# Patient Record
Sex: FEMALE | Race: WHITE | NOT HISPANIC OR LATINO | Employment: OTHER | URBAN - METROPOLITAN AREA
[De-identification: names, ages, dates, MRNs, and addresses within clinical notes are randomized per-mention and may not be internally consistent; named-entity substitution may affect disease eponyms.]

---

## 2016-11-21 LAB
ALT SERPL-CCNC: 134 IU/L (ref 0–32)
AST SERPL-CCNC: 96 IU/L (ref 0–40)
CHOLEST SERPL-MCNC: 234 MG/DL (ref 100–199)
CREAT SERPL-MCNC: 0.73 MG/DL (ref 0.57–1)
GFR SERPL CREATININE-BSD FRML MDRD: 85 ML/MIN/1.73M2
HBA1C MFR BLD: 6.3 % (ref 4.8–5.6)
HDLC SERPL-MCNC: 100 MG/DL
LDLC SERPL CALC-MCNC: 117 MG/DL (ref 0–99)
POTASSIUM SERPL-SCNC: 3.8 MMOL/L (ref 3.5–5.2)
TRIGL SERPL-MCNC: 86 MG/DL (ref 0–149)
TSH SERPL-ACNC: 2.77 UIU/ML (ref 0.45–4.5)

## 2017-01-16 LAB
ALT SERPL-CCNC: 145 IU/L (ref 0–32)
AST SERPL-CCNC: 116 IU/L (ref 0–40)
CREAT SERPL-MCNC: 0.91 MG/DL (ref 0.57–1)
GFR SERPL CREATININE-BSD FRML MDRD: 65 ML/MIN/1.73M2
GLUCOSE SERPL-MCNC: 111 MG/DL (ref 65–99)
HBA1C MFR BLD: 6.2 % (ref 4.8–5.6)
POTASSIUM SERPL-SCNC: 3.9 MMOL/L (ref 3.5–5.2)

## 2017-01-18 ENCOUNTER — TRANSFERRED RECORDS (OUTPATIENT)
Dept: HEALTH INFORMATION MANAGEMENT | Facility: CLINIC | Age: 67
End: 2017-01-18

## 2017-01-18 LAB — EJECTION FRACTION: 3

## 2018-01-09 ENCOUNTER — TRANSFERRED RECORDS (OUTPATIENT)
Dept: HEALTH INFORMATION MANAGEMENT | Facility: CLINIC | Age: 68
End: 2018-01-09

## 2018-01-15 ENCOUNTER — TRANSFERRED RECORDS (OUTPATIENT)
Dept: HEALTH INFORMATION MANAGEMENT | Facility: CLINIC | Age: 68
End: 2018-01-15

## 2018-01-15 LAB
CHOLEST SERPL-MCNC: 274 MG/DL (ref 100–199)
CREAT SERPL-MCNC: 0.82 MG/DL (ref 0.57–1)
GFR SERPL CREATININE-BSD FRML MDRD: 74 ML/MIN/1.73M2
HBA1C MFR BLD: 6.1 % (ref 4.8–5.6)
HDLC SERPL-MCNC: 98 MG/DL
LDLC SERPL CALC-MCNC: 164 MG/DL (ref 0–99)
POTASSIUM SERPL-SCNC: 4.3 MMOL/L (ref 3.5–5.2)
TRIGL SERPL-MCNC: 60 MG/DL (ref 0–149)
TSH SERPL-ACNC: 3.56 UIU/ML (ref 0.45–4.5)

## 2018-04-05 ENCOUNTER — OFFICE VISIT (OUTPATIENT)
Dept: ALLERGY | Facility: CLINIC | Age: 68
End: 2018-04-05
Payer: COMMERCIAL

## 2018-04-05 VITALS
HEART RATE: 72 BPM | DIASTOLIC BLOOD PRESSURE: 78 MMHG | WEIGHT: 219.2 LBS | RESPIRATION RATE: 16 BRPM | HEIGHT: 63 IN | OXYGEN SATURATION: 95 % | SYSTOLIC BLOOD PRESSURE: 162 MMHG | TEMPERATURE: 97 F | BODY MASS INDEX: 38.84 KG/M2

## 2018-04-05 DIAGNOSIS — J31.0 CHRONIC RHINITIS, UNSPECIFIED TYPE: Primary | ICD-10-CM

## 2018-04-05 PROCEDURE — 99204 OFFICE O/P NEW MOD 45 MIN: CPT | Performed by: ALLERGY & IMMUNOLOGY

## 2018-04-05 RX ORDER — AMLODIPINE BESYLATE 5 MG/1
TABLET ORAL
Refills: 4 | COMMUNITY
Start: 2018-02-28 | End: 2018-07-27

## 2018-04-05 RX ORDER — AZELASTINE 1 MG/ML
1-2 SPRAY, METERED NASAL 2 TIMES DAILY
Qty: 1 BOTTLE | Refills: 11 | Status: SHIPPED | OUTPATIENT
Start: 2018-04-05 | End: 2020-07-08

## 2018-04-05 RX ORDER — ATENOLOL 25 MG/1
TABLET ORAL
Refills: 3 | COMMUNITY
Start: 2018-01-15 | End: 2018-11-29

## 2018-04-05 RX ORDER — LOSARTAN POTASSIUM AND HYDROCHLOROTHIAZIDE 12.5; 5 MG/1; MG/1
TABLET ORAL
Refills: 2 | COMMUNITY
Start: 2018-03-18 | End: 2018-08-14

## 2018-04-05 RX ORDER — SIMVASTATIN 10 MG
TABLET ORAL
Refills: 4 | COMMUNITY
Start: 2018-01-15 | End: 2019-03-04

## 2018-04-05 RX ORDER — FLUTICASONE PROPIONATE 50 MCG
1 SPRAY, SUSPENSION (ML) NASAL DAILY PRN
COMMUNITY

## 2018-04-05 RX ORDER — CETIRIZINE HYDROCHLORIDE 10 MG/1
10 TABLET ORAL DAILY
COMMUNITY
End: 2024-03-21

## 2018-04-05 RX ORDER — LEVOTHYROXINE SODIUM 112 UG/1
TABLET ORAL
Refills: 4 | COMMUNITY
Start: 2018-01-31 | End: 2019-03-04

## 2018-04-05 NOTE — MR AVS SNAPSHOT
After Visit Summary   4/5/2018    Glenda Bales    MRN: [id number]           Patient Information     Date Of Birth          1950        Visit Information        Provider Department      4/5/2018 1:00 PM Oc West MD [facility]        Today's Diagnoses     Chronic rhinitis, unspecified type    -  1      Care Instructions    If you have any questions regarding your allergies, asthma, or what we discussed during your visit today please call the allergy clinic or contact us via [facility].    [facility]: [phone]    Do not take any more zyrtec until you come back for allergy testing      Take the flonase (fluticasone) nasal spray - 1 spray in each nostril twice daily    Start the astelin (azelastine) nasal spray - 1 or 2 sprays in each nostril twice daily      You can also try the sinus rinses once a day - use this at least 15 or 20 min before using the nasal sprays          Follow-ups after your visit        Who to contact     If you have questions or need follow up information about today's clinic visit or your schedule please contact [facility] directly at [phone].  Normal or non-critical lab and imaging results will be communicated to you by MyChart, letter or phone within 4 business days after the clinic has received the results. If you do not hear from us within 7 days, please contact the clinic through [employer] or phone. If you have a critical or abnormal lab result, we will notify you by phone as soon as possible.  Submit refill requests through [facility] or call your pharmacy and they will forward the refill request to us. Please allow 3 business days for your refill to be completed.          Additional Information About Your Visit        MyChart Information     [facility] lets you send messages to your doctor, view your test results, renew your prescriptions, schedule appointments and more. To sign up, go to [email or web address] .  "Click on \"Log in\" on the left side of the screen, which will take you to the Welcome page. Then click on \"Sign up Now\" on the right side of the page.     You will be asked to enter the access code listed below, as well as some personal information. Please follow the directions to create your username and password.     Your access code is: [email or web address]  Expires: 2018  1:40 PM     Your access code will  in 90 days. If you need help or a new code, please call your [facility] or [phone].        Care EveryWhere ID     This is your Care EveryWhere ID. This could be used by other organizations to access your [place] medical records  [id number]        Your Vitals Were     Pulse Temperature Respirations Height Pulse Oximetry BMI (Body Mass Index)    72 97  F (36.1  C) (Oral) 16 1.595 m (5' 2.8\") 95% 39.08 kg/m2       Blood Pressure from Last 3 Encounters:   18 162/78    Weight from Last 3 Encounters:   18 99.4 kg (219 lb 3.2 oz)              Today, you had the following     No orders found for display         Today's Medication Changes          These changes are accurate as of 18  1:40 PM.  If you have any questions, ask your nurse or doctor.               Start taking these medicines.        Dose/Directions    azelastine 0.1 % spray   Commonly known as:  ASTELIN   Used for:  Chronic rhinitis, unspecified type   Started by:  Oc West MD        Dose:  1-2 spray   Spray 1-2 sprays into both nostrils 2 times daily   Quantity:  1 Bottle   Refills:  11            Where to get your medicines      [facility] [place] [address] [address]  [address] [address]     Phone:  [phone]     azelastine 0.1 % spray                Primary Care Provider Office Phone # Fax #    Gladis Hernandez [phone]       [facility] [address] [place] [address]  [place] " [place] [address]        Equal Access to Services     [facility]: Hadii aad ku hadronda Sorosamaria, waaxda luqadaha, qaybta kaalmada robinson, margaret garrett vianeythaddeus valenzuelayolanda ibarra panchito steiner. So [facility] [phone].    ATENCIÓN: Si habla español, tiene a whalen disposición servicios gratuitos de asistencia lingüística. Larry al [phone].    We comply with applicable federal civil rights laws and [place] laws. We do not discriminate on the basis of race, color, national origin, age, disability, sex, sexual orientation, or gender identity.            Thank you!     Thank you for choosing [facility]  for your care. Our goal is always to provide you with excellent care. Hearing back from our patients is one way we can continue to improve our services. Please take a few minutes to complete the written survey that you may receive in the mail after your visit with us. Thank you!             Your Updated Medication List - Protect others around you: Learn how to safely use, store and throw away your medicines at www.disposemymeds.org.          This list is accurate as of 4/5/18  1:40 PM.  Always use your most recent med list.                   Brand Name Dispense Instructions for use Diagnosis    ALAWAY 0.025 % Soln ophthalmic solution   Generic drug:  ketotifen      1 drop 2 times daily        amLODIPine 5 MG tablet    NORVASC     TK 1 T PO  QD        atenolol 25 MG tablet    TENORMIN     TK 1 T PO QD        azelastine 0.1 % spray    ASTELIN    1 Bottle    Spray 1-2 sprays into both nostrils 2 times daily    Chronic rhinitis, unspecified type       cetirizine 10 MG tablet    zyrTEC     Take 10 mg by mouth daily        fluticasone 50 MCG/ACT spray    FLONASE     Spray 1 spray into both nostrils daily        levothyroxine 112 MCG tablet    SYNTHROID/LEVOTHROID     TK 1 T PO  QD        losartan-hydrochlorothiazide 50-12.5 MG per tablet    HYZAAR     TK 1 T PO D        omeprazole 20 MG CR capsule    priLOSEC     TK 1 C PO D         simvastatin 10 MG tablet    ZOCOR     TK 1 T PO D HS

## 2018-04-05 NOTE — PATIENT INSTRUCTIONS
If you have any questions regarding your allergies, asthma, or what we discussed during your visit today please call the allergy clinic or contact us via [employer].    [facility]: [phone]    Do not take any more zyrtec until you come back for allergy testing      Take the flonase (fluticasone) nasal spray - 1 spray in each nostril twice daily    Start the astelin (azelastine) nasal spray - 1 or 2 sprays in each nostril twice daily      You can also try the sinus rinses once a day - use this at least 15 or 20 min before using the nasal sprays

## 2018-04-05 NOTE — LETTER
4/5/2018         RE: Glenda Bales  [address]  [place] [facility] [address]        Dear Colleague,    Thank you for referring your patient, Glenda Bales, to the [facility]. Please see a copy of my visit note below.    Glenda Bales was seen in the Allergy Clinic at [facility]. The following are my recommendations regarding her Chronic Rhinitis    1. Return for skin testing when off of antihistamines x 7 days  2. Continue fluticasone nasal spray, 1 spray in each nostril twice daily - appropriate nasal spray technique reviewed  3. Begin azelastine nasal spray, 1 to 2 sprays in each nostril twice daily  4. Recommend sinus irrigation rinse once daily  5. Patient to follow up with Primary Care provider regarding elevated blood pressure      Glenda Bales is a 67 year old female being seen today in consultation for allergies. She states she has had allergy symptoms for at least a year and needs to have this evaluated. Glenda's symptoms consist of sinus pressure and congestion, post-nasal drainage, rhinorrhea in the mornings, and she often has to cough up mucus and clear her throat. In the morning her mucus is pink/red in color otherwise it is usually greenish in color. Glenda states that her symptoms are present throughout the year without seasonal variation. She does feel that strong scents or perfumes do bother her. She does take zyrtec daily but stopped it on Sunday and hasn't noticed much difference since stopping the medication. Glenda does use alaway eye drops every morning for eye itching and feels that this is helpful. She has also been using flonase daily for a couple of years and states she can tell that her symptoms are worse if she does not use the nasal spray.      PAST MEDICAL HISTORY:  Hypertension  Hyperlipidemia  Hypothyroidism  GERD    FAMILY HISTORY:  Niece - asthma    History reviewed. No pertinent surgical history.    ENVIRONMENTAL HISTORY: [place] The home is heated with a forced air. They does not have central air conditioning. The patient's bedroom is furnished with feather/wool bedding or pillows and carpeting in bedroom.  Pets inside the house include None. There is  history of cockroach or mice infestation. There is/are 0 smokers in the house.  The house does not have a damp basement.     SOCIAL HISTORY:   Glenda is employed as . She has missed 1 days of school/work due to illness. She lives alone.    REVIEW OF SYSTEMS:  General: negative for weight gain. negative for weight loss. negative for changes in sleep.   Eyes: positive  for itching. negative for redness. negative for tearing/watering.  Ears: negative for fullness. negative for hearing loss. negative for dizziness.   Nose: negative for snoring.negative for changes in smell. positive  for drainage.   Throat: positive  for hoarseness. negative for sore throat. negative for trouble swallowing.   Lungs: negative for shortness of breath.negative for wheezing. positive  for sputum production.   Cardiovascular: negative for chest pain. positive  for swelling of ankles. negative for fast or irregular heartbeat.   Gastrointestinal: negative for nausea. negative for heartburn. negative for acid reflux.   Musculoskeletal: negative for joint pain. negative for joint stiffness. negative for joint swelling.   Neurologic: negative for seizures. negative for fainting. negative for weakness.   Psychiatric: negative for changes in mood. negative for anxiety.   Endocrine: negative for cold intolerance. negative for heat intolerance. negative for tremors.   Hematologic: negative for easy bruising. negative for easy bleeding.  Integumentary: negative for rash. negative for scaling. negative for nail changes.       Current Outpatient Prescriptions:      atenolol (TENORMIN) 25 MG tablet, TK 1 T PO QD, Disp: , Rfl: 3     levothyroxine (SYNTHROID/LEVOTHROID) 112 MCG tablet,  "TK 1 T PO  QD, Disp: , Rfl: 4     losartan-hydrochlorothiazide (HYZAAR) 50-12.5 MG per tablet, TK 1 T PO D, Disp: , Rfl: 2     omeprazole (PRILOSEC) 20 MG CR capsule, TK 1 C PO D, Disp: , Rfl: 4     amLODIPine (NORVASC) 5 MG tablet, TK 1 T PO  QD, Disp: , Rfl: 4     simvastatin (ZOCOR) 10 MG tablet, TK 1 T PO D HS, Disp: , Rfl: 4     cetirizine (ZYRTEC) 10 MG tablet, Take 10 mg by mouth daily, Disp: , Rfl:      fluticasone (FLONASE) 50 MCG/ACT spray, Spray 1 spray into both nostrils daily, Disp: , Rfl:      ketotifen (ALAWAY) 0.025 % SOLN ophthalmic solution, 1 drop 2 times daily, Disp: , Rfl:     There is no immunization history on file for this patient.  No Known Allergies      EXAM:   /82  Pulse 72  Temp 97  F (36.1  C) (Oral)  Resp 16  Ht 1.595 m (5' 2.8\")  Wt 99.4 kg (219 lb 3.2 oz)  SpO2 95%  BMI 39.08 kg/m2  GENERAL APPEARANCE: alert, cooperative and not in distress  SKIN: no rashes, no lesions  HEAD: atraumatic, normocephalic  EYES: lids and lashes normal, conjunctivae and sclerae clear, pupils equal, round, reactive to light, EOM full and intact  ENT: no scars or lesions, nasal exam showed no discharge, swelling or lesions noted, otoscopy showed external auditory canals clear, tympanic membranes normal, tongue midline and normal, soft palate, uvula, and tonsils normal  NECK: no asymmetry, masses, or scars, supple without significant adenopathy  LUNGS: unlabored respirations, no intercostal retractions or accessory muscle use, clear to auscultation without rales or wheezes  HEART: regular rate and rhythm without murmurs and normal S1 and S2  MUSCULOSKELETAL: no musculoskeletal defects are noted  NEURO: no focal deficits noted  PSYCH: does not appear depressed or anxious    WORKUP: None    ASSESSMENT/PLAN:  Glenda Bales is a 67 year old female here for evaluation of possible allergies. Skin prick testing could not be performed today due to recent antihistamine use. Her symptoms have been present " for the last year, do not vary with seasons, and have been triggered by exposure to irritants such as perfumes. Glenda was counseled that her symptoms are likely due to nonallergic rhinitis and potential causes as well as management were discussed. She is interested in trying alternative medications but requests allergy testing to definitively rule out allergic causes.    1. Return for skin testing when off of antihistamines x 7 days  2. Continue fluticasone nasal spray, 1 spray in each nostril twice daily - appropriate nasal spray technique reviewed  3. Begin azelastine nasal spray, 1 to 2 sprays in each nostril twice daily  4. Recommend sinus irrigation rinse once daily  5. Patient to follow up with Primary Care provider regarding elevated blood pressure      Oc West MD  Allergy/Immunology  [place], [place]      Chart documentation done in part with Dragon Voice Recognition Software. Although reviewed after completion, some word and grammatical errors may remain.      Again, thank you for allowing me to participate in the care of your patient.        Sincerely,        Oc West MD

## 2018-04-05 NOTE — PROGRESS NOTES
Glenda Bales was seen in the Allergy Clinic at [facility]. The following are my recommendations regarding her Chronic Rhinitis    1. Return for skin testing when off of antihistamines x 7 days  2. Continue fluticasone nasal spray, 1 spray in each nostril twice daily - appropriate nasal spray technique reviewed  3. Begin azelastine nasal spray, 1 to 2 sprays in each nostril twice daily  4. Recommend sinus irrigation rinse once daily  5. Patient to follow up with Primary Care provider regarding elevated blood pressure      Glenda Bales is a 67 year old female being seen today in consultation for allergies. She states she has had allergy symptoms for at least a year and needs to have this evaluated. Glenda's symptoms consist of sinus pressure and congestion, post-nasal drainage, rhinorrhea in the mornings, and she often has to cough up mucus and clear her throat. In the morning her mucus is pink/red in color otherwise it is usually greenish in color. Glenda states that her symptoms are present throughout the year without seasonal variation. She does feel that strong scents or perfumes do bother her. She does take zyrtec daily but stopped it on Sunday and hasn't noticed much difference since stopping the medication. Glenda does use alaway eye drops every morning for eye itching and feels that this is helpful. She has also been using flonase daily for a couple of years and states she can tell that her symptoms are worse if she does not use the nasal spray.      PAST MEDICAL HISTORY:  Hypertension  Hyperlipidemia  Hypothyroidism  GERD    FAMILY HISTORY:  Niece - asthma    History reviewed. No pertinent surgical history.    ENVIRONMENTAL HISTORY: [place] The home is heated with a forced air. They does not have central air conditioning. The patient's bedroom is furnished with feather/wool bedding or pillows and carpeting in bedroom.  Pets inside the house  include None. There is  history of cockroach or mice infestation. There is/are 0 smokers in the house.  The house does not have a damp basement.     SOCIAL HISTORY:   Glenda is employed as . She has missed 1 days of school/work due to illness. She lives alone.    REVIEW OF SYSTEMS:  General: negative for weight gain. negative for weight loss. negative for changes in sleep.   Eyes: positive  for itching. negative for redness. negative for tearing/watering.  Ears: negative for fullness. negative for hearing loss. negative for dizziness.   Nose: negative for snoring.negative for changes in smell. positive  for drainage.   Throat: positive  for hoarseness. negative for sore throat. negative for trouble swallowing.   Lungs: negative for shortness of breath.negative for wheezing. positive  for sputum production.   Cardiovascular: negative for chest pain. positive  for swelling of ankles. negative for fast or irregular heartbeat.   Gastrointestinal: negative for nausea. negative for heartburn. negative for acid reflux.   Musculoskeletal: negative for joint pain. negative for joint stiffness. negative for joint swelling.   Neurologic: negative for seizures. negative for fainting. negative for weakness.   Psychiatric: negative for changes in mood. negative for anxiety.   Endocrine: negative for cold intolerance. negative for heat intolerance. negative for tremors.   Hematologic: negative for easy bruising. negative for easy bleeding.  Integumentary: negative for rash. negative for scaling. negative for nail changes.       Current Outpatient Prescriptions:      atenolol (TENORMIN) 25 MG tablet, TK 1 T PO QD, Disp: , Rfl: 3     levothyroxine (SYNTHROID/LEVOTHROID) 112 MCG tablet, TK 1 T PO  QD, Disp: , Rfl: 4     losartan-hydrochlorothiazide (HYZAAR) 50-12.5 MG per tablet, TK 1 T PO D, Disp: , Rfl: 2     omeprazole (PRILOSEC) 20 MG CR capsule, TK 1 C PO D, Disp: , Rfl: 4     amLODIPine (NORVASC) 5 MG tablet, TK 1 T  "PO  QD, Disp: , Rfl: 4     simvastatin (ZOCOR) 10 MG tablet, TK 1 T PO D HS, Disp: , Rfl: 4     cetirizine (ZYRTEC) 10 MG tablet, Take 10 mg by mouth daily, Disp: , Rfl:      fluticasone (FLONASE) 50 MCG/ACT spray, Spray 1 spray into both nostrils daily, Disp: , Rfl:      ketotifen (ALAWAY) 0.025 % SOLN ophthalmic solution, 1 drop 2 times daily, Disp: , Rfl:     There is no immunization history on file for this patient.  No Known Allergies      EXAM:   /82  Pulse 72  Temp 97  F (36.1  C) (Oral)  Resp 16  Ht 1.595 m (5' 2.8\")  Wt 99.4 kg (219 lb 3.2 oz)  SpO2 95%  BMI 39.08 kg/m2  GENERAL APPEARANCE: alert, cooperative and not in distress  SKIN: no rashes, no lesions  HEAD: atraumatic, normocephalic  EYES: lids and lashes normal, conjunctivae and sclerae clear, pupils equal, round, reactive to light, EOM full and intact  ENT: no scars or lesions, nasal exam showed no discharge, swelling or lesions noted, otoscopy showed external auditory canals clear, tympanic membranes normal, tongue midline and normal, soft palate, uvula, and tonsils normal  NECK: no asymmetry, masses, or scars, supple without significant adenopathy  LUNGS: unlabored respirations, no intercostal retractions or accessory muscle use, clear to auscultation without rales or wheezes  HEART: regular rate and rhythm without murmurs and normal S1 and S2  MUSCULOSKELETAL: no musculoskeletal defects are noted  NEURO: no focal deficits noted  PSYCH: does not appear depressed or anxious    WORKUP: None    ASSESSMENT/PLAN:  Glenda Bales is a 67 year old female here for evaluation of possible allergies. Skin prick testing could not be performed today due to recent antihistamine use. Her symptoms have been present for the last year, do not vary with seasons, and have been triggered by exposure to irritants such as perfumes. Glenda was counseled that her symptoms are likely due to nonallergic rhinitis and potential causes as well as management were " discussed. She is interested in trying alternative medications but requests allergy testing to definitively rule out allergic causes.    1. Return for skin testing when off of antihistamines x 7 days  2. Continue fluticasone nasal spray, 1 spray in each nostril twice daily - appropriate nasal spray technique reviewed  3. Begin azelastine nasal spray, 1 to 2 sprays in each nostril twice daily  4. Recommend sinus irrigation rinse once daily  5. Patient to follow up with Primary Care provider regarding elevated blood pressure      Oc West MD  Allergy/Immunology  [facility] and [place], [place]      Chart documentation done in part with Dragon Voice Recognition Software. Although reviewed after completion, some word and grammatical errors may remain.

## 2018-04-09 ENCOUNTER — OFFICE VISIT (OUTPATIENT)
Dept: ALLERGY | Facility: CLINIC | Age: 68
End: 2018-04-09
Payer: COMMERCIAL

## 2018-04-09 VITALS
BODY MASS INDEX: 39.65 KG/M2 | DIASTOLIC BLOOD PRESSURE: 80 MMHG | TEMPERATURE: 97.6 F | HEART RATE: 71 BPM | SYSTOLIC BLOOD PRESSURE: 162 MMHG | WEIGHT: 222.4 LBS | OXYGEN SATURATION: 95 %

## 2018-04-09 DIAGNOSIS — J31.0 NONALLERGIC RHINITIS: Primary | ICD-10-CM

## 2018-04-09 PROCEDURE — 95004 PERQ TESTS W/ALRGNC XTRCS: CPT | Performed by: ALLERGY & IMMUNOLOGY

## 2018-04-09 PROCEDURE — 99207 ZZC DROP WITH A PROCEDURE: CPT | Mod: 25 | Performed by: ALLERGY & IMMUNOLOGY

## 2018-04-09 NOTE — LETTER
4/9/2018         RE: Glenda Bales  [address]  [facility] [address]        Dear Colleague,    Thank you for referring your patient, Glenda Bales, to the [facility]. Please see a copy of my visit note below.    Glenda Bales was seen in the Allergy Clinic at [facility]. The following are my recommendations regarding her Nonallergic Rhinitis    1. Continue fluticasone nasal spray, 1 spray in each nostril twice daily  2. Continue azelastine nasal spray, 1 to 2 sprays in each nostril twice daily  3. Recommend sinus irrigation rinse once daily  4. Patient to follow up with Primary Care provider regarding elevated blood pressure.  5. Follow-up in the allergy clinic as needed      Glenda Bales is a 67 year old American female who is seen today for skin testing. She has been feeling well and remained off of antihistamines x 7 days.      REVIEW OF SYSTEMS:  General: negative for weight gain. negative for weight loss. negative for changes in sleep.   Eyes: positive  for itching. negative for redness. negative for tearing/watering.  Ears: negative for fullness. negative for hearing loss. negative for dizziness.   Nose: negative for snoring.negative for changes in smell. positive for drainage.   Throat: negative for hoarseness. negative for sore throat. negative for trouble swallowing.   Lungs: negative for shortness of breath.negative for wheezing. positive  for sputum production.   Cardiovascular: negative for chest pain. positive  for swelling of ankles. negative for fast or irregular heartbeat.   Gastrointestinal: negative for nausea. negative for heartburn. negative for acid reflux.   Musculoskeletal: negative for joint pain. negative for joint stiffness. negative for joint swelling.   Neurologic: negative for seizures. negative for fainting. negative for weakness.   Psychiatric: negative for changes in mood. negative for anxiety.   Endocrine: negative for cold intolerance.  negative for heat intolerance. negative for tremors.   Hematologic: negative for easy bruising. negative for easy bleeding.  Integumentary: negative for rash. negative for scaling. negative for nail changes.       Current Outpatient Prescriptions:      atenolol (TENORMIN) 25 MG tablet, TK 1 T PO QD, Disp: , Rfl: 3     levothyroxine (SYNTHROID/LEVOTHROID) 112 MCG tablet, TK 1 T PO  QD, Disp: , Rfl: 4     losartan-hydrochlorothiazide (HYZAAR) 50-12.5 MG per tablet, TK 1 T PO D, Disp: , Rfl: 2     omeprazole (PRILOSEC) 20 MG CR capsule, TK 1 C PO D, Disp: , Rfl: 4     amLODIPine (NORVASC) 5 MG tablet, TK 1 T PO  QD, Disp: , Rfl: 4     simvastatin (ZOCOR) 10 MG tablet, TK 1 T PO D HS, Disp: , Rfl: 4     fluticasone (FLONASE) 50 MCG/ACT spray, Spray 1 spray into both nostrils daily, Disp: , Rfl:      ketotifen (ALAWAY) 0.025 % SOLN ophthalmic solution, 1 drop 2 times daily, Disp: , Rfl:      azelastine (ASTELIN) 0.1 % spray, Spray 1-2 sprays into both nostrils 2 times daily, Disp: 1 Bottle, Rfl: 11     cetirizine (ZYRTEC) 10 MG tablet, Take 10 mg by mouth daily, Disp: , Rfl:     EXAM:   /80  Pulse 71  Temp 97.6  F (36.4  C) (Oral)  Wt 100.9 kg (222 lb 6.4 oz)  SpO2 95%  BMI 39.65 kg/m2  GENERAL APPEARANCE: alert, cooperative and not in distress  SKIN: no rashes, no lesions  MUSCULOSKELETAL: no musculoskeletal defects are noted  NEURO: no focal deficits noted  PSYCH: does not appear depressed or anxious      WORKUP:  Skin testing    Skin prick testing was performed to our adult aeroallergen panel. She had negative reaction to all antigens tested with appropriate responses to controls.    ASSESSMENT/PLAN:  Glenda Bales is a 67 year old female here for skin testing. Allergy testing was negative for evidence of sensitization to seasonal or perennial aeroallergens. She reports that she feels her symptoms have improved since stopping zyrtec and adding astelin nasal spray. She has not yet tried the sinus irrigation  rinses.    1. Continue fluticasone nasal spray, 1 spray in each nostril twice daily  2. Continue azelastine nasal spray, 1 to 2 sprays in each nostril twice daily  3. Recommend sinus irrigation rinse once daily  4. Patient to follow up with Primary Care provider regarding elevated blood pressure.  5. Follow-up in the allergy clinic as needed      Oc West MD  Allergy/Immunology  [place], [place]      Chart documentation done in part with Dragon Voice Recognition Software. Although reviewed after completion, some word and grammatical errors may remain.      Again, thank you for allowing me to participate in the care of your patient.        Sincerely,        Oc West MD

## 2018-04-09 NOTE — NURSING NOTE
Per provider verbal order, placed Adult Environmental Panel scratch test.  Consent was obtained prior to procedure.  Once panels were placed, patient was monitored for 15 minutes in clinic.  Provider read test after 15 minutes..  Pt tolerated procedure well.  All questions and concerns were addressed at office visit.     Penny Rees RN

## 2018-04-09 NOTE — MR AVS SNAPSHOT
"              After Visit Summary   4/9/2018    Glenda Bales    MRN: [id number]           Patient Information     Date Of Birth          1950        Visit Information        Provider Department      4/9/2018 3:00 PM Oc West MD [facility]        Today's Diagnoses     Nonallergic rhinitis    -  1      Care Instructions    If you have any questions regarding your allergies, asthma, or what we discussed during your visit today please call the allergy clinic or contact us via [employer].    [facility] ([place], [place]): [phone]  [facility]: [phone]            Follow-ups after your visit        Who to contact     If you have questions or need follow up information about today's clinic visit or your schedule please contact [facility] directly at [phone].  Normal or non-critical lab and imaging results will be communicated to you by MyChart, letter or phone within 4 business days after the clinic has received the results. If you do not hear from us within 7 days, please contact the clinic through [employer] or phone. If you have a critical or abnormal lab result, we will notify you by phone as soon as possible.  Submit refill requests through [employer] or call your pharmacy and they will forward the refill request to us. Please allow 3 business days for your refill to be completed.          Additional Information About Your Visit        MyChart Information     [employer] lets you send messages to your doctor, view your test results, renew your prescriptions, schedule appointments and more. To sign up, go to [email or web address] . Click on \"Log in\" on the left side of the screen, which will take you to the Welcome page. Then click on \"Sign up Now\" on the right side of the page.     You will be asked to enter the access code listed below, as well as some personal information. Please follow the directions to create your username and password.   "   Your access code is: [email or web address]  Expires: 2018  1:40 PM     Your access code will  in 90 days. If you need help or a new code, please call your [place] [facility] clinic or [phone].        Care EveryWhere ID     This is your Care EveryWhere ID. This could be used by other organizations to access your [facility] medical records  [id number]        Your Vitals Were     Pulse Temperature Pulse Oximetry BMI (Body Mass Index)          71 97.6  F (36.4  C) (Oral) 95% 39.65 kg/m2         Blood Pressure from Last 3 Encounters:   18 162/80   18 162/78    Weight from Last 3 Encounters:   18 100.9 kg (222 lb 6.4 oz)   18 99.4 kg (219 lb 3.2 oz)              We Performed the Following     ALLERGY SKIN TESTS,ALLERGENS        Primary Care Provider Office Phone # Fax #    Gladis Hernandez [phone]       [facility] [address] [facility] [address]        Equal Access to Services     [facility]: Hadii aad ku hadasho Soomaali, waaxda luqadaha, qaybta kaalmada adeegyamarshall, margaret flanagan . So [facility] [phone].    ATENCIÓN: Si habla español, tiene a whalen disposición servicios gratuitos de asistencia lingüística. Larry al [phone].    We comply with applicable federal civil rights laws and [place] laws. We do not discriminate on the basis of race, color, national origin, age, disability, sex, sexual orientation, or gender identity.            Thank you!     Thank you for choosing [facility]  for your care. Our goal is always to provide you with excellent care. Hearing back from our patients is one way we can continue to improve our services. Please take a few minutes to complete the written survey that you may receive in the mail after your visit with us. Thank you!             Your Updated Medication List - Protect others around you: Learn how to safely use, store and throw away your medicines at  www.disposemymeds.org.          This list is accurate as of 4/9/18  3:39 PM.  Always use your most recent med list.                   Brand Name Dispense Instructions for use Diagnosis    ALAWAY 0.025 % Soln ophthalmic solution   Generic drug:  ketotifen      1 drop 2 times daily        amLODIPine 5 MG tablet    NORVASC     TK 1 T PO  QD        atenolol 25 MG tablet    TENORMIN     TK 1 T PO QD        azelastine 0.1 % spray    ASTELIN    1 Bottle    Spray 1-2 sprays into both nostrils 2 times daily    Chronic rhinitis, unspecified type       cetirizine 10 MG tablet    zyrTEC     Take 10 mg by mouth daily        fluticasone 50 MCG/ACT spray    FLONASE     Spray 1 spray into both nostrils daily        levothyroxine 112 MCG tablet    SYNTHROID/LEVOTHROID     TK 1 T PO  QD        losartan-hydrochlorothiazide 50-12.5 MG per tablet    HYZAAR     TK 1 T PO D        omeprazole 20 MG CR capsule    priLOSEC     TK 1 C PO D        simvastatin 10 MG tablet    ZOCOR     TK 1 T PO D HS

## 2018-04-09 NOTE — PATIENT INSTRUCTIONS
If you have any questions regarding your allergies, asthma, or what we discussed during your visit today please call the allergy clinic or contact us via [employer].    [facility] ([place], [place]): [phone]  [facility]: [phone]

## 2018-04-09 NOTE — PROGRESS NOTES
Glenda Bales was seen in the Allergy Clinic at [facility]. The following are my recommendations regarding her Nonallergic Rhinitis    1. Continue fluticasone nasal spray, 1 spray in each nostril twice daily  2. Continue azelastine nasal spray, 1 to 2 sprays in each nostril twice daily  3. Recommend sinus irrigation rinse once daily  4. Patient to follow up with Primary Care provider regarding elevated blood pressure.  5. Follow-up in the allergy clinic as needed      Glenda Bales is a 67 year old American female who is seen today for skin testing. She has been feeling well and remained off of antihistamines x 7 days.      REVIEW OF SYSTEMS:  General: negative for weight gain. negative for weight loss. negative for changes in sleep.   Eyes: positive  for itching. negative for redness. negative for tearing/watering.  Ears: negative for fullness. negative for hearing loss. negative for dizziness.   Nose: negative for snoring.negative for changes in smell. positive for drainage.   Throat: negative for hoarseness. negative for sore throat. negative for trouble swallowing.   Lungs: negative for shortness of breath.negative for wheezing. positive  for sputum production.   Cardiovascular: negative for chest pain. positive  for swelling of ankles. negative for fast or irregular heartbeat.   Gastrointestinal: negative for nausea. negative for heartburn. negative for acid reflux.   Musculoskeletal: negative for joint pain. negative for joint stiffness. negative for joint swelling.   Neurologic: negative for seizures. negative for fainting. negative for weakness.   Psychiatric: negative for changes in mood. negative for anxiety.   Endocrine: negative for cold intolerance. negative for heat intolerance. negative for tremors.   Hematologic: negative for easy bruising. negative for easy bleeding.  Integumentary: negative for rash. negative for scaling. negative for nail changes.       Current Outpatient  Prescriptions:      atenolol (TENORMIN) 25 MG tablet, TK 1 T PO QD, Disp: , Rfl: 3     levothyroxine (SYNTHROID/LEVOTHROID) 112 MCG tablet, TK 1 T PO  QD, Disp: , Rfl: 4     losartan-hydrochlorothiazide (HYZAAR) 50-12.5 MG per tablet, TK 1 T PO D, Disp: , Rfl: 2     omeprazole (PRILOSEC) 20 MG CR capsule, TK 1 C PO D, Disp: , Rfl: 4     amLODIPine (NORVASC) 5 MG tablet, TK 1 T PO  QD, Disp: , Rfl: 4     simvastatin (ZOCOR) 10 MG tablet, TK 1 T PO D HS, Disp: , Rfl: 4     fluticasone (FLONASE) 50 MCG/ACT spray, Spray 1 spray into both nostrils daily, Disp: , Rfl:      ketotifen (ALAWAY) 0.025 % SOLN ophthalmic solution, 1 drop 2 times daily, Disp: , Rfl:      azelastine (ASTELIN) 0.1 % spray, Spray 1-2 sprays into both nostrils 2 times daily, Disp: 1 Bottle, Rfl: 11     cetirizine (ZYRTEC) 10 MG tablet, Take 10 mg by mouth daily, Disp: , Rfl:     EXAM:   /80  Pulse 71  Temp 97.6  F (36.4  C) (Oral)  Wt 100.9 kg (222 lb 6.4 oz)  SpO2 95%  BMI 39.65 kg/m2  GENERAL APPEARANCE: alert, cooperative and not in distress  SKIN: no rashes, no lesions  MUSCULOSKELETAL: no musculoskeletal defects are noted  NEURO: no focal deficits noted  PSYCH: does not appear depressed or anxious      WORKUP:  Skin testing    Skin prick testing was performed to our adult aeroallergen panel. She had negative reaction to all antigens tested with appropriate responses to controls.    ASSESSMENT/PLAN:  Glenda Bales is a 67 year old female here for skin testing. Allergy testing was negative for evidence of sensitization to seasonal or perennial aeroallergens. She reports that she feels her symptoms have improved since stopping zyrtec and adding astelin nasal spray. She has not yet tried the sinus irrigation rinses.    1. Continue fluticasone nasal spray, 1 spray in each nostril twice daily  2. Continue azelastine nasal spray, 1 to 2 sprays in each nostril twice daily  3. Recommend sinus irrigation rinse once daily  4. Patient to follow  up with Primary Care provider regarding elevated blood pressure.  5. Follow-up in the allergy clinic as needed      Oc West MD  Allergy/Immunology  [facility] and [place], [place]      Chart documentation done in part with Dragon Voice Recognition Software. Although reviewed after completion, some word and grammatical errors may remain.

## 2018-04-24 ENCOUNTER — OFFICE VISIT (OUTPATIENT)
Dept: FAMILY MEDICINE | Facility: CLINIC | Age: 68
End: 2018-04-24
Payer: COMMERCIAL

## 2018-04-24 VITALS
DIASTOLIC BLOOD PRESSURE: 80 MMHG | TEMPERATURE: 97.7 F | RESPIRATION RATE: 14 BRPM | HEART RATE: 66 BPM | OXYGEN SATURATION: 95 % | BODY MASS INDEX: 39.37 KG/M2 | WEIGHT: 222.2 LBS | SYSTOLIC BLOOD PRESSURE: 138 MMHG | HEIGHT: 63 IN

## 2018-04-24 DIAGNOSIS — Z12.31 VISIT FOR SCREENING MAMMOGRAM: ICD-10-CM

## 2018-04-24 DIAGNOSIS — Z12.11 SCREEN FOR COLON CANCER: ICD-10-CM

## 2018-04-24 DIAGNOSIS — Z11.59 NEED FOR HEPATITIS C SCREENING TEST: ICD-10-CM

## 2018-04-24 DIAGNOSIS — E78.5 HYPERLIPIDEMIA LDL GOAL <100: ICD-10-CM

## 2018-04-24 DIAGNOSIS — Z00.00 ROUTINE GENERAL MEDICAL EXAMINATION AT A HEALTH CARE FACILITY: Primary | ICD-10-CM

## 2018-04-24 DIAGNOSIS — I10 ESSENTIAL HYPERTENSION, BENIGN: ICD-10-CM

## 2018-04-24 DIAGNOSIS — Z23 ENCOUNTER FOR IMMUNIZATION: ICD-10-CM

## 2018-04-24 DIAGNOSIS — J31.0 OTHER CHRONIC RHINITIS: ICD-10-CM

## 2018-04-24 DIAGNOSIS — Z78.0 ASYMPTOMATIC POSTMENOPAUSAL STATUS: ICD-10-CM

## 2018-04-24 DIAGNOSIS — E66.01 MORBID OBESITY DUE TO EXCESS CALORIES (H): ICD-10-CM

## 2018-04-24 DIAGNOSIS — E03.9 ACQUIRED HYPOTHYROIDISM: ICD-10-CM

## 2018-04-24 PROBLEM — K21.9 GASTROESOPHAGEAL REFLUX DISEASE WITHOUT ESOPHAGITIS: Status: ACTIVE | Noted: 2018-04-24

## 2018-04-24 PROCEDURE — 90472 IMMUNIZATION ADMIN EACH ADD: CPT | Performed by: FAMILY MEDICINE

## 2018-04-24 PROCEDURE — 90715 TDAP VACCINE 7 YRS/> IM: CPT | Performed by: FAMILY MEDICINE

## 2018-04-24 PROCEDURE — 90471 IMMUNIZATION ADMIN: CPT | Performed by: FAMILY MEDICINE

## 2018-04-24 PROCEDURE — 90732 PPSV23 VACC 2 YRS+ SUBQ/IM: CPT | Performed by: FAMILY MEDICINE

## 2018-04-24 PROCEDURE — 99213 OFFICE O/P EST LOW 20 MIN: CPT | Mod: 25 | Performed by: FAMILY MEDICINE

## 2018-04-24 PROCEDURE — 99387 INIT PM E/M NEW PAT 65+ YRS: CPT | Mod: 25 | Performed by: FAMILY MEDICINE

## 2018-04-24 ASSESSMENT — PAIN SCALES - GENERAL: PAINLEVEL: NO PAIN (0)

## 2018-04-24 NOTE — PATIENT INSTRUCTIONS
Preventive Health Recommendations    Female Ages 65 +    Yearly exam:     See your health care provider every year in order to  o Review health changes.   o Discuss preventive care.    o Review your medicines if your doctor has prescribed any.      You no longer need a yearly Pap test unless you've had an abnormal Pap test in the past 10 years. If you have vaginal symptoms, such as bleeding or discharge, be sure to talk with your provider about a Pap test.      Every 1 to 2 years, have a mammogram.  If you are over 69, talk with your health care provider about whether or not you want to continue having screening mammograms.      Every 10 years, have a colonoscopy. Or, have a yearly FIT test (stool test). These exams will check for colon cancer.       Have a cholesterol test every 5 years, or more often if your doctor advises it.       Have a diabetes test (fasting glucose) every three years. If you are at risk for diabetes, you should have this test more often.       At age 65, have a bone density scan (DEXA) to check for osteoporosis (brittle bone disease).    Shots:    Get a flu shot each year.    Get a tetanus shot every 10 years.    Talk to your doctor about your pneumonia vaccines. There are now two you should receive - Pneumovax (PPSV 23) and Prevnar (PCV 13).    Talk to your doctor about the shingles vaccine.    Talk to your doctor about the hepatitis B vaccine.    Nutrition:     Eat at least 5 servings of fruits and vegetables each day.      Eat whole-grain bread, whole-wheat pasta and brown rice instead of white grains and rice.      Talk to your provider about Calcium and Vitamin D.     Lifestyle    Exercise at least 150 minutes a week (30 minutes a day, 5 days a week). This will help you control your weight and prevent disease.      Limit alcohol to one drink per day.      No smoking.       Wear sunscreen to prevent skin cancer.       See your dentist twice a year for an exam and cleaning.      See your  eye doctor every 1 to 2 years to screen for conditions such as glaucoma, macular degeneration and cataracts.  [facility]    If you have any questions regarding to your visit please contact your care team:       Team Red:   Clinic Hours Telephone Number   Dr. Carin Aparicio, NP   7am-7pm  Monday - Thursday   7am-5pm  Fridays  [phone]  (Appointment scheduling available 24/7)    Questions about your recent visit?   Team Line  [phone]   Urgent Care - [place] and [facility] - 11am-9pm Monday-Friday Saturday-Sunday- 9am-5pm   [place] - 5pm-9pm Monday-Friday Saturday-Sunday- 9am-5pm  [phone] - [place]  [phone] - [place]       What options do I have for a visit other than an office visit? We offer electronic visits (e-visits) and telephone visits, when medically appropriate.  Please check with your medical insurance to see if these types of visits are covered, as you will be responsible for any charges that are not paid by your insurance.      You can use [employer] (secure electronic communication) to access to your chart, send your primary care provider a message, or make an appointment. Ask a team member how to get started.     For a price quote for your services, please call our Consumer Price Line at [phone] or our Imaging Cost estimation line at [phone] (for imaging tests).        Allyson Beckman MA

## 2018-04-24 NOTE — MR AVS SNAPSHOT
After Visit Summary   4/24/2018    Glenda Bales    MRN: [id number]           Patient Information     Date Of Birth          1950        Visit Information        Provider Department      4/24/2018 5:00 PM Marysol Cisse MD [facility] [place]        Today's Diagnoses     Essential hypertension, benign    -  1    Screen for colon cancer        Need for prophylactic vaccination with tetanus-diphtheria (TD)        Acquired hypothyroidism        Hyperlipidemia LDL goal <100        Encounter for immunization        Other chronic rhinitis        Asymptomatic postmenopausal status        Visit for screening mammogram        Need for hepatitis C screening test          Care Instructions      Preventive Health Recommendations    Female Ages 65 +    Yearly exam:     See your health care provider every year in order to  o Review health changes.   o Discuss preventive care.    o Review your medicines if your doctor has prescribed any.      You no longer need a yearly Pap test unless you've had an abnormal Pap test in the past 10 years. If you have vaginal symptoms, such as bleeding or discharge, be sure to talk with your provider about a Pap test.      Every 1 to 2 years, have a mammogram.  If you are over 69, talk with your health care provider about whether or not you want to continue having screening mammograms.      Every 10 years, have a colonoscopy. Or, have a yearly FIT test (stool test). These exams will check for colon cancer.       Have a cholesterol test every 5 years, or more often if your doctor advises it.       Have a diabetes test (fasting glucose) every three years. If you are at risk for diabetes, you should have this test more often.       At age 65, have a bone density scan (DEXA) to check for osteoporosis (brittle bone disease).    Shots:    Get a flu shot each year.    Get a tetanus shot every 10 years.    Talk to your doctor about your pneumonia vaccines. There are now two you  should receive - Pneumovax (PPSV 23) and Prevnar (PCV 13).    Talk to your doctor about the shingles vaccine.    Talk to your doctor about the hepatitis B vaccine.    Nutrition:     Eat at least 5 servings of fruits and vegetables each day.      Eat whole-grain bread, whole-wheat pasta and brown rice instead of white grains and rice.      Talk to your provider about Calcium and Vitamin D.     Lifestyle    Exercise at least 150 minutes a week (30 minutes a day, 5 days a week). This will help you control your weight and prevent disease.      Limit alcohol to one drink per day.      No smoking.       Wear sunscreen to prevent skin cancer.       See your dentist twice a year for an exam and cleaning.      See your eye doctor every 1 to 2 years to screen for conditions such as glaucoma, macular degeneration and cataracts.  [facility]    If you have any questions regarding to your visit please contact your care team:       Team Red:   Clinic Hours Telephone Number   Dr. Carin Aparicio, NP   7am-7pm  Monday - Thursday   7am-5pm  Fridays  [phone]  (Appointment scheduling available 24/7)    Questions about your recent visit?   Team Line  [phone]   Urgent Care - [place] - 11am-9pm Monday-Friday Saturday-Sunday- 9am-5pm   [place] - 5pm-9pm Monday-Friday Saturday-Sunday- 9am-5pm  [phone] - [place]  [phone] - [place]       What options do I have for a visit other than an office visit? We offer electronic visits (e-visits) and telephone visits, when medically appropriate.  Please check with your medical insurance to see if these types of visits are covered, as you will be responsible for any charges that are not paid by your insurance.      You can use [employer] (secure electronic communication) to access to your chart, send your primary care provider a message, or make an appointment. Ask a team member how  to get started.     For a price quote for your services, please call our Consumer Price Line at [phone] or our Imaging Cost estimation line at [phone] (for imaging tests).        Allyson Beckman MA              Follow-ups after your visit        Future tests that were ordered for you today     Open Future Orders        Priority Expected Expires Ordered    DEXA HIP/PELVIS/SPINE - Future Routine  4/18/2019 4/24/2018    MA SCREENING DIGITAL BILAT - Future  (s+30) Routine  4/18/2019 4/24/2018    Hepatitis C Screen Reflex to HCV RNA Quant and Genotype Routine  5/24/2018 4/24/2018    Lipid panel reflex to direct LDL Fasting Routine  5/24/2018 4/24/2018    Fecal colorectal cancer screen (FIT) Routine 5/15/2018 7/17/2018 4/24/2018    Comprehensive metabolic panel Routine  5/24/2018 4/24/2018    TSH with free T4 reflex Routine  5/24/2018 4/24/2018    Fecal colorectal cancer screen (FIT) Routine 5/15/2018 7/17/2018 4/24/2018            Who to contact     If you have questions or need follow up information about today's clinic visit or your schedule please contact [facility] directly at [phone].  Normal or non-critical lab and imaging results will be communicated to you by kontoblickhart, letter or phone within 4 business days after the clinic has received the results. If you do not hear from us within 7 days, please contact the clinic through kontoblickhart or phone. If you have a critical or abnormal lab result, we will notify you by phone as soon as possible.  Submit refill requests through [employer] or call your pharmacy and they will forward the refill request to us. Please allow 3 business days for your refill to be completed.          Additional Information About Your Visit        kontoblickharNetechy Information     [employer] gives you secure access to your electronic health record. If you see a primary care provider, you can also send messages to your care team and make appointments. If you have questions, please call  "your primary care clinic.  If you do not have a primary care provider, please call [phone] and they will assist you.        Care EveryWhere ID     This is your Care EveryWhere ID. This could be used by other organizations to access your [place] medical records  [id number]        Your Vitals Were     Pulse Temperature Respirations Height Pulse Oximetry BMI (Body Mass Index)    66 97.7  F (36.5  C) (Oral) 14 5' 3\" (1.6 m) 95% 39.36 kg/m2       Blood Pressure from Last 3 Encounters:   04/24/18 138/80   04/09/18 162/80   04/05/18 162/78    Weight from Last 3 Encounters:   04/24/18 222 lb 3.2 oz (100.8 kg)   04/09/18 222 lb 6.4 oz (100.9 kg)   04/05/18 219 lb 3.2 oz (99.4 kg)              We Performed the Following     PNEUMOCOCCAL VACCINE,ADULT,SQ OR IM     TDAP VACCINE (ADACEL)        Primary Care Provider Office Phone # Fax #    Gladis Hernandez [phone]       [facility] [address] [address]        Equal Access to Services     [facility]: Hadii aad ku hadasho Soraffaeleali, waaxda luqadaha, qaybta kaalmada adeyolandayada, margaret steiner. So [facility] [phone].    ATENCIÓN: Si habla español, tiene a whalen disposición servicios gratuitos de asistencia lingüística. [facility] [phone].    We comply with applicable federal civil rights laws and [place] laws. We do not discriminate on the basis of race, color, national origin, age, disability, sex, sexual orientation, or gender identity.            Thank you!     Thank you for choosing [facility] [place]  for your care. Our goal is always to provide you with excellent care. Hearing back from our patients is one way we can continue to improve our services. Please take a few minutes to complete the written survey that you may receive in the mail after your visit with us. Thank you!             Your Updated Medication List - Protect others around you: Learn how to safely use, store and " throw away your medicines at www.disposemymeds.org.          This list is accurate as of 4/24/18  5:46 PM.  Always use your most recent med list.                   Brand Name Dispense Instructions for use Diagnosis    ALAWAY 0.025 % Soln ophthalmic solution   Generic drug:  ketotifen      1 drop 2 times daily        amLODIPine 5 MG tablet    NORVASC     TK 1 T PO  QD        atenolol 25 MG tablet    TENORMIN     TK 1 T PO QD        azelastine 0.1 % spray    ASTELIN    1 Bottle    Spray 1-2 sprays into both nostrils 2 times daily    Chronic rhinitis, unspecified type       cetirizine 10 MG tablet    zyrTEC     Take 10 mg by mouth daily        fluticasone 50 MCG/ACT spray    FLONASE     Spray 1 spray into both nostrils daily        levothyroxine 112 MCG tablet    SYNTHROID/LEVOTHROID     TK 1 T PO  QD        losartan-hydrochlorothiazide 50-12.5 MG per tablet    HYZAAR     TK 1 T PO D        omeprazole 20 MG CR capsule    priLOSEC     TK 1 C PO D        simvastatin 10 MG tablet    ZOCOR     TK 1 T PO D HS

## 2018-04-24 NOTE — PROGRESS NOTES
SUBJECTIVE:   Glenda Bales is a 67 year old female who presents for Preventive Visit.    She is a new patient to me.  Are you in the first 12 months of your Medicare Part B coverage?  No    Healthy Habits:    Do you get at least three servings of calcium containing foods daily (dairy, green leafy vegetables, etc.)? yes    Amount of exercise or daily activities, outside of work: none    Problems taking medications regularly No    Medication side effects: No    Have you had an eye exam in the past two years? yes    Do you see a dentist twice per year? Once a year    Do you have sleep apnea, excessive snoring or daytime drowsiness?wakes up at night      Ability to successfully perform activities of daily living: Yes, no assistance needed    Home safety:  none identified     Hearing impairment: No    Fall risk:  Fallen 2 or more times in the past year?: Yes (once)  Any fall with injury in the past year?: No    click delete button to remove this line now    COGNITIVE SCREEN  1) Repeat 3 items (Banana, Sunrise, Chair)    2) Clock draw:    3) 3 item recall: Recalls 3 objects  Results: 3 items recalled: COGNITIVE IMPAIRMENT LESS LIKELY    Mini-CogTM Copyright S Javy. Licensed by the author for use in [facility]; reprinted with permission ([email or web address]). All rights reserved.                Hyperlipidemia Follow-Up      Rate your low fat/cholesterol diet?: good    Taking statin?  Yes, no muscle aches from statin    Other lipid medications/supplements?:  none    Hypertension Follow-up      Outpatient blood pressures are not being checked.    Low Salt Diet: no added salt    Hypothyroidism Follow-up      Since last visit, patient describes the following symptoms: Weight stable, no hair loss, no skin changes, no constipation, no loose stools      Reviewed and updated as needed this visit by clinical staff         Reviewed and updated as needed this visit by Provider        Social History   Substance Use Topics      Smoking status: Former Smoker     Types: Cigarettes     Smokeless tobacco: Never Used     Alcohol use Not on file       If you drink alcohol do you typically have >3 drinks per day or >7 drinks per week? Yes - AUDIT SCORE:     No flowsheet data found.                        Today's PHQ-2 Score: No flowsheet data found.    Do you feel safe in your environment - Yes        Current providers sharing in care for this patient include:   Patient Care Team:  Gladis Hernandez MD as PCP - General (Family Practice)    The following health maintenance items are reviewed in Epic and correct as of today:  Health Maintenance   Topic Date Due     HEPATITIS C SCREENING  11/20/1967     LIPID SCREEN Q5 YR FEMALE (SYSTEM ASSIGNED)  11/20/1994     MAMMO SCREEN Q2 YR (SYSTEM ASSIGNED)  11/20/1999     COLON CANCER SCREEN (SYSTEM ASSIGNED)  11/20/1999     ADVANCE DIRECTIVE PLANNING Q5 YRS  11/20/2004     FALL RISK ASSESSMENT  11/20/2014     DEXA SCAN SCREENING (SYSTEM ASSIGNED)  11/20/2014     PNEUMOCOCCAL (1 of 2 - PCV13) 11/20/2014     TETANUS IMMUNIZATION (SYSTEM ASSIGNED)  01/30/2016     INFLUENZA VACCINE (1) 09/01/2017     Labs reviewed in EPIC  BP Readings from Last 3 Encounters:   04/24/18 138/80   04/09/18 162/80   04/05/18 162/78    Wt Readings from Last 3 Encounters:   04/24/18 222 lb 3.2 oz (100.8 kg)   04/09/18 222 lb 6.4 oz (100.9 kg)   04/05/18 219 lb 3.2 oz (99.4 kg)                  Patient Active Problem List   Diagnosis     Nonallergic rhinitis     Essential hypertension, benign     Chronic rhinitis     Acquired hypothyroidism     Hyperlipidemia LDL goal <100     Gastroesophageal reflux disease without esophagitis     Past Surgical History:   Procedure Laterality Date     NO HISTORY OF SURGERY         Social History   Substance Use Topics     Smoking status: Former Smoker     Types: Cigarettes     Smokeless tobacco: Never Used     Alcohol use Not on file     Family History   Problem Relation Age of Onset      "DIABETES Mother      Coronary Artery Disease Father      Colon Cancer No family hx of      Breast Cancer No family hx of          Current Outpatient Prescriptions   Medication Sig Dispense Refill     amLODIPine (NORVASC) 5 MG tablet TK 1 T PO  QD  4     atenolol (TENORMIN) 25 MG tablet TK 1 T PO QD  3     azelastine (ASTELIN) 0.1 % spray Spray 1-2 sprays into both nostrils 2 times daily 1 Bottle 11     cetirizine (ZYRTEC) 10 MG tablet Take 10 mg by mouth daily       fluticasone (FLONASE) 50 MCG/ACT spray Spray 1 spray into both nostrils daily       ketotifen (ALAWAY) 0.025 % SOLN ophthalmic solution 1 drop 2 times daily       levothyroxine (SYNTHROID/LEVOTHROID) 112 MCG tablet TK 1 T PO  QD  4     losartan-hydrochlorothiazide (HYZAAR) 50-12.5 MG per tablet TK 1 T PO D  2     omeprazole (PRILOSEC) 20 MG CR capsule TK 1 C PO D  4     simvastatin (ZOCOR) 10 MG tablet TK 1 T PO D HS  4     No Known Allergies  No lab results found.         ROS:  CONSTITUTIONAL: NEGATIVE for fever, chills, change in weight  INTEGUMENTARY/SKIN: NEGATIVE for worrisome rashes, moles or lesions  EYES: NEGATIVE for vision changes or irritation  ENT/MOUTH: NEGATIVE for ear, mouth and throat problems  RESP: NEGATIVE for significant cough or SOB  BREAST: NEGATIVE for masses, tenderness or discharge  CV: NEGATIVE for chest pain, palpitations or peripheral edema  GI: NEGATIVE for nausea, abdominal pain, heartburn, or change in bowel habits  : NEGATIVE for frequency, dysuria, or hematuria  MUSCULOSKELETAL: NEGATIVE for significant arthralgias or myalgia  NEURO: NEGATIVE for weakness, dizziness or paresthesias  ENDOCRINE: NEGATIVE for temperature intolerance, skin/hair changes  HEME: NEGATIVE for bleeding problems  PSYCHIATRIC: NEGATIVE for changes in mood or affect    OBJECTIVE:   There were no vitals taken for this visit. Estimated body mass index is 39.65 kg/(m^2) as calculated from the following:    Height as of 4/5/18: 5' 2.8\" (1.595 m).    " Weight as of 4/9/18: 222 lb 6.4 oz (100.9 kg).  EXAM:   GENERAL: healthy, alert and no distress  EYES: Eyes grossly normal to inspection, PERRL and conjunctivae and sclerae normal  HENT: ear canals and TM's normal, nose and mouth without ulcers or lesions  NECK: no adenopathy, no asymmetry, masses, or scars and thyroid normal to palpation  RESP: lungs clear to auscultation - no rales, rhonchi or wheezes  BREAST: normal without masses, tenderness or nipple discharge and no palpable axillary masses or adenopathy  CV: regular rate and rhythm, normal S1 S2, no S3 or S4, no murmur, click or rub, no peripheral edema and peripheral pulses strong  ABDOMEN: soft, nontender, no hepatosplenomegaly, no masses and bowel sounds normal  MS: no gross musculoskeletal defects noted, no edema  SKIN: no suspicious lesions or rashes  NEURO: Normal strength and tone, mentation intact and speech normal  PSYCH: mentation appears normal, affect normal/bright    ASSESSMENT / PLAN:   1. Routine general medical examination at a health care facility      2. Essential hypertension, benign  controlled  Labs are pending   - Comprehensive metabolic panel; Future    3. Acquired hypothyroidism  Pending   - TSH with free T4 reflex; Future    4. Hyperlipidemia LDL goal <100  Labs pending   - Lipid panel reflex to direct LDL Fasting; Future  - Comprehensive metabolic panel; Future    5. Encounter for immunization  Advised   The potential side effects of thishave been discussed with the patient.  Call if any significant problems with these are experienced.    - TDAP VACCINE (ADACEL)  - PNEUMOCOCCAL VACCINE,ADULT,SQ OR IM    6. Other chronic rhinitis  Saw Allergist    7. Asymptomatic postmenopausal status    - DEXA HIP/PELVIS/SPINE - Future; Future    8. Screen for colon cancer  Advised colonoscopy  - Fecal colorectal cancer screen (FIT); Future  - Fecal colorectal cancer screen (FIT); Future    9. Visit for screening mammogram  Advised   - MA SCREENING  "DIGITAL BILAT - Future  (s+30); Future    10. Need for hepatitis C screening test  Advised   - Hepatitis C Screen Reflex to HCV RNA Quant and Genotype; Future  COUNSELING:  Reviewed preventive health counseling, as reflected in patient instructions       Regular exercise       Healthy diet/nutrition       Vision screening       Hearing screening       Dental care       Osteoporosis Prevention/Bone Health       Colon cancer screening       The ASCVD Risk score (Stacey BUI Jr, et al., 2013) failed to calculate for the following reasons:    Cannot find a previous HDL lab    Cannot find a previous total cholesterol lab       Advanced Planning         Estimated body mass index is 39.65 kg/(m^2) as calculated from the following:    Height as of 4/5/18: 5' 2.8\" (1.595 m).    Weight as of 4/9/18: 222 lb 6.4 oz (100.9 kg).  Weight management plan: low carroll Exercise/Exercise     reports that she has quit smoking. Her smoking use included Cigarettes. She has never used smokeless tobacco.      Appropriate preventive services were discussed with this patient, including applicable screening as appropriate for cardiovascular disease, diabetes, osteopenia/osteoporosis, and glaucoma.  As appropriate for age/gender, discussed screening for colorectal cancer, prostate cancer, breast cancer, and cervical cancer. Checklist reviewing preventive services available has been given to the patient.    Reviewed patients plan of care and provided an AVS. The Intermediate Care Plan ( asthma action plan, low back pain action plan, and migraine action plan) for Glenda meets the Care Plan requirement. This Care Plan has been established and reviewed with the Patient.    Counseling Resources:  ATP IV Guidelines  Pooled Cohorts Equation Calculator  Breast Cancer Risk Calculator  FRAX Risk Assessment  ICSI Preventive Guidelines  Dietary Guidelines for Americans, 2010  USDA's MyPlate  ASA Prophylaxis  Lung CA Screening    Marysol Cisse MD  [facility] " [place]

## 2018-04-25 PROBLEM — E66.01 MORBID OBESITY DUE TO EXCESS CALORIES (H): Status: ACTIVE | Noted: 2018-04-25

## 2018-05-02 DIAGNOSIS — R73.9 HYPERGLYCEMIA: Primary | ICD-10-CM

## 2018-05-02 PROBLEM — F10.11 ALCOHOL ABUSE, IN REMISSION: Status: ACTIVE | Noted: 2018-05-02

## 2018-05-02 PROBLEM — K58.8 OTHER IRRITABLE BOWEL SYNDROME: Status: ACTIVE | Noted: 2018-05-02

## 2018-05-02 PROBLEM — F41.9 ANXIETY: Status: ACTIVE | Noted: 2018-05-02

## 2018-05-02 PROBLEM — M17.9 OSTEOARTHRITIS OF KNEE, UNSPECIFIED LATERALITY, UNSPECIFIED OSTEOARTHRITIS TYPE: Status: ACTIVE | Noted: 2018-05-02

## 2018-05-02 PROBLEM — R79.89 ELEVATED LFTS: Status: ACTIVE | Noted: 2018-05-02

## 2018-05-02 PROBLEM — I35.1 NONRHEUMATIC AORTIC VALVE INSUFFICIENCY: Status: ACTIVE | Noted: 2018-05-02

## 2018-05-14 ENCOUNTER — RADIANT APPOINTMENT (OUTPATIENT)
Dept: MAMMOGRAPHY | Facility: CLINIC | Age: 68
End: 2018-05-14
Attending: FAMILY MEDICINE
Payer: COMMERCIAL

## 2018-05-14 ENCOUNTER — RADIANT APPOINTMENT (OUTPATIENT)
Dept: BONE DENSITY | Facility: CLINIC | Age: 68
End: 2018-05-14
Attending: FAMILY MEDICINE
Payer: COMMERCIAL

## 2018-05-14 DIAGNOSIS — Z78.0 ASYMPTOMATIC POSTMENOPAUSAL STATUS: ICD-10-CM

## 2018-05-14 DIAGNOSIS — Z12.31 VISIT FOR SCREENING MAMMOGRAM: ICD-10-CM

## 2018-05-14 PROCEDURE — 77067 SCR MAMMO BI INCL CAD: CPT | Mod: TC

## 2018-05-14 PROCEDURE — 77080 DXA BONE DENSITY AXIAL: CPT | Performed by: INTERNAL MEDICINE

## 2018-05-25 DIAGNOSIS — R73.9 HYPERGLYCEMIA: ICD-10-CM

## 2018-05-25 DIAGNOSIS — Z11.59 NEED FOR HEPATITIS C SCREENING TEST: ICD-10-CM

## 2018-05-25 DIAGNOSIS — I10 ESSENTIAL HYPERTENSION, BENIGN: ICD-10-CM

## 2018-05-25 DIAGNOSIS — E03.9 ACQUIRED HYPOTHYROIDISM: ICD-10-CM

## 2018-05-25 DIAGNOSIS — E78.5 HYPERLIPIDEMIA LDL GOAL <100: ICD-10-CM

## 2018-05-25 LAB
ALBUMIN SERPL-MCNC: 3.4 G/DL (ref 3.4–5)
ALP SERPL-CCNC: 83 U/L (ref 40–150)
ALT SERPL W P-5'-P-CCNC: 86 U/L (ref 0–50)
ANION GAP SERPL CALCULATED.3IONS-SCNC: 7 MMOL/L (ref 3–14)
AST SERPL W P-5'-P-CCNC: 58 U/L (ref 0–45)
BILIRUB SERPL-MCNC: 0.4 MG/DL (ref 0.2–1.3)
BUN SERPL-MCNC: 17 MG/DL (ref 7–30)
CALCIUM SERPL-MCNC: 8.9 MG/DL (ref 8.5–10.1)
CHLORIDE SERPL-SCNC: 104 MMOL/L (ref 94–109)
CHOLEST SERPL-MCNC: 254 MG/DL
CO2 SERPL-SCNC: 28 MMOL/L (ref 20–32)
CREAT SERPL-MCNC: 0.94 MG/DL (ref 0.52–1.04)
GFR SERPL CREATININE-BSD FRML MDRD: 59 ML/MIN/1.7M2
GLUCOSE SERPL-MCNC: 96 MG/DL (ref 70–99)
HBA1C MFR BLD: 6 % (ref 0–5.6)
HCV AB SERPL QL IA: NONREACTIVE
HDLC SERPL-MCNC: 82 MG/DL
LDLC SERPL CALC-MCNC: 152 MG/DL
NONHDLC SERPL-MCNC: 172 MG/DL
POTASSIUM SERPL-SCNC: 4 MMOL/L (ref 3.4–5.3)
PROT SERPL-MCNC: 7.7 G/DL (ref 6.8–8.8)
SODIUM SERPL-SCNC: 139 MMOL/L (ref 133–144)
TRIGL SERPL-MCNC: 98 MG/DL
TSH SERPL DL<=0.005 MIU/L-ACNC: 3 MU/L (ref 0.4–4)

## 2018-05-25 PROCEDURE — 80061 LIPID PANEL: CPT | Performed by: FAMILY MEDICINE

## 2018-05-25 PROCEDURE — 86803 HEPATITIS C AB TEST: CPT | Performed by: FAMILY MEDICINE

## 2018-05-25 PROCEDURE — 80053 COMPREHEN METABOLIC PANEL: CPT | Performed by: FAMILY MEDICINE

## 2018-05-25 PROCEDURE — 36415 COLL VENOUS BLD VENIPUNCTURE: CPT | Performed by: FAMILY MEDICINE

## 2018-05-25 PROCEDURE — 84443 ASSAY THYROID STIM HORMONE: CPT | Performed by: FAMILY MEDICINE

## 2018-05-25 PROCEDURE — 83036 HEMOGLOBIN GLYCOSYLATED A1C: CPT | Performed by: FAMILY MEDICINE

## 2018-05-29 ENCOUNTER — TELEPHONE (OUTPATIENT)
Dept: FAMILY MEDICINE | Facility: CLINIC | Age: 68
End: 2018-05-29

## 2018-05-29 NOTE — TELEPHONE ENCOUNTER
Hepatitis C Screen Reflex to HCV RNA Quant and Genotype   Status:  Final result   Visible to patient:  Yes (Luket)   Dx:  Need for hepatitis C screening test Order: [id number]       Notes Recorded by Carlee Simms RN on 5/29/2018 at 4:58 PM  Message left on VM to return call to RN hotline at [phone].   Carlee Simms RN    TE started  ------    Notes Recorded by Marysol Cisse MD on 5/29/2018 at 11:17 AM  Thyroid is normal   Liver test are High  Please stop any alcohol  Repeat test in 1 month  Normal Electrolytes   The 10-year ASCVD risk score (Stacey DC Jr, et al., 2013) is: 11.6%   Please follow low cholesterol diet  I would recommend increasing your cholestero medicines when Liver test is Good  Take care  Marysol Cisse MD

## 2018-05-29 NOTE — TELEPHONE ENCOUNTER
Spoke with patient.   Results note read as written.   Patient verbalized understanding.   No further questions at this time.     Carlee Simms RN

## 2018-07-27 ENCOUNTER — OFFICE VISIT (OUTPATIENT)
Dept: FAMILY MEDICINE | Facility: CLINIC | Age: 68
End: 2018-07-27
Payer: COMMERCIAL

## 2018-07-27 VITALS
OXYGEN SATURATION: 95 % | HEIGHT: 63 IN | DIASTOLIC BLOOD PRESSURE: 82 MMHG | RESPIRATION RATE: 14 BRPM | TEMPERATURE: 99.2 F | BODY MASS INDEX: 38.8 KG/M2 | SYSTOLIC BLOOD PRESSURE: 146 MMHG | WEIGHT: 219 LBS | HEART RATE: 76 BPM

## 2018-07-27 DIAGNOSIS — E66.01 MORBID OBESITY DUE TO EXCESS CALORIES (H): ICD-10-CM

## 2018-07-27 DIAGNOSIS — J32.9 SINUSITIS, UNSPECIFIED CHRONICITY, UNSPECIFIED LOCATION: Primary | ICD-10-CM

## 2018-07-27 DIAGNOSIS — I10 ESSENTIAL HYPERTENSION, BENIGN: ICD-10-CM

## 2018-07-27 PROCEDURE — 99214 OFFICE O/P EST MOD 30 MIN: CPT | Performed by: FAMILY MEDICINE

## 2018-07-27 RX ORDER — AMLODIPINE BESYLATE 5 MG/1
TABLET ORAL
Qty: 45 TABLET | Refills: 0 | Status: SHIPPED | OUTPATIENT
Start: 2018-07-27 | End: 2018-08-14

## 2018-07-27 NOTE — MR AVS SNAPSHOT
After Visit Summary   7/27/2018    Glenda Bales    MRN: [id number]           Patient Information     Date Of Birth          1950        Visit Information        Provider Department      7/27/2018 2:20 PM Marysol Cisse MD [facility]        Today's Diagnoses     Sinusitis, unspecified chronicity, unspecified location    -  1      Care Instructions      [facility]    If you have any questions regarding to your visit please contact your care team:       Team Red:   Clinic Hours Telephone Number   Dr. Carin Aparicio, NP   7am-7pm  Monday - Thursday   7am-5pm  Fridays  [phone]  (Appointment scheduling available 24/7)    Questions about your recent visit?   Team Line  [phone]   Urgent Care - [place] and [facility] - 11am-9pm Monday-Friday Saturday-Sunday- 9am-5pm   [place] - 5pm-9pm Monday-Friday Saturday-Sunday- 9am-5pm  [phone] - [place]  [phone] - [place]       What options do I have for a visit other than an office visit? We offer electronic visits (e-visits) and telephone visits, when medically appropriate.  Please check with your medical insurance to see if these types of visits are covered, as you will be responsible for any charges that are not paid by your insurance.      You can use Placely (secure electronic communication) to access to your chart, send your primary care provider a message, or make an appointment. Ask a team member how to get started.     For a price quote for your services, please call our Consumer Price Line at       [phone] or our Imaging Cost estimation line at [phone] (for imaging tests).        Increase Norvasc to 7.5 mg daily  Please do FIT card  Follow up in 1 month Blood Pressure check  Marysol Cisse MD                Follow-ups after your visit        Who to contact     If you have questions or need follow up information about today's  "clinic visit or your schedule please contact [facility] directly at [phone].  Normal or non-critical lab and imaging results will be communicated to you by MyChart, letter or phone within 4 business days after the clinic has received the results. If you do not hear from us within 7 days, please contact the clinic through Ryonethart or phone. If you have a critical or abnormal lab result, we will notify you by phone as soon as possible.  Submit refill requests through Sherpaa or call your pharmacy and they will forward the refill request to us. Please allow 3 business days for your refill to be completed.          Additional Information About Your Visit        [employer] Information     Sherpaa gives you secure access to your electronic health record. If you see a primary care provider, you can also send messages to your care team and make appointments. If you have questions, please call your primary care clinic.  If you do not have a primary care provider, please call [phone] and they will assist you.        Care EveryWhere ID     This is your Care EveryWhere ID. This could be used by other organizations to access your [place] medical records  [id number]        Your Vitals Were     Pulse Temperature Respirations Height Pulse Oximetry BMI (Body Mass Index)    76 99.2  F (37.3  C) 14 5' 3\" (1.6 m) 95% 38.79 kg/m2       Blood Pressure from Last 3 Encounters:   07/27/18 146/82   04/24/18 138/80   04/09/18 162/80    Weight from Last 3 Encounters:   07/27/18 219 lb (99.3 kg)   04/24/18 222 lb 3.2 oz (100.8 kg)   04/09/18 222 lb 6.4 oz (100.9 kg)              Today, you had the following     No orders found for display         Today's Medication Changes          These changes are accurate as of 7/27/18  3:13 PM.  If you have any questions, ask your nurse or doctor.               Start taking these medicines.        Dose/Directions    amoxicillin-clavulanate 875-125 MG per tablet   Commonly known as:  " AUGMENTIN   Used for:  Sinusitis, unspecified chronicity, unspecified location   Started by:  Marysol Cisse MD        Dose:  1 tablet   Take 1 tablet by mouth 2 times daily for 10 days   Quantity:  20 tablet   Refills:  0         These medicines have changed or have updated prescriptions.        Dose/Directions    amLODIPine 5 MG tablet   Commonly known as:  NORVASC   This may have changed:  See the new instructions.   Used for:  Sinusitis, unspecified chronicity, unspecified location   Changed by:  Marysol Cisse MD        7.5 mg daily   Quantity:  45 tablet   Refills:  0            Where to get your medicines      These medications [id number] [id number] [facility] [address]     Phone:  [phone]     amLODIPine 5 MG tablet    amoxicillin-clavulanate 875-125 MG per tablet                Primary Care Provider Office Phone # Fax #    Gladis Hernandez [phone]       [facility] [address] [address]        Equal Access to Services     [facility]: Hadii aad ku hadasho Soomaali, waaxda luqadaha, qaybta kaalmada adeegyada, waxay garrett flanagan . So [facility] [phone].    ATENCIÓN: Si habla español, tiene a whalen disposición servicios gratuitos de asistencia lingüística. [facility] [phone].    We comply with applicable federal civil rights laws and [place] laws. We do not discriminate on the basis of race, color, national origin, age, disability, sex, sexual orientation, or gender identity.            Thank you!     Thank you for choosing [facility]  for your care. Our goal is always to provide you with excellent care. Hearing back from our patients is one way we can continue to improve our services. Please take a few minutes to complete the written survey that you may receive in the mail after your visit with us.  Thank you!             Your Updated Medication List - Protect others around you: Learn how to safely use, store and throw away your medicines at www.disposemymeds.org.          This list is accurate as of 7/27/18  3:13 PM.  Always use your most recent med list.                   Brand Name Dispense Instructions for use Diagnosis    ALAWAY 0.025 % Soln ophthalmic solution   Generic drug:  ketotifen      1 drop 2 times daily        amLODIPine 5 MG tablet    NORVASC    45 tablet    7.5 mg daily    Sinusitis, unspecified chronicity, unspecified location       amoxicillin-clavulanate 875-125 MG per tablet    AUGMENTIN    20 tablet    Take 1 tablet by mouth 2 times daily for 10 days    Sinusitis, unspecified chronicity, unspecified location       atenolol 25 MG tablet    TENORMIN     TK 1 T PO QD        azelastine 0.1 % spray    ASTELIN    1 Bottle    Spray 1-2 sprays into both nostrils 2 times daily    Chronic rhinitis, unspecified type       cetirizine 10 MG tablet    zyrTEC     Take 10 mg by mouth daily        fluticasone 50 MCG/ACT spray    FLONASE     Spray 1 spray into both nostrils daily        levothyroxine 112 MCG tablet    SYNTHROID/LEVOTHROID     TK 1 T PO  QD        losartan-hydrochlorothiazide 50-12.5 MG per tablet    HYZAAR     TK 1 T PO D        omeprazole 20 MG CR capsule    priLOSEC     TK 1 C PO D        simvastatin 10 MG tablet    ZOCOR     TK 1 T PO D HS

## 2018-07-27 NOTE — PATIENT INSTRUCTIONS
[facility]    If you have any questions regarding to your visit please contact your care team:       Team Red:   Clinic Hours Telephone Number   Dr. Carin Aparicio, NP   7am-7pm  Monday - Thursday   7am-5pm  Fridays  [phone]  (Appointment scheduling available 24/7)    Questions about your recent visit?   Team Line  [phone]   Urgent Care - [place] and [facility] - 11am-9pm Monday-Friday Saturday-Sunday- 9am-5pm   [place] - 5pm-9pm Monday-Friday Saturday-Sunday- 9am-5pm  [phone] - [place]  [phone] - [place]       What options do I have for a visit other than an office visit? We offer electronic visits (e-visits) and telephone visits, when medically appropriate.  Please check with your medical insurance to see if these types of visits are covered, as you will be responsible for any charges that are not paid by your insurance.      You can use [employer] (secure electronic communication) to access to your chart, send your primary care provider a message, or make an appointment. Ask a team member how to get started.     For a price quote for your services, please call our Consumer Price Line at       [phone] or our Imaging Cost estimation line at [phone] (for imaging tests).        Increase Norvasc to 7.5 mg daily  Please do FIT card  Follow up in 1 month Blood Pressure check  Marysol Cisse MD

## 2018-07-27 NOTE — PROGRESS NOTES
SUBJECTIVE:   Glenda Bales is a 67 year old female who presents to clinic today for the following health issues:        Acute Illness   Acute illness concerns: sinus  Onset: 3 day    Fever: no     Chills/Sweats: no     Headache (location?): YES    Sinus Pressure:YES    Conjunctivitis:  no    Ear Pain: no    Rhinorrhea: YES    Congestion: YES    Sore Throat: YES     Cough: YES    Wheeze: no    Decreased Appetite: no    Nausea: no    Vomiting: no    Diarrhea:  no    Dysuria/Freq.: no    Fatigue/Achiness: no    Sick/Strep Exposure: no     Therapies Tried and outcome: pal,flonase          Problem list and histories reviewed & adjusted, as indicated.  Additional history: as documented    Patient Active Problem List   Diagnosis     Nonallergic rhinitis     Essential hypertension, benign     Chronic rhinitis     Acquired hypothyroidism     Hyperlipidemia LDL goal <100     Gastroesophageal reflux disease without esophagitis     BMI>35     Nonrheumatic aortic valve insufficiency     Anxiety     Hyperglycemia     Elevated LFTs     Osteoarthritis of knee, unspecified laterality, unspecified osteoarthritis type     Other irritable bowel syndrome     Alcohol abuse, in remission     Past Surgical History:   Procedure Laterality Date     NO HISTORY OF SURGERY         Social History   Substance Use Topics     Smoking status: Former Smoker     Types: Cigarettes     Quit date: 4/25/2016     Smokeless tobacco: Never Used     Alcohol use Not on file     Family History   Problem Relation Age of Onset     Diabetes Mother      Coronary Artery Disease Father      Colon Cancer No family hx of      Breast Cancer No family hx of          Current Outpatient Prescriptions   Medication Sig Dispense Refill     amLODIPine (NORVASC) 5 MG tablet 7.5 mg daily 45 tablet 0     amoxicillin-clavulanate (AUGMENTIN) 875-125 MG per tablet Take 1 tablet by mouth 2 times daily for 10 days 20 tablet 0     atenolol (TENORMIN) 25 MG tablet TK 1 T PO QD  3      cetirizine (ZYRTEC) 10 MG tablet Take 10 mg by mouth daily       fluticasone (FLONASE) 50 MCG/ACT spray Spray 1 spray into both nostrils daily       ketotifen (ALAWAY) 0.025 % SOLN ophthalmic solution 1 drop 2 times daily       levothyroxine (SYNTHROID/LEVOTHROID) 112 MCG tablet TK 1 T PO  QD  4     losartan-hydrochlorothiazide (HYZAAR) 50-12.5 MG per tablet TK 1 T PO D  2     omeprazole (PRILOSEC) 20 MG CR capsule TK 1 C PO D  4     simvastatin (ZOCOR) 10 MG tablet TK 1 T PO D HS  4     azelastine (ASTELIN) 0.1 % spray Spray 1-2 sprays into both nostrils 2 times daily (Patient not taking: Reported on 7/27/2018) 1 Bottle 11     [DISCONTINUED] amLODIPine (NORVASC) 5 MG tablet TK 1 T PO  QD  4     No Known Allergies  Recent Labs   Lab Test  05/25/18   0735 01/15/18 01/16/17 11/21/16   A1C  6.0*  6.1*  6.2*  6.3*   LDL  152*  164*   --   117*   HDL  82  98   --   100   TRIG  98  60   --   86   ALT  86*   --   145*  134*   CR  0.94  0.82  0.91  0.73   GFRESTIMATED  59*  74  65  85   GFRESTBLACK  71  85  76  99   POTASSIUM  4.0  4.3  3.9  3.8   TSH  3.00  3.560   --   2.770      BP Readings from Last 3 Encounters:   07/27/18 146/82   04/24/18 138/80   04/09/18 162/80    Wt Readings from Last 3 Encounters:   07/27/18 219 lb (99.3 kg)   04/24/18 222 lb 3.2 oz (100.8 kg)   04/09/18 222 lb 6.4 oz (100.9 kg)                  Labs reviewed in EPIC    Reviewed and updated as needed this visit by clinical staff  Tobacco  Allergies  Meds       Reviewed and updated as needed this visit by Provider         ROS:  CONSTITUTIONAL: NEGATIVE for fever, chills, change in weight  INTEGUMENTARY/SKIN: NEGATIVE for worrisome rashes, moles or lesions  ENT/MOUTH: nasal congestion  RESP:as above  CV: NEGATIVE for chest pain, palpitations or peripheral edema  GI: NEGATIVE for nausea, abdominal pain, heartburn, or change in bowel habits  MUSCULOSKELETAL: NEGATIVE for significant arthralgias or myalgia    OBJECTIVE:     /82  Pulse 76  " Temp 99.2  F (37.3  C)  Resp 14  Ht 5' 3\" (1.6 m)  Wt 219 lb (99.3 kg)  SpO2 95%  BMI 38.79 kg/m2  Body mass index is 38.79 kg/(m^2).  GENERAL: healthy, alert and no distress  EYES: Eyes grossly normal to inspection, PERRL and conjunctivae and sclerae normal  HENT: ear canals and TM's normal, nose  Congestion nd mouth without ulcers or lesions  NECK: no adenopathy, no asymmetry, masses, or scars and thyroid normal to palpation  RESP: lungs clear to auscultation - no rales, rhonchi or wheezes  CV: regular rate and rhythm, normal S1 S2, no S3 or S4, no murmur, click or rub, no peripheral edema and peripheral pulses strong  ABDOMEN: soft, nontender, no hepatosplenomegaly, no masses and bowel sounds normal  MS: no gross musculoskeletal defects noted, no edema    Diagnostic Test Results:  none     ASSESSMENT/PLAN:   (J32.9) Sinusitis, unspecified chronicity, unspecified location  (primary encounter diagnosis)  Comment: SEE EPIC care orders  The potential side effects of this medication have been discussed with the patient.  Call if any significant problems with these are experienced.    Plan: amoxicillin-clavulanate (AUGMENTIN) 875-125 MG         per tablet        Follow up 1 week if not better/sooner if worse      (I10) Essential hypertension, benign  Comment: advised increase Norvasc to 7.5 mg daily  Plan: amLODIPine (NORVASC) 5 MG tablet        Follow up BP check 1 month  Repeat /88    (E66.01) BMI>35  Comment: low carroll diet  Plan:    Advised to send the FIT card  Marysol Cisse MD  [facility]  "

## 2018-08-13 NOTE — PROGRESS NOTES
SUBJECTIVE:   Glenda Bales is a 67 year old female who presents to clinic today for the following health issues:      Edema Legs since Norvasc was increased      Duration: since 07/27/2018    Description (location/character/radiation): swelling in ankles and feet    Intensity:  moderate    Accompanying signs and symptoms: tightness of skin    History (similar episodes/previous evaluation): None    Precipitating or alleviating factors: states blood pressure medication was increased    Therapies tried and outcome: elevating feet which helps       Pt still drinks 1 glass of wince daily    Problem list and histories reviewed & adjusted, as indicated.  Additional history: as documented    Patient Active Problem List   Diagnosis     Nonallergic rhinitis     Essential hypertension, benign     Chronic rhinitis     Acquired hypothyroidism     Hyperlipidemia LDL goal <100     Gastroesophageal reflux disease without esophagitis     BMI>35     Nonrheumatic aortic valve insufficiency     Anxiety     Hyperglycemia     Elevated LFTs     Osteoarthritis of knee, unspecified laterality, unspecified osteoarthritis type     Other irritable bowel syndrome     Alcohol abuse, in remission     Past Surgical History:   Procedure Laterality Date     NO HISTORY OF SURGERY         Social History   Substance Use Topics     Smoking status: Former Smoker     Types: Cigarettes     Quit date: 4/25/2016     Smokeless tobacco: Never Used     Alcohol use Not on file     Family History   Problem Relation Age of Onset     Diabetes Mother      Coronary Artery Disease Father      Colon Cancer No family hx of      Breast Cancer No family hx of          Current Outpatient Prescriptions   Medication Sig Dispense Refill     amLODIPine (NORVASC) 5 MG tablet Take 1 tablet (5 mg) by mouth daily 30 tablet 1     atenolol (TENORMIN) 25 MG tablet TK 1 T PO QD  3     cetirizine (ZYRTEC) 10 MG tablet Take 10 mg by mouth daily       fluticasone (FLONASE) 50 MCG/ACT  spray Spray 1 spray into both nostrils daily       ketotifen (ALAWAY) 0.025 % SOLN ophthalmic solution 1 drop 2 times daily       levothyroxine (SYNTHROID/LEVOTHROID) 112 MCG tablet TK 1 T PO  QD  4     losartan-hydrochlorothiazide (HYZAAR) 100-25 MG per tablet Take 1 tablet by mouth daily 30 tablet 0     omeprazole (PRILOSEC) 20 MG CR capsule TK 1 C PO D  4     simvastatin (ZOCOR) 10 MG tablet TK 1 T PO D HS  4     azelastine (ASTELIN) 0.1 % spray Spray 1-2 sprays into both nostrils 2 times daily (Patient not taking: Reported on 7/27/2018) 1 Bottle 11     [DISCONTINUED] amLODIPine (NORVASC) 5 MG tablet 7.5 mg daily 45 tablet 0     [DISCONTINUED] losartan-hydrochlorothiazide (HYZAAR) 50-12.5 MG per tablet TK 1 T PO D  2     No Known Allergies  Recent Labs   Lab Test  05/25/18   0735 01/15/18 01/16/17 11/21/16   A1C  6.0*  6.1*  6.2*  6.3*   LDL  152*  164*   --   117*   HDL  82  98   --   100   TRIG  98  60   --   86   ALT  86*   --   145*  134*   CR  0.94  0.82  0.91  0.73   GFRESTIMATED  59*  74  65  85   GFRESTBLACK  71  85  76  99   POTASSIUM  4.0  4.3  3.9  3.8   TSH  3.00  3.560   --   2.770      BP Readings from Last 3 Encounters:   08/14/18 146/78   07/27/18 146/82   04/24/18 138/80    Wt Readings from Last 3 Encounters:   08/14/18 221 lb (100.2 kg)   07/27/18 219 lb (99.3 kg)   04/24/18 222 lb 3.2 oz (100.8 kg)                  Labs reviewed in EPIC    Reviewed and updated as needed this visit by clinical staff       Reviewed and updated as needed this visit by Provider         ROS:  CONSTITUTIONAL: NEGATIVE for fever, chills, change in weight  ENT/MOUTH: NEGATIVE for ear, mouth and throat problems  RESP: NEGATIVE for significant cough or SOB  CV: NEGATIVE for chest pain, palpitations or PND,no Orthopnea  GI: NEGATIVE for nausea, abdominal pain, heartburn, or change in bowel habits  MUSCULOSKELETAL: NEGATIVE for significant arthralgias or myalgia    OBJECTIVE:     /78 (BP Location: Left arm, Patient  "Position: Chair, Cuff Size: Adult Large)  Pulse 73  Temp 98.8  F (37.1  C) (Oral)  Resp 20  Ht 5' 3\" (1.6 m)  Wt 221 lb (100.2 kg)  SpO2 95%  BMI 39.15 kg/m2  Body mass index is 39.15 kg/(m^2).  GENERAL: healthy, alert and no distress  NECK: no adenopathy, no asymmetry, masses, or scars and thyroid normal to palpation  RESP: lungs clear to auscultation - no rales, rhonchi or wheezes  CV: regular rate and rhythm, normal S1 S2, no S3 or S4, no murmur, click or rub, no peripheral edema and peripheral pulses strong  ABDOMEN: soft, nontender, no hepatosplenomegaly, no masses and bowel sounds normal  MS: 1 + pedal edema    Diagnostic Test Results:  none     ASSESSMENT/PLAN:       1. Essential hypertension, benign  Advised decrease Norvasc and increase Losarten/HCTZ  SEE EPIC care orders  The potential side effects of this medication have been discussed with the patient.  Call if any significant problems with these are experienced.    - losartan-hydrochlorothiazide (HYZAAR) 100-25 MG per tablet; Take 1 tablet by mouth daily  Dispense: 30 tablet; Refill: 0  - amLODIPine (NORVASC) 5 MG tablet; Take 1 tablet (5 mg) by mouth daily  Dispense: 30 tablet; Refill: 1  Follow up  2-3 weeks  Advised stop alcohol  2. Screen for colon cancer  Advised do FIT card  Elevate legs  Marysol Cisse MD  [facility]    "

## 2018-08-14 ENCOUNTER — OFFICE VISIT (OUTPATIENT)
Dept: FAMILY MEDICINE | Facility: CLINIC | Age: 68
End: 2018-08-14
Payer: COMMERCIAL

## 2018-08-14 VITALS
OXYGEN SATURATION: 95 % | SYSTOLIC BLOOD PRESSURE: 146 MMHG | BODY MASS INDEX: 39.16 KG/M2 | HEIGHT: 63 IN | HEART RATE: 73 BPM | RESPIRATION RATE: 20 BRPM | DIASTOLIC BLOOD PRESSURE: 78 MMHG | WEIGHT: 221 LBS | TEMPERATURE: 98.8 F

## 2018-08-14 DIAGNOSIS — R60.0 PEDAL EDEMA: ICD-10-CM

## 2018-08-14 DIAGNOSIS — Z12.11 SCREEN FOR COLON CANCER: ICD-10-CM

## 2018-08-14 DIAGNOSIS — I10 ESSENTIAL HYPERTENSION, BENIGN: Primary | ICD-10-CM

## 2018-08-14 PROCEDURE — 99213 OFFICE O/P EST LOW 20 MIN: CPT | Performed by: FAMILY MEDICINE

## 2018-08-14 RX ORDER — AMLODIPINE BESYLATE 5 MG/1
5 TABLET ORAL DAILY
Qty: 30 TABLET | Refills: 1 | Status: SHIPPED | OUTPATIENT
Start: 2018-08-14 | End: 2018-08-27

## 2018-08-14 RX ORDER — LOSARTAN POTASSIUM AND HYDROCHLOROTHIAZIDE 25; 100 MG/1; MG/1
1 TABLET ORAL DAILY
Qty: 30 TABLET | Refills: 0 | Status: SHIPPED | OUTPATIENT
Start: 2018-08-14 | End: 2018-08-28

## 2018-08-14 ASSESSMENT — PAIN SCALES - GENERAL: PAINLEVEL: NO PAIN (0)

## 2018-08-14 NOTE — MR AVS SNAPSHOT
After Visit Summary   8/14/2018    Glenda Bales    MRN: [id number]           Patient Information     Date Of Birth          1950        Visit Information        Provider Department      8/14/2018 5:40 PM Marysol Cisse MD [facility]        Today's Diagnoses     Essential hypertension, benign    -  1    Screen for colon cancer           Follow-ups after your visit        Your next 10 appointments already scheduled     Aug 28, 2018  5:20 PM CDT   Office Visit with Marysol Cisse MD   [facility] ([facility]    [address] [facility] [address] [phone]           Bring a current list of meds and any records pertaining to this visit. For Physicals, please bring immunization records and any forms needing to be filled out. Please arrive 10 minutes early to complete paperwork.              Who to contact     If you have questions or need follow up information about today's clinic visit or your schedule please contact [facility] directly at [phone].  Normal or non-critical lab and imaging results will be communicated to you by fypiohart, letter or phone within 4 business days after the clinic has received the results. If you do not hear from us within 7 days, please contact the clinic through Wavebornt or phone. If you have a critical or abnormal lab result, we will notify you by phone as soon as possible.  Submit refill requests through Lecturio or call your pharmacy and they will forward the refill request to us. Please allow 3 business days for your refill to be completed.          Additional Information About Your Visit        fypiohart Information     Lecturio gives you secure access to your electronic health record. If you see a primary care provider, you can also send messages to your care team and make appointments. If you have questions, please call your primary care clinic.  If you do not have a primary care provider, please  "call [phone] and they will assist you.        Care EveryWhere ID     This is your Care EveryWhere ID. This could be used by other organizations to access your [address] medical records  [id number]        Your Vitals Were     Pulse Temperature Respirations Height Pulse Oximetry BMI (Body Mass Index)    73 98.8  F (37.1  C) (Oral) 20 5' 3\" (1.6 m) 95% 39.15 kg/m2       Blood Pressure from Last 3 Encounters:   08/14/18 146/78   07/27/18 146/82   04/24/18 138/80    Weight from Last 3 Encounters:   08/14/18 221 lb (100.2 kg)   07/27/18 219 lb (99.3 kg)   04/24/18 222 lb 3.2 oz (100.8 kg)              Today, you had the following     No orders found for display         Today's Medication Changes          These changes are accurate as of 8/14/18  6:05 PM.  If you have any questions, ask your nurse or doctor.               Start taking these medicines.        Dose/Directions    losartan-hydrochlorothiazide 100-25 MG per tablet   Commonly known as:  HYZAAR   Used for:  Essential hypertension, benign   Replaces:  losartan-hydrochlorothiazide 50-12.5 MG per tablet   Started by:  Marysol Cisse MD        Dose:  1 tablet   Take 1 tablet by mouth daily   Quantity:  30 tablet   Refills:  0         These medicines have changed or have updated prescriptions.        Dose/Directions    * amLODIPine 5 MG tablet   Commonly known as:  NORVASC   This may have changed:  Another medication with the same name was added. Make sure you understand how and when to take each.   Used for:  Essential hypertension, benign   Changed by:  Marysol Cisse MD        7.5 mg daily   Quantity:  45 tablet   Refills:  0       * amLODIPine 5 MG tablet   Commonly known as:  NORVASC   This may have changed:  You were already taking a medication with the same name, and this prescription was added. Make sure you understand how and when to take each.   Used for:  Essential hypertension, benign   Changed by:  Marysol Cisse MD        Dose:  5 mg   Take 1 tablet " (5 mg) by mouth daily   Quantity:  30 tablet   Refills:  1       * Notice:  This list has 2 medication(s) that are the same as other medications prescribed for you. Read the directions carefully, and ask your doctor or other care provider to review them with you.      Stop taking these medicines if you haven't already. Please contact your care team if you have questions.     losartan-hydrochlorothiazide 50-12.5 MG per tablet   Commonly known as:  HYZAAR   Replaced by:  losartan-hydrochlorothiazide 100-25 MG per tablet   Stopped by:  Marysol Cisse MD                Where to get your medicines      [id number] [address] [facility] [address] [facility] [address]     Phone:  [phone]     amLODIPine 5 MG tablet    losartan-hydrochlorothiazide 100-25 MG per tablet                Primary Care Provider Office Phone # Fax #    Gladis Hernandez [phone]       [facility] [address] [facility] [address]        Equal Access to Services     [facility]: Hadii aad ku hadasho Soomaali, waaxda luqadaha, qaybta kaalmada adeegyada, waxay garrett flanagan . So [facility] [phone].    ATENCIÓN: Si habla español, tiene a whalen disposición servicios gratuitos de asistencia lingüística. [facility] [phone].    We comply with applicable federal civil rights laws and [place] laws. We do not discriminate on the basis of race, color, national origin, age, disability, sex, sexual orientation, or gender identity.            Thank you!     Thank you for choosing [facility]  for your care. Our goal is always to provide you with excellent care. Hearing back from our patients is one way we can continue to improve our services. Please take a few minutes to complete the written survey that you may receive in the mail after your visit with us. Thank you!              Your Updated Medication List - Protect others around you: Learn how to safely use, store and throw away your medicines at www.disposemymeds.org.          This list is accurate as of 8/14/18  6:05 PM.  Always use your most recent med list.                   Brand Name Dispense Instructions for use Diagnosis    ALAWAY 0.025 % Soln ophthalmic solution   Generic drug:  ketotifen      1 drop 2 times daily        * amLODIPine 5 MG tablet    NORVASC    45 tablet    7.5 mg daily    Essential hypertension, benign       * amLODIPine 5 MG tablet    NORVASC    30 tablet    Take 1 tablet (5 mg) by mouth daily    Essential hypertension, benign       atenolol 25 MG tablet    TENORMIN     TK 1 T PO QD        azelastine 0.1 % nasal spray    ASTELIN    1 Bottle    Spray 1-2 sprays into both nostrils 2 times daily    Chronic rhinitis, unspecified type       cetirizine 10 MG tablet    zyrTEC     Take 10 mg by mouth daily        fluticasone 50 MCG/ACT spray    FLONASE     Spray 1 spray into both nostrils daily        levothyroxine 112 MCG tablet    SYNTHROID/LEVOTHROID     TK 1 T PO  QD        losartan-hydrochlorothiazide 100-25 MG per tablet    HYZAAR    30 tablet    Take 1 tablet by mouth daily    Essential hypertension, benign       omeprazole 20 MG CR capsule    priLOSEC     TK 1 C PO D        simvastatin 10 MG tablet    ZOCOR     TK 1 T PO D HS        * Notice:  This list has 2 medication(s) that are the same as other medications prescribed for you. Read the directions carefully, and ask your doctor or other care provider to review them with you.

## 2018-08-27 RX ORDER — LOSARTAN POTASSIUM AND HYDROCHLOROTHIAZIDE 25; 100 MG/1; MG/1
1 TABLET ORAL DAILY
Qty: 30 TABLET | Refills: 0 | Status: CANCELLED | OUTPATIENT
Start: 2018-08-27

## 2018-08-27 NOTE — PROGRESS NOTES
SUBJECTIVE:   Glenda Bales is a 67 year old female who presents to clinic today for the following health issues:        Hypertension Follow-up      Outpatient blood pressures are being checked at home.  Results are higher.    Low Salt Diet: no added salt      Amount of exercise or physical activity: None    Problems taking medications regularly: No    Medication side effects: none    Diet: low salt and low fat/cholesterol            Problem list and histories reviewed & adjusted, as indicated.  Additional history: as documented    Patient Active Problem List   Diagnosis     Nonallergic rhinitis     Essential hypertension, benign     Chronic rhinitis     Acquired hypothyroidism     Hyperlipidemia LDL goal <100     Gastroesophageal reflux disease without esophagitis     BMI>35     Nonrheumatic aortic valve insufficiency     Anxiety     Hyperglycemia     Elevated LFTs     Osteoarthritis of knee, unspecified laterality, unspecified osteoarthritis type     Other irritable bowel syndrome     Alcohol abuse, in remission     Past Surgical History:   Procedure Laterality Date     NO HISTORY OF SURGERY         Social History   Substance Use Topics     Smoking status: Former Smoker     Types: Cigarettes     Quit date: 4/25/2016     Smokeless tobacco: Never Used     Alcohol use Not on file     Family History   Problem Relation Age of Onset     Diabetes Mother      Coronary Artery Disease Father      Colon Cancer No family hx of      Breast Cancer No family hx of          Current Outpatient Prescriptions   Medication Sig Dispense Refill     amLODIPine (NORVASC) 5 MG tablet Take 1 tablet (5 mg) by mouth daily 30 tablet 1     atenolol (TENORMIN) 25 MG tablet TK 1 T PO QD  3     cetirizine (ZYRTEC) 10 MG tablet Take 10 mg by mouth daily       chlorthalidone (HYGROTON) 25 MG tablet Take 1 tablet (25 mg) by mouth daily 30 tablet 1     fluticasone (FLONASE) 50 MCG/ACT spray Spray 1 spray into both nostrils daily       ketotifen  "(ALAWAY) 0.025 % SOLN ophthalmic solution 1 drop 2 times daily       levothyroxine (SYNTHROID/LEVOTHROID) 112 MCG tablet TK 1 T PO  QD  4     losartan (COZAAR) 100 MG tablet Take 1 tablet (100 mg) by mouth daily 90 tablet 1     omeprazole (PRILOSEC) 20 MG CR capsule TK 1 C PO D  4     simvastatin (ZOCOR) 10 MG tablet TK 1 T PO D HS  4     azelastine (ASTELIN) 0.1 % spray Spray 1-2 sprays into both nostrils 2 times daily (Patient not taking: Reported on 7/27/2018) 1 Bottle 11     No Known Allergies  Recent Labs   Lab Test  05/25/18   0735 01/15/18 01/16/17 11/21/16   A1C  6.0*  6.1*  6.2*  6.3*   LDL  152*  164*   --   117*   HDL  82  98   --   100   TRIG  98  60   --   86   ALT  86*   --   145*  134*   CR  0.94  0.82  0.91  0.73   GFRESTIMATED  59*  74  65  85   GFRESTBLACK  71  85  76  99   POTASSIUM  4.0  4.3  3.9  3.8   TSH  3.00  3.560   --   2.770      BP Readings from Last 3 Encounters:   08/28/18 142/78   08/14/18 146/78   07/27/18 146/82    Wt Readings from Last 3 Encounters:   08/28/18 223 lb (101.2 kg)   08/14/18 221 lb (100.2 kg)   07/27/18 219 lb (99.3 kg)                  Labs reviewed in EPIC    Reviewed and updated as needed this visit by clinical staff       Reviewed and updated as needed this visit by Provider         ROS:  CONSTITUTIONAL: NEGATIVE for fever, chills, change in weight  ENT/MOUTH: NEGATIVE for ear, mouth and throat problems  RESP: NEGATIVE for significant cough or SOB  CV: NEGATIVE for chest pain, palpitations or peripheral edema  GI: NEGATIVE for nausea, abdominal pain, heartburn, or change in bowel habits  MUSCULOSKELETAL: NEGATIVE for significant arthralgias or myalgia    OBJECTIVE:     /78  Pulse 70  Temp 97.1  F (36.2  C)  Resp 14  Ht 5' 3\" (1.6 m)  Wt 223 lb (101.2 kg)  SpO2 94%  BMI 39.5 kg/m2  Body mass index is 39.5 kg/(m^2).  GENERAL: healthy, alert and no distress  NECK: no adenopathy, no asymmetry, masses, or scars and thyroid normal to palpation  RESP: lungs " clear to auscultation - no rales, rhonchi or wheezes  CV: regular rate and rhythm, normal S1 S2, no S3 or S4, no murmur, click or rub, no peripheral edema and peripheral pulses strong  ABDOMEN: soft, nontender, no hepatosplenomegaly, no masses and bowel sounds normal  MS: no gross musculoskeletal defects noted, no edema    Diagnostic Test Results:  Results for orders placed or performed in visit on 05/25/18   Hepatitis C Screen Reflex to HCV RNA Quant and Genotype   Result Value Ref Range    Hepatitis C Antibody Nonreactive NR^Nonreactive   Lipid panel reflex to direct LDL Fasting   Result Value Ref Range    Cholesterol 254 (H) <200 mg/dL    Triglycerides 98 <150 mg/dL    HDL Cholesterol 82 >49 mg/dL    LDL Cholesterol Calculated 152 (H) <100 mg/dL    Non HDL Cholesterol 172 (H) <130 mg/dL   Comprehensive metabolic panel   Result Value Ref Range    Sodium 139 133 - 144 mmol/L    Potassium 4.0 3.4 - 5.3 mmol/L    Chloride 104 94 - 109 mmol/L    Carbon Dioxide 28 20 - 32 mmol/L    Anion Gap 7 3 - 14 mmol/L    Glucose 96 70 - 99 mg/dL    Urea Nitrogen 17 7 - 30 mg/dL    Creatinine 0.94 0.52 - 1.04 mg/dL    GFR Estimate 59 (L) >60 mL/min/1.7m2    GFR Estimate If Black 71 >60 mL/min/1.7m2    Calcium 8.9 8.5 - 10.1 mg/dL    Bilirubin Total 0.4 0.2 - 1.3 mg/dL    Albumin 3.4 3.4 - 5.0 g/dL    Protein Total 7.7 6.8 - 8.8 g/dL    Alkaline Phosphatase 83 40 - 150 U/L    ALT 86 (H) 0 - 50 U/L    AST 58 (H) 0 - 45 U/L   TSH with free T4 reflex   Result Value Ref Range    TSH 3.00 0.40 - 4.00 mU/L   Hemoglobin A1c   Result Value Ref Range    Hemoglobin A1C 6.0 (H) 0 - 5.6 %       ASSESSMENT/PLAN:       1. Essential hypertension, benign  Advised stop Losartan/HCTZ  seemeds below  SEE [facility] care orders  The potential side effects of this medication have been discussed with the patient.  Call if any significant problems with these are experienced.  Follow up 2 weeks BP check  - amLODIPine (NORVASC) 5 MG tablet; Take 1 tablet (5 mg)  by mouth daily  Dispense: 30 tablet; Refill: 1  - losartan (COZAAR) 100 MG tablet; Take 1 tablet (100 mg) by mouth daily  Dispense: 90 tablet; Refill: 1  - chlorthalidone (HYGROTON) 25 MG tablet; Take 1 tablet (25 mg) by mouth daily  Dispense: 30 tablet; Refill: 1    2. Screen for colon cancer  Advised   Pt interested in cologuard    Pt needs CT Lung cancer screen    Marysol Cisse MD  [facility]

## 2018-08-28 ENCOUNTER — OFFICE VISIT (OUTPATIENT)
Dept: FAMILY MEDICINE | Facility: CLINIC | Age: 68
End: 2018-08-28
Payer: COMMERCIAL

## 2018-08-28 VITALS
SYSTOLIC BLOOD PRESSURE: 142 MMHG | BODY MASS INDEX: 39.51 KG/M2 | OXYGEN SATURATION: 94 % | HEART RATE: 70 BPM | HEIGHT: 63 IN | RESPIRATION RATE: 14 BRPM | DIASTOLIC BLOOD PRESSURE: 78 MMHG | WEIGHT: 223 LBS | TEMPERATURE: 97.1 F

## 2018-08-28 DIAGNOSIS — I10 ESSENTIAL HYPERTENSION, BENIGN: Primary | ICD-10-CM

## 2018-08-28 DIAGNOSIS — Z12.11 SCREEN FOR COLON CANCER: ICD-10-CM

## 2018-08-28 DIAGNOSIS — Z23 NEED FOR PROPHYLACTIC VACCINATION AND INOCULATION AGAINST INFLUENZA: ICD-10-CM

## 2018-08-28 PROCEDURE — 90662 IIV NO PRSV INCREASED AG IM: CPT | Performed by: FAMILY MEDICINE

## 2018-08-28 PROCEDURE — 90471 IMMUNIZATION ADMIN: CPT | Performed by: FAMILY MEDICINE

## 2018-08-28 PROCEDURE — 99213 OFFICE O/P EST LOW 20 MIN: CPT | Performed by: FAMILY MEDICINE

## 2018-08-28 RX ORDER — CHLORTHALIDONE 25 MG/1
25 TABLET ORAL DAILY
Qty: 30 TABLET | Refills: 1 | Status: SHIPPED | OUTPATIENT
Start: 2018-08-28 | End: 2018-09-11

## 2018-08-28 RX ORDER — LOSARTAN POTASSIUM 100 MG/1
100 TABLET ORAL DAILY
Qty: 90 TABLET | Refills: 1 | Status: SHIPPED | OUTPATIENT
Start: 2018-08-28 | End: 2018-09-11

## 2018-08-28 RX ORDER — AMLODIPINE BESYLATE 5 MG/1
5 TABLET ORAL DAILY
Qty: 30 TABLET | Refills: 1 | Status: SHIPPED | OUTPATIENT
Start: 2018-08-28 | End: 2018-09-11

## 2018-08-28 NOTE — MR AVS SNAPSHOT
After Visit Summary   8/28/2018    Glenda Bales    MRN: [id number]           Patient Information     Date Of Birth          1950        Visit Information        Provider Department      8/28/2018 5:20 PM Marysol Cisse MD [facility]        Today's Diagnoses     Essential hypertension, benign    -  1    Screen for colon cancer          Care Instructions      [facility]    If you have any questions regarding to your visit please contact your care team:       Team Red:   Clinic Hours Telephone Number   Dr. Carin Aparicio, NP   7am-7pm  Monday - Thursday   7am-5pm  Fridays  [phone]  (Appointment scheduling available 24/7)    Questions about your recent visit?   Team Line  [phone]   Urgent Care - [place] and [facility] - 11am-9pm Monday-Friday Saturday-Sunday- 9am-5pm   [place] - 5pm-9pm Monday-Friday Saturday-Sunday- 9am-5pm  [phone] - [place]  [phone] - [place]       What options do I have for a visit other than an office visit? We offer electronic visits (e-visits) and telephone visits, when medically appropriate.  Please check with your medical insurance to see if these types of visits are covered, as you will be responsible for any charges that are not paid by your insurance.      You can use SentiOne (secure electronic communication) to access to your chart, send your primary care provider a message, or make an appointment. Ask a team member how to get started.     For a price quote for your services, please call our Consumer Price Line at [phone] or our Imaging Cost estimation line at [phone] (for imaging tests).          Please stop Losatan/HCTZ  Take Losartan 100 mg daily  Take Chlorthalidone 25 mg daily  Follow up for labs and Blood Pressure check in 2 weeks  Marysol Cisse MD                Follow-ups after your visit        Who to contact     If you have  "questions or need follow up information about today's clinic visit or your schedule please contact [facility] directly at [phone].  Normal or non-critical lab and imaging results will be communicated to you by OpenNewshart, letter or phone within 4 business days after the clinic has received the results. If you do not hear from us within 7 days, please contact the clinic through OpenNewshart or phone. If you have a critical or abnormal lab result, we will notify you by phone as soon as possible.  Submit refill requests through [email or web address] or call your pharmacy and they will forward the refill request to us. Please allow 3 business days for your refill to be completed.          Additional Information About Your Visit        OpenNewsharAwesomeHighlighter Information     [email or web address] gives you secure access to your electronic health record. If you see a primary care provider, you can also send messages to your care team and make appointments. If you have questions, please call your primary care clinic.  If you do not have a primary care provider, please call [phone] and they will assist you.        Care EveryWhere ID     This is your Care EveryWhere ID. This could be used by other organizations to access your [place] medical records  [id number]        Your Vitals Were     Pulse Temperature Respirations Height Pulse Oximetry BMI (Body Mass Index)    70 97.1  F (36.2  C) 14 5' 3\" (1.6 m) 94% 39.5 kg/m2       Blood Pressure from Last 3 Encounters:   08/28/18 142/78   08/14/18 146/78   07/27/18 146/82    Weight from Last 3 Encounters:   08/28/18 223 lb (101.2 kg)   08/14/18 221 lb (100.2 kg)   07/27/18 219 lb (99.3 kg)              Today, you had the following     No orders found for display         Today's Medication Changes          These changes are accurate as of 8/28/18  6:02 PM.  If you have any questions, ask your nurse or doctor.               Start taking these medicines.        Dose/Directions    chlorthalidone 25 MG tablet "   Commonly known as:  HYGROTON   Used for:  Essential hypertension, benign   Started by:  Marysol Cisse MD        Dose:  25 mg   Take 1 tablet (25 mg) by mouth daily   Quantity:  30 tablet   Refills:  1       losartan 100 MG tablet   Commonly known as:  COZAAR   Used for:  Essential hypertension, benign   Started by:  Marysol Cisse MD        Dose:  100 mg   Take 1 tablet (100 mg) by mouth daily   Quantity:  90 tablet   Refills:  1         Stop taking these medicines if you haven't already. Please contact your care team if you have questions.     losartan-hydrochlorothiazide 100-25 MG per tablet   Commonly known as:  HYZAAR   Stopped by:  Marysol Cisse MD                Where to get your medicines      These medications were sent to [facility] [id number] [place] [address] [address] [facility] & [address] [address]     Phone:  [phone]     amLODIPine 5 MG tablet    chlorthalidone 25 MG tablet    losartan 100 MG tablet                Primary Care Provider Office Phone # Fax #    Gladis Hernandez [phone]       [facility] [address] [facility] [address]        Equal Access to Services     [facility]: Hadii aad ku hadasho Soomaali, waaxda luqadaha, qaybta kaalmada adeegyada, waxay sonyain hayaan adeyolanda ibarra laalysia steiner. So [facility] [phone].    ATENCIÓN: Si habla español, tiene a whalen disposición servicios gratuitos de asistencia lingüística. Larry al [phone].    We comply with applicable federal civil rights laws and [place] laws. We do not discriminate on the basis of race, color, national origin, age, disability, sex, sexual orientation, or gender identity.            Thank you!     Thank you for choosing [facility]  for your care. Our goal is always to provide you with excellent care. Hearing back from our patients is one way we can continue to improve our services. Please take a few  minutes to complete the written survey that you may receive in the mail after your visit with us. Thank you!             Your Updated Medication List - Protect others around you: Learn how to safely use, store and throw away your medicines at www.disposemymeds.org.          This list is accurate as of 8/28/18  6:02 PM.  Always use your most recent med list.                   Brand Name Dispense Instructions for use Diagnosis    ALAWAY 0.025 % Soln ophthalmic solution   Generic drug:  ketotifen      1 drop 2 times daily        amLODIPine 5 MG tablet    NORVASC    30 tablet    Take 1 tablet (5 mg) by mouth daily    Essential hypertension, benign       atenolol 25 MG tablet    TENORMIN     TK 1 T PO QD        azelastine 0.1 % nasal spray    ASTELIN    1 Bottle    Spray 1-2 sprays into both nostrils 2 times daily    Chronic rhinitis, unspecified type       cetirizine 10 MG tablet    zyrTEC     Take 10 mg by mouth daily        chlorthalidone 25 MG tablet    HYGROTON    30 tablet    Take 1 tablet (25 mg) by mouth daily    Essential hypertension, benign       fluticasone 50 MCG/ACT spray    FLONASE     Spray 1 spray into both nostrils daily        levothyroxine 112 MCG tablet    SYNTHROID/LEVOTHROID     TK 1 T PO  QD        losartan 100 MG tablet    COZAAR    90 tablet    Take 1 tablet (100 mg) by mouth daily    Essential hypertension, benign       omeprazole 20 MG CR capsule    priLOSEC     TK 1 C PO D        simvastatin 10 MG tablet    ZOCOR     TK 1 T PO D HS

## 2018-08-28 NOTE — PATIENT INSTRUCTIONS
[facility]    If you have any questions regarding to your visit please contact your care team:       Team Red:   Clinic Hours Telephone Number   Dr. Carin Aparicio, NP   7am-7pm  Monday - Thursday   7am-5pm  Fridays  [phone]  (Appointment scheduling available 24/7)    Questions about your recent visit?   Team Line  [phone]   Urgent Care - [place] and [facility] - 11am-9pm Monday-Friday Saturday-Sunday- 9am-5pm   [place] - 5pm-9pm Monday-Friday Saturday-Sunday- 9am-5pm  [phone] - [place]  [phone] - [place]       What options do I have for a visit other than an office visit? We offer electronic visits (e-visits) and telephone visits, when medically appropriate.  Please check with your medical insurance to see if these types of visits are covered, as you will be responsible for any charges that are not paid by your insurance.      You can use [employer] (secure electronic communication) to access to your chart, send your primary care provider a message, or make an appointment. Ask a team member how to get started.     For a price quote for your services, please call our Consumer Price Line at [phone] or our Imaging Cost estimation line at [phone] (for imaging tests).          Please stop Losatan/HCTZ  Take Losartan 100 mg daily  Take Chlorthalidone 25 mg daily  Follow up for labs and Blood Pressure check in 2 weeks  Marysol Cisse MD

## 2018-08-29 NOTE — PROGRESS NOTES

## 2018-09-04 NOTE — PROGRESS NOTES
SUBJECTIVE:   Glenda Bales is a 67 year old female who presents to clinic today for the following health issues:      Hypertension Follow-up      Outpatient blood pressures are being checked at home.  Results are 132/82, 120/78, 109/69.    Low Salt Diet: no added salt      Amount of exercise or physical activity: None    Problems taking medications regularly: No    Medication side effects: none    Diet: regular (no restrictions)        1.) Patient states she has been having tingling in feet since starting water pill and would like to have labs drawn for potassium, if possible.    Problem list and histories reviewed & adjusted, as indicated.  Additional history: as documented    Patient Active Problem List   Diagnosis     Nonallergic rhinitis     Essential hypertension, benign     Chronic rhinitis     Acquired hypothyroidism     Hyperlipidemia LDL goal <100     Gastroesophageal reflux disease without esophagitis     BMI>35     Nonrheumatic aortic valve insufficiency     Anxiety     Hyperglycemia     Elevated LFTs     Osteoarthritis of knee, unspecified laterality, unspecified osteoarthritis type     Other irritable bowel syndrome     Alcohol abuse, in remission     Past Surgical History:   Procedure Laterality Date     NO HISTORY OF SURGERY         Social History   Substance Use Topics     Smoking status: Former Smoker     Types: Cigarettes     Quit date: 4/25/2016     Smokeless tobacco: Never Used     Alcohol use Not on file     Family History   Problem Relation Age of Onset     Diabetes Mother      Coronary Artery Disease Father      Colon Cancer No family hx of      Breast Cancer No family hx of          Current Outpatient Prescriptions   Medication Sig Dispense Refill     amLODIPine (NORVASC) 5 MG tablet Take 1 tablet (5 mg) by mouth daily 90 tablet 3     atenolol (TENORMIN) 25 MG tablet TK 1 T PO QD  3     cetirizine (ZYRTEC) 10 MG tablet Take 10 mg by mouth daily       chlorthalidone (HYGROTON) 25 MG tablet  Take 1 tablet (25 mg) by mouth daily 90 tablet 3     fluticasone (FLONASE) 50 MCG/ACT spray Spray 1 spray into both nostrils daily       ketotifen (ALAWAY) 0.025 % SOLN ophthalmic solution 1 drop 2 times daily       levothyroxine (SYNTHROID/LEVOTHROID) 112 MCG tablet TK 1 T PO  QD  4     losartan (COZAAR) 100 MG tablet Take 1 tablet (100 mg) by mouth daily 90 tablet 3     omeprazole (PRILOSEC) 20 MG CR capsule TK 1 C PO D  4     azelastine (ASTELIN) 0.1 % spray Spray 1-2 sprays into both nostrils 2 times daily (Patient not taking: Reported on 7/27/2018) 1 Bottle 11     simvastatin (ZOCOR) 10 MG tablet TK 1 T PO D HS  4     [DISCONTINUED] amLODIPine (NORVASC) 5 MG tablet Take 1 tablet (5 mg) by mouth daily 30 tablet 1     [DISCONTINUED] chlorthalidone (HYGROTON) 25 MG tablet Take 1 tablet (25 mg) by mouth daily 30 tablet 1     [DISCONTINUED] losartan (COZAAR) 100 MG tablet Take 1 tablet (100 mg) by mouth daily 90 tablet 1     No Known Allergies  Recent Labs   Lab Test  05/25/18   0735 01/15/18 01/16/17 11/21/16   A1C  6.0*  6.1*  6.2*  6.3*   LDL  152*  164*   --   117*   HDL  82  98   --   100   TRIG  98  60   --   86   ALT  86*   --   145*  134*   CR  0.94  0.82  0.91  0.73   GFRESTIMATED  59*  74  65  85   GFRESTBLACK  71  85  76  99   POTASSIUM  4.0  4.3  3.9  3.8   TSH  3.00  3.560   --   2.770      BP Readings from Last 3 Encounters:   09/11/18 132/78   08/28/18 142/78   08/14/18 146/78    Wt Readings from Last 3 Encounters:   09/11/18 221 lb 9.6 oz (100.5 kg)   08/28/18 223 lb (101.2 kg)   08/14/18 221 lb (100.2 kg)                  Labs reviewed in EPIC    Reviewed and updated as needed this visit by clinical staff       Reviewed and updated as needed this visit by Provider         ROS:  CONSTITUTIONAL: NEGATIVE for fever, chills, change in weight  ENT/MOUTH: NEGATIVE for ear, mouth and throat problems  RESP: NEGATIVE for significant cough or SOB  CV: NEGATIVE for chest pain, palpitations or peripheral  "edema  GI: NEGATIVE for nausea, abdominal pain, heartburn, or change in bowel habits  ROS otherwise negative    OBJECTIVE:     /78  Pulse 70  Temp 98.2  F (36.8  C) (Oral)  Resp 18  Ht 5' 3\" (1.6 m)  Wt 221 lb 9.6 oz (100.5 kg)  SpO2 96%  Breastfeeding? No  BMI 39.25 kg/m2  Body mass index is 39.25 kg/(m^2).  GENERAL: healthy, alert and no distress  NECK: no adenopathy, no asymmetry, masses, or scars and thyroid normal to palpation  RESP: lungs clear to auscultation - no rales, rhonchi or wheezes  CV: regular rate and rhythm, normal S1 S2, no S3 or S4, no murmur, click or rub, no peripheral edema and peripheral pulses strong  ABDOMEN: soft, nontender, no hepatosplenomegaly, no masses and bowel sounds normal  MS: no gross musculoskeletal defects noted, no edema    Diagnostic Test Results:  Pending     ASSESSMENT/PLAN:     1. Essential hypertension, benign  Controlled  Will check potassium  List of Potassium foods given to pt  - Potassium  - chlorthalidone (HYGROTON) 25 MG tablet; Take 1 tablet (25 mg) by mouth daily  Dispense: 90 tablet; Refill: 3  - losartan (COZAAR) 100 MG tablet; Take 1 tablet (100 mg) by mouth daily  Dispense: 90 tablet; Refill: 3  - amLODIPine (NORVASC) 5 MG tablet; Take 1 tablet (5 mg) by mouth daily  Dispense: 90 tablet; Refill: 3  Follow up 6 months  Advised again to do Emelyn Cisse MD  [facility]    "

## 2018-09-11 ENCOUNTER — OFFICE VISIT (OUTPATIENT)
Dept: FAMILY MEDICINE | Facility: CLINIC | Age: 68
End: 2018-09-11
Payer: COMMERCIAL

## 2018-09-11 VITALS
TEMPERATURE: 98.2 F | RESPIRATION RATE: 18 BRPM | BODY MASS INDEX: 39.27 KG/M2 | HEART RATE: 70 BPM | OXYGEN SATURATION: 96 % | HEIGHT: 63 IN | DIASTOLIC BLOOD PRESSURE: 78 MMHG | SYSTOLIC BLOOD PRESSURE: 132 MMHG | WEIGHT: 221.6 LBS

## 2018-09-11 DIAGNOSIS — I10 ESSENTIAL HYPERTENSION, BENIGN: Primary | ICD-10-CM

## 2018-09-11 PROCEDURE — 36415 COLL VENOUS BLD VENIPUNCTURE: CPT | Performed by: FAMILY MEDICINE

## 2018-09-11 PROCEDURE — 84132 ASSAY OF SERUM POTASSIUM: CPT | Performed by: FAMILY MEDICINE

## 2018-09-11 PROCEDURE — 99213 OFFICE O/P EST LOW 20 MIN: CPT | Performed by: FAMILY MEDICINE

## 2018-09-11 RX ORDER — CHLORTHALIDONE 25 MG/1
25 TABLET ORAL DAILY
Qty: 90 TABLET | Refills: 3 | Status: SHIPPED | OUTPATIENT
Start: 2018-09-11 | End: 2019-03-04

## 2018-09-11 RX ORDER — LOSARTAN POTASSIUM 100 MG/1
100 TABLET ORAL DAILY
Qty: 90 TABLET | Refills: 3 | Status: SHIPPED | OUTPATIENT
Start: 2018-09-11 | End: 2018-11-20 | Stop reason: SINTOL

## 2018-09-11 RX ORDER — AMLODIPINE BESYLATE 5 MG/1
5 TABLET ORAL DAILY
Qty: 90 TABLET | Refills: 3 | Status: SHIPPED | OUTPATIENT
Start: 2018-09-11 | End: 2018-11-20

## 2018-09-11 NOTE — PATIENT INSTRUCTIONS
Discharge Instructions: Eating a High-Potassium Diet  Your healthcare provider has told you to eat a high-potassium diet. This may be because you have low levels of potassium in your blood. Or it may be because you have high blood pressure. Potassium is found in many foods. These include dairy products, nuts, seeds, and beans. It s also found in many fruits and vegetables in high amounts.  Guidelines for a high-potassium diet    Eat fruits and vegetables in their fresh or raw form most often.    Check labels for ingredients that have potassium. This includes potassium chloride. Add these items to your diet.    Try salt substitutes. Many of these have potassium.    Avoid licorice. This includes licorice root and teas that have licorice. These can reduce potassium levels in your body.  Eat plenty of the following high-potassium foods:    Fruits. Good choices are apricots (canned and fresh), bananas, cantaloupe, honeydew melon, kiwi, nectarines, oranges, orange juice, and pears. Dried fruits include apricots, dates, figs, and prunes. Prune juice also has potassium.    Vegetables. Good choices are asparagus, avocado, artichoke, broccoli, bamboo shoots, beets, [place] sprouts, cabbage, celery, chard, okra, potatoes (white and sweet), pumpkin, rutabaga, spinach (cooked), squash, and tomatoes. Tomato sauce, tomato juice, and vegetable juice cocktail are also good choices.    Chicken, fish, clams, and crab    Milk, chocolate milk, buttermilk, and soy milk    Legumes. These include black-eyed peas, chickpeas, lentils, lima beans, navy beans, red kidney beans, soybeans, and split peas.    Nuts and seeds. Try almonds, Brazil nuts, cashews, peanuts, peanut butter, pecans, pumpkin seeds, sunflower seeds, and walnuts.    Breads and cereals. These include bran and whole-grain products.    Others foods include chocolate, cocoa, coconut milk, and molasses  Follow-up  Make a follow-up appointment for a repeat test.  When to call  your healthcare provider  Call your healthcare provider right away or go to the ER if you have any of the following:    Vomiting    Extreme tiredness (fatigue)    Diarrhea    Rapid, irregular heartbeat    Shortness of breath    Chest pain    Muscle cramps, spasms, or twitching    Weakness    Paralysis   Date Last Reviewed: 11/1/2017 2000-2017 [employer]. [address] [place] [address]. All rights reserved. This information is not intended as a substitute for professional medical care. Always follow your healthcare professional's instructions.      [facility]    If you have any questions regarding to your visit please contact your care team:       Team Red:   Clinic Hours Telephone Number   Dr. Carin Aparicio, NP 7am-7pm  Monday - Thursday   7am-5pm  Fridays  [phone]  (Appointment scheduling available 24/7)   Urgent Care - [place] [facility] [place] and [place] [facility] - 11am-9pm Monday-Friday Saturday-Sunday- 9am-5pm   [place] - 5pm-9pm Monday-Friday Saturday-Sunday- 9am-5pm  [phone] - [facility]  [phone] - [place]       What options do I have for a visit other than an office visit? We offer electronic visits (e-visits) and telephone visits, when medically appropriate.  Please check with your medical insurance to see if these types of visits are covered, as you will be responsible for any charges that are not paid by your insurance.      You can use [employer] (secure electronic communication) to access to your chart, send your primary care provider a message, or make an appointment. Ask a team member how to get started.     For a price quote for your services, please call our Consumer Price Line at [phone] or our Imaging Cost estimation line at [phone] (for imaging tests).    YUNIOR Vu MA

## 2018-09-11 NOTE — MR AVS SNAPSHOT
After Visit Summary   9/11/2018    Glenda Bales    MRN: [id number]           Patient Information     Date Of Birth          1950        Visit Information        Provider Department      9/11/2018 5:40 PM Marysol Cisse MD [facility]        Today's Diagnoses     Essential hypertension, benign    -  1      Care Instructions      Discharge Instructions: Eating a High-Potassium Diet  Your healthcare provider has told you to eat a high-potassium diet. This may be because you have low levels of potassium in your blood. Or it may be because you have high blood pressure. Potassium is found in many foods. These include dairy products, nuts, seeds, and beans. It s also found in many fruits and vegetables in high amounts.  Guidelines for a high-potassium diet    Eat fruits and vegetables in their fresh or raw form most often.    Check labels for ingredients that have potassium. This includes potassium chloride. Add these items to your diet.    Try salt substitutes. Many of these have potassium.    Avoid licorice. This includes licorice root and teas that have licorice. These can reduce potassium levels in your body.  Eat plenty of the following high-potassium foods:    Fruits. Good choices are apricots (canned and fresh), bananas, cantaloupe, honeydew melon, kiwi, nectarines, oranges, orange juice, and pears. Dried fruits include apricots, dates, figs, and prunes. Prune juice also has potassium.    Vegetables. Good choices are asparagus, avocado, artichoke, broccoli, bamboo shoots, beets, [place] sprouts, cabbage, celery, chard, okra, potatoes (white and sweet), pumpkin, rutabaga, spinach (cooked), squash, and tomatoes. Tomato sauce, tomato juice, and vegetable juice cocktail are also good choices.    Chicken, fish, clams, and crab    Milk, chocolate milk, buttermilk, and soy milk    Legumes. These include black-eyed peas, chickpeas, lentils, lima beans, navy beans, red kidney beans,  soybeans, and split peas.    Nuts and seeds. Try almonds, Brazil nuts, cashews, peanuts, peanut butter, pecans, pumpkin seeds, sunflower seeds, and walnuts.    Breads and cereals. These include bran and whole-grain products.    Others foods include chocolate, cocoa, coconut milk, and molasses  Follow-up  Make a follow-up appointment for a repeat test.  When to call your healthcare provider  Call your healthcare provider right away or go to the ER if you have any of the following:    Vomiting    Extreme tiredness (fatigue)    Diarrhea    Rapid, irregular heartbeat    Shortness of breath    Chest pain    Muscle cramps, spasms, or twitching    Weakness    Paralysis   Date Last Reviewed: 11/1/2017 2000-2017 [employer]. [address] [place] [address]. All rights reserved. This information is not intended as a substitute for professional medical care. Always follow your healthcare professional's instructions.      [facility]    If you have any questions regarding to your visit please contact your care team:       Team Red:   Clinic Hours Telephone Number   Dr. Carin Aparicio, NP 7am-7pm  Monday - Thursday   7am-5pm  Fridays  [phone]  (Appointment scheduling available 24/7)   Urgent Care - [facility] - 11am-9pm Monday-Friday Saturday-Sunday- 9am-5pm   [place] - 5pm-9pm Monday-Friday Saturday-Sunday- 9am-5pm  [phone] - [place]  [phone] - [place]       What options do I have for a visit other than an office visit? We offer electronic visits (e-visits) and telephone visits, when medically appropriate.  Please check with your medical insurance to see if these types of visits are covered, as you will be responsible for any charges that are not paid by your insurance.      You can use [employer] (secure electronic communication) to access to your chart, send your primary care provider a  "message, or make an appointment. Ask a team member how to get started.     For a price quote for your services, please call our Consumer Price Line at [phone] or our Imaging Cost estimation line at [phone] (for imaging tests).    YUNIOR Vu MA            Follow-ups after your visit        Who to contact     If you have questions or need follow up information about today's clinic visit or your schedule please contact [facility] [place] directly at [phone].  Normal or non-critical lab and imaging results will be communicated to you by [employer], letter or phone within 4 business days after the clinic has received the results. If you do not hear from us within 7 days, please contact the clinic through [employer] or phone. If you have a critical or abnormal lab result, we will notify you by phone as soon as possible.  Submit refill requests through [employer] or call your pharmacy and they will forward the refill request to us. Please allow 3 business days for your refill to be completed.          Additional Information About Your Visit        [employer] Information     [employer] gives you secure access to your electronic health record. If you see a primary care provider, you can also send messages to your care team and make appointments. If you have questions, please call your primary care clinic.  If you do not have a primary care provider, please call [phone] and they will assist you.        Care EveryWhere ID     This is your Care EveryWhere ID. This could be used by other organizations to access your [place] medical records  [id number]        Your Vitals Were     Pulse Temperature Respirations Height Pulse Oximetry Breastfeeding?    70 98.2  F (36.8  C) (Oral) 18 5' 3\" (1.6 m) 96% No    BMI (Body Mass Index)                   39.25 kg/m2            Blood Pressure from Last 3 Encounters:   09/11/18 132/78   08/28/18 142/78   08/14/18 146/78    Weight from Last 3 Encounters:   09/11/18 221 lb 9.6 oz " (100.5 kg)   08/28/18 223 lb (101.2 kg)   08/14/18 221 lb (100.2 kg)              We Performed the Following     Potassium          Where to get your medicines      [id number] [place] - [address] AT [facility] [address]  [address], [place] [address]     Phone:  [phone]     amLODIPine 5 MG tablet    chlorthalidone 25 MG tablet    losartan 100 MG tablet          Primary Care Provider Office Phone # Fax #    Marysol Cisse -[phone] [address]  [place] [address]        Equal Access to Services     [facility] : Hadii basil Main, wasen baker, qaybta kaalmada adejuan c, margaret flanagan . So [facility] [phone].    ATENCIÓN: Si habla español, tiene a whalen disposición servicios gratuitos de asistencia lingüística. [facility] [phone].    We comply with applicable federal civil rights laws and [place] laws. We do not discriminate on the basis of race, color, national origin, age, disability, sex, sexual orientation, or gender identity.            Thank you!     Thank you for choosing [facility]  for your care. Our goal is always to provide you with excellent care. Hearing back from our patients is one way we can continue to improve our services. Please take a few minutes to complete the written survey that you may receive in the mail after your visit with us. Thank you!             Your Updated Medication List - Protect others around you: Learn how to safely use, store and throw away your medicines at www.disposemymeds.org.          This list is accurate as of 9/11/18  6:05 PM.  Always use your most recent med list.                   Brand Name Dispense Instructions for use Diagnosis    ALAWAY 0.025 % Soln ophthalmic solution   Generic drug:  ketotifen      1 drop 2 times daily        amLODIPine 5 MG tablet    NORVASC    90 tablet    Take 1  tablet (5 mg) by mouth daily    Essential hypertension, benign       atenolol 25 MG tablet    TENORMIN     TK 1 T PO QD        azelastine 0.1 % nasal spray    ASTELIN    1 Bottle    Spray 1-2 sprays into both nostrils 2 times daily    Chronic rhinitis, unspecified type       cetirizine 10 MG tablet    zyrTEC     Take 10 mg by mouth daily        chlorthalidone 25 MG tablet    HYGROTON    90 tablet    Take 1 tablet (25 mg) by mouth daily    Essential hypertension, benign       fluticasone 50 MCG/ACT spray    FLONASE     Spray 1 spray into both nostrils daily        levothyroxine 112 MCG tablet    SYNTHROID/LEVOTHROID     TK 1 T PO  QD        losartan 100 MG tablet    COZAAR    90 tablet    Take 1 tablet (100 mg) by mouth daily    Essential hypertension, benign       omeprazole 20 MG CR capsule    priLOSEC     TK 1 C PO D        simvastatin 10 MG tablet    ZOCOR     TK 1 T PO D HS

## 2018-09-12 ENCOUNTER — MEDICAL CORRESPONDENCE (OUTPATIENT)
Dept: HEALTH INFORMATION MANAGEMENT | Facility: CLINIC | Age: 68
End: 2018-09-12

## 2018-09-12 LAB — POTASSIUM SERPL-SCNC: 4.1 MMOL/L (ref 3.4–5.3)

## 2018-11-06 ENCOUNTER — TELEPHONE (OUTPATIENT)
Dept: FAMILY MEDICINE | Facility: CLINIC | Age: 68
End: 2018-11-06

## 2018-11-06 NOTE — TELEPHONE ENCOUNTER
Received a fax from [facility]   Your patient has not completed the Cologuard test you ordered for them.    [facility] has made several attempts to encourage your patient to return their sample.We encourage you to contact your patient directly to ensure your patient completes their screening.     Order number: [id number]    Phone number [phone]  Fax number [phone]    Per provider has requested that the clinic reach out to the patient to confirm this is what they still plan to do.    Anna Ray,

## 2018-11-06 NOTE — TELEPHONE ENCOUNTER
Called Glenda to let her know of the message. She is aware that she needs to complete this. She is just busy at this time. She said she would call them to let them know that she just hasn't had time yet. Anna Ray,

## 2018-11-12 ENCOUNTER — TELEPHONE (OUTPATIENT)
Dept: FAMILY MEDICINE | Facility: CLINIC | Age: 68
End: 2018-11-12

## 2018-11-12 NOTE — TELEPHONE ENCOUNTER
Panel Management Review      Patient has the following on her problem list:     Hypertension   Last three blood pressure readings:  BP Readings from Last 3 Encounters:   09/11/18 132/78   08/28/18 142/78   08/14/18 146/78     Blood pressure: Passed    HTN Guidelines:  Age 18-59 BP range:  Less than 140/90  Age 60-85 with Diabetes:  Less than 140/90  Age 60-85 without Diabetes:  less than 150/90      Composite cancer screening  Chart review shows that this patient is due/due soon for the following Fecal Colorectal (FIT)  Summary:    Patient is due/failing the following:   FIT    Action needed:   NONE, order done on 9/13/2018    Type of outreach:    NONE    Questions for provider review:    None                                                                                                                                    Pancho Alanis CMA on 11/12/2018 at 12:08 PM       Chart routed to NONE .

## 2018-11-15 ENCOUNTER — OFFICE VISIT (OUTPATIENT)
Dept: FAMILY MEDICINE | Facility: CLINIC | Age: 68
End: 2018-11-15
Payer: COMMERCIAL

## 2018-11-15 VITALS
WEIGHT: 213 LBS | SYSTOLIC BLOOD PRESSURE: 110 MMHG | DIASTOLIC BLOOD PRESSURE: 68 MMHG | TEMPERATURE: 98.6 F | BODY MASS INDEX: 37.74 KG/M2 | HEART RATE: 81 BPM | HEIGHT: 63 IN | OXYGEN SATURATION: 96 %

## 2018-11-15 DIAGNOSIS — J06.9 VIRAL UPPER RESPIRATORY TRACT INFECTION: Primary | ICD-10-CM

## 2018-11-15 DIAGNOSIS — Z12.11 SCREEN FOR COLON CANCER: ICD-10-CM

## 2018-11-15 DIAGNOSIS — R05.9 COUGH: ICD-10-CM

## 2018-11-15 PROCEDURE — 99213 OFFICE O/P EST LOW 20 MIN: CPT | Performed by: NURSE PRACTITIONER

## 2018-11-15 RX ORDER — BENZONATATE 200 MG/1
200 CAPSULE ORAL 3 TIMES DAILY PRN
Qty: 21 CAPSULE | Refills: 0 | Status: SHIPPED | OUTPATIENT
Start: 2018-11-15 | End: 2018-12-13

## 2018-11-15 ASSESSMENT — PAIN SCALES - GENERAL: PAINLEVEL: MODERATE PAIN (5)

## 2018-11-15 NOTE — PATIENT INSTRUCTIONS
Before Your Surgery      Call your surgeon if there is any change in your health. This includes signs of a cold or flu (such as a sore throat, runny nose, cough, rash or fever).    Do not smoke, drink alcohol or take over the counter medicine (unless your surgeon or primary care doctor tells you to) for the 24 hours before and after surgery.    If you take prescribed drugs: Follow your doctor s orders about which medicines to take and which to stop until after surgery.    Eating and drinking prior to surgery: follow the instructions from your surgeon    Take a shower or bath the night before surgery. Use the soap your surgeon gave you to gently clean your skin. If you do not have soap from your surgeon, use your regular soap. Do not shave or scrub the surgery site.  Wear clean pajamas and have clean sheets on your bed.     [facility]    If you have any questions regarding to your visit please contact your care team:       Team Red:   Clinic Hours Telephone Number   Dr. Carin Aparicio, NP 7am-7pm  Monday - Thursday   7am-5pm  Fridays  [phone]  (Appointment scheduling available 24/7)   Urgent Care - [place] - 11am-9pm Monday-Friday Saturday-Sunday- 9am-5pm   [place] - 5pm-9pm Monday-Friday Saturday-Sunday- 9am-5pm  [phone] - [place]  [phone] - [place]       What options do I have for a visit other than an office visit? We offer electronic visits (e-visits) and telephone visits, when medically appropriate.  Please check with your medical insurance to see if these types of visits are covered, as you will be responsible for any charges that are not paid by your insurance.      You can use [employer] (secure electronic communication) to access to your chart, send your primary care provider a message, or make an appointment. Ask a team member how to get started.     For a price quote for your services,  please call our Consumer Price Line at [phone] or our Imaging Cost estimation line at [phone] (for imaging tests).    Discharged by Migdalia Turner MA.

## 2018-11-15 NOTE — MR AVS SNAPSHOT
After Visit Summary   11/15/2018    Glenda Bales    MRN: [id number]           Patient Information     Date Of Birth          1950        Visit Information        Provider Department      11/15/2018 8:40 AM Makayla Aparicio APRN CNP [facility]        Today's Diagnoses     Viral upper respiratory tract infection    -  1    Cough        Screen for colon cancer          Care Instructions    [facility]    If you have any questions regarding to your visit please contact your care team:       Team Red:   Clinic Hours Telephone Number   Dr. Carin Aparicio, NP 7am-7pm  Monday - Thursday   7am-5pm  Fridays  [phone]  (Appointment scheduling available 24/7)   Urgent Care - [place] and [facility] - 11am-9pm Monday-Friday Saturday-Sunday- 9am-5pm   [place] - 5pm-9pm Monday-Friday Saturday-Sunday- 9am-5pm  [phone] - [place]  [phone] - [place]       What options do I have for a visit other than an office visit? We offer electronic visits (e-visits) and telephone visits, when medically appropriate.  Please check with your medical insurance to see if these types of visits are covered, as you will be responsible for any charges that are not paid by your insurance.      You can use Moneythink (secure electronic communication) to access to your chart, send your primary care provider a message, or make an appointment. Ask a team member how to get started.     For a price quote for your services, please call our Consumer Price Line at [phone] or our Imaging Cost estimation line at [phone] (for imaging tests).    Discharged by Migdalia Turner MA.              Follow-ups after your visit        Your next 10 appointments already scheduled     Nov 19, 2018  7:20 AM CST   Pre-Op physical with EDILBERTO Hu CNP   [facility] ([address]  "[place]  [place] [id number] [phone]              Who to contact     If you have questions or need follow up information about today's clinic visit or your schedule please contact [facility] [place] directly at [phone].  Normal or non-critical lab and imaging results will be communicated to you by MyChart, letter or phone within 4 business days after the clinic has received the results. If you do not hear from us within 7 days, please contact the clinic through Rijuvenhart or phone. If you have a critical or abnormal lab result, we will notify you by phone as soon as possible.  Submit refill requests through Pick[employer] Services or call your pharmacy and they will forward the refill request to us. Please allow 3 business days for your refill to be completed.          Additional Information About Your Visit        Rijuvenhart Information     [employer] Services gives you secure access to your electronic health record. If you see a primary care provider, you can also send messages to your care team and make appointments. If you have questions, please call your primary care clinic.  If you do not have a primary care provider, please call [phone] and they will assist you.        Care EveryWhere ID     This is your Care EveryWhere ID. This could be used by other organizations to access your [place] medical records  [id number]        Your Vitals Were     Pulse Temperature Height Pulse Oximetry BMI (Body Mass Index)       81 98.6  F (37  C) (Tympanic) 5' 3\" (1.6 m) 96% 37.73 kg/m2        Blood Pressure from Last 3 Encounters:   11/15/18 110/68   09/11/18 132/78   08/28/18 142/78    Weight from Last 3 Encounters:   11/15/18 213 lb (96.6 kg)   09/11/18 221 lb 9.6 oz (100.5 kg)   08/28/18 223 lb (101.2 kg)              Today, you had the following     No orders found for display         Today's Medication Changes          These changes are accurate as of 11/15/18  9:18 AM.  If you have any questions, ask your nurse or doctor.       "         Start taking these medicines.        Dose/Directions    benzonatate 200 MG capsule   Commonly known as:  TESSALON   Used for:  Viral upper respiratory tract infection, Cough   Started by:  Makayla Aparicio APRN CNP        Dose:  200 mg   Take 1 capsule (200 mg) by mouth 3 times daily as needed for cough   Quantity:  21 capsule   Refills:  0            Where to get your medicines      These [id number] [address] AT [facility] [address] [place] [id number] [facility], [place] [address]     Phone:  [phone]     benzonatate 200 MG capsule                Primary Care Provider Office Phone # Fax #    Marysol Cisse [phone] [address] [facility]  [place] [address]        Equal Access to Services     [facility]: Hadii aad ku hadasho Sorosamaria, waaxda luqadaha, qaybta kaalmada adejuan c, margaret flanagan . So [facility] [phone].    ATENCIÓN: Si habla español, tiene a whalen disposición servicios gratuitos de asistencia lingüística. [facility] [phone].    We comply with applicable federal civil rights laws and [place] laws. We do not discriminate on the basis of race, color, national origin, age, disability, sex, sexual orientation, or gender identity.            Thank you!     Thank you for choosing [facility]  for your care. Our goal is always to provide you with excellent care. Hearing back from our patients is one way we can continue to improve our services. Please take a few minutes to complete the written survey that you may receive in the mail after your visit with us. Thank you!             Your Updated Medication List - Protect others around you: Learn how to safely use, store and throw away your medicines at www.disposemymeds.org.          This list is accurate as of 11/15/18  9:18 AM.  Always use your most recent med list.                   Brand Name Dispense  Instructions for use Diagnosis    ALAWAY 0.025 % Soln ophthalmic solution   Generic drug:  ketotifen      1 drop 2 times daily        amLODIPine 5 MG tablet    NORVASC    90 tablet    Take 1 tablet (5 mg) by mouth daily    Essential hypertension, benign       atenolol 25 MG tablet    TENORMIN     TK 1 T PO QD        azelastine 0.1 % nasal spray    ASTELIN    1 Bottle    Spray 1-2 sprays into both nostrils 2 times daily    Chronic rhinitis, unspecified type       benzonatate 200 MG capsule    TESSALON    21 capsule    Take 1 capsule (200 mg) by mouth 3 times daily as needed for cough    Viral upper respiratory tract infection, Cough       cetirizine 10 MG tablet    zyrTEC     Take 10 mg by mouth daily        chlorthalidone 25 MG tablet    HYGROTON    90 tablet    Take 1 tablet (25 mg) by mouth daily    Essential hypertension, benign       fluticasone 50 MCG/ACT spray    FLONASE     Spray 1 spray into both nostrils daily        levothyroxine 112 MCG tablet    SYNTHROID/LEVOTHROID     TK 1 T PO  QD        losartan 100 MG tablet    COZAAR    90 tablet    Take 1 tablet (100 mg) by mouth daily    Essential hypertension, benign       omeprazole 20 MG CR capsule    priLOSEC     TK 1 C PO D        simvastatin 10 MG tablet    ZOCOR     TK 1 T PO D HS

## 2018-11-15 NOTE — PATIENT INSTRUCTIONS
[facility]    If you have any questions regarding to your visit please contact your care team:       Team Red:   Clinic Hours Telephone Number   Dr. Carin Aparicio NP 7am-7pm  Monday - Thursday   7am-5pm  Fridays  [phone]  (Appointment scheduling available 24/7)   Urgent Care - [place] and [facility] - 11am-9pm Monday-Friday Saturday-Sunday- 9am-5pm   [place] - 5pm-9pm Monday-Friday Saturday-Sunday- 9am-5pm  [phone] - [place]  [phone] - [place]       What options do I have for a visit other than an office visit? We offer electronic visits (e-visits) and telephone visits, when medically appropriate.  Please check with your medical insurance to see if these types of visits are covered, as you will be responsible for any charges that are not paid by your insurance.      You can use Graceful Tables (secure electronic communication) to access to your chart, send your primary care provider a message, or make an appointment. Ask a team member how to get started.     For a price quote for your services, please call our Consumer Price Line at [phone] or our Imaging Cost estimation line at [phone] (for imaging tests).    Discharged by Migdalia Turner MA.

## 2018-11-15 NOTE — PROGRESS NOTES
[facility]  [address] [place] [address]  [place] [address]  [phone]  Dept: [phone]    PRE-OP EVALUATION:  Today's date: 2018    Glenda Bales (: 1950) presents for pre-operative evaluation assessment as requested by Dr. Akers.  She requires evaluation and anesthesia risk assessment prior to undergoing surgery/procedure for treatment of Bilateral Cataracts.    Proposed Surgery/ Procedure: Cataract Removal  Date of Surgery/ Procedure: 2018 and 2018  Time of Surgery/ Procedure: [facility]  Hospital/Surgical Facility: [facility] [place]  Fax number for surgical facility: [phone]  Primary Physician: Marysol Cisse  Type of Anesthesia Anticipated: to be determined    Patient has a Health Care Directive or Living Will:  NO    1. NO - Do you have a history of heart attack, stroke, stent, bypass or surgery on an artery in the head, neck, heart or legs?  2. NO - Do you ever have any pain or discomfort in your chest?  3. NO - Do you have a history of  Heart Failure?  4. NO - Are you troubled by shortness of breath when: walking on the level, up a slight hill or at night?  5. YES - DO YOU CURRENTLY HAVE A COLD, BRONCHITIS OR OTHER RESPIRATORY INFECTION? Cough ongoing for 9 days with slight improvement since onset. Has related muscle cramps and urinary frequency.  6. YES - DO YOU HAVE A COUGH, SHORTNESS OF BREATH OR WHEEZING? As above  7. YES - DO YOU SOMETIMES GET PAINS IN THE CALVES OF YOUR LEGS WHEN YOU WALK? Has bilateral calf cramps since sick  8. NO - Do you or anyone in your family have previous history of blood clots?  9. NO - Do you or does anyone in your family have a serious bleeding problem such as prolonged bleeding following surgeries or cuts?  10. NO - Have you ever had problems with anemia or been told to take iron pills?  11. NO - Have you had any abnormal blood loss such as black, tarry or bloody stools, or abnormal vaginal bleeding?  12. NO - Have you  ever had a blood transfusion?  13. NO - Have you or any of your relatives ever had problems with anesthesia?  14. NO - Do you have sleep apnea, excessive snoring or daytime drowsiness?  15. NO - Do you have any prosthetic heart valves?  16. NO - Do you have prosthetic joints?  17. NO - Is there any chance that you may be pregnant?      HPI:     HPI related to upcoming procedure: Patient has bilateral senile cataracts with reduction in visual acuity and repair is planned.       See problem list for active medical problems.  Problems all longstanding and stable, except as noted/documented.  See ROS for pertinent symptoms related to these conditions.                                                                                                                                                          .    MEDICAL HISTORY:     Patient Active Problem List    Diagnosis Date Noted     Nonrheumatic aortic valve insufficiency 05/02/2018     Priority: Medium     Anxiety 05/02/2018     Priority: Medium     Hyperglycemia 05/02/2018     Priority: Medium     Elevated LFTs 05/02/2018     Priority: Medium     Transaminase level       Osteoarthritis of knee, unspecified laterality, unspecified osteoarthritis type 05/02/2018     Priority: Medium     Other irritable bowel syndrome 05/02/2018     Priority: Medium     Alcohol abuse, in remission 05/02/2018     Priority: Medium     BMI>35 04/25/2018     Priority: Medium     Essential hypertension, benign 04/24/2018     Priority: Medium     Chronic rhinitis 04/24/2018     Priority: Medium     Acquired hypothyroidism 04/24/2018     Priority: Medium     Hyperlipidemia LDL goal <100 04/24/2018     Priority: Medium     Gastroesophageal reflux disease without esophagitis 04/24/2018     Priority: Medium     Nonallergic rhinitis 04/09/2018     Priority: Medium     Negative skin testing 4/9/18        No past medical history on file.  Past Surgical History:   Procedure Laterality Date     NO  "HISTORY OF SURGERY       Current Outpatient Prescriptions   Medication Sig Dispense Refill     amLODIPine (NORVASC) 5 MG tablet Take 1 tablet (5 mg) by mouth daily 90 tablet 3     atenolol (TENORMIN) 25 MG tablet TK 1 T PO QD  3     azelastine (ASTELIN) 0.1 % spray Spray 1-2 sprays into both nostrils 2 times daily 1 Bottle 11     benzonatate (TESSALON) 200 MG capsule Take 1 capsule (200 mg) by mouth 3 times daily as needed for cough 21 capsule 0     cetirizine (ZYRTEC) 10 MG tablet Take 10 mg by mouth daily       chlorthalidone (HYGROTON) 25 MG tablet Take 1 tablet (25 mg) by mouth daily 90 tablet 3     fluticasone (FLONASE) 50 MCG/ACT spray Spray 1 spray into both nostrils daily       ketotifen (ALAWAY) 0.025 % SOLN ophthalmic solution 1 drop 2 times daily       levothyroxine (SYNTHROID/LEVOTHROID) 112 MCG tablet TK 1 T PO  QD  4     losartan (COZAAR) 100 MG tablet Take 1 tablet (100 mg) by mouth daily 90 tablet 3     omeprazole (PRILOSEC) 20 MG CR capsule TK 1 C PO D  4     simvastatin (ZOCOR) 10 MG tablet TK 1 T PO D HS  4     OTC products: None, except as noted above    No Known Allergies   Latex Allergy: NO    Social History   Substance Use Topics     Smoking status: Former Smoker     Years: 3.00     Types: Cigarettes     Quit date: 4/25/2016     Smokeless tobacco: Never Used     Alcohol use Not on file     History   Drug Use Not on file       REVIEW OF SYSTEMS:   Constitutional, neuro, ENT, endocrine, pulmonary, cardiac, gastrointestinal, genitourinary, musculoskeletal, integument and psychiatric systems are negative, except as otherwise noted.    EXAM:   /68 (BP Location: Left arm, Patient Position: Chair, Cuff Size: Adult Large)  Pulse 88  Temp 96.3  F (35.7  C) (Tympanic)  Ht 5' 3\" (1.6 m)  Wt 214 lb (97.1 kg)  SpO2 95%  BMI 37.91 kg/m2    GENERAL APPEARANCE: healthy, alert and no distress     EYES: EOMI, PERRL     HENT: ear canals and TM's normal and nose and mouth without ulcers or lesions     " NECK: no adenopathy, no asymmetry, masses, or scars and thyroid normal to palpation     RESP: no coughing, lungs clear to auscultation - no rales, rhonchi or wheezes     CV: regular rates and rhythm, normal S1 S2, no S3 or S4 and no murmur, click or rub     ABDOMEN:  soft, nontender, no HSM or masses and bowel sounds normal     MS: extremities normal- no gross deformities noted, no evidence of inflammation in joints, FROM in all extremities.     SKIN: no suspicious lesions or rashes     NEURO: Normal strength and tone, sensory exam grossly normal, mentation intact and speech normal     PSYCH: mentation appears normal. and affect normal/bright     LYMPHATICS: No cervical adenopathy    DIAGNOSTICS:     Labs Resulted Today:   Results for orders placed or performed in visit on 09/11/18   Potassium   Result Value Ref Range    Potassium 4.1 3.4 - 5.3 mmol/L     Labs Drawn and in Process:   Unresulted Labs Ordered in the Past 30 Days of this Admission     Date and Time Order Name Status Description    11/19/2018 0749 CBC WITH PLATELETS DIFFERENTIAL In process     11/19/2018 0749 UA MACROSCOPIC WITH REFLEX TO MICROSCOPIC AND CULTURE In process     11/19/2018 0749 BASIC METABOLIC PANEL In process           Recent Labs   Lab Test  09/11/18   1816  05/25/18   0735 01/15/18   NA   --   139   --    POTASSIUM  4.1  4.0  4.3   CR   --   0.94  0.82   A1C   --   6.0*  6.1*        IMPRESSION:   Reason for surgery/procedure: Senile cataracts  Diagnosis/reason for consult: Evaluate perioperative risk    The proposed surgical procedure is considered LOW risk.    REVISED CARDIAC RISK INDEX  The patient has the following serious cardiovascular risks for perioperative complications such as (MI, PE, VFib and 3  AV Block):  No serious cardiac risks  INTERPRETATION: 0 risks: Class I (very low risk - 0.4% complication rate)    The patient has the following additional risks for perioperative complications:  Morbid obesity      ICD-10-CM    1.  Preop general physical exam Z01.818    2. Age-related cataract of both eyes, unspecified age-related cataract type H25.9    3. Acute bronchitis, unspecified organism J20.9 CBC with platelets differential     azithromycin (ZITHROMAX) 250 MG tablet   4. Urinary frequency R35.0 UA reflex to Microscopic and Culture   5. Muscle cramps R25.2 Basic metabolic panel   6. Candidiasis of vagina B37.3 fluconazole (DIFLUCAN) 150 MG tablet       RECOMMENDATIONS:     --For Bronchitis- continue to observe symptoms over the next 24-48 hours. If improving, continue supportive measures only. If no improvement or symptoms worsen, may start Azithromycin. Diflucan given prophylactically due to history of vaginal candidiasis with antibiotics.    --Patient is to take all scheduled medications on the day of surgery EXCEPT for modifications listed below.  - no changes to meds    APPROVAL GIVEN to proceed with proposed procedure, without further diagnostic evaluation       Signed Electronically by: EDILBERTO Hu CNP    Copy of this evaluation report is provided to requesting physician.    [facility] Preop Guidelines    Revised Cardiac Risk Index

## 2018-11-15 NOTE — PROGRESS NOTES
SUBJECTIVE:   Glenda Bales is a 67 year old female who presents to clinic today for the following health issues:      RESPIRATORY SYMPTOMS      Duration: 6 days    Description  nasal congestion, lots of drainage, post nasal drip, dry cough, tightness in chest and hoarse voice    Severity: moderate    Accompanying signs and symptoms: None    History (predisposing factors):  History of sinus infections    Precipitating or alleviating factors: None    Therapies tried and outcome:  Lots of water unsure if this has helped    Has some SOB when walking for extended period of time, but otherwise breathing well. Did feel achy last night.    Problem list and histories reviewed & adjusted, as indicated.  Additional history: as documented    Patient Active Problem List   Diagnosis     Nonallergic rhinitis     Essential hypertension, benign     Chronic rhinitis     Acquired hypothyroidism     Hyperlipidemia LDL goal <100     Gastroesophageal reflux disease without esophagitis     BMI>35     Nonrheumatic aortic valve insufficiency     Anxiety     Hyperglycemia     Elevated LFTs     Osteoarthritis of knee, unspecified laterality, unspecified osteoarthritis type     Other irritable bowel syndrome     Alcohol abuse, in remission     Past Surgical History:   Procedure Laterality Date     NO HISTORY OF SURGERY         Social History   Substance Use Topics     Smoking status: Former Smoker     Years: 3.00     Types: Cigarettes     Quit date: 4/25/2016     Smokeless tobacco: Never Used     Alcohol use Not on file     Family History   Problem Relation Age of Onset     Diabetes Mother      Coronary Artery Disease Father      Colon Cancer No family hx of      Breast Cancer No family hx of            Reviewed and updated as needed this visit by clinical staff       Reviewed and updated as needed this visit by Provider         ROS:  Constitutional, HEENT, cardiovascular, pulmonary, GI, , musculoskeletal, neuro, skin, endocrine and psych  "systems are negative, except as otherwise noted.    OBJECTIVE:     /68 (BP Location: Left arm, Patient Position: Chair, Cuff Size: Adult Large)  Pulse 81  Temp 98.6  F (37  C) (Tympanic)  Ht 5' 3\" (1.6 m)  Wt 213 lb (96.6 kg)  SpO2 96%  BMI 37.73 kg/m2  Body mass index is 37.73 kg/(m^2).  GENERAL: healthy, alert and no distress  EYES: Eyes grossly normal to inspection, PERRL and conjunctivae and sclerae normal  HENT: normal cephalic/atraumatic, ear canals and TM's normal, nose and mouth without ulcers or lesions, oropharynx clear, oral mucous membranes moist and sinuses: maxillary tenderness on bilateral  NECK: no adenopathy, no asymmetry, masses, or scars and thyroid normal to palpation  RESP: lungs clear to auscultation - no rales, rhonchi or wheezes  CV: regular rate and rhythm, normal S1 S2, no S3 or S4, no murmur, click or rub, no peripheral edema and peripheral pulses strong    Diagnostic Test Results:  none     ASSESSMENT/PLAN:     1. Viral upper respiratory tract infection  Symptoms consistent with viral sinusitis- reviewed general care for URIs including hydration, rest, and warning signs indicating needs for follow up (I.e. Symptoms persisting greater than 10-14 days, new onset fevers, worsening cough). May use Tessalon PRN cough, continue Flonase, consider using saline sinus rinse.  - benzonatate (TESSALON) 200 MG capsule; Take 1 capsule (200 mg) by mouth 3 times daily as needed for cough  Dispense: 21 capsule; Refill: 0    2. Cough  As above  - benzonatate (TESSALON) 200 MG capsule; Take 1 capsule (200 mg) by mouth 3 times daily as needed for cough  Dispense: 21 capsule; Refill: 0    3. Screen for colon cancer  Advised to complete Cologuard screening as ordered by PCP      See Patient Instructions    EDILBERTO Hu [facility]    "

## 2018-11-19 ENCOUNTER — OFFICE VISIT (OUTPATIENT)
Dept: FAMILY MEDICINE | Facility: CLINIC | Age: 68
End: 2018-11-19
Payer: COMMERCIAL

## 2018-11-19 VITALS
DIASTOLIC BLOOD PRESSURE: 68 MMHG | HEIGHT: 63 IN | HEART RATE: 88 BPM | TEMPERATURE: 96.3 F | OXYGEN SATURATION: 95 % | SYSTOLIC BLOOD PRESSURE: 112 MMHG | BODY MASS INDEX: 37.92 KG/M2 | WEIGHT: 214 LBS

## 2018-11-19 DIAGNOSIS — Z01.818 PREOP GENERAL PHYSICAL EXAM: Primary | ICD-10-CM

## 2018-11-19 DIAGNOSIS — H25.9 AGE-RELATED CATARACT OF BOTH EYES, UNSPECIFIED AGE-RELATED CATARACT TYPE: ICD-10-CM

## 2018-11-19 DIAGNOSIS — J20.9 ACUTE BRONCHITIS, UNSPECIFIED ORGANISM: ICD-10-CM

## 2018-11-19 DIAGNOSIS — I10 ESSENTIAL HYPERTENSION, BENIGN: ICD-10-CM

## 2018-11-19 DIAGNOSIS — R25.2 MUSCLE CRAMPS: ICD-10-CM

## 2018-11-19 DIAGNOSIS — B37.31 CANDIDIASIS OF VAGINA: ICD-10-CM

## 2018-11-19 DIAGNOSIS — N17.9 ACUTE KIDNEY INJURY (H): ICD-10-CM

## 2018-11-19 DIAGNOSIS — R35.0 URINARY FREQUENCY: ICD-10-CM

## 2018-11-19 LAB
ALBUMIN UR-MCNC: NEGATIVE MG/DL
ANION GAP SERPL CALCULATED.3IONS-SCNC: 5 MMOL/L (ref 3–14)
APPEARANCE UR: CLEAR
BASOPHILS # BLD AUTO: 0 10E9/L (ref 0–0.2)
BASOPHILS NFR BLD AUTO: 0.3 %
BILIRUB UR QL STRIP: NEGATIVE
BUN SERPL-MCNC: 27 MG/DL (ref 7–30)
CALCIUM SERPL-MCNC: 9 MG/DL (ref 8.5–10.1)
CHLORIDE SERPL-SCNC: 97 MMOL/L (ref 94–109)
CO2 SERPL-SCNC: 31 MMOL/L (ref 20–32)
COLOR UR AUTO: YELLOW
CREAT SERPL-MCNC: 1.44 MG/DL (ref 0.52–1.04)
DIFFERENTIAL METHOD BLD: NORMAL
EOSINOPHIL # BLD AUTO: 0 10E9/L (ref 0–0.7)
EOSINOPHIL NFR BLD AUTO: 0.6 %
ERYTHROCYTE [DISTWIDTH] IN BLOOD BY AUTOMATED COUNT: 12.8 % (ref 10–15)
GFR SERPL CREATININE-BSD FRML MDRD: 36 ML/MIN/1.7M2
GLUCOSE SERPL-MCNC: 152 MG/DL (ref 70–99)
GLUCOSE UR STRIP-MCNC: NEGATIVE MG/DL
HCT VFR BLD AUTO: 44.5 % (ref 35–47)
HGB BLD-MCNC: 14.9 G/DL (ref 11.7–15.7)
HGB UR QL STRIP: NEGATIVE
KETONES UR STRIP-MCNC: NEGATIVE MG/DL
LEUKOCYTE ESTERASE UR QL STRIP: NEGATIVE
LYMPHOCYTES # BLD AUTO: 2.2 10E9/L (ref 0.8–5.3)
LYMPHOCYTES NFR BLD AUTO: 33 %
MCH RBC QN AUTO: 32 PG (ref 26.5–33)
MCHC RBC AUTO-ENTMCNC: 33.5 G/DL (ref 31.5–36.5)
MCV RBC AUTO: 96 FL (ref 78–100)
MONOCYTES # BLD AUTO: 0.6 10E9/L (ref 0–1.3)
MONOCYTES NFR BLD AUTO: 9.5 %
NEUTROPHILS # BLD AUTO: 3.7 10E9/L (ref 1.6–8.3)
NEUTROPHILS NFR BLD AUTO: 56.6 %
NITRATE UR QL: NEGATIVE
PH UR STRIP: 6 PH (ref 5–7)
PLATELET # BLD AUTO: 168 10E9/L (ref 150–450)
POTASSIUM SERPL-SCNC: 3.9 MMOL/L (ref 3.4–5.3)
RBC # BLD AUTO: 4.65 10E12/L (ref 3.8–5.2)
SODIUM SERPL-SCNC: 133 MMOL/L (ref 133–144)
SOURCE: NORMAL
SP GR UR STRIP: 1.02 (ref 1–1.03)
UROBILINOGEN UR STRIP-ACNC: 0.2 EU/DL (ref 0.2–1)
WBC # BLD AUTO: 6.5 10E9/L (ref 4–11)

## 2018-11-19 PROCEDURE — 81003 URINALYSIS AUTO W/O SCOPE: CPT | Performed by: NURSE PRACTITIONER

## 2018-11-19 PROCEDURE — 99214 OFFICE O/P EST MOD 30 MIN: CPT | Performed by: NURSE PRACTITIONER

## 2018-11-19 PROCEDURE — 85025 COMPLETE CBC W/AUTO DIFF WBC: CPT | Performed by: NURSE PRACTITIONER

## 2018-11-19 PROCEDURE — 80048 BASIC METABOLIC PNL TOTAL CA: CPT | Performed by: NURSE PRACTITIONER

## 2018-11-19 PROCEDURE — 36415 COLL VENOUS BLD VENIPUNCTURE: CPT | Performed by: NURSE PRACTITIONER

## 2018-11-19 RX ORDER — FLUCONAZOLE 150 MG/1
150 TABLET ORAL ONCE
Qty: 1 TABLET | Refills: 0 | Status: SHIPPED | OUTPATIENT
Start: 2018-11-19 | End: 2018-11-19

## 2018-11-19 RX ORDER — AZITHROMYCIN 250 MG/1
TABLET, FILM COATED ORAL
Qty: 6 TABLET | Refills: 0 | Status: SHIPPED | OUTPATIENT
Start: 2018-11-19 | End: 2018-12-13

## 2018-11-19 NOTE — Clinical Note
Please fax pre-op notes, labs, and EKG from 11/19/18 to surgical facility listed in pre-op note.  Makayla Aparicio, CNP

## 2018-11-19 NOTE — MR AVS SNAPSHOT
After Visit Summary   11/19/2018    Glenda Bales    MRN: [id number]           Patient Information     Date Of Birth          1950        Visit Information        Provider Department      11/19/2018 7:20 AM Makayla Aparicio APRN [facility]        Today's Diagnoses     Preop general physical exam    -  1    Age-related cataract of both eyes, unspecified age-related cataract type        Acute bronchitis, unspecified organism        Urinary frequency        Muscle cramps        Candidiasis of vagina          Care Instructions      Before Your Surgery      Call your surgeon if there is any change in your health. This includes signs of a cold or flu (such as a sore throat, runny nose, cough, rash or fever).    Do not smoke, drink alcohol or take over the counter medicine (unless your surgeon or primary care doctor tells you to) for the 24 hours before and after surgery.    If you take prescribed drugs: Follow your doctor s orders about which medicines to take and which to stop until after surgery.    Eating and drinking prior to surgery: follow the instructions from your surgeon    Take a shower or bath the night before surgery. Use the soap your surgeon gave you to gently clean your skin. If you do not have soap from your surgeon, use your regular soap. Do not shave or scrub the surgery site.  Wear clean pajamas and have clean sheets on your bed.     [facility]    If you have any questions regarding to your visit please contact your care team:       Team Red:   Clinic Hours Telephone Number   Dr. Carin Aparicio, NP 7am-7pm  Monday - Thursday   7am-5pm  Fridays  [phone]  (Appointment scheduling available 24/7)   Urgent [facility] - [place] [facility] - 11am-9pm Monday-Friday Saturday-Sunday- 9am-5pm   [place] - 5pm-9pm Monday-Friday Saturday-Sunday- 9am-5pm  [phone]  [place]  Danielle  [phone] - [place]       What options do I have for a visit other than an office visit? We offer electronic visits (e-visits) and telephone visits, when medically appropriate.  Please check with your medical insurance to see if these types of visits are covered, as you will be responsible for any charges that are not paid by your insurance.      You can use [employer] (secure electronic communication) to access to your chart, send your primary care provider a message, or make an appointment. Ask a team member how to get started.     For a price quote for your services, please call our Consumer Price Line at [phone] or our Imaging Cost estimation line at [phone] (for imaging tests).    Discharged by Migdalia Turner MA.            Follow-ups after your visit        Who to contact     If you have questions or need follow up information about today's clinic visit or your schedule please contact [facility] directly at [phone].  Normal or non-critical lab and imaging results will be communicated to you by [employer], letter or phone within 4 business days after the clinic has received the results. If you do not hear from us within 7 days, please contact the clinic through [employer] or phone. If you have a critical or abnormal lab result, we will notify you by phone as soon as possible.  Submit refill requests through [employer] or call your pharmacy and they will forward the refill request to us. Please allow 3 business days for your refill to be completed.          Additional Information About Your Visit        [employer] Information     [employer] gives you secure access to your electronic health record. If you see a primary care provider, you can also send messages to your care team and make appointments. If you have questions, please call your primary care clinic.  If you do not have a primary care provider, please call [phone] and they will assist you.        Care EveryWhere ID     This is  "your Care EveryWhere ID. This could be used by other organizations to access your [place] medical records  [id number]        Your Vitals Were     Pulse Temperature Height Pulse Oximetry BMI (Body Mass Index)       88 96.3  F (35.7  C) (Tympanic) 5' 3\" (1.6 m) 95% 37.91 kg/m2        Blood Pressure from Last 3 Encounters:   11/19/18 112/68   11/15/18 110/68   09/11/18 132/78    Weight from Last 3 Encounters:   11/19/18 214 lb (97.1 kg)   11/15/18 213 lb (96.6 kg)   09/11/18 221 lb 9.6 oz (100.5 kg)              We Performed the Following     Basic metabolic panel     CBC with platelets differential     UA reflex to Microscopic and Culture          Today's Medication Changes          These changes are accurate as of 11/19/18  8:46 AM.  If you have any questions, ask your nurse or doctor.               Start taking these medicines.        Dose/Directions    azithromycin 250 MG tablet   Commonly known as:  ZITHROMAX   Used for:  Acute bronchitis, unspecified organism   Started by:  Makayla Aparicio APRN CNP        Two tablets first day, then one tablet daily for four days.   Quantity:  6 tablet   Refills:  0       fluconazole 150 MG tablet   Commonly known as:  DIFLUCAN   Used for:  Candidiasis of vagina   Started by:  Makayla Aparicio APRN CNP        Dose:  150 mg   Take 1 tablet (150 mg) by mouth once for 1 dose   Quantity:  1 tablet   Refills:  0            Where to get your medicines      Some of these will need a paper prescription and others can be bought over the counter.  Ask your nurse if you have questions.     Bring a paper prescription for each of these medications     azithromycin 250 MG tablet    fluconazole 150 MG tablet                Primary Care Provider Office Phone # Fax #    Marysol Cisse [phone] [address] [facility] [place] [id number]        Equal Access to Services     ELLIE GRIFFITHS AH: Tony Main, fareed baker, paco betancourt " margaret bowersyolanda bearaan ah. [facility] [phone].    ATENCIÓN: Si benniela pablo, tiene a whalen disposición servicios gratuitos de asistencia lingüística. Larry al [phone].    We comply with applicable federal civil rights laws and [place] laws. We do not discriminate on the basis of race, color, national origin, age, disability, sex, sexual orientation, or gender identity.            Thank you!     Thank you for choosing [facility]  for your care. Our goal is always to provide you with excellent care. Hearing back from our patients is one way we can continue to improve our services. Please take a few minutes to complete the written survey that you may receive in the mail after your visit with us. Thank you!             Your Updated Medication List - Protect others around you: Learn how to safely use, store and throw away your medicines at www.disposemymeds.org.          This list is accurate as of 11/19/18  8:46 AM.  Always use your most recent med list.                   Brand Name Dispense Instructions for use Diagnosis    ALAWAY 0.025 % Soln ophthalmic solution   Generic drug:  ketotifen      1 drop 2 times daily        amLODIPine 5 MG tablet    NORVASC    90 tablet    Take 1 tablet (5 mg) by mouth daily    Essential hypertension, benign       atenolol 25 MG tablet    TENORMIN     TK 1 T PO QD        azelastine 0.1 % nasal spray    ASTELIN    1 Bottle    Spray 1-2 sprays into both nostrils 2 times daily    Chronic rhinitis, unspecified type       azithromycin 250 MG tablet    ZITHROMAX    6 tablet    Two tablets first day, then one tablet daily for four days.    Acute bronchitis, unspecified organism       benzonatate 200 MG capsule    TESSALON    21 capsule    Take 1 capsule (200 mg) by mouth 3 times daily as needed for cough    Viral upper respiratory tract infection, Cough       cetirizine 10 MG tablet    zyrTEC     Take 10 mg by mouth daily        chlorthalidone 25  MG tablet    HYGROTON    90 tablet    Take 1 tablet (25 mg) by mouth daily    Essential hypertension, benign       fluconazole 150 MG tablet    DIFLUCAN    1 tablet    Take 1 tablet (150 mg) by mouth once for 1 dose    Candidiasis of vagina       fluticasone 50 MCG/ACT spray    FLONASE     Spray 1 spray into both nostrils daily        levothyroxine 112 MCG tablet    SYNTHROID/LEVOTHROID     TK 1 T PO  QD        losartan 100 MG tablet    COZAAR    90 tablet    Take 1 tablet (100 mg) by mouth daily    Essential hypertension, benign       omeprazole 20 MG CR capsule    priLOSEC     TK 1 C PO D        simvastatin 10 MG tablet    ZOCOR     TK 1 T PO D HS

## 2018-11-20 ENCOUNTER — TELEPHONE (OUTPATIENT)
Dept: FAMILY MEDICINE | Facility: CLINIC | Age: 68
End: 2018-11-20

## 2018-11-20 RX ORDER — AMLODIPINE BESYLATE 10 MG/1
10 TABLET ORAL DAILY
Qty: 90 TABLET | Refills: 0 | Status: SHIPPED | OUTPATIENT
Start: 2018-11-20 | End: 2018-11-29

## 2018-11-20 NOTE — TELEPHONE ENCOUNTER
"Called and spoke with patient and gave results.   Patient concerned about switching medications because she states when she was on a higher dose of amlodipine, it caused her legs/feet to swell.   Also states Losartan seems to be the only medication that has reduced her blood pressure.   Advised her Losartan can negatively affect the kidneys along with other medications and should be avoided as her GFR is decreased.  Pt still continued to disagree and states she wants more information on why she should stop Losartan or if she should really increase amlodipine.  Gave info & # for Ultrasound but states \"I don't have time off for that\" and \"I'll wait to see what Dr. Cisse has to say\"   Scheduled pt for 2 week f/u with Dr. Cisse.    Please advise if any further info can be given.     Rosario Montanez RN  "

## 2018-11-20 NOTE — TELEPHONE ENCOUNTER
Patient called back and added that she is concerned that her BP will increase and she will have leg swelling.  Patient asked if she should wait to change medications?  Advised patient if she decides to keep medications the same she is risking her kidney function worsening and causing problems.  Patient will start the new dose recommended but is still concerned about leg/feet swelling.  See note below also.   Maribel Tee RN

## 2018-11-20 NOTE — PROGRESS NOTES
Please call the patient with these results:    Glenda, your kidney function is significantly decreased since your last lab check. Please stop your Losartan, avoid NSAIDs and doses of Aspirin higher than 81 mg daily. Schedule a kidney ultrasound. You may increase the Amlodipine to 10 mg daily. Follow-up in 2 weeks with Dr. Cisse or me.  Ok to proceed with surgery.    Makayla Aparicio, CNP

## 2018-11-20 NOTE — TELEPHONE ENCOUNTER
Notes Recorded by Rosario Montanez RN on 11/20/2018 at 12:23 PM  Left message for patient to call RN hotline [phone].   See TE.    Rosario Montanez RN  ------    Notes Recorded by Makayla Aparicio APRN CNP on 11/20/2018 at 11:43 AM  Please call the patient with these results:    Glenda, your kidney function is significantly decreased since your last lab check. Please stop your Losartan, avoid NSAIDs and doses of Aspirin higher than 81 mg daily. Schedule a kidney ultrasound. You may increase the Amlodipine to 10 mg daily. Follow-up in 2 weeks with Dr. Cisse or me.  Ok to proceed with surgery.    Makayla Aparicio, DANILO Montanez RN

## 2018-11-23 ENCOUNTER — MYC MEDICAL ADVICE (OUTPATIENT)
Dept: FAMILY MEDICINE | Facility: CLINIC | Age: 68
End: 2018-11-23

## 2018-11-23 NOTE — TELEPHONE ENCOUNTER
I agree with Makayla's recommendations.  Her kidney function has decreased significantly since last drawn.  Losartan can contribute to decline in kidney function.  Please have her stop losartan.  She can increase amlodipine to 10 mg and monitor closely for edema.  Please encourage her to have ultrasound done to evaluate for structural changes to the kidneys that may cause a decrease in kidney function.    Thanks,  Fannie Vail, CNP

## 2018-11-27 ENCOUNTER — RADIANT APPOINTMENT (OUTPATIENT)
Dept: ULTRASOUND IMAGING | Facility: CLINIC | Age: 68
End: 2018-11-27
Attending: NURSE PRACTITIONER
Payer: COMMERCIAL

## 2018-11-27 DIAGNOSIS — N17.9 ACUTE KIDNEY INJURY (H): ICD-10-CM

## 2018-11-27 PROBLEM — K76.0 FATTY LIVER: Status: ACTIVE | Noted: 2018-11-27

## 2018-11-27 PROCEDURE — 76770 US EXAM ABDO BACK WALL COMP: CPT | Mod: 59

## 2018-11-27 PROCEDURE — 93975 VASCULAR STUDY: CPT

## 2018-11-27 NOTE — PROGRESS NOTES
Please call patient with results:    Good news- your kidneys are normal.   The ultrasound did show you have fatty liver, which can be caused by a diet high in fat, being overweight, or alcohol consumption. Regular exercise, a diet low in simple carbs and low in saturated fats, weight loss, and avoiding alcohol can improve this.  Please follow up with Dr. Cisse as planned.      Call or send me a message with any questions!    Makayla Aparicio, CNP

## 2018-11-27 NOTE — PROGRESS NOTES
SUBJECTIVE:   Glenda Bales is a 68 year old female who presents to clinic today for the following health issues:      New Patient/Transfer of Care  Hypertension Follow-up      Outpatient blood pressures are being checked at home.  Results are 128 78.    Low Salt Diet: low salt      Amount of exercise or physical activity: None    Problems taking medications regularly: No    Medication side effects: none    Diet: low salt            Problem list and histories reviewed & adjusted, as indicated.  Additional history: as documented    Patient Active Problem List   Diagnosis     Nonallergic rhinitis     Essential hypertension, benign     Chronic rhinitis     Acquired hypothyroidism     Hyperlipidemia LDL goal <100     Gastroesophageal reflux disease without esophagitis     BMI>35     Nonrheumatic aortic valve insufficiency     Anxiety     Hyperglycemia     Elevated LFTs     Osteoarthritis of knee, unspecified laterality, unspecified osteoarthritis type     Other irritable bowel syndrome     Alcohol abuse, in remission     Fatty liver     Past Surgical History:   Procedure Laterality Date     NO HISTORY OF SURGERY         Social History   Substance Use Topics     Smoking status: Former Smoker     Years: 3.00     Types: Cigarettes     Quit date: 4/25/2016     Smokeless tobacco: Never Used     Alcohol use Not on file     Family History   Problem Relation Age of Onset     Diabetes Mother      Coronary Artery Disease Father      Colon Cancer No family hx of      Breast Cancer No family hx of          Current Outpatient Prescriptions   Medication Sig Dispense Refill     amLODIPine (NORVASC) 10 MG tablet Take 1 tablet (10 mg) by mouth daily       atenolol (TENORMIN) 25 MG tablet Take 1 tablet (25 mg) by mouth daily       azelastine (ASTELIN) 0.1 % spray Spray 1-2 sprays into both nostrils 2 times daily 1 Bottle 11     azithromycin (ZITHROMAX) 250 MG tablet Two tablets first day, then one tablet daily for four days. 6 tablet 0      benzonatate (TESSALON) 200 MG capsule Take 1 capsule (200 mg) by mouth 3 times daily as needed for cough 21 capsule 0     cetirizine (ZYRTEC) 10 MG tablet Take 10 mg by mouth daily       chlorthalidone (HYGROTON) 25 MG tablet Take 1 tablet (25 mg) by mouth daily 90 tablet 3     fluticasone (FLONASE) 50 MCG/ACT spray Spray 1 spray into both nostrils daily       ketotifen (ALAWAY) 0.025 % SOLN ophthalmic solution 1 drop 2 times daily       levothyroxine (SYNTHROID/LEVOTHROID) 112 MCG tablet TK 1 T PO  QD  4     omeprazole (PRILOSEC) 20 MG CR capsule TK 1 C PO D  4     simvastatin (ZOCOR) 10 MG tablet TK 1 T PO D HS  4     [DISCONTINUED] amLODIPine (NORVASC) 5 MG tablet Take 1 tablet (5 mg) by mouth daily (Patient taking differently: Take 10 mg by mouth daily ) 90 tablet 0     Allergies   Allergen Reactions     Losartan Other (See Comments)     Acute Kidney Injury     Seasonal      Recent Labs   Lab Test  12/03/18   1643  11/19/18   0810   05/25/18   0735 01/15/18 01/16/17 11/21/16   A1C   --    --    --   6.0*  6.1*  6.2*  6.3*   LDL   --    --    --   152*  164*   --   117*   HDL   --    --    --   82  98   --   100   TRIG   --    --    --   98  60   --   86   ALT   --    --    --   86*   --   145*  134*   CR  1.31*  1.44*   --   0.94  0.82  0.91  0.73   GFRESTIMATED  40*  36*   --   59*  74  65  85   GFRESTBLACK  49*  44*   --   71  85  76  99   POTASSIUM  3.5  3.9   < >  4.0  4.3  3.9  3.8   TSH   --    --    --   3.00  3.560   --   2.770    < > = values in this interval not displayed.      BP Readings from Last 3 Encounters:   12/03/18 124/68   11/19/18 112/68   11/15/18 110/68    Wt Readings from Last 3 Encounters:   12/03/18 217 lb (98.4 kg)   11/19/18 214 lb (97.1 kg)   11/15/18 213 lb (96.6 kg)                  Labs reviewed in EPIC    Reviewed and updated as needed this visit by clinical staff       Reviewed and updated as needed this visit by Provider         ROS:  CONSTITUTIONAL: NEGATIVE for fever,  "chills, change in weight  ENT/MOUTH: NEGATIVE for ear, mouth and throat problems  RESP: NEGATIVE for significant cough or SOB  CV: NEGATIVE for chest pain, palpitations or peripheral edema  GI: NEGATIVE for nausea, abdominal pain, heartburn, or change in bowel habits  MUSCULOSKELETAL: NEGATIVE for significant arthralgias or myalgia    OBJECTIVE:     /68  Pulse 79  Temp 97.8  F (36.6  C) (Oral)  Resp 18  Ht 5' 3\" (1.6 m)  Wt 217 lb (98.4 kg)  SpO2 98%  BMI 38.44 kg/m2  Body mass index is 38.44 kg/(m^2).  GENERAL: healthy, alert and no distress  NECK: no adenopathy, no asymmetry, masses, or scars and thyroid normal to palpation  RESP: lungs clear to auscultation - no rales, rhonchi or wheezes  CV: regular rate and rhythm, normal S1 S2, no S3 or S4, no murmur, click or rub, no peripheral edema and peripheral pulses strong  ABDOMEN: soft, nontender, no hepatosplenomegaly, no masses and bowel sounds normal  MS: no gross musculoskeletal defects noted, no edema    Diagnostic Test Results:      ASSESSMENT/PLAN:         BMI:   Estimated body mass index is 38.44 kg/(m^2) as calculated from the following:    Height as of this encounter: 5' 3\" (1.6 m).    Weight as of this encounter: 217 lb (98.4 kg).   Weight management plan: Discussed healthy diet and exercise guidelines      1. Essential hypertension, benign  controllled  - atenolol (TENORMIN) 25 MG tablet; Take 1 tablet (25 mg) by mouth daily  - amLODIPine (NORVASC) 10 MG tablet; Take 1 tablet (10 mg) by mouth daily  - Basic metabolic panel    2. Prediabetes  Referral done  - DIABETES EDUCATOR REFERRAL  Exercise/low carroll diet  3. Screen for colon cancer  Advised  Colonoscopy  Pt wants   - Fecal colorectal cancer screen FIT - Future (S+30); Future    4. BMI>35  As above        Marysol Cisse MD  [facility]    "

## 2018-11-28 ENCOUNTER — MYC MEDICAL ADVICE (OUTPATIENT)
Dept: FAMILY MEDICINE | Facility: CLINIC | Age: 68
End: 2018-11-28

## 2018-11-28 DIAGNOSIS — I10 ESSENTIAL HYPERTENSION, BENIGN: ICD-10-CM

## 2018-11-29 RX ORDER — ATENOLOL 25 MG/1
25 TABLET ORAL 2 TIMES DAILY
Qty: 60 TABLET | Refills: 3 | Status: SHIPPED | OUTPATIENT
Start: 2018-11-29 | End: 2018-12-03

## 2018-11-29 RX ORDER — AMLODIPINE BESYLATE 5 MG/1
5 TABLET ORAL DAILY
Qty: 90 TABLET | Refills: 0 | Status: SHIPPED | OUTPATIENT
Start: 2018-11-29 | End: 2018-12-04

## 2018-11-29 NOTE — TELEPHONE ENCOUNTER
Makayla,  Please see [employer] message below and advise. Thanks.    Gladis Baker RN  [facility]

## 2018-12-03 ENCOUNTER — OFFICE VISIT (OUTPATIENT)
Dept: FAMILY MEDICINE | Facility: CLINIC | Age: 68
End: 2018-12-03
Payer: COMMERCIAL

## 2018-12-03 VITALS
BODY MASS INDEX: 38.45 KG/M2 | TEMPERATURE: 97.8 F | RESPIRATION RATE: 18 BRPM | WEIGHT: 217 LBS | HEIGHT: 63 IN | DIASTOLIC BLOOD PRESSURE: 68 MMHG | SYSTOLIC BLOOD PRESSURE: 124 MMHG | OXYGEN SATURATION: 98 % | HEART RATE: 79 BPM

## 2018-12-03 DIAGNOSIS — E66.01 MORBID OBESITY DUE TO EXCESS CALORIES (H): ICD-10-CM

## 2018-12-03 DIAGNOSIS — R73.03 PREDIABETES: ICD-10-CM

## 2018-12-03 DIAGNOSIS — I10 ESSENTIAL HYPERTENSION, BENIGN: Primary | ICD-10-CM

## 2018-12-03 DIAGNOSIS — Z12.11 SCREEN FOR COLON CANCER: ICD-10-CM

## 2018-12-03 PROCEDURE — 36415 COLL VENOUS BLD VENIPUNCTURE: CPT | Performed by: FAMILY MEDICINE

## 2018-12-03 PROCEDURE — 99213 OFFICE O/P EST LOW 20 MIN: CPT | Performed by: FAMILY MEDICINE

## 2018-12-03 PROCEDURE — 80048 BASIC METABOLIC PNL TOTAL CA: CPT | Performed by: FAMILY MEDICINE

## 2018-12-03 RX ORDER — AMLODIPINE BESYLATE 10 MG/1
5 TABLET ORAL DAILY
COMMUNITY
Start: 2018-12-03 | End: 2019-02-18

## 2018-12-03 RX ORDER — ATENOLOL 25 MG/1
25 TABLET ORAL 2 TIMES DAILY
COMMUNITY
Start: 2018-12-03 | End: 2019-03-04

## 2018-12-03 ASSESSMENT — PAIN SCALES - GENERAL: PAINLEVEL: NO PAIN (0)

## 2018-12-03 NOTE — MR AVS SNAPSHOT
After Visit Summary   12/3/2018    Glenda Bales    MRN: [id number]           Patient Information     Date Of Birth          1950        Visit Information        Provider Department      12/3/2018 4:00 PM Marysol Cisse MD [facility]        Today's Diagnoses     Screen for colon cancer    -  1    Essential hypertension, benign          Care Instructions    Please do FIT test for colon cancer screen  Marysol Cisse MD    [place]    If you have any questions regarding to your visit please contact your care team:       Team Red:   Clinic Hours Telephone Number   Dr. Carin Aparicio, NP 7am-7pm  Monday - Thursday   7am-5pm  Fridays  [phone]  (Appointment scheduling available 24/7)   Urgent Care - [place] and [facility] - 11am-9pm Monday-Friday Saturday-Sunday- 9am-5pm   [place] - 5pm-9pm Monday-Friday Saturday-Sunday- 9am-5pm  [phone] - [place]  [phone] - [place]       What options do I have for a visit other than an office visit? We offer electronic visits (e-visits) and telephone visits, when medically appropriate.  Please check with your medical insurance to see if these types of visits are covered, as you will be responsible for any charges that are not paid by your insurance.      You can use [employer] (secure electronic communication) to access to your chart, send your primary care provider a message, or make an appointment. Ask a team member how to get started.     For a price quote for your services, please call our Consumer Price Line at [phone] or our Imaging Cost estimation line at [phone] (for imaging tests).              Follow-ups after your visit        Follow-up notes from your care team     Return in about 3 months (around 3/3/2019) for Physical Exam, BP Recheck.      Future tests that were ordered for you today     Open Future Orders        Priority Expected  "Expires Ordered    Fecal colorectal cancer screen FIT - Future (S+30) Routine 12/18/2018 12/27/2018 12/3/2018            Who to contact     If you have questions or need follow up information about today's clinic visit or your schedule please contact [facility] directly at [phone].  Normal or non-critical lab and imaging results will be communicated to you by MyChart, letter or phone within 4 business days after the clinic has received the results. If you do not hear from us within 7 days, please contact the clinic through [employer] or phone. If you have a critical or abnormal lab result, we will notify you by phone as soon as possible.  Submit refill requests through [employer] or call your pharmacy and they will forward the refill request to us. Please allow 3 business days for your refill to be completed.          Additional Information About Your Visit        [employer] Information     [employer] gives you secure access to your electronic health record. If you see a primary care provider, you can also send messages to your care team and make appointments. If you have questions, please call your primary care clinic.  If you do not have a primary care provider, please call [phone] and they will assist you.        Care EveryWhere ID     This is your Care EveryWhere ID. This could be used by other organizations to access your [place] medical records  [id number]        Your Vitals Were     Pulse Temperature Respirations Height Pulse Oximetry BMI (Body Mass Index)    79 97.8  F (36.6  C) (Oral) 18 5' 3\" (1.6 m) 98% 38.44 kg/m2       Blood Pressure from Last 3 Encounters:   12/03/18 124/68   11/19/18 112/68   11/15/18 110/68    Weight from Last 3 Encounters:   12/03/18 217 lb (98.4 kg)   11/19/18 214 lb (97.1 kg)   11/15/18 213 lb (96.6 kg)              We Performed the Following     Basic metabolic panel          Today's Medication Changes          These changes are accurate as of 12/3/18  4:28 PM.  If " you have any questions, ask your nurse or doctor.               These medicines have changed or have updated prescriptions.        Dose/Directions    * amLODIPine 5 MG tablet   Commonly known as:  NORVASC   This may have changed:  how much to take   Used for:  Essential hypertension, benign        Dose:  5 mg   Take 1 tablet (5 mg) by mouth daily   Quantity:  90 tablet   Refills:  0       * amLODIPine 10 MG tablet   Commonly known as:  NORVASC   This may have changed:  Another medication with the same name was changed. Make sure you understand how and when to take each.   Used for:  Essential hypertension, benign        Dose:  10 mg   Take 1 tablet (10 mg) by mouth daily   Refills:  0       atenolol 25 MG tablet   Commonly known as:  TENORMIN   This may have changed:  Another medication with the same name was removed. Continue taking this medication, and follow the directions you see here.   Used for:  Essential hypertension, benign   Changed by:  Marysol Cisse MD        Dose:  25 mg   Take 1 tablet (25 mg) by mouth daily   Refills:  0       * Notice:  This list has 2 medication(s) that are the same as other medications prescribed for you. Read the directions carefully, and ask your doctor or other care provider to review them with you.             Primary Care Provider Office Phone # Fax #    Marysol Cisse [phone] [address] [facility] [address]        Equal Access to Services     TED GRIFFITHS : Tony anno Soraffaeleali, waaxda luqadaha, qaybta kaalmada adeegyada, margaret steiner. So [facility] [place] [phone].    ATENCIÓN: Si habla español, tiene a whalen disposición servicios gratuitos de asistencia lingüística. Llame al [phone].    We comply with applicable federal civil rights laws and [place] laws. We do not discriminate on the basis of race, color, national origin, age, disability, sex, sexual orientation, or gender identity.            Thank you!      Thank you for choosing [facility]  for your care. Our goal is always to provide you with excellent care. Hearing back from our patients is one way we can continue to improve our services. Please take a few minutes to complete the written survey that you may receive in the mail after your visit with us. Thank you!             Your Updated Medication List - Protect others around you: Learn how to safely use, store and throw away your medicines at www.disposemymeds.org.          This list is accurate as of 12/3/18  4:28 PM.  Always use your most recent med list.                   Brand Name Dispense Instructions for use Diagnosis    ALAWAY 0.025 % ophthalmic solution   Generic drug:  ketotifen      1 drop 2 times daily        * amLODIPine 5 MG tablet    NORVASC    90 tablet    Take 1 tablet (5 mg) by mouth daily    Essential hypertension, benign       * amLODIPine 10 MG tablet    NORVASC     Take 1 tablet (10 mg) by mouth daily    Essential hypertension, benign       atenolol 25 MG tablet    TENORMIN     Take 1 tablet (25 mg) by mouth daily    Essential hypertension, benign       azelastine 0.1 % nasal spray    ASTELIN    1 Bottle    Spray 1-2 sprays into both nostrils 2 times daily    Chronic rhinitis, unspecified type       azithromycin 250 MG tablet    ZITHROMAX    6 tablet    Two tablets first day, then one tablet daily for four days.    Acute bronchitis, unspecified organism       benzonatate 200 MG capsule    TESSALON    21 capsule    Take 1 capsule (200 mg) by mouth 3 times daily as needed for cough    Viral upper respiratory tract infection, Cough       cetirizine 10 MG tablet    zyrTEC     Take 10 mg by mouth daily        chlorthalidone 25 MG tablet    HYGROTON    90 tablet    Take 1 tablet (25 mg) by mouth daily    Essential hypertension, benign       fluticasone 50 MCG/ACT nasal spray    FLONASE     Spray 1 spray into both nostrils daily        levothyroxine 112 MCG tablet     SYNTHROID/LEVOTHROID     TK 1 T PO  QD        omeprazole 20 MG DR capsule    priLOSEC     TK 1 C PO D        simvastatin 10 MG tablet    ZOCOR     TK 1 T PO D HS        * Notice:  This list has 2 medication(s) that are the same as other medications prescribed for you. Read the directions carefully, and ask your doctor or other care provider to review them with you.

## 2018-12-03 NOTE — PATIENT INSTRUCTIONS
Please do FIT test for colon cancer screen  Marysol Cisse MD    [facility]    If you have any questions regarding to your visit please contact your care team:       Team Red:   Clinic Hours Telephone Number   Dr. Carin Aparicio, NP 7am-7pm  Monday - Thursday   7am-5pm  Fridays  [phone]  (Appointment scheduling available 24/7)   Urgent Care - [place] and [facility] - 11am-9pm Monday-Friday Saturday-Sunday- 9am-5pm   [place] - 5pm-9pm Monday-Friday Saturday-Sunday- 9am-5pm  [phone] - [place]  [phone] - [place]       What options do I have for a visit other than an office visit? We offer electronic visits (e-visits) and telephone visits, when medically appropriate.  Please check with your medical insurance to see if these types of visits are covered, as you will be responsible for any charges that are not paid by your insurance.      You can use [employer] (secure electronic communication) to access to your chart, send your primary care provider a message, or make an appointment. Ask a team member how to get started.     For a price quote for your services, please call our Consumer Price Line at [phone] or our Imaging Cost estimation line at [phone] (for imaging tests).

## 2018-12-04 LAB
ANION GAP SERPL CALCULATED.3IONS-SCNC: 6 MMOL/L (ref 3–14)
BUN SERPL-MCNC: 26 MG/DL (ref 7–30)
CALCIUM SERPL-MCNC: 9.2 MG/DL (ref 8.5–10.1)
CHLORIDE SERPL-SCNC: 99 MMOL/L (ref 94–109)
CO2 SERPL-SCNC: 31 MMOL/L (ref 20–32)
CREAT SERPL-MCNC: 1.31 MG/DL (ref 0.52–1.04)
GFR SERPL CREATININE-BSD FRML MDRD: 40 ML/MIN/1.7M2
GLUCOSE SERPL-MCNC: 127 MG/DL (ref 70–99)
POTASSIUM SERPL-SCNC: 3.5 MMOL/L (ref 3.4–5.3)
SODIUM SERPL-SCNC: 136 MMOL/L (ref 133–144)

## 2018-12-12 PROBLEM — J31.0 NONALLERGIC RHINITIS: Status: RESOLVED | Noted: 2018-04-09 | Resolved: 2018-12-12

## 2018-12-12 NOTE — PROGRESS NOTES
SUBJECTIVE:   Glenda Bales is a 68 year old female who presents to clinic today for the following health issues:      Hypertension Follow-up      Outpatient blood pressures at home this mornin/78    Low Salt Diet: no added salt      Amount of exercise or physical activity: None    Problems taking medications regularly: No    Medication side effects: possibly, swelling in feet and hands     Diet: regular (no restrictions)    Patient presents for follow up of blood pressure. Due to acute decline in renal function, her losartan was discontinued & Amlodipine increased. She has had ankle swelling since then, but BP has been nicely controlled. She discussed with Primary Care Provider and opted to live with the swelling, but yesterday found the swelling was significantly worse. Better today and with leg elevation.      Problem list and histories reviewed & adjusted, as indicated.  Additional history: as documented    Patient Active Problem List   Diagnosis     Essential hypertension, benign     Chronic rhinitis     Acquired hypothyroidism     Hyperlipidemia LDL goal <100     Gastroesophageal reflux disease without esophagitis     BMI>35     Nonrheumatic aortic valve insufficiency     Anxiety     Hyperglycemia     Elevated LFTs     Osteoarthritis of knee, unspecified laterality, unspecified osteoarthritis type     Other irritable bowel syndrome     Alcohol abuse, in remission     Fatty liver     Past Surgical History:   Procedure Laterality Date     NO HISTORY OF SURGERY         Social History     Tobacco Use     Smoking status: Former Smoker     Years: 3.00     Types: Cigarettes     Last attempt to quit: 2016     Years since quittin.6     Smokeless tobacco: Never Used   Substance Use Topics     Alcohol use: Not on file     Family History   Problem Relation Age of Onset     Diabetes Mother      Coronary Artery Disease Father      Colon Cancer No family hx of      Breast Cancer No family hx of       "      Reviewed and updated as needed this visit by clinical staff       Reviewed and updated as needed this visit by Provider         ROS:  Constitutional, HEENT, cardiovascular, pulmonary, gi and gu systems are negative, except as otherwise noted.    OBJECTIVE:     /70 (BP Location: Left arm, Patient Position: Chair, Cuff Size: Adult Large)   Pulse 78   Temp 98.5  F (36.9  C) (Oral)   Ht 1.6 m (5' 3\")   Wt 95.7 kg (211 lb)   SpO2 95%   BMI 37.38 kg/m    Body mass index is 37.38 kg/m .  GENERAL: healthy, alert and no distress  RESP: lungs clear to auscultation - no rales, rhonchi or wheezes  CV: regular rates and rhythm, normal S1 S2, no S3 or S4, no murmur, click or rub, peripheral pulses strong and 1+ bilateral edema of ankles      Diagnostic Test Results:  none     ASSESSMENT/PLAN:     1. Essential hypertension, benign  Due to side effect of swelling, patient will decrease Amlodipine to 5 mg daily and increase Atenolol to 25 mg two times daily. She prefers to wait until after her eye surgery next week to make this change. Follow up 2 weeks after med change for ancillary BP check.      See Patient Instructions    EDILBERTO Hu [facility]    "

## 2018-12-12 NOTE — PATIENT INSTRUCTIONS
After your eye surgery, decrease the Amlodipine to 5 mg daily and increase the Atenolol to 25 mg two times daily.     Please return your FIT card.

## 2018-12-13 ENCOUNTER — OFFICE VISIT (OUTPATIENT)
Dept: FAMILY MEDICINE | Facility: CLINIC | Age: 68
End: 2018-12-13
Payer: COMMERCIAL

## 2018-12-13 VITALS
SYSTOLIC BLOOD PRESSURE: 110 MMHG | BODY MASS INDEX: 37.39 KG/M2 | TEMPERATURE: 98.5 F | OXYGEN SATURATION: 95 % | WEIGHT: 211 LBS | HEIGHT: 63 IN | HEART RATE: 78 BPM | DIASTOLIC BLOOD PRESSURE: 70 MMHG

## 2018-12-13 DIAGNOSIS — I10 ESSENTIAL HYPERTENSION, BENIGN: Primary | ICD-10-CM

## 2018-12-13 PROCEDURE — 99213 OFFICE O/P EST LOW 20 MIN: CPT | Performed by: NURSE PRACTITIONER

## 2018-12-13 ASSESSMENT — PAIN SCALES - GENERAL: PAINLEVEL: MODERATE PAIN (4)

## 2018-12-13 ASSESSMENT — MIFFLIN-ST. JEOR: SCORE: 1451.22

## 2018-12-19 ENCOUNTER — TRANSFERRED RECORDS (OUTPATIENT)
Dept: HEALTH INFORMATION MANAGEMENT | Facility: CLINIC | Age: 68
End: 2018-12-19

## 2018-12-20 ENCOUNTER — TRANSFERRED RECORDS (OUTPATIENT)
Dept: HEALTH INFORMATION MANAGEMENT | Facility: CLINIC | Age: 68
End: 2018-12-20

## 2019-01-14 ENCOUNTER — ALLIED HEALTH/NURSE VISIT (OUTPATIENT)
Dept: NURSING | Facility: CLINIC | Age: 69
End: 2019-01-14
Payer: COMMERCIAL

## 2019-01-14 VITALS — HEART RATE: 59 BPM | DIASTOLIC BLOOD PRESSURE: 82 MMHG | OXYGEN SATURATION: 98 % | SYSTOLIC BLOOD PRESSURE: 124 MMHG

## 2019-01-14 DIAGNOSIS — Z01.30 BP CHECK: Primary | ICD-10-CM

## 2019-01-14 NOTE — NURSING NOTE
Glenda Bales is a 68 year old patient who comes in today for a Blood Pressure check.  Initial BP:  /82   Pulse 59   SpO2 98%      59  Disposition: results routed to provider  Gladis LOPEZ CMA ([facility])

## 2019-02-18 ENCOUNTER — MYC REFILL (OUTPATIENT)
Dept: FAMILY MEDICINE | Facility: CLINIC | Age: 69
End: 2019-02-18

## 2019-02-18 DIAGNOSIS — I10 ESSENTIAL HYPERTENSION, BENIGN: ICD-10-CM

## 2019-02-18 RX ORDER — AMLODIPINE BESYLATE 10 MG/1
5 TABLET ORAL DAILY
Qty: 90 TABLET | Refills: 1 | Status: SHIPPED | OUTPATIENT
Start: 2019-02-18 | End: 2019-03-04

## 2019-02-18 NOTE — TELEPHONE ENCOUNTER
"Routing refill request to provider for review/approval because:  Labs out of range:  Cr    Requested Prescriptions   Pending Prescriptions Disp Refills     amLODIPine (NORVASC) 10 MG tablet       Sig: Take 0.5 tablets (5 mg) by mouth daily    Calcium Channel Blockers Protocol  Failed - 2/18/2019  7:25 AM       Failed - Normal serum creatinine on file in past 12 months    Recent Labs   Lab Test 12/03/18  1643   CR 1.31*            Passed - Blood pressure under 140/90 in past 12 months    BP Readings from Last 3 Encounters:   01/14/19 124/82   12/13/18 110/70   12/03/18 124/68                Passed - Recent (12 mo) or future (30 days) visit within the authorizing provider's specialty    Patient had office visit in the last 12 months or has a visit in the next 30 days with authorizing provider or within the authorizing provider's specialty.  See \"Patient Info\" tab in inbasket, or \"Choose Columns\" in Meds & Orders section of the refill encounter.             Passed - Medication is active on med list       Passed - Patient is age 18 or older       Passed - No active pregnancy on record       Passed - No positive pregnancy test in past 12 months        Rosario Montanez RN  "

## 2019-02-27 RX ORDER — SIMVASTATIN 10 MG
TABLET ORAL
Qty: 30 TABLET | Refills: 4 | Status: CANCELLED | OUTPATIENT
Start: 2019-02-27

## 2019-02-27 RX ORDER — ATENOLOL 25 MG/1
25 TABLET ORAL 2 TIMES DAILY
Qty: 30 TABLET | Status: CANCELLED | OUTPATIENT
Start: 2019-02-27

## 2019-03-04 ENCOUNTER — OFFICE VISIT (OUTPATIENT)
Dept: FAMILY MEDICINE | Facility: CLINIC | Age: 69
End: 2019-03-04
Payer: COMMERCIAL

## 2019-03-04 VITALS
OXYGEN SATURATION: 96 % | SYSTOLIC BLOOD PRESSURE: 152 MMHG | HEIGHT: 63 IN | BODY MASS INDEX: 37.74 KG/M2 | WEIGHT: 213 LBS | TEMPERATURE: 97.4 F | RESPIRATION RATE: 16 BRPM | HEART RATE: 76 BPM | DIASTOLIC BLOOD PRESSURE: 82 MMHG

## 2019-03-04 DIAGNOSIS — E66.01 MORBID OBESITY DUE TO EXCESS CALORIES (H): ICD-10-CM

## 2019-03-04 DIAGNOSIS — I10 ESSENTIAL HYPERTENSION, BENIGN: ICD-10-CM

## 2019-03-04 DIAGNOSIS — R73.9 HYPERGLYCEMIA: ICD-10-CM

## 2019-03-04 DIAGNOSIS — E78.5 HYPERLIPIDEMIA LDL GOAL <100: ICD-10-CM

## 2019-03-04 DIAGNOSIS — K21.9 GASTROESOPHAGEAL REFLUX DISEASE, ESOPHAGITIS PRESENCE NOT SPECIFIED: ICD-10-CM

## 2019-03-04 DIAGNOSIS — K76.0 FATTY LIVER: ICD-10-CM

## 2019-03-04 DIAGNOSIS — E03.9 ACQUIRED HYPOTHYROIDISM: ICD-10-CM

## 2019-03-04 DIAGNOSIS — Z12.11 SCREEN FOR COLON CANCER: ICD-10-CM

## 2019-03-04 DIAGNOSIS — F10.11 ALCOHOL ABUSE, IN REMISSION: ICD-10-CM

## 2019-03-04 LAB
ALBUMIN SERPL-MCNC: 3.5 G/DL (ref 3.4–5)
ALP SERPL-CCNC: 91 U/L (ref 40–150)
ALT SERPL W P-5'-P-CCNC: 70 U/L (ref 0–50)
ANION GAP SERPL CALCULATED.3IONS-SCNC: 7 MMOL/L (ref 3–14)
AST SERPL W P-5'-P-CCNC: 44 U/L (ref 0–45)
BILIRUB SERPL-MCNC: 0.5 MG/DL (ref 0.2–1.3)
BUN SERPL-MCNC: 12 MG/DL (ref 7–30)
CALCIUM SERPL-MCNC: 8.8 MG/DL (ref 8.5–10.1)
CHLORIDE SERPL-SCNC: 100 MMOL/L (ref 94–109)
CHOLEST SERPL-MCNC: 277 MG/DL
CO2 SERPL-SCNC: 32 MMOL/L (ref 20–32)
CREAT SERPL-MCNC: 0.97 MG/DL (ref 0.52–1.04)
GFR SERPL CREATININE-BSD FRML MDRD: 59 ML/MIN/{1.73_M2}
GLUCOSE SERPL-MCNC: 162 MG/DL (ref 70–99)
HDLC SERPL-MCNC: 77 MG/DL
LDLC SERPL CALC-MCNC: 175 MG/DL
NONHDLC SERPL-MCNC: 200 MG/DL
POTASSIUM SERPL-SCNC: 3.6 MMOL/L (ref 3.4–5.3)
PROT SERPL-MCNC: 7.8 G/DL (ref 6.8–8.8)
SODIUM SERPL-SCNC: 139 MMOL/L (ref 133–144)
T4 FREE SERPL-MCNC: 1.52 NG/DL (ref 0.76–1.46)
TRIGL SERPL-MCNC: 124 MG/DL
TSH SERPL DL<=0.005 MIU/L-ACNC: 4.93 MU/L (ref 0.4–4)

## 2019-03-04 PROCEDURE — 99214 OFFICE O/P EST MOD 30 MIN: CPT | Performed by: FAMILY MEDICINE

## 2019-03-04 PROCEDURE — 36415 COLL VENOUS BLD VENIPUNCTURE: CPT | Performed by: FAMILY MEDICINE

## 2019-03-04 PROCEDURE — 84443 ASSAY THYROID STIM HORMONE: CPT | Performed by: FAMILY MEDICINE

## 2019-03-04 PROCEDURE — 84439 ASSAY OF FREE THYROXINE: CPT | Performed by: FAMILY MEDICINE

## 2019-03-04 PROCEDURE — 80053 COMPREHEN METABOLIC PANEL: CPT | Performed by: FAMILY MEDICINE

## 2019-03-04 PROCEDURE — 80061 LIPID PANEL: CPT | Performed by: FAMILY MEDICINE

## 2019-03-04 RX ORDER — ATORVASTATIN CALCIUM 10 MG/1
10 TABLET, FILM COATED ORAL DAILY
Qty: 30 TABLET | Refills: 1 | Status: SHIPPED | OUTPATIENT
Start: 2019-03-04 | End: 2019-04-17

## 2019-03-04 RX ORDER — CHLORTHALIDONE 25 MG/1
25 TABLET ORAL DAILY
Qty: 90 TABLET | Refills: 3 | Status: SHIPPED | OUTPATIENT
Start: 2019-03-04 | End: 2019-09-13

## 2019-03-04 RX ORDER — ATENOLOL 50 MG/1
50 TABLET ORAL 2 TIMES DAILY
Qty: 180 TABLET | Refills: 0 | Status: SHIPPED | OUTPATIENT
Start: 2019-03-04 | End: 2019-04-17

## 2019-03-04 RX ORDER — LEVOTHYROXINE SODIUM 112 UG/1
TABLET ORAL
Qty: 90 TABLET | Refills: 4 | Status: SHIPPED | OUTPATIENT
Start: 2019-03-04 | End: 2020-03-18

## 2019-03-04 RX ORDER — AMLODIPINE BESYLATE 5 MG/1
5 TABLET ORAL DAILY
Qty: 90 TABLET | Refills: 1 | Status: SHIPPED | OUTPATIENT
Start: 2019-03-04 | End: 2019-04-17

## 2019-03-04 ASSESSMENT — MIFFLIN-ST. JEOR: SCORE: 1460.29

## 2019-03-04 NOTE — PROGRESS NOTES
SUBJECTIVE:   Glenda Bales is a 68 year old female who presents to clinic today for the following health issues:      Hyperlipidemia Follow-Up      Rate your low fat/cholesterol diet?: fair    Taking statin?  No, told to hold off because labs were high    Other lipid medications/supplements?:  none    Hypertension Follow-up      Outpatient blood pressures are not being checked.    Low Salt Diet: no added salt      Amount of exercise or physical activity: None    Problems taking medications regularly: No    Medication side effects: none    Diet: regular (no restrictions)    Pt says she Drinks 1-2 alcohol drinks a week.  Hypothyroidism Follow-up      Since last visit, patient describes the following symptoms: Weight stable, no hair loss, no skin changes, no constipation, no loose stools      Problem list and histories reviewed & adjusted, as indicated.  Additional history: as documented    Patient Active Problem List   Diagnosis     Essential hypertension, benign     Chronic rhinitis     Acquired hypothyroidism     Hyperlipidemia LDL goal <100     Gastroesophageal reflux disease without esophagitis     BMI>35     Nonrheumatic aortic valve insufficiency     Anxiety     Hyperglycemia     Elevated LFTs     Osteoarthritis of knee, unspecified laterality, unspecified osteoarthritis type     Other irritable bowel syndrome     Alcohol abuse, in remission     Fatty liver     Past Surgical History:   Procedure Laterality Date     NO HISTORY OF SURGERY         Social History     Tobacco Use     Smoking status: Former Smoker     Years: 3.00     Types: Cigarettes     Last attempt to quit: 2016     Years since quittin.8     Smokeless tobacco: Never Used     Tobacco comment: quit in    Substance Use Topics     Alcohol use: Yes     Family History   Problem Relation Age of Onset     Diabetes Mother      Coronary Artery Disease Father      Colon Cancer No family hx of      Breast Cancer No family hx of          Current  Outpatient Medications   Medication Sig Dispense Refill     amLODIPine (NORVASC) 5 MG tablet Take 1 tablet (5 mg) by mouth daily 90 tablet 1     atenolol (TENORMIN) 50 MG tablet Take 1 tablet (50 mg) by mouth 2 times daily 180 tablet 0     atorvastatin (LIPITOR) 10 MG tablet Take 1 tablet (10 mg) by mouth daily 30 tablet 1     cetirizine (ZYRTEC) 10 MG tablet Take 10 mg by mouth daily       chlorthalidone (HYGROTON) 25 MG tablet Take 1 tablet (25 mg) by mouth daily 90 tablet 3     fluticasone (FLONASE) 50 MCG/ACT spray Spray 1 spray into both nostrils daily       levothyroxine (SYNTHROID/LEVOTHROID) 112 MCG tablet TK 1 T PO  QD 90 tablet 4     omeprazole (PRILOSEC) 20 MG DR capsule TK 1 C PO D 90 capsule 4     azelastine (ASTELIN) 0.1 % spray Spray 1-2 sprays into both nostrils 2 times daily (Patient not taking: Reported on 3/4/2019) 1 Bottle 11     ketotifen (ALAWAY) 0.025 % SOLN ophthalmic solution 1 drop 2 times daily       Allergies   Allergen Reactions     Losartan Other (See Comments)     Acute Kidney Injury     Seasonal      Recent Labs   Lab Test 12/03/18  1643 11/19/18  0810  05/25/18  0735 01/15/18 01/16/17 11/21/16   A1C  --   --   --  6.0* 6.1* 6.2* 6.3*   LDL  --   --   --  152* 164*  --  117*   HDL  --   --   --  82 98  --  100   TRIG  --   --   --  98 60  --  86   ALT  --   --   --  86*  --  145* 134*   CR 1.31* 1.44*  --  0.94 0.82 0.91 0.73   GFRESTIMATED 40* 36*  --  59* 74 65 85   GFRESTBLACK 49* 44*  --  71 85 76 99   POTASSIUM 3.5 3.9   < > 4.0 4.3 3.9 3.8   TSH  --   --   --  3.00 3.560  --  2.770    < > = values in this interval not displayed.      BP Readings from Last 3 Encounters:   03/04/19 152/82   01/14/19 124/82   12/13/18 110/70    Wt Readings from Last 3 Encounters:   03/04/19 96.6 kg (213 lb)   12/13/18 95.7 kg (211 lb)   12/03/18 98.4 kg (217 lb)                  Labs reviewed in EPIC    Reviewed and updated as needed this visit by clinical staff  Tobacco  Allergies  Meds  Med Hx  " Surg Hx  Fam Hx  Soc Hx      Reviewed and updated as needed this visit by Provider         ROS:  CONSTITUTIONAL: NEGATIVE for fever, chills, change in weight  ENT/MOUTH: NEGATIVE for ear, mouth and throat problems  RESP: NEGATIVE for significant cough or SOB  CV: NEGATIVE for chest pain, palpitations or peripheral edema  GI: NEGATIVE for nausea, abdominal pain, heartburn, or change in bowel habits  MUSCULOSKELETAL: NEGATIVE for significant arthralgias or myalgia  ROS otherwise negative    OBJECTIVE:     /82   Pulse 76   Temp 97.4  F (36.3  C) (Oral)   Resp 16   Ht 1.6 m (5' 3\")   Wt 96.6 kg (213 lb)   SpO2 96%   Breastfeeding? No   BMI 37.73 kg/m    Body mass index is 37.73 kg/m .  GENERAL: healthy, alert and no distress  NECK: no adenopathy, no asymmetry, masses, or scars and thyroid normal to palpation  RESP: lungs clear to auscultation - no rales, rhonchi or wheezes  CV: regular rate and rhythm, normal S1 S2, no S3 or S4, no murmur, click or rub, no peripheral edema and peripheral pulses strong  ABDOMEN: soft, nontender, no hepatosplenomegaly, no masses and bowel sounds normal  MS: no gross musculoskeletal defects noted, no edema    Diagnostic Test Results:  Pending     ASSESSMENT/PLAN:     1. Essential hypertension, benign  High  Advised increase atenolol to 50 mg BID  SEE EPIC care orders  The potential side effects of this medication have been discussed with the patient.  Call if any significant problems with these are experienced.    - Comprehensive metabolic panel  - chlorthalidone (HYGROTON) 25 MG tablet; Take 1 tablet (25 mg) by mouth daily  Dispense: 90 tablet; Refill: 3  - atenolol (TENORMIN) 50 MG tablet; Take 1 tablet (50 mg) by mouth 2 times daily  Dispense: 180 tablet; Refill: 0  - amLODIPine (NORVASC) 5 MG tablet; Take 1 tablet (5 mg) by mouth daily  Dispense: 90 tablet; Refill: 1    2. Hyperlipidemia LDL goal <100  Advised start Lipitor  SEE [facility] orders  The potential side " effects of this medication have been discussed with the patient.  Call if any significant problems with these are experienced.    - atorvastatin (LIPITOR) 10 MG tablet; Take 1 tablet (10 mg) by mouth daily  Dispense: 30 tablet; Refill: 1  - Lipid panel reflex to direct LDL Fasting  - Comprehensive metabolic panel    3. Hyperglycemia  Pending   - Hemoglobin A1c; Future    4. Fatty liver  Advised stop alcohol    5. BMI>35  Low carroll diet    6. Alcohol abuse, in remission      7. Acquired hypothyroidism    - levothyroxine (SYNTHROID/LEVOTHROID) 112 MCG tablet; TK 1 T PO  QD  Dispense: 90 tablet; Refill: 4  - TSH with free T4 reflex    8. Gastroesophageal reflux disease, esophagitis presence not specified  Stable   - omeprazole (PRILOSEC) 20 MG DR capsule; TK 1 C PO D  Dispense: 90 capsule; Refill: 4    9. Screen for colon cancer  Advised do FIT card    Follow up 6 weeks Physical and lab checkSalex Cisse MD  [facility]

## 2019-03-11 ENCOUNTER — OFFICE VISIT (OUTPATIENT)
Dept: FAMILY MEDICINE | Facility: CLINIC | Age: 69
End: 2019-03-11
Payer: COMMERCIAL

## 2019-03-11 VITALS
HEART RATE: 69 BPM | DIASTOLIC BLOOD PRESSURE: 85 MMHG | SYSTOLIC BLOOD PRESSURE: 162 MMHG | WEIGHT: 212 LBS | OXYGEN SATURATION: 95 % | TEMPERATURE: 97 F | HEIGHT: 63 IN | BODY MASS INDEX: 37.56 KG/M2 | RESPIRATION RATE: 18 BRPM

## 2019-03-11 DIAGNOSIS — J20.9 ACUTE BRONCHITIS WITH COEXISTING CONDITION REQUIRING PROPHYLACTIC TREATMENT: Primary | ICD-10-CM

## 2019-03-11 PROCEDURE — 99214 OFFICE O/P EST MOD 30 MIN: CPT | Mod: 25 | Performed by: PHYSICIAN ASSISTANT

## 2019-03-11 PROCEDURE — 94640 AIRWAY INHALATION TREATMENT: CPT | Performed by: PHYSICIAN ASSISTANT

## 2019-03-11 RX ORDER — CODEINE PHOSPHATE AND GUAIFENESIN 10; 100 MG/5ML; MG/5ML
1-2 SOLUTION ORAL EVERY 4 HOURS PRN
Qty: 118 ML | Refills: 0 | Status: SHIPPED | OUTPATIENT
Start: 2019-03-11 | End: 2019-04-17

## 2019-03-11 RX ORDER — ALBUTEROL SULFATE 90 UG/1
2 AEROSOL, METERED RESPIRATORY (INHALATION) EVERY 6 HOURS
Qty: 1 INHALER | Refills: 0 | Status: SHIPPED | OUTPATIENT
Start: 2019-03-11 | End: 2019-06-06

## 2019-03-11 RX ORDER — AZITHROMYCIN 500 MG/1
500 TABLET, FILM COATED ORAL DAILY
Qty: 3 TABLET | Refills: 0 | Status: SHIPPED | OUTPATIENT
Start: 2019-03-11 | End: 2019-06-06

## 2019-03-11 RX ORDER — IPRATROPIUM BROMIDE AND ALBUTEROL SULFATE 2.5; .5 MG/3ML; MG/3ML
3 SOLUTION RESPIRATORY (INHALATION) ONCE
Status: DISCONTINUED | OUTPATIENT
Start: 2019-03-11 | End: 2019-06-06

## 2019-03-11 ASSESSMENT — MIFFLIN-ST. JEOR: SCORE: 1455.76

## 2019-03-11 ASSESSMENT — PAIN SCALES - GENERAL: PAINLEVEL: NO PAIN (0)

## 2019-03-11 NOTE — NURSING NOTE
The following nebulizer treatment was given:     MEDICATION: Albuterol Sulfate 2.5 mg  : [employer]  LOT #: [id number]  EXPIRATION DATE:  2/20  NDC # 0262-1857-70     Nebulizer Start Time:  11:55am  Nebulizer Stop Time:  12:05am  See Vital Signs Flowsheet      ROSIE Villanueva

## 2019-03-11 NOTE — PROGRESS NOTES
"  SUBJECTIVE:   Glenda Bales is a 68 year old female who presents to clinic today for the following health issues:    RESPIRATORY SYMPTOMS    Duration: 4 days     Description  cough and fever, drainage     Severity: moderate    Accompanying signs and symptoms: can't sleep at night, and chest discomfort     History (predisposing factors):  none    Precipitating or alleviating factors: None    Therapies tried and outcome:  none      Problem list and histories reviewed & adjusted, as indicated.  Additional history: as documented      ROS:  Constitutional, HEENT, cardiovascular, pulmonary, gi and gu systems are negative, except as otherwise noted.    OBJECTIVE:     /85   Pulse 68   Temp 100  F (37.8  C) (Oral)   Resp 18   Ht 1.6 m (5' 3\")   Wt 96.2 kg (212 lb)   SpO2 95%   BMI 37.55 kg/m    Body mass index is 37.55 kg/m .  GENERAL: healthy, alert and no distress  HENT: normal cephalic/atraumatic, ear canals and TM's normal, nasal mucosa edematous , rhinorrhea clear, oropharynx clear and oral mucous membranes moist  NECK: no adenopathy, no asymmetry, masses, or scars and thyroid normal to palpation  RESP: prolonged expiratory phase, improved sats to 97% after DuoNeb clinic administered.   SKIN: no suspicious lesions or rashes    Diagnostic Test Results:  none     ASSESSMENT/PLAN:   1. Acute bronchitis with coexisting condition requiring prophylactic treatment  - ipratropium - albuterol 0.5 mg/2.5 mg/3 mL (DUONEB) neb solution 3 mL  - azithromycin (ZITHROMAX) 500 MG tablet; Take 1 tablet (500 mg) by mouth daily for 3 days  Dispense: 3 tablet; Refill: 0  - guaiFENesin-codeine (ROBITUSSIN AC) 100-10 MG/5ML solution; Take 5-10 mLs by mouth every 4 hours as needed for cough  Dispense: 118 mL; Refill: 0  - albuterol (PROAIR HFA/PROVENTIL HFA/VENTOLIN HFA) 108 (90 Base) MCG/ACT inhaler; Inhale 2 puffs into the lungs every 6 hours  Dispense: 1 Inhaler; Refill: 0    Use medication as directed.  Follow up if symptoms " should persist, change or worsen.  Patient amenable to this follow up plan.     Raffi Mauro PA-C  [facility]

## 2019-03-13 ENCOUNTER — MYC MEDICAL ADVICE (OUTPATIENT)
Dept: FAMILY MEDICINE | Facility: CLINIC | Age: 69
End: 2019-03-13

## 2019-03-13 NOTE — PROGRESS NOTES
SUBJECTIVE:   Glenda Bales is a 68 year old female who presents to clinic today for the following health issues:      ED/UC Followup:    Facility:  [facility]  Date of visit: 19  Reason for visit: Acute bronchitis  Current Status: Got a Inhaler  And zithromax     She is better  Still Mild cough  Occasional wheezing  Problem list and histories reviewed & adjusted, as indicated.  Additional history: as documented    Patient Active Problem List   Diagnosis     Essential hypertension, benign     Chronic rhinitis     Acquired hypothyroidism     Hyperlipidemia LDL goal <100     Gastroesophageal reflux disease without esophagitis     BMI>35     Nonrheumatic aortic valve insufficiency     Anxiety     Hyperglycemia     Elevated LFTs     Osteoarthritis of knee, unspecified laterality, unspecified osteoarthritis type     Other irritable bowel syndrome     Alcohol abuse, in remission     Fatty liver     Past Surgical History:   Procedure Laterality Date     NO HISTORY OF SURGERY         Social History     Tobacco Use     Smoking status: Former Smoker     Years: 3.00     Types: Cigarettes     Last attempt to quit: 2016     Years since quittin.8     Smokeless tobacco: Never Used     Tobacco comment: quit in    Substance Use Topics     Alcohol use: Yes     Family History   Problem Relation Age of Onset     Diabetes Mother      Coronary Artery Disease Father      Colon Cancer No family hx of      Breast Cancer No family hx of          Current Outpatient Medications   Medication Sig Dispense Refill     albuterol (PROAIR HFA/PROVENTIL HFA/VENTOLIN HFA) 108 (90 Base) MCG/ACT inhaler Inhale 2 puffs into the lungs every 6 hours 1 Inhaler 0     amLODIPine (NORVASC) 5 MG tablet Take 1 tablet (5 mg) by mouth daily 90 tablet 1     atenolol (TENORMIN) 50 MG tablet Take 1 tablet (50 mg) by mouth 2 times daily 180 tablet 0     atorvastatin (LIPITOR) 10 MG tablet Take 1 tablet (10 mg) by mouth daily 30  tablet 1     azithromycin (ZITHROMAX) 500 MG tablet Take 1 tablet (500 mg) by mouth daily for 3 days 3 tablet 0     chlorthalidone (HYGROTON) 25 MG tablet Take 1 tablet (25 mg) by mouth daily 90 tablet 3     fluticasone (FLONASE) 50 MCG/ACT spray Spray 1 spray into both nostrils daily       ketotifen (ALAWAY) 0.025 % SOLN ophthalmic solution 1 drop 2 times daily       levothyroxine (SYNTHROID/LEVOTHROID) 112 MCG tablet TK 1 T PO  QD 90 tablet 4     omeprazole (PRILOSEC) 20 MG DR capsule TK 1 C PO D 90 capsule 4     azelastine (ASTELIN) 0.1 % spray Spray 1-2 sprays into both nostrils 2 times daily (Patient not taking: Reported on 3/4/2019) 1 Bottle 11     cetirizine (ZYRTEC) 10 MG tablet Take 10 mg by mouth daily       guaiFENesin-codeine (ROBITUSSIN AC) 100-10 MG/5ML solution Take 5-10 mLs by mouth every 4 hours as needed for cough (Patient not taking: Reported on 3/14/2019) 118 mL 0     Allergies   Allergen Reactions     Losartan Other (See Comments)     Acute Kidney Injury     Seasonal      Recent Labs   Lab Test 03/04/19  0807 12/03/18  1643  05/25/18  0735 01/15/18 01/16/17   A1C  --   --   --  6.0* 6.1* 6.2*   *  --   --  152* 164*  --    HDL 77  --   --  82 98  --    TRIG 124  --   --  98 60  --    ALT 70*  --   --  86*  --  145*   CR 0.97 1.31*   < > 0.94 0.82 0.91   GFRESTIMATED 59* 40*   < > 59* 74 65   GFRESTBLACK 69 49*   < > 71 85 76   POTASSIUM 3.6 3.5   < > 4.0 4.3 3.9   TSH 4.93*  --   --  3.00 3.560  --     < > = values in this interval not displayed.      BP Readings from Last 3 Encounters:   03/14/19 132/82   03/11/19 162/85   03/04/19 152/82    Wt Readings from Last 3 Encounters:   03/14/19 93.4 kg (206 lb)   03/11/19 96.2 kg (212 lb)   03/04/19 96.6 kg (213 lb)                  Labs reviewed in EPIC    Reviewed and updated as needed this visit by clinical staff       Reviewed and updated as needed this visit by Provider         ROS:  CONSTITUTIONAL: NEGATIVE for fever, chills, change in  "weight  INTEGUMENTARY/SKIN: NEGATIVE for worrisome rashes, moles or lesions  ENT/MOUTH: NEGATIVE for ear, mouth and throat problems  RESP:  As above  CV: NEGATIVE for chest pain, palpitations or peripheral edema  GI: NEGATIVE for nausea, abdominal pain, heartburn, or change in bowel habits  MUSCULOSKELETAL: NEGATIVE for significant arthralgias or myalgia  PSYCHIATRIC: NEGATIVE for changes in mood or affect    OBJECTIVE:     /82   Pulse 80   Temp 97.8  F (36.6  C) (Oral)   Resp 16   Ht 1.6 m (5' 3\")   Wt 93.4 kg (206 lb)   SpO2 96%   Breastfeeding? No   BMI 36.49 kg/m    Body mass index is 36.49 kg/m .  GENERAL: healthy, alert and no distress  EYES: Eyes grossly normal to inspection, PERRL and conjunctivae and sclerae normal  HENT: ear canals and TM's normal, nose and mouth without ulcers or lesions  NECK: no adenopathy, no asymmetry, masses, or scars and thyroid normal to palpation  RESP: lungs clear to auscultation - no rales, rhonchi or wheezes  CV: regular rate and rhythm, normal S1 S2, no S3 or S4, no murmur, click or rub, no peripheral edema and peripheral pulses strong  ABDOMEN: soft, nontender, no hepatosplenomegaly, no masses and bowel sounds normal  MS: no gross musculoskeletal defects noted, no edema    Diagnostic Test Results:  none     ASSESSMENT/PLAN:       Bronchitis  Advised use Inhaler as recommended   Zithromax just done  Advised follow up if not better 1 week-sooner if worse  Advised to do FIT card      Marysol Cisse MD  [facility]    "

## 2019-03-14 ENCOUNTER — OFFICE VISIT (OUTPATIENT)
Dept: FAMILY MEDICINE | Facility: CLINIC | Age: 69
End: 2019-03-14
Payer: COMMERCIAL

## 2019-03-14 VITALS
HEIGHT: 63 IN | WEIGHT: 206 LBS | TEMPERATURE: 97.8 F | SYSTOLIC BLOOD PRESSURE: 132 MMHG | RESPIRATION RATE: 16 BRPM | DIASTOLIC BLOOD PRESSURE: 82 MMHG | HEART RATE: 80 BPM | BODY MASS INDEX: 36.5 KG/M2 | OXYGEN SATURATION: 96 %

## 2019-03-14 DIAGNOSIS — Z12.11 SCREEN FOR COLON CANCER: Primary | ICD-10-CM

## 2019-03-14 DIAGNOSIS — J40 BRONCHITIS: ICD-10-CM

## 2019-03-14 PROCEDURE — 99213 OFFICE O/P EST LOW 20 MIN: CPT | Performed by: FAMILY MEDICINE

## 2019-03-14 ASSESSMENT — MIFFLIN-ST. JEOR: SCORE: 1428.54

## 2019-03-14 NOTE — NURSING NOTE
Inhaler teaching done for albuterol inhaler per request of Dr. Cisse. Pt was also given handouts on proair inhaler.     Gladis Baker RN  [facility]

## 2019-04-17 ENCOUNTER — OFFICE VISIT (OUTPATIENT)
Dept: FAMILY MEDICINE | Facility: CLINIC | Age: 69
End: 2019-04-17
Payer: COMMERCIAL

## 2019-04-17 VITALS
TEMPERATURE: 97.3 F | OXYGEN SATURATION: 95 % | HEART RATE: 70 BPM | BODY MASS INDEX: 37.07 KG/M2 | RESPIRATION RATE: 16 BRPM | WEIGHT: 209.2 LBS | DIASTOLIC BLOOD PRESSURE: 82 MMHG | SYSTOLIC BLOOD PRESSURE: 132 MMHG | HEIGHT: 63 IN

## 2019-04-17 DIAGNOSIS — I10 ESSENTIAL HYPERTENSION, BENIGN: ICD-10-CM

## 2019-04-17 DIAGNOSIS — E78.5 HYPERLIPIDEMIA LDL GOAL <100: ICD-10-CM

## 2019-04-17 DIAGNOSIS — Z12.11 SCREEN FOR COLON CANCER: ICD-10-CM

## 2019-04-17 PROCEDURE — 99213 OFFICE O/P EST LOW 20 MIN: CPT | Performed by: FAMILY MEDICINE

## 2019-04-17 RX ORDER — ATENOLOL 50 MG/1
50 TABLET ORAL 2 TIMES DAILY
Qty: 180 TABLET | Refills: 3 | Status: SHIPPED | OUTPATIENT
Start: 2019-04-17 | End: 2019-06-06

## 2019-04-17 RX ORDER — ATORVASTATIN CALCIUM 10 MG/1
10 TABLET, FILM COATED ORAL DAILY
Qty: 90 TABLET | Refills: 0 | Status: SHIPPED | OUTPATIENT
Start: 2019-04-17 | End: 2019-04-30

## 2019-04-17 RX ORDER — AMLODIPINE BESYLATE 5 MG/1
5 TABLET ORAL DAILY
Qty: 90 TABLET | Refills: 3 | Status: SHIPPED | OUTPATIENT
Start: 2019-04-17 | End: 2019-06-06

## 2019-04-17 ASSESSMENT — PAIN SCALES - GENERAL: PAINLEVEL: NO PAIN (0)

## 2019-04-17 ASSESSMENT — MIFFLIN-ST. JEOR: SCORE: 1443.05

## 2019-04-17 NOTE — PROGRESS NOTES
SUBJECTIVE:   Glenda Bales is a 68 year old female who presents to clinic today for the following   health issues:    Hyperlipidemia Follow-Up    Rate your low fat/cholesterol diet?: poor    Taking statin?  Yes, no muscle aches from statin    Other lipid medications/supplements?:  none    Hypertension Follow-up    Outpatient blood pressures are being checked at home.  Results are 129/70.  Low Salt Diet: low salt    Amount of exercise or physical activity: None    Problems taking medications regularly: No    Medication side effects: none    Diet: none            Additional history: as documented    Reviewed  and updated as needed this visit by clinical staff         Reviewed and updated as needed this visit by Provider         Patient Active Problem List   Diagnosis     Essential hypertension, benign     Chronic rhinitis     Acquired hypothyroidism     Hyperlipidemia LDL goal <100     Gastroesophageal reflux disease without esophagitis     BMI>35     Nonrheumatic aortic valve insufficiency     Anxiety     Hyperglycemia     Elevated LFTs     Osteoarthritis of knee, unspecified laterality, unspecified osteoarthritis type     Other irritable bowel syndrome     Alcohol abuse, in remission     Fatty liver     Past Surgical History:   Procedure Laterality Date     NO HISTORY OF SURGERY         Social History     Tobacco Use     Smoking status: Former Smoker     Years: 3.00     Types: Cigarettes     Last attempt to quit: 2016     Years since quittin.9     Smokeless tobacco: Never Used     Tobacco comment: quit in    Substance Use Topics     Alcohol use: Yes     Family History   Problem Relation Age of Onset     Diabetes Mother      Coronary Artery Disease Father      Colon Cancer No family hx of      Breast Cancer No family hx of          Current Outpatient Medications   Medication Sig Dispense Refill     albuterol (PROAIR HFA/PROVENTIL HFA/VENTOLIN HFA) 108 (90 Base) MCG/ACT inhaler Inhale 2 puffs into the  "lungs every 6 hours 1 Inhaler 0     amLODIPine (NORVASC) 5 MG tablet Take 1 tablet (5 mg) by mouth daily 90 tablet 3     atenolol (TENORMIN) 50 MG tablet Take 1 tablet (50 mg) by mouth 2 times daily 180 tablet 3     atorvastatin (LIPITOR) 10 MG tablet Take 1 tablet (10 mg) by mouth daily 90 tablet 0     azelastine (ASTELIN) 0.1 % spray Spray 1-2 sprays into both nostrils 2 times daily 1 Bottle 11     cetirizine (ZYRTEC) 10 MG tablet Take 10 mg by mouth daily       chlorthalidone (HYGROTON) 25 MG tablet Take 1 tablet (25 mg) by mouth daily 90 tablet 3     fluticasone (FLONASE) 50 MCG/ACT spray Spray 1 spray into both nostrils daily       ketotifen (ALAWAY) 0.025 % SOLN ophthalmic solution 1 drop 2 times daily       levothyroxine (SYNTHROID/LEVOTHROID) 112 MCG tablet TK 1 T PO  QD 90 tablet 4     omeprazole (PRILOSEC) 20 MG DR capsule TK 1 C PO D 90 capsule 4     Allergies   Allergen Reactions     Losartan Other (See Comments)     Acute Kidney Injury     Seasonal      Recent Labs   Lab Test 03/04/19  0807 12/03/18  1643  05/25/18  0735 01/15/18 01/16/17   A1C  --   --   --  6.0* 6.1* 6.2*   *  --   --  152* 164*  --    HDL 77  --   --  82 98  --    TRIG 124  --   --  98 60  --    ALT 70*  --   --  86*  --  145*   CR 0.97 1.31*   < > 0.94 0.82 0.91   GFRESTIMATED 59* 40*   < > 59* 74 65   GFRESTBLACK 69 49*   < > 71 85 76   POTASSIUM 3.6 3.5   < > 4.0 4.3 3.9   TSH 4.93*  --   --  3.00 3.560  --     < > = values in this interval not displayed.      BP Readings from Last 3 Encounters:   04/17/19 132/82   03/14/19 132/82   03/11/19 162/85    Wt Readings from Last 3 Encounters:   04/17/19 94.9 kg (209 lb 3.2 oz)   03/14/19 93.4 kg (206 lb)   03/11/19 96.2 kg (212 lb)                  Labs reviewed in EPIC    ROS:  Rest of the ROS is Negative except see above and Problem list [stable]      OBJECTIVE:     /82   Pulse 70   Temp 97.3  F (36.3  C) (Oral)   Resp 16   Ht 1.6 m (5' 3\")   Wt 94.9 kg (209 lb 3.2 " "oz)   SpO2 95%   BMI 37.06 kg/m    Body mass index is 37.06 kg/m .  GENERAL: healthy, alert and no distress  NECK: no adenopathy, no asymmetry, masses, or scars and thyroid normal to palpation  RESP: lungs clear to auscultation - no rales, rhonchi or wheezes  CV: regular rate and rhythm, normal S1 S2, no S3 or S4, no murmur, click or rub, no peripheral edema and peripheral pulses strong  ABDOMEN: soft, nontender, no hepatosplenomegaly, no masses and bowel sounds normal  MS: no gross musculoskeletal defects noted, no edema    Diagnostic Test Results:  Pending     ASSESSMENT/PLAN:         BMI:   Estimated body mass index is 37.06 kg/m  as calculated from the following:    Height as of this encounter: 1.6 m (5' 3\").    Weight as of this encounter: 94.9 kg (209 lb 3.2 oz).   Weight management plan: Discussed healthy diet and exercise guidelines      1. Essential hypertension, benign  Stable   - atenolol (TENORMIN) 50 MG tablet; Take 1 tablet (50 mg) by mouth 2 times daily  Dispense: 180 tablet; Refill: 3  - ALT; Future  - AST; Future  - amLODIPine (NORVASC) 5 MG tablet; Take 1 tablet (5 mg) by mouth daily  Dispense: 90 tablet; Refill: 3    2. Hyperlipidemia LDL goal <100  controlled  - atorvastatin (LIPITOR) 10 MG tablet; Take 1 tablet (10 mg) by mouth daily  Dispense: 90 tablet; Refill: 0  - Lipid panel reflex to direct LDL Fasting; Future  - Glucose; Future    3. Screen for colon cancer  Advised do FIT card  Marysol Cisse MD  [facility]        "

## 2019-04-19 DIAGNOSIS — E03.9 ACQUIRED HYPOTHYROIDISM: ICD-10-CM

## 2019-04-19 DIAGNOSIS — R73.9 HYPERGLYCEMIA: ICD-10-CM

## 2019-04-19 DIAGNOSIS — E78.5 HYPERLIPIDEMIA LDL GOAL <100: ICD-10-CM

## 2019-04-19 DIAGNOSIS — I10 ESSENTIAL HYPERTENSION, BENIGN: ICD-10-CM

## 2019-04-19 LAB
ALT SERPL W P-5'-P-CCNC: 61 U/L (ref 0–50)
AST SERPL W P-5'-P-CCNC: 36 U/L (ref 0–45)
CHOLEST SERPL-MCNC: 202 MG/DL
GLUCOSE SERPL-MCNC: 166 MG/DL (ref 70–99)
HBA1C MFR BLD: 7.7 % (ref 0–5.6)
HDLC SERPL-MCNC: 74 MG/DL
LDLC SERPL CALC-MCNC: 112 MG/DL
NONHDLC SERPL-MCNC: 128 MG/DL
TRIGL SERPL-MCNC: 79 MG/DL
TSH SERPL DL<=0.005 MIU/L-ACNC: 2.25 MU/L (ref 0.4–4)

## 2019-04-19 PROCEDURE — 36415 COLL VENOUS BLD VENIPUNCTURE: CPT | Performed by: FAMILY MEDICINE

## 2019-04-19 PROCEDURE — 84460 ALANINE AMINO (ALT) (SGPT): CPT | Performed by: FAMILY MEDICINE

## 2019-04-19 PROCEDURE — 84450 TRANSFERASE (AST) (SGOT): CPT | Performed by: FAMILY MEDICINE

## 2019-04-19 PROCEDURE — 80061 LIPID PANEL: CPT | Performed by: FAMILY MEDICINE

## 2019-04-19 PROCEDURE — 83036 HEMOGLOBIN GLYCOSYLATED A1C: CPT | Performed by: FAMILY MEDICINE

## 2019-04-19 PROCEDURE — 82947 ASSAY GLUCOSE BLOOD QUANT: CPT | Performed by: FAMILY MEDICINE

## 2019-04-19 PROCEDURE — 84443 ASSAY THYROID STIM HORMONE: CPT | Performed by: FAMILY MEDICINE

## 2019-04-30 DIAGNOSIS — E78.5 HYPERLIPIDEMIA LDL GOAL <100: ICD-10-CM

## 2019-05-01 RX ORDER — ATORVASTATIN CALCIUM 10 MG/1
TABLET, FILM COATED ORAL
Qty: 90 TABLET | Refills: 2 | Status: SHIPPED | OUTPATIENT
Start: 2019-05-01 | End: 2020-07-08

## 2019-05-16 ENCOUNTER — OFFICE VISIT (OUTPATIENT)
Dept: FAMILY MEDICINE | Facility: CLINIC | Age: 69
End: 2019-05-16
Payer: MEDICARE

## 2019-05-16 VITALS
DIASTOLIC BLOOD PRESSURE: 78 MMHG | HEART RATE: 75 BPM | BODY MASS INDEX: 36.32 KG/M2 | OXYGEN SATURATION: 95 % | WEIGHT: 205 LBS | RESPIRATION RATE: 16 BRPM | HEIGHT: 63 IN | TEMPERATURE: 97.7 F | SYSTOLIC BLOOD PRESSURE: 138 MMHG

## 2019-05-16 DIAGNOSIS — E11.65 TYPE 2 DIABETES MELLITUS WITH HYPERGLYCEMIA, WITHOUT LONG-TERM CURRENT USE OF INSULIN (H): ICD-10-CM

## 2019-05-16 DIAGNOSIS — R25.2 MUSCLE CRAMPS: Primary | ICD-10-CM

## 2019-05-16 DIAGNOSIS — E87.6 HYPOKALEMIA: ICD-10-CM

## 2019-05-16 LAB
ANION GAP SERPL CALCULATED.3IONS-SCNC: 10 MMOL/L (ref 3–14)
BUN SERPL-MCNC: 15 MG/DL (ref 7–30)
CALCIUM SERPL-MCNC: 9.3 MG/DL (ref 8.5–10.1)
CHLORIDE SERPL-SCNC: 92 MMOL/L (ref 94–109)
CO2 SERPL-SCNC: 33 MMOL/L (ref 20–32)
CREAT SERPL-MCNC: 0.98 MG/DL (ref 0.52–1.04)
GFR SERPL CREATININE-BSD FRML MDRD: 59 ML/MIN/{1.73_M2}
GLUCOSE SERPL-MCNC: 292 MG/DL (ref 70–99)
POTASSIUM SERPL-SCNC: 3.2 MMOL/L (ref 3.4–5.3)
SODIUM SERPL-SCNC: 135 MMOL/L (ref 133–144)

## 2019-05-16 PROCEDURE — 99214 OFFICE O/P EST MOD 30 MIN: CPT | Performed by: NURSE PRACTITIONER

## 2019-05-16 PROCEDURE — 36415 COLL VENOUS BLD VENIPUNCTURE: CPT | Performed by: NURSE PRACTITIONER

## 2019-05-16 PROCEDURE — 80048 BASIC METABOLIC PNL TOTAL CA: CPT | Performed by: NURSE PRACTITIONER

## 2019-05-16 RX ORDER — GLUCOSAMINE HCL/CHONDROITIN SU 500-400 MG
CAPSULE ORAL
Qty: 100 EACH | Refills: 3 | Status: SHIPPED | OUTPATIENT
Start: 2019-05-16

## 2019-05-16 RX ORDER — LANCETS
EACH MISCELLANEOUS
Qty: 100 EACH | Refills: 6 | Status: SHIPPED | OUTPATIENT
Start: 2019-05-16 | End: 2019-05-24

## 2019-05-16 ASSESSMENT — MIFFLIN-ST. JEOR: SCORE: 1424

## 2019-05-16 NOTE — PROGRESS NOTES
SUBJECTIVE:   Glenda Bales is a 68 year old female who presents to clinic today for the following   health issues:      Bilateral Leg Cramps    Onset: Off and on for a few months, but worsened over the past couple of weeks    Description:   Location: both legs  Character: Burning, Cramping and noticed some stiffness which improves when she gets up and going for the day.    Intensity: mild    Progression of Symptoms: worse    Accompanying Signs & Symptoms:  Other symptoms: none    History:   Previous similar pain: no       Precipitating factors:   Trauma or overuse: no     Alleviating factors:  Improved by: activity helps to relief some of the pain and stiffness.    Therapies Tried and outcome: None    Had labs done recently with Primary Care Provider showing hyperglycemia.    Additional history: as documented    Reviewed  and updated as needed this visit by clinical staff  Tobacco  Allergies  Meds  Med Hx  Surg Hx  Fam Hx  Soc Hx        Reviewed and updated as needed this visit by Provider         Patient Active Problem List   Diagnosis     Essential hypertension, benign     Chronic rhinitis     Acquired hypothyroidism     Hyperlipidemia LDL goal <100     Gastroesophageal reflux disease without esophagitis     BMI>35     Nonrheumatic aortic valve insufficiency     Anxiety     Hyperglycemia     Elevated LFTs     Osteoarthritis of knee, unspecified laterality, unspecified osteoarthritis type     Other irritable bowel syndrome     Alcohol abuse, in remission     Fatty liver     Muscle cramps     Past Surgical History:   Procedure Laterality Date     NO HISTORY OF SURGERY         Social History     Tobacco Use     Smoking status: Former Smoker     Years: 3.00     Types: Cigarettes     Last attempt to quit: 4/25/2016     Years since quitting: 3.0     Smokeless tobacco: Never Used     Tobacco comment: quit in 1978   Substance Use Topics     Alcohol use: Yes     Family History   Problem Relation Age of Onset      "Diabetes Mother      Coronary Artery Disease Father      Colon Cancer No family hx of      Breast Cancer No family hx of            ROS:  Constitutional, HEENT, cardiovascular, pulmonary, GI, , musculoskeletal, neuro, skin, endocrine and psych systems are negative, except as otherwise noted.    OBJECTIVE:     /78 (BP Location: Right arm, Patient Position: Chair, Cuff Size: Adult Large)   Pulse 75   Temp 97.7  F (36.5  C) (Oral)   Resp 16   Ht 1.6 m (5' 3\")   Wt 93 kg (205 lb)   SpO2 95%   Breastfeeding? No   BMI 36.31 kg/m    Body mass index is 36.31 kg/m .  GENERAL: healthy, alert and no distress  RESP: lungs clear to auscultation - no rales, rhonchi or wheezes  CV: regular rate and rhythm, normal S1 S2, no S3 or S4, no murmur, click or rub, no peripheral edema and peripheral pulses strong  MS: no gross musculoskeletal defects noted, no edema    Diagnostic Test Results:  Results for orders placed or performed in visit on 05/16/19   Basic metabolic panel   Result Value Ref Range    Sodium 135 133 - 144 mmol/L    Potassium 3.2 (L) 3.4 - 5.3 mmol/L    Chloride 92 (L) 94 - 109 mmol/L    Carbon Dioxide 33 (H) 20 - 32 mmol/L    Anion Gap 10 3 - 14 mmol/L    Glucose 292 (H) 70 - 99 mg/dL    Urea Nitrogen 15 7 - 30 mg/dL    Creatinine 0.98 0.52 - 1.04 mg/dL    GFR Estimate 59 (L) >60 mL/min/[1.73_m2]    GFR Estimate If Black 68 >60 mL/min/[1.73_m2]    Calcium 9.3 8.5 - 10.1 mg/dL       ASSESSMENT/PLAN:     1. Muscle cramps  Onset correlates with starting Lipitor. Will have her hold for 2 weeks to see if symptoms improve. If better, will try alternate, low dose statin.  - Basic metabolic panel    2. Type 2 diabetes mellitus with hyperglycemia, without long-term current use of insulin (H)  Last lab diagnostic for diabetes- reviewed pathophysiology of T2DM, diet/exercise counseling. Patient prefers not to start medications & will allow 3 months of lifestyle changes. Will have her see diabetic ed to learn blood " sugar testing.  - DIABETES EDUCATOR REFERRAL  - blood glucose monitoring (NO BRAND SPECIFIED) meter device kit; Use to test blood sugar 1 times daily or as directed. Preferred blood glucose meter OR supplies to accompany: Blood Glucose Monitor Brands: per insurance.  Dispense: 1 kit; Refill: 0  - blood glucose (NO BRAND SPECIFIED) test strip; Use to test blood sugar 1 times daily or as directed. To accompany: Blood Glucose Monitor Brands: per insurance.  Dispense: 100 strip; Refill: 6  - blood glucose calibration (NO BRAND SPECIFIED) solution; To accompany: Blood Glucose Monitor Brands: per insurance.  Dispense: 1 Bottle; Refill: 3  - thin (NO BRAND SPECIFIED) lancets; Use with lanceting device. To accompany: Blood Glucose Monitor Brands: per insurance.  Dispense: 100 each; Refill: 6  - alcohol swab prep pads; Use to swab area of injection/milena as directed.  Dispense: 100 each; Refill: 3  - Basic metabolic panel    3. Hypokalemia    - **Basic metabolic panel FUTURE 14d; Future  - potassium chloride ER (K-DUR/KLOR-CON M) 20 MEQ CR tablet; Take 1 tablet (20 mEq) by mouth daily  Dispense: 90 tablet; Refill: 3    See Patient Instructions    [facility]

## 2019-05-16 NOTE — PATIENT INSTRUCTIONS
Hold the Atorvastatin for 2 weeks to see if the cramps improve.    Then call or MyChart and let me now if they get better or not. If not, we'll do more testing to figure out why you have them. If they are better, then we'll try a different cholesterol medication.  Patient Education     Diet: Diabetes  Food is an important tool that you can use to control diabetes and stay healthy. Eating well-balanced meals in the correct amounts will help you control your blood glucose levels and prevent low blood sugar reactions. It will also help you reduce the health risks of diabetes. There is no one specific diet that is right for everyone with diabetes. But there are general guidelines to follow. A registered dietitian (RD) will create a tailored diet approach that s just right for you. He or she will also help you plan healthy meals and snacks. If you have any questions, call your dietitian for advice.     Guidelines for success  Talk with your healthcare provider before starting a diabetes diet or weight loss program. If you haven't talked with a dietitian yet, ask your provider for a referral. The following guidelines can help you succeed:    Select foods from the 6 food groups below. Your dietitian will help you find food choices within each group. He or she will also show you serving sizes and how many servings you can have at each meal.  ? Grains, beans, and starchy vegetables  ? Vegetables  ? Fruit  ? Milk or yogurt  ? Meat, poultry, fish, or tofu  ? Healthy fats    Check your blood sugar levels as directed by your provider. Take any medicine as prescribed by your provider.    Learn to read food labels and pick the right portion sizes.    Eat only the amount of food in your meal plan. Eat about the same amount of food at regular times each day. Don t skip meals. Eat meals 4 to 5 hours apart, with snacks in between.    Limit alcohol. It raises blood sugar levels. Drink water or calorie-free diet drinks that use safe  sweeteners.    Eat less fat to help lower your risk of heart disease. Use nonfat or low-fat dairy products and lean meats. Avoid fried foods. Use cooking oils that are unsaturated, such as olive, canola, or peanut oil.    Talk with your dietitian about safe sugar substitutes.    Avoid added salt. It can contribute to high blood pressure, which can cause heart disease. People with diabetes already have a risk of high blood pressure and heart disease.    Stay at a healthy weight. If you need to lose weight, cut down on your portion sizes. But don t skip meals. Exercise is an important part of any weight management program. Talk with your provider about an exercise program that s right for you.    For more information about the best diet plan for you, talk with a registered dietitian (RD). To find an RD in your area, contact:  ? Academy of Nutrition and Dietetics www.eatright.org  ? The American Diabetes Association 182-136-1194 www.diabetes.org  Date Last Reviewed: 8/1/2016 2000-2018 [employer]. [address] [place] [address], [place] [address]. All rights reserved. This information is not intended as a substitute for professional medical care. Always follow your healthcare professional's instructions.

## 2019-05-17 RX ORDER — POTASSIUM CHLORIDE 1500 MG/1
20 TABLET, EXTENDED RELEASE ORAL DAILY
Qty: 90 TABLET | Refills: 3 | Status: SHIPPED | OUTPATIENT
Start: 2019-05-17 | End: 2019-09-13

## 2019-05-23 DIAGNOSIS — E87.6 HYPOKALEMIA: ICD-10-CM

## 2019-05-23 DIAGNOSIS — E11.65 TYPE 2 DIABETES MELLITUS WITH HYPERGLYCEMIA, WITHOUT LONG-TERM CURRENT USE OF INSULIN (H): ICD-10-CM

## 2019-05-23 LAB
ANION GAP SERPL CALCULATED.3IONS-SCNC: 9 MMOL/L (ref 3–14)
BUN SERPL-MCNC: 16 MG/DL (ref 7–30)
CALCIUM SERPL-MCNC: 9.1 MG/DL (ref 8.5–10.1)
CHLORIDE SERPL-SCNC: 96 MMOL/L (ref 94–109)
CO2 SERPL-SCNC: 30 MMOL/L (ref 20–32)
CREAT SERPL-MCNC: 0.9 MG/DL (ref 0.52–1.04)
GFR SERPL CREATININE-BSD FRML MDRD: 65 ML/MIN/{1.73_M2}
GLUCOSE SERPL-MCNC: 243 MG/DL (ref 70–99)
POTASSIUM SERPL-SCNC: 3.4 MMOL/L (ref 3.4–5.3)
SODIUM SERPL-SCNC: 135 MMOL/L (ref 133–144)

## 2019-05-23 PROCEDURE — 80048 BASIC METABOLIC PNL TOTAL CA: CPT | Performed by: NURSE PRACTITIONER

## 2019-05-23 PROCEDURE — 36415 COLL VENOUS BLD VENIPUNCTURE: CPT | Performed by: NURSE PRACTITIONER

## 2019-05-24 RX ORDER — LANCETS
EACH MISCELLANEOUS
Qty: 100 EACH | Refills: 6 | Status: SHIPPED | OUTPATIENT
Start: 2019-05-24 | End: 2020-07-08

## 2019-05-29 ENCOUNTER — ALLIED HEALTH/NURSE VISIT (OUTPATIENT)
Dept: EDUCATION SERVICES | Facility: CLINIC | Age: 69
End: 2019-05-29
Payer: MEDICARE

## 2019-05-29 VITALS — BODY MASS INDEX: 35.25 KG/M2 | WEIGHT: 199 LBS

## 2019-05-29 DIAGNOSIS — E11.65: Primary | ICD-10-CM

## 2019-05-29 PROCEDURE — G0108 DIAB MANAGE TRN  PER INDIV: HCPCS

## 2019-05-29 NOTE — PROGRESS NOTES
"Diabetes Self-Management Education & Support    Diabetes Education Self Management & Training    SUBJECTIVE/OBJECTIVE:  Presents for: Initial Assessment for new diagnosis  Accompanied by: Self  Diabetes education in the past 24 mo: No  Cultural Influences/Ethnic Background:  American  I heard that I cannot eat any bread or pasta.  I have retired, so now I do not have any excuse not to do some exercise.  I would like to lose weight and control my diabetes without taking medications.    Diabetes Symptoms & Complications     Patient Problem List and Family Medical History reviewed for relevant medical history, current medical status, and diabetes risk factors.    Vitals:  Wt 90.3 kg (199 lb)   BMI 35.25 kg/m  a  Estimated body mass index is 36.31 kg/m  as calculated from the following:    Height as of 5/16/19: 1.6 m (5' 3\").    Weight as of 5/16/19: 93 kg (205 lb).   Last 3 BP:   BP Readings from Last 3 Encounters:   05/16/19 138/78   04/17/19 132/82   03/14/19 132/82       History   Smoking Status     Former Smoker     Years: 3.00     Types: Cigarettes     Quit date: 4/25/2016   Smokeless Tobacco     Never Used     Comment: quit in 1978       Labs:  Lab Results   Component Value Date    A1C 7.7 04/19/2019     Lab Results   Component Value Date     05/23/2019     Lab Results   Component Value Date     04/19/2019     HDL Cholesterol   Date Value Ref Range Status   04/19/2019 74 >49 mg/dL Final   ]  GFR Estimate   Date Value Ref Range Status   05/23/2019 65 >60 mL/min/[1.73_m2] Final     Comment:     Non  GFR Calc  Starting 12/18/2018, serum creatinine based estimated GFR (eGFR) will be   calculated using the Chronic Kidney Disease Epidemiology Collaboration   (CKD-EPI) equation.       GFR Estimate If Black   Date Value Ref Range Status   05/23/2019 76 >60 mL/min/[1.73_m2] Final     Comment:      GFR Calc  Starting 12/18/2018, serum creatinine based estimated GFR (eGFR) will " be   calculated using the Chronic Kidney Disease Epidemiology Collaboration   (CKD-EPI) equation.       Lab Results   Component Value Date    CR 0.90 2019     No results found for: MICROALBUMIN    Healthy Eating  Meals include: Breakfast, Lunch, Snacks  Breakfast: 9 am-egg and cheese water   or cheese and strawberries or yogurt with blueberries  Lunch: 2 pm-salad and breaded chicken breast  or chicken K jim, vegetables, pretzels and avocado dip, light beer salad  or open face tuna sandwich salad and fries a few.  Snacks: 10 pm- 1 glass red wine, cheese and 1/2 apple  Beverages: Water, Alcohol, Diet soda, Coffee, Coffee drinks  Has patient met with a dietitian in the past?: No    Being Active  Exercise:: Currently not exercising  Barrier to exercise: None    Monitoring  Monitoring Assessed Today: Yes  Did patient bring glucose meter to appointment? : Yes  Blood Glucose Meter: Accu-check ([employer])  Home Glucose (Sugar) Monitorin-2 times per day  Blood glucose trend: Decreasing   Low Glucose Range (mg/dL): 140-180  High Glucose Range (mg/dL): 180-200    BG range for the last 3 days 135-174 mg/dl.    Taking Medications    Taking Medication Assessed Today: No  Current Treatments: Diet     Reducing Risks  Diabetes Risks: Age over 45 years, Sedentary Lifestyle  CAD Risks: Diabetes Mellitus, Obesity  Has dilated eye exam at least once a year?: Yes  Sees dentist every 6 months?: No  Sees podiatrist (foot doctor)?: No    Healthy Coping  Emotional response to diabetes: Ready to learn  Difficulty affording diabetes management supplies?: No  Patient Activation Measure Survey Score:  No flowsheet data found.    ASSESSMENT:  Patient newly diagnosed and working to lose weight to help improve her diabetes control.  Has heard much mis information re diabetes and diet.  BG levels are above goal range but fasting level is improving.  Patient can benefit from consistent meals and snack and testing her blood glucose one time per  day.    Goals Addressed as of 5/29/2019 at 3:24 PM                 Today      Being Active (pt-stated)   0%    Added 5/29/19 by Stacie Reno RN     My Goal: I will walk for 10 minutes on 3 days per week as a place to start to exercise.    What I need to meet my goal: motivation to improve my diabetes control.    I plan to meet my goal by this date:  By July 1, 2019            Healthy Eating (pt-stated)   0%    Added 5/29/19 by Stacie Reno RN     My Goal: I will prepare meals for myself at home that include 2-3 carb choices.    What I need to meet my goal: motivation and planning ahead for what I will eat.    I plan to meet my goal by this date: July 1, 2019            Patient's most recent   Lab Results   Component Value Date    A1C 7.7 04/19/2019    is not meeting goal of <7.0    INTERVENTION:   Diabetes knowledge and skills assessment:     Patient is knowledgeable in diabetes management concepts related to: Monitoring    Patient needs further education on the following diabetes management concepts: Healthy Eating, Being Active, Monitoring, Taking Medication, Problem Solving, Reducing Risks and Healthy Coping    Based on learning assessment above, most appropriate setting for further diabetes education would be: Group class or Individual setting.    Education provided today on:  AADE Self-Care Behaviors:  Diabetes Pathophysiology  Healthy Eating: carbohydrate counting, consistency in amount, composition, and timing of food intake, weight reduction, eating out, portion control, plate planning method and label reading  Being Active: relationship to blood glucose and describe appropriate activity program  Monitoring: proper technique, log and interpret results, individual blood glucose targets, frequency of monitoring and proper sharps disposal, testing daily for MC coverage of test strips.  Taking Medication: possible need to start metformin    Opportunities for ongoing education and support in  diabetes-self management were discussed.    Pt verbalized understanding of concepts discussed and recommendations provided today.       Education Materials Provided:  [facility] Understanding Diabetes Booklet, Safe Disposal Options for Needles & Syringes, BG Log Sheet and My Plate Planner    PLAN:  See Patient Instructions for co-developed, patient-stated behavior change goals.  AVS printed and provided to patient today. See Follow-Up section for recommended follow-up.  Pt is scheduled to follow up in group classes for newly diagnosed diabetes at [facility].    Stacie Reno RN  BSN CDE    Time Spent: 90 minutes  Encounter Type: Individual    Any diabetes medication dose changes were made via the CDE Protocol and Collaborative Practice Agreement with the patient's referring provider.

## 2019-05-29 NOTE — PATIENT INSTRUCTIONS
Recommend to eat 3 meals per day and a snack if desired.  Recommend to eat 2-3 carb choices at meals and 0-2 carb choices for a snack.  Call if concerns.

## 2019-06-06 ENCOUNTER — OFFICE VISIT (OUTPATIENT)
Dept: FAMILY MEDICINE | Facility: CLINIC | Age: 69
End: 2019-06-06
Payer: MEDICARE

## 2019-06-06 VITALS
HEIGHT: 63 IN | HEART RATE: 64 BPM | DIASTOLIC BLOOD PRESSURE: 74 MMHG | SYSTOLIC BLOOD PRESSURE: 128 MMHG | WEIGHT: 197 LBS | TEMPERATURE: 96.9 F | OXYGEN SATURATION: 96 % | BODY MASS INDEX: 34.91 KG/M2

## 2019-06-06 DIAGNOSIS — E11.65 TYPE 2 DIABETES MELLITUS WITH HYPERGLYCEMIA, WITHOUT LONG-TERM CURRENT USE OF INSULIN (H): ICD-10-CM

## 2019-06-06 DIAGNOSIS — E87.6 HYPOKALEMIA: Primary | ICD-10-CM

## 2019-06-06 DIAGNOSIS — E78.5 HYPERLIPIDEMIA LDL GOAL <100: ICD-10-CM

## 2019-06-06 DIAGNOSIS — I10 ESSENTIAL HYPERTENSION, BENIGN: ICD-10-CM

## 2019-06-06 DIAGNOSIS — R05.9 COUGH: ICD-10-CM

## 2019-06-06 PROBLEM — E11.9 DIABETES MELLITUS, TYPE 2 (H): Status: ACTIVE | Noted: 2019-06-06

## 2019-06-06 LAB
FEF 25/75: NORMAL
FEV-1: NORMAL
FEV1/FVC: NORMAL
FVC: NORMAL

## 2019-06-06 PROCEDURE — 99214 OFFICE O/P EST MOD 30 MIN: CPT | Mod: 25 | Performed by: NURSE PRACTITIONER

## 2019-06-06 PROCEDURE — 94010 BREATHING CAPACITY TEST: CPT | Performed by: NURSE PRACTITIONER

## 2019-06-06 RX ORDER — ATENOLOL 50 MG/1
50 TABLET ORAL 2 TIMES DAILY
Qty: 180 TABLET | Refills: 3 | Status: SHIPPED | OUTPATIENT
Start: 2019-06-06 | End: 2019-06-17

## 2019-06-06 RX ORDER — ALBUTEROL SULFATE 90 UG/1
2 AEROSOL, METERED RESPIRATORY (INHALATION) EVERY 6 HOURS
Qty: 1 INHALER | Refills: 0 | Status: SHIPPED | OUTPATIENT
Start: 2019-06-06 | End: 2019-06-19

## 2019-06-06 RX ORDER — AMLODIPINE BESYLATE 5 MG/1
5 TABLET ORAL DAILY
Qty: 90 TABLET | Refills: 3 | Status: SHIPPED | OUTPATIENT
Start: 2019-06-06 | End: 2019-09-13

## 2019-06-06 ASSESSMENT — MIFFLIN-ST. JEOR: SCORE: 1387.72

## 2019-06-06 ASSESSMENT — PAIN SCALES - GENERAL: PAINLEVEL: NO PAIN (0)

## 2019-06-06 NOTE — PROGRESS NOTES
Subjective     Glenda Bales is a 68 year old female who presents to clinic today for the following health issues:    HPI     Chief Complaint   Patient presents with     Leg Cramps Fup     Patient presents for 2 week follow up of leg cramps. Was found to be hypokalemic, which has improved nicely with potassium supplement. She has also been holding statin to r/o statin myopathy. Cramps have significantly improved.    Patient requests refill of albuterol, which was originally prescribed for URI. States she is using Albuterol for coughing spasms which she relates to spring allergies. She does awaken at nighttime with coughing spells and the Albuterol is helpful for this. Minimal smoking history.    Recently diagnosed as diabetic and working with Diabetic Ed on lifestyle changes. Blood sugars tend to be around 120 in AM and high (over 200) in the afternoons.      Patient Active Problem List   Diagnosis     Essential hypertension, benign     Chronic rhinitis     Acquired hypothyroidism     Hyperlipidemia LDL goal <100     Gastroesophageal reflux disease without esophagitis     BMI>35     Nonrheumatic aortic valve insufficiency     Anxiety     Hyperglycemia     Elevated LFTs     Osteoarthritis of knee, unspecified laterality, unspecified osteoarthritis type     Other irritable bowel syndrome     Alcohol abuse, in remission     Fatty liver     Muscle cramps     Diabetes mellitus, type 2 (H)     Past Surgical History:   Procedure Laterality Date     NO HISTORY OF SURGERY         Social History     Tobacco Use     Smoking status: Former Smoker     Years: 3.00     Types: Cigarettes     Last attempt to quit: 4/25/2016     Years since quitting: 3.1     Smokeless tobacco: Never Used     Tobacco comment: quit in 1978   Substance Use Topics     Alcohol use: Yes     Family History   Problem Relation Age of Onset     Diabetes Mother      Coronary Artery Disease Father      Colon Cancer No family hx of      Breast Cancer No family hx  "of              Reviewed and updated as needed this visit by Provider         Review of Systems   ROS COMP: Constitutional, HEENT, cardiovascular, pulmonary, GI, , musculoskeletal, neuro, skin, endocrine and psych systems are negative, except as otherwise noted.      Objective    /74 (BP Location: Left arm, Patient Position: Chair, Cuff Size: Adult Large)   Pulse 64   Temp 96.9  F (36.1  C) (Oral)   Ht 1.6 m (5' 3\")   Wt 89.4 kg (197 lb)   SpO2 96%   BMI 34.90 kg/m    Body mass index is 34.9 kg/m .  Physical Exam   GENERAL: healthy, alert and no distress  RESP: lungs clear to auscultation - no rales, rhonchi or wheezes  CV: regular rate and rhythm, normal S1 S2, no S3 or S4, no murmur, click or rub, no peripheral edema and peripheral pulses strong  PSYCH: mentation appears normal, affect normal/bright    Diagnostic Test Results:  Labs reviewed in Epic  Results for orders placed or performed in visit on 06/06/19 (from the past 24 hour(s))   Spirometry, Breathing Capacity   Result Value Ref Range    FEV-1      FVC      FEV1/FVC      FEF 25/75             Assessment & Plan     1. Hypokalemia  Resolved with potassium supplements- likely cause of muscle aches    2. Hyperlipidemia LDL goal <100  Ok to try restarting Lipitor. Patient hesitant- wants to stay off statin. Discussed more aggressive goal of LDL <70 due to new diabetes diagnosis; after further counseling, patient agreeable to restarting. Contact clinic if muscle aches recur on the medication.    3. Type 2 diabetes mellitus with hyperglycemia, without long-term current use of insulin (H)  Working with diabetic ed- reinforced lifestyle changes    4. Essential hypertension, benign  Medications refilled  - atenolol (TENORMIN) 50 MG tablet; Take 1 tablet (50 mg) by mouth 2 times daily  Dispense: 180 tablet; Refill: 3  - amLODIPine (NORVASC) 5 MG tablet; Take 1 tablet (5 mg) by mouth daily  Dispense: 90 tablet; Refill: 3    5. Cough  Suspect asthma, less " likely COPD due to minimal smoking history. Continue Albuterol PRN.  - albuterol (PROAIR HFA/PROVENTIL HFA/VENTOLIN HFA) 108 (90 Base) MCG/ACT inhaler; Inhale 2 puffs into the lungs every 6 hours  Dispense: 1 Inhaler; Refill: 0  - Spirometry, Breathing Capacity       See Patient Instructions    Return in about 6 weeks (around 7/18/2019) for Diabetes check with PCP.    EDILBERTO Hu CNP  [facility]

## 2019-06-06 NOTE — PATIENT INSTRUCTIONS
Start taking the Lipitor every other day for 2 weeks- if you're feeling ok without muscle cramps, then go up to taking daily.  If your muscle cramps return, please contact the clinic.

## 2019-06-06 NOTE — RESULT ENCOUNTER NOTE
Please call the patient with these results:    Glenda, your breathing test here was normal. You can continue to use the inhaler. Please follow up on the cough and inhaler use with Dr. Cisse at your next appointment to discuss additional testing.    Makayla Aparicio, CNP

## 2019-06-10 ENCOUNTER — TELEPHONE (OUTPATIENT)
Dept: FAMILY MEDICINE | Facility: CLINIC | Age: 69
End: 2019-06-10

## 2019-06-10 NOTE — TELEPHONE ENCOUNTER
[facility]    This was completed by a covering provider Makayla Aparicio CNP

## 2019-06-17 ENCOUNTER — MYC MEDICAL ADVICE (OUTPATIENT)
Dept: FAMILY MEDICINE | Facility: CLINIC | Age: 69
End: 2019-06-17

## 2019-06-17 DIAGNOSIS — I10 ESSENTIAL HYPERTENSION, BENIGN: ICD-10-CM

## 2019-06-17 RX ORDER — ATENOLOL 50 MG/1
25 TABLET ORAL 2 TIMES DAILY
Qty: 90 TABLET | Refills: 3 | Status: SHIPPED | OUTPATIENT
Start: 2019-06-17 | End: 2019-10-17

## 2019-06-19 ENCOUNTER — TELEPHONE (OUTPATIENT)
Dept: FAMILY MEDICINE | Facility: CLINIC | Age: 69
End: 2019-06-19

## 2019-06-19 DIAGNOSIS — R05.9 COUGH: ICD-10-CM

## 2019-06-19 RX ORDER — ALBUTEROL SULFATE 90 UG/1
2 AEROSOL, METERED RESPIRATORY (INHALATION) EVERY 6 HOURS
Qty: 1 INHALER | Refills: 0 | Status: SHIPPED | OUTPATIENT
Start: 2019-06-19 | End: 2021-06-21

## 2019-06-19 NOTE — TELEPHONE ENCOUNTER
[employer] Medicare Services Part D.      Received a letter stating they have provided the patient with a temporary supply of:  albuterol 108 (90 Base) MCG/ACT inhaler    This is either not covered (non-formulary) or is subject to the limits.    Before the temporary supply is out do one of the following:    *Change the medication to a formulary  *Request a Prior Approval/ coverage determination if it meets our criteria for coverage  *Request an exception from criteria for coverage    To request a coverage determination or an exception contacts:  [employer] [facility]   [id number]  [address] [place] [address].   [place] [address]    Phone: [phone]  Fax: [phone]

## 2019-07-18 ENCOUNTER — OFFICE VISIT (OUTPATIENT)
Dept: FAMILY MEDICINE | Facility: CLINIC | Age: 69
End: 2019-07-18
Payer: MEDICARE

## 2019-07-18 VITALS
DIASTOLIC BLOOD PRESSURE: 68 MMHG | WEIGHT: 190 LBS | TEMPERATURE: 98.2 F | HEART RATE: 73 BPM | SYSTOLIC BLOOD PRESSURE: 122 MMHG | BODY MASS INDEX: 33.66 KG/M2 | HEIGHT: 63 IN | OXYGEN SATURATION: 97 % | RESPIRATION RATE: 16 BRPM

## 2019-07-18 DIAGNOSIS — E66.811 CLASS 1 OBESITY DUE TO EXCESS CALORIES WITH SERIOUS COMORBIDITY AND BODY MASS INDEX (BMI) OF 33.0 TO 33.9 IN ADULT: ICD-10-CM

## 2019-07-18 DIAGNOSIS — E66.09 CLASS 1 OBESITY DUE TO EXCESS CALORIES WITH SERIOUS COMORBIDITY AND BODY MASS INDEX (BMI) OF 33.0 TO 33.9 IN ADULT: ICD-10-CM

## 2019-07-18 DIAGNOSIS — E11.65 TYPE 2 DIABETES MELLITUS WITH HYPERGLYCEMIA, WITHOUT LONG-TERM CURRENT USE OF INSULIN (H): Primary | ICD-10-CM

## 2019-07-18 DIAGNOSIS — Z12.31 VISIT FOR SCREENING MAMMOGRAM: ICD-10-CM

## 2019-07-18 DIAGNOSIS — I10 ESSENTIAL HYPERTENSION, BENIGN: ICD-10-CM

## 2019-07-18 DIAGNOSIS — E78.5 HYPERLIPIDEMIA LDL GOAL <70: ICD-10-CM

## 2019-07-18 PROBLEM — R73.9 HYPERGLYCEMIA: Status: RESOLVED | Noted: 2018-05-02 | Resolved: 2019-07-18

## 2019-07-18 PROBLEM — E66.01 MORBID OBESITY DUE TO EXCESS CALORIES (H): Status: RESOLVED | Noted: 2018-04-25 | Resolved: 2019-07-18

## 2019-07-18 PROBLEM — R25.2 MUSCLE CRAMPS: Status: RESOLVED | Noted: 2019-05-16 | Resolved: 2019-07-18

## 2019-07-18 LAB — HBA1C MFR BLD: 6.5 % (ref 0–5.6)

## 2019-07-18 PROCEDURE — 99207 C FOOT EXAM  NO CHARGE: CPT | Performed by: FAMILY MEDICINE

## 2019-07-18 PROCEDURE — 83036 HEMOGLOBIN GLYCOSYLATED A1C: CPT | Performed by: FAMILY MEDICINE

## 2019-07-18 PROCEDURE — 36415 COLL VENOUS BLD VENIPUNCTURE: CPT | Performed by: FAMILY MEDICINE

## 2019-07-18 PROCEDURE — 99214 OFFICE O/P EST MOD 30 MIN: CPT | Performed by: FAMILY MEDICINE

## 2019-07-18 ASSESSMENT — MIFFLIN-ST. JEOR: SCORE: 1355.96

## 2019-07-18 NOTE — PROGRESS NOTES
Subjective     Glenda Bales is a 68 year old female who presents to clinic today for the following health issues:    HPI   Diabetes Follow-up      How often are you checking your blood sugar? One time daily    What time of day are you checking your blood sugars (select all that apply)?  Before meals    Have you had any blood sugars above 200?  No    Have you had any blood sugars below 70?  No    What symptoms do you notice when your blood sugar is low?  None    What concerns do you have today about your diabetes? None     Do you have any of these symptoms? (Select all that apply)  No numbness or tingling in feet.  No redness, sores or blisters on feet.  No complaints of excessive thirst.  No reports of blurry vision.  No significant changes to weight.     Have you had a diabetic eye exam in the last 12 months? Yes- Date of last eye exam: see [facility]    Diabetes Management Resources    Hyperlipidemia Follow-Up      Are you having any of the following symptoms? (Select all that apply)  No complaints of shortness of breath, chest pain or pressure.  No increased sweating or nausea with activity.  No left-sided neck or arm pain.  No complaints of pain in calves when walking 1-2 blocks.    Are you regularly taking any medication or supplement to lower your cholesterol?   No    Are you having muscle aches or other side effects that you think could be caused by your cholesterol lowering medication?  No    Hypertension Follow-up      Do you check your blood pressure regularly outside of the clinic? Yes    Are you following a low salt diet? Yes    Are your blood pressures ever more than 140 on the top number (systolic) OR more   than 90 on the bottom number (diastolic), for example 140/90? No    BP Readings from Last 2 Encounters:   07/18/19 122/68   06/06/19 128/74     Hemoglobin A1C (%)   Date Value   07/18/2019 6.5 (H)   04/19/2019 7.7 (H)     LDL Cholesterol Calculated (mg/dL)   Date Value   04/19/2019 112 (H)   03/04/2019  175 (H)       Amount of exercise or physical activity: 2-3 days/week for an average of 30-45 minutes    Problems taking medications regularly: No    Medication side effects: none    Diet: low salt    1. Patient states she would like to talk with Dr. Cisse about diabetes classes she is currently signed up for.   2. Blood pressues lower than usual (change in the way she is taking atenolol per [facility])    Patient Active Problem List   Diagnosis     Essential hypertension, benign     Chronic rhinitis     Acquired hypothyroidism     Hyperlipidemia LDL goal <100     Gastroesophageal reflux disease without esophagitis     BMI>35     Nonrheumatic aortic valve insufficiency     Anxiety     Hyperglycemia     Elevated LFTs     Osteoarthritis of knee, unspecified laterality, unspecified osteoarthritis type     Other irritable bowel syndrome     Alcohol abuse, in remission     Fatty liver     Muscle cramps     Diabetes mellitus, type 2 (H)     Past Surgical History:   Procedure Laterality Date     NO HISTORY OF SURGERY         Social History     Tobacco Use     Smoking status: Former Smoker     Packs/day: 0.00     Years: 1.00     Pack years: 0.00     Types: Cigarettes     Last attempt to quit: 2016     Years since quitting: 3.2     Smokeless tobacco: Never Used     Tobacco comment: quit in    Substance Use Topics     Alcohol use: Yes     Comment: 1 red wine before bef     Family History   Problem Relation Age of Onset     Diabetes Mother      Coronary Artery Disease Father          with a heart attack     Colon Cancer No family hx of      Breast Cancer No family hx of          Current Outpatient Medications   Medication Sig Dispense Refill     albuterol (PROAIR HFA/PROVENTIL HFA/VENTOLIN HFA) 108 (90 Base) MCG/ACT inhaler Inhale 2 puffs into the lungs every 6 hours 1 Inhaler 0     alcohol swab prep pads Use to swab area of injection/milena as directed. 100 each 3     amLODIPine (NORVASC) 5 MG tablet Take 1 tablet (5 mg)  by mouth daily 90 tablet 3     atenolol (TENORMIN) 50 MG tablet Take 0.5 tablets (25 mg) by mouth 2 times daily 90 tablet 3     atorvastatin (LIPITOR) 10 MG tablet TAKE 1 TABLET(10 MG) BY MOUTH DAILY 90 tablet 2     blood glucose (NO BRAND SPECIFIED) test strip Use to test blood sugar 1 times daily or as directed. To accompany: Blood Glucose Monitor Brands: per insurance. 100 strip 6     blood glucose calibration (NO BRAND SPECIFIED) solution To accompany: Blood Glucose Monitor Brands: per insurance. 1 Bottle 3     blood glucose monitoring (NO BRAND SPECIFIED) meter device kit Use to test blood sugar 1 times daily or as directed. Preferred blood glucose meter OR supplies to accompany: Blood Glucose Monitor Brands: per insurance. 1 kit 0     cetirizine (ZYRTEC) 10 MG tablet Take 10 mg by mouth daily       chlorthalidone (HYGROTON) 25 MG tablet Take 1 tablet (25 mg) by mouth daily 90 tablet 3     fluticasone (FLONASE) 50 MCG/ACT spray Spray 1 spray into both nostrils daily       ketotifen (ALAWAY) 0.025 % SOLN ophthalmic solution 1 drop 2 times daily       levothyroxine (SYNTHROID/LEVOTHROID) 112 MCG tablet TK 1 T PO  QD 90 tablet 4     omeprazole (PRILOSEC) 20 MG DR capsule TK 1 C PO D 90 capsule 4     potassium chloride ER (K-DUR/KLOR-CON M) 20 MEQ CR tablet Take 1 tablet (20 mEq) by mouth daily 90 tablet 3     thin (NO BRAND SPECIFIED) lancets Use with lanceting device. To accompany: Blood Glucose Monitor Brands: per insurance. 100 each 6     azelastine (ASTELIN) 0.1 % spray Spray 1-2 sprays into both nostrils 2 times daily (Patient not taking: Reported on 6/6/2019) 1 Bottle 11     Allergies   Allergen Reactions     Losartan Other (See Comments)     Acute Kidney Injury     Seasonal      Recent Labs   Lab Test 07/18/19  0733 05/23/19  0714 05/16/19  1050 04/19/19  0925 03/04/19  0807  05/25/18  0735   A1C 6.5*  --   --  7.7*  --   --  6.0*   LDL  --   --   --  112* 175*  --  152*   HDL  --   --   --  74 77  --  82  "  TRIG  --   --   --  79 124  --  98   ALT  --   --   --  61* 70*  --  86*   CR  --  0.90 0.98  --  0.97   < > 0.94   GFRESTIMATED  --  65 59*  --  59*   < > 59*   GFRESTBLACK  --  76 68  --  69   < > 71   POTASSIUM  --  3.4 3.2*  --  3.6   < > 4.0   TSH  --   --   --  2.25 4.93*  --  3.00    < > = values in this interval not displayed.      BP Readings from Last 3 Encounters:   07/18/19 122/68   06/06/19 128/74   05/16/19 138/78    Wt Readings from Last 3 Encounters:   07/18/19 86.2 kg (190 lb)   06/06/19 89.4 kg (197 lb)   05/29/19 90.3 kg (199 lb)                      Reviewed and updated as needed this visit by Provider  Allergies         Review of Systems   ROS COMP: CONSTITUTIONAL: NEGATIVE for fever, chills, change in weight  ENT/MOUTH: NEGATIVE for ear, mouth and throat problems  RESP: NEGATIVE for significant cough or SOB  CV: NEGATIVE for chest pain, palpitations or peripheral edema  GI: NEGATIVE for nausea, abdominal pain, heartburn, or change in bowel habits  MUSCULOSKELETAL: NEGATIVE for significant arthralgias or myalgia  PSYCHIATRIC: NEGATIVE for changes in mood or affect  ROS otherwise negative      Objective    /68   Pulse 73   Temp 98.2  F (36.8  C) (Oral)   Resp 16   Ht 1.6 m (5' 3\")   Wt 86.2 kg (190 lb)   SpO2 97%   Breastfeeding? No   BMI 33.66 kg/m    Body mass index is 33.66 kg/m .  Physical Exam   GENERAL: healthy, alert and no distress  NECK: no adenopathy, no asymmetry, masses, or scars and thyroid normal to palpation  RESP: lungs clear to auscultation - no rales, rhonchi or wheezes  CV: regular rate and rhythm, normal S1 S2, no S3 or S4, no murmur, click or rub, no peripheral edema and peripheral pulses strong  ABDOMEN: soft, nontender, no hepatosplenomegaly, no masses and bowel sounds normal  MS: no gross musculoskeletal defects noted, no edema  PSYCH: mentation appears normal, affect normal/bright    Diagnostic Test Results:  Labs reviewed in Epic  Results for orders " "placed or performed in visit on 07/18/19 (from the past 24 hour(s))   Hemoglobin A1c   Result Value Ref Range    Hemoglobin A1C 6.5 (H) 0 - 5.6 %           Assessment & Plan     1. Type 2 diabetes mellitus with hyperglycemia, without long-term current use of insulin (H)  Stable   - Albumin Random Urine Quantitative with Creat Ratio  - Hemoglobin A1c  - FOOT EXAM    2. Essential hypertension, benign  controlled    3. Visit for screening mammogram  Advised   - MA Screening Digital Bilateral; Future    4. Class 1 obesity due to excess calories with serious comorbidity and body mass index (BMI) of 33.0 to 33.9 in adult  Pt is watching diet and exercising    5. Hyperlipidemia LDL goal <70  Advised needs to take her statins  Discussed ASCVD risk       BMI:   Estimated body mass index is 33.66 kg/m  as calculated from the following:    Height as of this encounter: 1.6 m (5' 3\").    Weight as of this encounter: 86.2 kg (190 lb).   Weight management plan: Discussed healthy diet and exercise guidelines            Return in about 3 months (around 10/18/2019) for Medicare wellness Exam, Diabetes.    Marysol Cisse MD  [facility]  The 10-year ASCVD risk score (Stacey BIU JrKate, et al., 2013) is: 17.9%    Values used to calculate the score:      Age: 68 years      Sex: Female      Is Non- : No      Diabetic: Yes      Tobacco smoker: No      Systolic Blood Pressure: 122 mmHg      Is BP treated: Yes      HDL Cholesterol: 74 mg/dL      Total Cholesterol: 202 mg/dL          "

## 2019-07-30 ENCOUNTER — ANCILLARY PROCEDURE (OUTPATIENT)
Dept: MAMMOGRAPHY | Facility: CLINIC | Age: 69
End: 2019-07-30
Payer: MEDICARE

## 2019-07-30 DIAGNOSIS — Z12.31 VISIT FOR SCREENING MAMMOGRAM: ICD-10-CM

## 2019-07-30 PROCEDURE — 77067 SCR MAMMO BI INCL CAD: CPT | Mod: TC

## 2019-09-01 ENCOUNTER — TRANSFERRED RECORDS (OUTPATIENT)
Dept: HEALTH INFORMATION MANAGEMENT | Facility: CLINIC | Age: 69
End: 2019-09-01

## 2019-09-12 ENCOUNTER — OFFICE VISIT (OUTPATIENT)
Dept: FAMILY MEDICINE | Facility: CLINIC | Age: 69
End: 2019-09-12
Payer: MEDICARE

## 2019-09-12 VITALS
HEIGHT: 63 IN | DIASTOLIC BLOOD PRESSURE: 78 MMHG | WEIGHT: 182 LBS | HEART RATE: 71 BPM | TEMPERATURE: 98.4 F | OXYGEN SATURATION: 98 % | SYSTOLIC BLOOD PRESSURE: 136 MMHG | BODY MASS INDEX: 32.25 KG/M2

## 2019-09-12 DIAGNOSIS — Z23 NEED FOR PROPHYLACTIC VACCINATION AND INOCULATION AGAINST INFLUENZA: ICD-10-CM

## 2019-09-12 DIAGNOSIS — R25.2 LEG CRAMPS: ICD-10-CM

## 2019-09-12 DIAGNOSIS — E66.811 CLASS 1 OBESITY DUE TO EXCESS CALORIES WITH SERIOUS COMORBIDITY AND BODY MASS INDEX (BMI) OF 33.0 TO 33.9 IN ADULT: ICD-10-CM

## 2019-09-12 DIAGNOSIS — E66.09 CLASS 1 OBESITY DUE TO EXCESS CALORIES WITH SERIOUS COMORBIDITY AND BODY MASS INDEX (BMI) OF 33.0 TO 33.9 IN ADULT: ICD-10-CM

## 2019-09-12 DIAGNOSIS — E11.65 TYPE 2 DIABETES MELLITUS WITH HYPERGLYCEMIA, WITHOUT LONG-TERM CURRENT USE OF INSULIN (H): ICD-10-CM

## 2019-09-12 LAB
ANION GAP SERPL CALCULATED.3IONS-SCNC: 7 MMOL/L (ref 3–14)
BUN SERPL-MCNC: 20 MG/DL (ref 7–30)
CALCIUM SERPL-MCNC: 9.2 MG/DL (ref 8.5–10.1)
CHLORIDE SERPL-SCNC: 100 MMOL/L (ref 94–109)
CO2 SERPL-SCNC: 29 MMOL/L (ref 20–32)
CREAT SERPL-MCNC: 1.13 MG/DL (ref 0.52–1.04)
ERYTHROCYTE [DISTWIDTH] IN BLOOD BY AUTOMATED COUNT: 13.4 % (ref 10–15)
GFR SERPL CREATININE-BSD FRML MDRD: 49 ML/MIN/{1.73_M2}
GLUCOSE SERPL-MCNC: 154 MG/DL (ref 70–99)
HCT VFR BLD AUTO: 46.5 % (ref 35–47)
HGB BLD-MCNC: 16 G/DL (ref 11.7–15.7)
MCH RBC QN AUTO: 31.7 PG (ref 26.5–33)
MCHC RBC AUTO-ENTMCNC: 34.4 G/DL (ref 31.5–36.5)
MCV RBC AUTO: 92 FL (ref 78–100)
PLATELET # BLD AUTO: 224 10E9/L (ref 150–450)
POTASSIUM SERPL-SCNC: 3 MMOL/L (ref 3.4–5.3)
RBC # BLD AUTO: 5.05 10E12/L (ref 3.8–5.2)
SODIUM SERPL-SCNC: 136 MMOL/L (ref 133–144)
TSH SERPL DL<=0.005 MIU/L-ACNC: 1.5 MU/L (ref 0.4–4)
VIT B12 SERPL-MCNC: 399 PG/ML (ref 193–986)
WBC # BLD AUTO: 7.8 10E9/L (ref 4–11)

## 2019-09-12 PROCEDURE — 82607 VITAMIN B-12: CPT | Performed by: FAMILY MEDICINE

## 2019-09-12 PROCEDURE — 80048 BASIC METABOLIC PNL TOTAL CA: CPT | Performed by: FAMILY MEDICINE

## 2019-09-12 PROCEDURE — G0008 ADMIN INFLUENZA VIRUS VAC: HCPCS | Performed by: FAMILY MEDICINE

## 2019-09-12 PROCEDURE — 36415 COLL VENOUS BLD VENIPUNCTURE: CPT | Performed by: FAMILY MEDICINE

## 2019-09-12 PROCEDURE — 90662 IIV NO PRSV INCREASED AG IM: CPT | Performed by: FAMILY MEDICINE

## 2019-09-12 PROCEDURE — 99213 OFFICE O/P EST LOW 20 MIN: CPT | Mod: 25 | Performed by: FAMILY MEDICINE

## 2019-09-12 PROCEDURE — 84443 ASSAY THYROID STIM HORMONE: CPT | Performed by: FAMILY MEDICINE

## 2019-09-12 PROCEDURE — 85027 COMPLETE CBC AUTOMATED: CPT | Performed by: FAMILY MEDICINE

## 2019-09-12 ASSESSMENT — MIFFLIN-ST. JEOR: SCORE: 1319.68

## 2019-09-12 NOTE — PROGRESS NOTES
Subjective     Glenda Bales is a 68 year old female who presents to clinic today for the following health issues:    HPI   Chief Complaint   Patient presents with     Ankle / foot cramping     x 4 months- mainly in AM     Flu Shot     Imm/Inj     Flu Shot     It is off and on  Pt says she is Trying to lose weight and Not sure if it is due to this  She retired and has not been drinking as much water.  The cramping is intermittent.  Today is actually a good day and she is not getting any foot or ankle cramps today.  Her diabetes is running good.  Her blood pressure has been stable.        Patient Active Problem List   Diagnosis     Essential hypertension, benign     Chronic rhinitis     Acquired hypothyroidism     Hyperlipidemia LDL goal <70     Gastroesophageal reflux disease without esophagitis     Nonrheumatic aortic valve insufficiency     Anxiety     Elevated LFTs     Osteoarthritis of knee, unspecified laterality, unspecified osteoarthritis type     Other irritable bowel syndrome     Alcohol abuse, in remission     Fatty liver     Diabetes mellitus, type 2 (H)     Class 1 obesity due to excess calories with serious comorbidity and body mass index (BMI) of 33.0 to 33.9 in adult     Past Surgical History:   Procedure Laterality Date     NO HISTORY OF SURGERY         Social History     Tobacco Use     Smoking status: Former Smoker     Packs/day: 0.00     Years: 1.00     Pack years: 0.00     Types: Cigarettes     Last attempt to quit: 2016     Years since quitting: 3.3     Smokeless tobacco: Never Used     Tobacco comment: quit in    Substance Use Topics     Alcohol use: Yes     Comment: 1 red wine before bef     Family History   Problem Relation Age of Onset     Diabetes Mother      Coronary Artery Disease Father          with a heart attack     Colon Cancer No family hx of      Breast Cancer No family hx of          Current Outpatient Medications   Medication Sig Dispense Refill     albuterol (PROAIR  HFA/PROVENTIL HFA/VENTOLIN HFA) 108 (90 Base) MCG/ACT inhaler Inhale 2 puffs into the lungs every 6 hours 1 Inhaler 0     alcohol swab prep pads Use to swab area of injection/milena as directed. 100 each 3     amLODIPine (NORVASC) 5 MG tablet Take 1 tablet (5 mg) by mouth daily 90 tablet 3     atenolol (TENORMIN) 50 MG tablet Take 0.5 tablets (25 mg) by mouth 2 times daily 90 tablet 3     atorvastatin (LIPITOR) 10 MG tablet TAKE 1 TABLET(10 MG) BY MOUTH DAILY 90 tablet 2     blood glucose (NO BRAND SPECIFIED) test strip Use to test blood sugar 1 times daily or as directed. To accompany: Blood Glucose Monitor Brands: per insurance. 100 strip 6     blood glucose calibration (NO BRAND SPECIFIED) solution To accompany: Blood Glucose Monitor Brands: per insurance. 1 Bottle 3     blood glucose monitoring (NO BRAND SPECIFIED) meter device kit Use to test blood sugar 1 times daily or as directed. Preferred blood glucose meter OR supplies to accompany: Blood Glucose Monitor Brands: per insurance. 1 kit 0     cetirizine (ZYRTEC) 10 MG tablet Take 10 mg by mouth daily       chlorthalidone (HYGROTON) 25 MG tablet Take 1 tablet (25 mg) by mouth daily 90 tablet 3     fluticasone (FLONASE) 50 MCG/ACT spray Spray 1 spray into both nostrils daily       ketotifen (ALAWAY) 0.025 % SOLN ophthalmic solution 1 drop 2 times daily       levothyroxine (SYNTHROID/LEVOTHROID) 112 MCG tablet TK 1 T PO  QD 90 tablet 4     omeprazole (PRILOSEC) 20 MG DR capsule TK 1 C PO D 90 capsule 4     potassium chloride ER (K-DUR/KLOR-CON M) 20 MEQ CR tablet Take 1 tablet (20 mEq) by mouth daily 90 tablet 3     thin (NO BRAND SPECIFIED) lancets Use with lanceting device. To accompany: Blood Glucose Monitor Brands: per insurance. 100 each 6     azelastine (ASTELIN) 0.1 % spray Spray 1-2 sprays into both nostrils 2 times daily (Patient not taking: Reported on 6/6/2019) 1 Bottle 11     Allergies   Allergen Reactions     Losartan Other (See Comments)     Acute  "Kidney Injury     Seasonal      Recent Labs   Lab Test 07/18/19  0733 05/23/19  0714 05/16/19  1050 04/19/19  0925 03/04/19  0807  05/25/18  0735   A1C 6.5*  --   --  7.7*  --   --  6.0*   LDL  --   --   --  112* 175*  --  152*   HDL  --   --   --  74 77  --  82   TRIG  --   --   --  79 124  --  98   ALT  --   --   --  61* 70*  --  86*   CR  --  0.90 0.98  --  0.97   < > 0.94   GFRESTIMATED  --  65 59*  --  59*   < > 59*   GFRESTBLACK  --  76 68  --  69   < > 71   POTASSIUM  --  3.4 3.2*  --  3.6   < > 4.0   TSH  --   --   --  2.25 4.93*  --  3.00    < > = values in this interval not displayed.      BP Readings from Last 3 Encounters:   09/12/19 136/78   07/18/19 122/68   06/06/19 128/74    Wt Readings from Last 3 Encounters:   09/12/19 82.6 kg (182 lb)   07/18/19 86.2 kg (190 lb)   06/06/19 89.4 kg (197 lb)                    Reviewed and updated as needed this visit by Provider         Review of Systems   ROS COMP: CONSTITUTIONAL: NEGATIVE for fever, chills, change in weight  RESP: NEGATIVE for significant cough or SOB  CV: NEGATIVE for chest pain, palpitations or peripheral edema  GI: NEGATIVE for nausea, abdominal pain, heartburn, or change in bowel habits  MUSCULOSKELETAL: as above  ROS otherwise negative      Objective    /78   Pulse 71   Temp 98.4  F (36.9  C) (Oral)   Ht 1.6 m (5' 3\")   Wt 82.6 kg (182 lb)   SpO2 98%   Breastfeeding? No   BMI 32.24 kg/m    Body mass index is 32.24 kg/m .  Physical Exam   GENERAL: healthy, alert and no distress  RESP: lungs clear to auscultation - no rales, rhonchi or wheezes  CV: regular rate and rhythm, normal S1 S2, no S3 or S4, no murmur, click or rub, no peripheral edema and peripheral pulses strong  ABDOMEN: soft, nontender, no hepatosplenomegaly, no masses and bowel sounds normal  MS: no gross musculoskeletal defects noted, no edema  NEURO: Normal strength and tone, mentation intact and speech normal  Has varicosities around her ankle    Diagnostic Test " "Results:  Labs reviewed in Epic  Pending         Assessment & Plan       ICD-10-CM    1. Leg cramps R25.2 Vitamin B12     Basic metabolic panel     CBC with platelets     TSH with free T4 reflex   2. Class 1 obesity due to excess calories with serious comorbidity and body mass index (BMI) of 33.0 to 33.9 in adult E66.09     Z68.33    3. Type 2 diabetes mellitus with hyperglycemia, without long-term current use of insulin (H) E11.65    4. Need for prophylactic vaccination and inoculation against influenza Z23 Vaccine Administration, Initial [23944]     INFLUENZA (HIGH DOSE) 3 VALENT VACCINE [68373]      advised wear van Hose stockings  Elevate legs  Labs pending Drink water as befor  Follow up 1 month  Please do Cologuard  BMI:   Estimated body mass index is 32.24 kg/m  as calculated from the following:    Height as of this encounter: 1.6 m (5' 3\").    Weight as of this encounter: 82.6 kg (182 lb).   Weight management plan: Discussed healthy diet and exercise guidelines            Return in about 1 month (around 10/12/2019) for Physical Exam.    Marysol Cisse MD  [facility]    "

## 2019-09-13 ENCOUNTER — MYC MEDICAL ADVICE (OUTPATIENT)
Dept: FAMILY MEDICINE | Facility: CLINIC | Age: 69
End: 2019-09-13

## 2019-09-13 DIAGNOSIS — I10 ESSENTIAL HYPERTENSION, BENIGN: ICD-10-CM

## 2019-09-13 RX ORDER — AMLODIPINE BESYLATE 5 MG/1
7.5 TABLET ORAL DAILY
Qty: 135 TABLET | Refills: 3 | Status: SHIPPED | OUTPATIENT
Start: 2019-09-13 | End: 2019-09-23

## 2019-09-13 NOTE — RESULT ENCOUNTER NOTE
Call pt-Potassium continues to be Low  Recommend stop Chlorthalidone today and Potassium  K-dur after 2 days  Increase Amlodipine to 7.5 mg daily  Follow up BP and Potassium check 2 weeks

## 2019-09-19 NOTE — PROGRESS NOTES
Subjective     Glenda Bales is a 68 year old female who presents to clinic today for the following health issues:    HPI     Concern - Rectal Lump  Onset: noticed 1 week ago    Description:   rectal lump    Intensity: mild    Progression of Symptoms:  same    Accompanying Signs & Symptoms:  none    Previous history of similar problem:   none    Precipitating factors:   Worsened by:  Was constipated for a few days-now better  No Bleeding    Alleviating factors:  Improved by: none    Therapies Tried and outcome: none  Hypertension Follow-up      Do you check your blood pressure regularly outside of the clinic? Yes     Pt not taking chlorthalidone    Her BP is good without this medicine    Are you following a low salt diet? Yes    Are your blood pressures ever more than 140 on the top number (systolic) OR more   than 90 on the bottom number (diastolic), for example 140/90? No   Leg cramps are better since she stopped this medicine  Chronic Kidney Disease Follow-up      Current NSAID use?  No      Patient Active Problem List   Diagnosis     Essential hypertension, benign     Chronic rhinitis     Acquired hypothyroidism     Hyperlipidemia LDL goal <70     Gastroesophageal reflux disease without esophagitis     Nonrheumatic aortic valve insufficiency     Anxiety     Elevated LFTs     Osteoarthritis of knee, unspecified laterality, unspecified osteoarthritis type     Other irritable bowel syndrome     Alcohol abuse, in remission     Fatty liver     Diabetes mellitus, type 2 (H)     Class 1 obesity due to excess calories with serious comorbidity and body mass index (BMI) of 33.0 to 33.9 in adult     CKD (chronic kidney disease) stage 4, GFR 15-29 ml/min (H)     Past Surgical History:   Procedure Laterality Date     NO HISTORY OF SURGERY         Social History     Tobacco Use     Smoking status: Former Smoker     Packs/day: 0.00     Years: 1.00     Pack years: 0.00     Types: Cigarettes     Last attempt to quit: 4/25/2016      Years since quitting: 3.4     Smokeless tobacco: Never Used     Tobacco comment: quit in    Substance Use Topics     Alcohol use: Yes     Comment: 1 red wine before bef     Family History   Problem Relation Age of Onset     Diabetes Mother      Coronary Artery Disease Father          with a heart attack     Colon Cancer No family hx of      Breast Cancer No family hx of          Current Outpatient Medications   Medication Sig Dispense Refill     albuterol (PROAIR HFA/PROVENTIL HFA/VENTOLIN HFA) 108 (90 Base) MCG/ACT inhaler Inhale 2 puffs into the lungs every 6 hours 1 Inhaler 0     alcohol swab prep pads Use to swab area of injection/milena as directed. 100 each 3     amLODIPine (NORVASC) 5 MG tablet Take 1.5 tablets (7.5 mg) by mouth daily 135 tablet 3     atenolol (TENORMIN) 50 MG tablet Take 0.5 tablets (25 mg) by mouth 2 times daily 90 tablet 3     blood glucose (NO BRAND SPECIFIED) test strip Use to test blood sugar 1 times daily or as directed. To accompany: Blood Glucose Monitor Brands: per insurance. 100 strip 6     blood glucose calibration (NO BRAND SPECIFIED) solution To accompany: Blood Glucose Monitor Brands: per insurance. 1 Bottle 3     blood glucose monitoring (NO BRAND SPECIFIED) meter device kit Use to test blood sugar 1 times daily or as directed. Preferred blood glucose meter OR supplies to accompany: Blood Glucose Monitor Brands: per insurance. 1 kit 0     cetirizine (ZYRTEC) 10 MG tablet Take 10 mg by mouth daily       fluticasone (FLONASE) 50 MCG/ACT spray Spray 1 spray into both nostrils daily       hydrocortisone (ANUSOL-HC) 2.5 % cream Place rectally 2 times daily as needed for hemorrhoids 30 g 0     ketotifen (ALAWAY) 0.025 % SOLN ophthalmic solution 1 drop 2 times daily       levothyroxine (SYNTHROID/LEVOTHROID) 112 MCG tablet TK 1 T PO  QD 90 tablet 4     omeprazole (PRILOSEC) 20 MG DR capsule TK 1 C PO D 90 capsule 4     atorvastatin (LIPITOR) 10 MG tablet TAKE 1 TABLET(10  MG) BY MOUTH DAILY (Patient not taking: Reported on 9/23/2019) 90 tablet 2     azelastine (ASTELIN) 0.1 % spray Spray 1-2 sprays into both nostrils 2 times daily (Patient not taking: Reported on 6/6/2019) 1 Bottle 11     thin (NO BRAND SPECIFIED) lancets Use with lanceting device. To accompany: Blood Glucose Monitor Brands: per insurance. 100 each 6     Allergies   Allergen Reactions     Losartan Other (See Comments)     Acute Kidney Injury     Seasonal      Recent Labs   Lab Test 09/12/19  1517 07/18/19  0733 05/23/19  0714  04/19/19  0925 03/04/19  0807  05/25/18  0735   A1C  --  6.5*  --   --  7.7*  --   --  6.0*   LDL  --   --   --   --  112* 175*  --  152*   HDL  --   --   --   --  74 77  --  82   TRIG  --   --   --   --  79 124  --  98   ALT  --   --   --   --  61* 70*  --  86*   CR 1.13*  --  0.90   < >  --  0.97   < > 0.94   GFRESTIMATED 49*  --  65   < >  --  59*   < > 59*   GFRESTBLACK 57*  --  76   < >  --  69   < > 71   POTASSIUM 3.0*  --  3.4   < >  --  3.6   < > 4.0   TSH 1.50  --   --   --  2.25 4.93*  --  3.00    < > = values in this interval not displayed.      BP Readings from Last 3 Encounters:   09/23/19 138/82   09/12/19 136/78   07/18/19 122/68    Wt Readings from Last 3 Encounters:   09/23/19 83.5 kg (184 lb)   09/12/19 82.6 kg (182 lb)   07/18/19 86.2 kg (190 lb)                      Reviewed and updated as needed this visit by Provider         Review of Systems   ROS COMP: CONSTITUTIONAL: NEGATIVE for fever, chills, change in weight  ENT/MOUTH: NEGATIVE for ear, mouth and throat problems  RESP: NEGATIVE for significant cough or SOB  CV: NEGATIVE for chest pain, palpitations or peripheral edema  GI: NEGATIVE for nausea, abdominal pain, heartburn, or change in bowel habits  MUSCULOSKELETAL: NEGATIVE for significant arthralgias or myalgia  ROS otherwise negative      Objective    /82 (BP Location: Left arm, Patient Position: Chair, Cuff Size: Adult Regular)   Pulse 66   Temp 96.7  F  "(35.9  C) (Oral)   Ht 1.6 m (5' 3\")   Wt 83.5 kg (184 lb)   SpO2 97%   BMI 32.59 kg/m    Body mass index is 32.59 kg/m .  Physical Exam   GENERAL: healthy, alert and no distress  CV: regular rate and rhythm, normal S1 S2, no S3 or S4, no murmur, click or rub, no peripheral edema and peripheral pulses strong  ABDOMEN: soft, nontender, no hepatosplenomegaly, no masses and bowel sounds normal  RECTAL (female): ext  Hemorrhoid no tenderness   Digital exam normal   MS: no gross musculoskeletal defects noted, no edema    Diagnostic Test Results:  Labs reviewed in Epic        Assessment & Plan     1. External hemorrhoids  Advised daily Miralax  - hydrocortisone (ANUSOL-HC) 2.5 % cream; Place rectally 2 times daily as needed for hemorrhoids  Dispense: 30 g; Refill: 0    2. Type 2 diabetes mellitus with hyperglycemia, without long-term current use of insulin (H)  Stable   - Albumin Random Urine Quantitative with Creat Ratio; Future    3. CKD (chronic kidney disease) stage 3, GFR 30-59 ml/min (H)  Labs pending   - Potassium    4. Essential hypertension, benign  Stable   Advised again to do Cologuard       BMI:   Estimated body mass index is 32.59 kg/m  as calculated from the following:    Height as of this encounter: 1.6 m (5' 3\").    Weight as of this encounter: 83.5 kg (184 lb).   Weight management plan: Discussed healthy diet and exercise guidelines            Return in about 1 month (around 10/23/2019) for Physical Exam, Diabetes.    Marysol Cisse MD  [facility]        "

## 2019-09-23 ENCOUNTER — OFFICE VISIT (OUTPATIENT)
Dept: FAMILY MEDICINE | Facility: CLINIC | Age: 69
End: 2019-09-23
Payer: MEDICARE

## 2019-09-23 VITALS
HEART RATE: 66 BPM | WEIGHT: 184 LBS | SYSTOLIC BLOOD PRESSURE: 138 MMHG | OXYGEN SATURATION: 97 % | TEMPERATURE: 96.7 F | BODY MASS INDEX: 32.6 KG/M2 | HEIGHT: 63 IN | DIASTOLIC BLOOD PRESSURE: 82 MMHG

## 2019-09-23 DIAGNOSIS — E11.65 TYPE 2 DIABETES MELLITUS WITH HYPERGLYCEMIA, WITHOUT LONG-TERM CURRENT USE OF INSULIN (H): ICD-10-CM

## 2019-09-23 DIAGNOSIS — K64.4 EXTERNAL HEMORRHOIDS: Primary | ICD-10-CM

## 2019-09-23 DIAGNOSIS — N18.30 CKD (CHRONIC KIDNEY DISEASE) STAGE 3, GFR 30-59 ML/MIN (H): ICD-10-CM

## 2019-09-23 DIAGNOSIS — I10 ESSENTIAL HYPERTENSION, BENIGN: ICD-10-CM

## 2019-09-23 DIAGNOSIS — N18.30 CHRONIC KIDNEY DISEASE, STAGE 3 (MODERATE): ICD-10-CM

## 2019-09-23 PROBLEM — N18.4 CKD (CHRONIC KIDNEY DISEASE) STAGE 4, GFR 15-29 ML/MIN (H): Status: ACTIVE | Noted: 2019-09-23

## 2019-09-23 PROBLEM — N18.4 CKD (CHRONIC KIDNEY DISEASE) STAGE 4, GFR 15-29 ML/MIN (H): Status: RESOLVED | Noted: 2019-09-23 | Resolved: 2019-09-23

## 2019-09-23 LAB — POTASSIUM SERPL-SCNC: 3.9 MMOL/L (ref 3.4–5.3)

## 2019-09-23 PROCEDURE — 99214 OFFICE O/P EST MOD 30 MIN: CPT | Performed by: FAMILY MEDICINE

## 2019-09-23 PROCEDURE — 36415 COLL VENOUS BLD VENIPUNCTURE: CPT | Performed by: FAMILY MEDICINE

## 2019-09-23 PROCEDURE — 84132 ASSAY OF SERUM POTASSIUM: CPT | Performed by: FAMILY MEDICINE

## 2019-09-23 RX ORDER — AMLODIPINE BESYLATE 5 MG/1
5 TABLET ORAL DAILY
Qty: 90 TABLET | Refills: 3 | Status: SHIPPED | OUTPATIENT
Start: 2019-09-23 | End: 2020-07-08

## 2019-09-23 ASSESSMENT — MIFFLIN-ST. JEOR: SCORE: 1328.75

## 2019-09-23 NOTE — PATIENT INSTRUCTIONS
Please send ogholger Cisse MD      Patient Education     Hemorrhoids    Hemorrhoids are swollen and inflamed veins inside the rectum and near the anus. The rectum is the last several inches of the colon. The anus is the passage between the rectum and the outside of the body.  Causes  The veins can become swollen due to increased pressure in them. This is most often caused by:    Chronic constipation or diarrhea    Straining when having a bowel movement    Sitting too long on the toilet    A low-fiber diet    Pregnancy  Symptoms    Bleeding from the rectum (this may be noticeable after bowel movements)    Lump near the anus    Itching around the anus    Pain around the anus  There are different types of hemorrhoids. Depending on the type you have and the severity, you may be able to treat yourself at home. In some cases, a procedure may be the best treatment option. Your healthcare provider can tell you more about this, if needed.  Home care  General care    To get relief from pain or itching, try:  ? Medicines. Your healthcare provider may recommend stool softeners, suppositories, or laxatives to help manage constipation. Use these exactly as directed.  ? Sitz baths. A sitz bath involves sitting in a few inches of warm bath water. Be careful not to make the water so hot that you burn yourself--test it before sitting in it. Soak for about 10 to 15 minutes a few times a day. This may help relieve pain.  ? Topical products. Your healthcare provider may prescribe or recommend creams, ointments, or pads that can be applied to the hemorrhoid. Use these exactly as directed.  Tips to help prevent hemorrhoids    Eat more fiber. Fiber adds bulk to stool and absorbs water as it moves through your colon. This makes stool softer and easier to pass.  ? Increase the fiber in your diet with more fiber-rich foods. These include fresh fruit, vegetables, and whole grains.  ? Take a fiber supplement or bulking agent, if  advised by your healthcare provider. These include products such as psyllium or methylcellulose.    Drink more water. Your healthcare provider may direct you to drink plenty of water. This can help keep stool soft.    Be more active. Frequent exercise aids digestion and helps prevent constipation. It may also help make bowel movements more regular.    Don t strain during bowel movements. This can make hemorrhoids more likely. Also, don t sit on the toilet for long periods of time.  Follow-up care  Follow up with your healthcare provider as advised. If a culture or imaging tests were done, someone will let you know the results when they are ready. This may take a few days or longer. If your healthcare provider recommends a procedure for your hemorrhoids, these options can be discussed. Options may include surgery and outpatient office treatments.  When to seek medical advice  Call your healthcare provider right away if any of these occur:    Increased bleeding from the rectum    Increased pain around the rectum or anus    Weakness or dizziness  Call 911  Call 911 if any of these occur:    Trouble breathing or swallowing    Fainting or loss of consciousness    Unusually fast heart rate    Vomiting blood    Large amounts of blood in stool or black, tarry stools  Date Last Reviewed: 9/1/2017 2000-2018 [employer] [address] [place] [address], [place] [address]. All rights reserved. This information is not intended as a substitute for professional medical care. Always follow your healthcare professional's instructions.

## 2019-10-16 NOTE — PROGRESS NOTES
"SUBJECTIVE:   Glenda Bales is a 68 year old female who presents for Preventive Visit.    Are you in the first 12 months of your Medicare coverage?  No    Healthy Habits:     In general, how would you rate your overall health?  Good    Frequency of exercise:  2-3 days/week    Duration of exercise:  15-30 minutes    Do you usually eat at least 4 servings of fruit and vegetables a day, include whole grains    & fiber and avoid regularly eating high fat or \"junk\" foods?  No    Taking medications regularly:  Yes    Medication side effects:  None    Ability to successfully perform activities of daily living:  No assistance needed    Home Safety:  No safety concerns identified    Hearing Impairment:  No hearing concerns    In the past 6 months, have you been bothered by leaking of urine?  No    In general, how would you rate your overall mental or emotional health?  Good      PHQ-2 Total Score: 0    Additional concerns today:  No    Do you feel safe in your environment? Yes    Do you have a Health Care Directive? Yes: Patient states has Advance Directive and will bring in a copy to clinic.      Fall risk  Fallen 2 or more times in the past year?: No  Any fall with injury in the past year?: No    Cognitive Screening   1) Repeat 3 items (Leader, Season, Table)    2) Clock draw: normal   3) 3 item recall: Recalls 2 objects   Results: ABNORMAL clock, 1-2 items recalled: PROBABLE COGNITIVE IMPAIRMENT, **INFORM PROVIDER**    Mini-CogTM Copyright LISETTE Pal[employer]ox. Licensed by the author for use in [facility]; reprinted with permission (kevin@.[facility]). All rights reserved.      Do you have sleep apnea, excessive snoring or daytime drowsiness?: no    Reviewed and updated as needed this visit by clinical staff  Tobacco  Allergies  Meds  Med Hx  Surg Hx  Fam Hx  Soc Hx        Reviewed and updated as needed this visit by Provider        Social History     Tobacco Use     Smoking status: Former Smoker     Packs/day: 0.00     " Years: 1.00     Pack years: 0.00     Types: Cigarettes     Last attempt to quit: 6/15/1978     Years since quittin.3     Smokeless tobacco: Never Used     Tobacco comment: quit in    Substance Use Topics     Alcohol use: Yes     Comment: 1 red wine before bef         Alcohol Use 10/17/2019   Prescreen: >3 drinks/day or >7 drinks/week? No   Prescreen: >3 drinks/day or >7 drinks/week? -     Diabetes Follow-up      How often are you checking your blood sugar? One time daily    What time of day are you checking your blood sugars (select all that apply)?  Before meals    Have you had any blood sugars above 200?  No    Have you had any blood sugars below 70?  No    What symptoms do you notice when your blood sugar is low?  Other: has had No hypoglycemia    What concerns do you have today about your diabetes? None     Do you have any of these symptoms? (Select all that apply)  No numbness or tingling in feet.  No redness, sores or blisters on feet.  No complaints of excessive thirst.  No reports of blurry vision.  No significant changes to weight.     Have you had a diabetic eye exam in the last 12 months? Yes- Date of last eye exam: 2018    Diabetes Management Resources    Hyperlipidemia Follow-Up      Are you having any of the following symptoms? (Select all that apply)  No complaints of shortness of breath, chest pain or pressure.  No increased sweating or nausea with activity.  No left-sided neck or arm pain.  No complaints of pain in calves when walking 1-2 blocks.    Are you regularly taking any medication or supplement to lower your cholesterol?   Yes- statins    Are you having muscle aches or other side effects that you think could be caused by your cholesterol lowering medication?  No    Hypertension Follow-up      Do you check your blood pressure regularly outside of the clinic? Yes     Are you following a low salt diet? Yes    Are your blood pressures ever more than 140 on the top number  (systolic) OR more   than 90 on the bottom number (diastolic), for example 140/90? No   Labs reviewed last visit  are Good  [facility]  Has retired and Doing well    BP Readings from Last 2 Encounters:   10/17/19 128/70   09/23/19 138/82     Hemoglobin A1C (%)   Date Value   10/17/2019 5.4   07/18/2019 6.5 (H)     LDL Cholesterol Calculated (mg/dL)   Date Value   04/19/2019 112 (H)   03/04/2019 175 (H)     Pt has OA and doing well      How many servings of fruits and vegetables do you eat daily?  2-3    On average, how many sweetened beverages do you drink each day (soda, juice, sweet tea, etc)?   0    How many days per week do you miss taking your medication? 0            Current providers sharing in care for this patient include:   Patient Care Team:  Marysol Cisse MD as PCP - General (Family Practice)  Marysol Cisse MD as Assigned PCP  Stacie Reno RN as Diabetes Educator (Diabetes Education)    The following health maintenance items are reviewed in Epic and correct as of today:  Health Maintenance   Topic Date Due     MICROALBUMIN  1950     ADVANCE CARE PLANNING  1950     EYE EXAM  1950     FIT  11/20/1959     MEDICARE ANNUAL WELLNESS VISIT  04/24/2019     A1C  01/18/2020     LIPID  04/19/2020     DIABETIC FOOT EXAM  07/18/2020     BMP  09/12/2020     FALL RISK ASSESSMENT  09/12/2020     MAMMO SCREENING  07/30/2021     TSH W/FREE T4 REFLEX  09/12/2021     DTAP/TDAP/TD IMMUNIZATION (4 - Td) 04/24/2028     DEXA  Completed     HEPATITIS C SCREENING  Completed     PHQ-2  Completed     INFLUENZA VACCINE  Completed     PNEUMOCOCCAL IMMUNIZATION 65+ LOW/MEDIUM RISK  Completed     ZOSTER IMMUNIZATION  Completed     IPV IMMUNIZATION  Aged Out     MENINGITIS IMMUNIZATION  Aged Out     Lab work is in process  Labs reviewed in EPIC  BP Readings from Last 3 Encounters:   10/17/19 128/70   09/23/19 138/82   09/12/19 136/78    Wt Readings from Last 3 Encounters:   10/17/19 83.5 kg (184  lb)   19 83.5 kg (184 lb)   19 82.6 kg (182 lb)                  Patient Active Problem List   Diagnosis     Essential hypertension, benign     Chronic rhinitis     Acquired hypothyroidism     Hyperlipidemia LDL goal <70     Gastroesophageal reflux disease without esophagitis     Nonrheumatic aortic valve insufficiency     Anxiety     Elevated LFTs     Osteoarthritis of knee, unspecified laterality, unspecified osteoarthritis type     Other irritable bowel syndrome     Alcohol abuse, in remission     Fatty liver     Diabetes mellitus, type 2 (H)     Class 1 obesity due to excess calories with serious comorbidity and body mass index (BMI) of 33.0 to 33.9 in adult     Chronic kidney disease, stage 3 (moderate) (H)     Advanced directives, counseling/discussion     Past Surgical History:   Procedure Laterality Date     NO HISTORY OF SURGERY         Social History     Tobacco Use     Smoking status: Former Smoker     Packs/day: 0.00     Years: 1.00     Pack years: 0.00     Types: Cigarettes     Last attempt to quit: 6/15/1978     Years since quittin.3     Smokeless tobacco: Never Used     Tobacco comment: quit in    Substance Use Topics     Alcohol use: Yes     Comment: 1 red wine before bef     Family History   Problem Relation Age of Onset     Diabetes Mother      Coronary Artery Disease Father          with a heart attack     Colon Cancer No family hx of      Breast Cancer No family hx of          Current Outpatient Medications   Medication Sig Dispense Refill     albuterol (PROAIR HFA/PROVENTIL HFA/VENTOLIN HFA) 108 (90 Base) MCG/ACT inhaler Inhale 2 puffs into the lungs every 6 hours 1 Inhaler 0     alcohol swab prep pads Use to swab area of injection/milena as directed. 100 each 3     amLODIPine (NORVASC) 5 MG tablet Take 1 tablet (5 mg) by mouth daily 90 tablet 3     atenolol (TENORMIN) 25 MG tablet Take 1 tablet (25 mg) by mouth 2 times daily 180 tablet 3     blood glucose (NO BRAND  SPECIFIED) test strip Use to test blood sugar 1 times daily or as directed. To accompany: Blood Glucose Monitor Brands: per insurance. 100 strip 6     blood glucose calibration (NO BRAND SPECIFIED) solution To accompany: Blood Glucose Monitor Brands: per insurance. 1 Bottle 3     blood glucose monitoring (NO BRAND SPECIFIED) meter device kit Use to test blood sugar 1 times daily or as directed. Preferred blood glucose meter OR supplies to accompany: Blood Glucose Monitor Brands: per insurance. 1 kit 0     cetirizine (ZYRTEC) 10 MG tablet Take 10 mg by mouth daily       fluticasone (FLONASE) 50 MCG/ACT spray Spray 1 spray into both nostrils daily       hydrocortisone (ANUSOL-HC) 2.5 % cream Place rectally 2 times daily as needed for hemorrhoids 30 g 0     ketotifen (ALAWAY) 0.025 % SOLN ophthalmic solution 1 drop 2 times daily       levothyroxine (SYNTHROID/LEVOTHROID) 112 MCG tablet TK 1 T PO  QD 90 tablet 4     omeprazole (PRILOSEC) 20 MG DR capsule TK 1 C PO D 90 capsule 4     thin (NO BRAND SPECIFIED) lancets Use with lanceting device. To accompany: Blood Glucose Monitor Brands: per insurance. 100 each 6     atorvastatin (LIPITOR) 10 MG tablet TAKE 1 TABLET(10 MG) BY MOUTH DAILY (Patient not taking: Reported on 9/23/2019) 90 tablet 2     azelastine (ASTELIN) 0.1 % spray Spray 1-2 sprays into both nostrils 2 times daily (Patient not taking: Reported on 6/6/2019) 1 Bottle 11     Allergies   Allergen Reactions     Losartan Other (See Comments)     Acute Kidney Injury     Seasonal      Recent Labs   Lab Test 10/17/19  0744 09/23/19  0827 09/12/19  1517 07/18/19  0733 05/23/19  0714  04/19/19  0925 03/04/19  0807  05/25/18  0735   A1C 5.4  --   --  6.5*  --   --  7.7*  --   --  6.0*   LDL  --   --   --   --   --   --  112* 175*  --  152*   HDL  --   --   --   --   --   --  74 77  --  82   TRIG  --   --   --   --   --   --  79 124  --  98   ALT  --   --   --   --   --   --  61* 70*  --  86*   CR  --   --  1.13*  --   "0.90   < >  --  0.97   < > 0.94   GFRESTIMATED  --   --  49*  --  65   < >  --  59*   < > 59*   GFRESTBLACK  --   --  57*  --  76   < >  --  69   < > 71   POTASSIUM  --  3.9 3.0*  --  3.4   < >  --  3.6   < > 4.0   TSH  --   --  1.50  --   --   --  2.25 4.93*  --  3.00    < > = values in this interval not displayed.          Review of Systems   Constitutional: Negative for chills and fever.   HENT: Negative for congestion, ear pain, hearing loss and sore throat.    Eyes: Negative for pain and visual disturbance.   Respiratory: Negative for cough and shortness of breath.    Cardiovascular: Positive for peripheral edema. Negative for chest pain and palpitations.   Gastrointestinal: Negative for abdominal pain, constipation, diarrhea, heartburn, hematochezia and nausea.   Breasts:  Negative for tenderness, breast mass and discharge.   Genitourinary: Negative for dysuria, frequency, genital sores, hematuria, pelvic pain, urgency, vaginal bleeding and vaginal discharge.   Musculoskeletal: Negative for arthralgias, joint swelling and myalgias.   Skin: Negative for rash.   Neurological: Negative for dizziness, weakness, headaches and paresthesias.   Psychiatric/Behavioral: Negative for mood changes. The patient is not nervous/anxious.      Rest of the ROS is Negative except see above and Problem list [stable]      OBJECTIVE:   /70   Pulse 69   Temp 97.2  F (36.2  C) (Oral)   Resp 16   Ht 1.6 m (5' 3\")   Wt 83.5 kg (184 lb)   SpO2 97%   Breastfeeding? No   BMI 32.59 kg/m   Estimated body mass index is 32.59 kg/m  as calculated from the following:    Height as of this encounter: 1.6 m (5' 3\").    Weight as of this encounter: 83.5 kg (184 lb).  Physical Exam  GENERAL APPEARANCE: healthy, alert and no distress  EYES: Eyes grossly normal to inspection, PERRL and conjunctivae and sclerae normal  HENT: ear canals and TM's normal, nose and mouth without ulcers or lesions, oropharynx clear and oral mucous membranes " moist  NECK: no adenopathy, no asymmetry, masses, or scars and thyroid normal to palpation  RESP: lungs clear to auscultation - no rales, rhonchi or wheezes  BREAST: normal without masses, tenderness or nipple discharge and no palpable axillary masses or adenopathy  CV: regular rate and rhythm, normal S1 S2, no S3 or S4, no murmur, click or rub, no peripheral edema and peripheral pulses strong  ABDOMEN: soft, nontender, no hepatosplenomegaly, no masses and bowel sounds normal  MS: no musculoskeletal defects are noted and gait is age appropriate without ataxia  SKIN: no suspicious lesions or rashes  NEURO: Normal strength and tone, sensory exam grossly normal, mentation intact and speech normal  PSYCH: mentation appears normal and affect normal/bright    Diagnostic Test Results:  Labs reviewed in Epic  Results for orders placed or performed in visit on 10/17/19 (from the past 24 hour(s))   Hemoglobin A1c   Result Value Ref Range    Hemoglobin A1C 5.4 0 - 5.6 %       ASSESSMENT / PLAN:   1. Encounter for Medicare annual wellness exam  Stable     3. Type 2 diabetes mellitus with hyperglycemia, without long-term current use of insulin (H)  Stable   - OPTOMETRY REFERRAL  - Hemoglobin A1c  - Albumin Random Urine Quantitative with Creat Ratio    4. Nonrheumatic aortic valve insufficiency  Stable     5. Hyperlipidemia LDL goal <70  Labs reviewed     6. Fatty liver  Advised watch diet    7. Essential hypertension, benign  controlled  - atenolol (TENORMIN) 25 MG tablet; Take 1 tablet (25 mg) by mouth 2 times daily  Dispense: 180 tablet; Refill: 3    8. Elevated LFTs  Pending   - Hepatic panel    9. Chronic kidney disease, stage 3 (moderate) (H)  Stable     10. Anxiety  Stable     11. Alcohol abuse, in remission      12. Acquired hypothyroidism  Labs reviewed -good    13. Class 1 obesity due to excess calories with serious comorbidity and body mass index (BMI) of 33.0 to 33.9 in adult  Low carroll diet    14. Osteoarthritis of  "knee, unspecified laterality, unspecified osteoarthritis type  Stable     15. Special screening for malignant neoplasms, colon  Advised   - Fecal colorectal cancer screen (FIT); Future    16. Advanced directives, counseling/discussion  Discussed       End of Life Planning:  Patient currently has an advanced directive: Pt will bring copy    COUNSELING:  Reviewed preventive health counseling, as reflected in patient instructions       Regular exercise       Healthy diet/nutrition       Vision screening       Hearing screening       Dental care       Bladder control       Fall risk prevention       Aspirin Prophylaxsis       Osteoporosis Prevention/Bone Health       Advanced Planning     Estimated body mass index is 32.59 kg/m  as calculated from the following:    Height as of this encounter: 1.6 m (5' 3\").    Weight as of this encounter: 83.5 kg (184 lb).    Weight management plan: Discussed healthy diet and exercise guidelines     reports that she quit smoking about 41 years ago. Her smoking use included cigarettes. She smoked 0.00 packs per day for 1.00 year. She has never used smokeless tobacco.      Appropriate preventive services were discussed with this patient, including applicable screening as appropriate for cardiovascular disease, diabetes, osteopenia/osteoporosis, and glaucoma.  As appropriate for age/gender, discussed screening for colorectal cancer, prostate cancer, breast cancer, and cervical cancer. Checklist reviewing preventive services available has been given to the patient.    Reviewed patients plan of care and provided an AVS. The Intermediate Care Plan ( asthma action plan, low back pain action plan, and migraine action plan) for Glenda meets the Care Plan requirement. This Care Plan has been established and reviewed with the Patient.    Counseling Resources:  ATP IV Guidelines  Pooled Cohorts Equation Calculator  Breast Cancer Risk Calculator  FRAX Risk Assessment  ICSI Preventive " Guidelines  Dietary Guidelines for Americans, 2010  USDA's MyPlate  ASA Prophylaxis  Lung CA Screening    Marysol Cisse MD  [facility]    Identified Health Risks:

## 2019-10-17 ENCOUNTER — OFFICE VISIT (OUTPATIENT)
Dept: FAMILY MEDICINE | Facility: CLINIC | Age: 69
End: 2019-10-17
Payer: MEDICARE

## 2019-10-17 VITALS
HEIGHT: 63 IN | OXYGEN SATURATION: 97 % | RESPIRATION RATE: 16 BRPM | SYSTOLIC BLOOD PRESSURE: 128 MMHG | HEART RATE: 69 BPM | BODY MASS INDEX: 32.6 KG/M2 | TEMPERATURE: 97.2 F | WEIGHT: 184 LBS | DIASTOLIC BLOOD PRESSURE: 70 MMHG

## 2019-10-17 DIAGNOSIS — I35.1 NONRHEUMATIC AORTIC VALVE INSUFFICIENCY: ICD-10-CM

## 2019-10-17 DIAGNOSIS — M17.9 OSTEOARTHRITIS OF KNEE, UNSPECIFIED LATERALITY, UNSPECIFIED OSTEOARTHRITIS TYPE: ICD-10-CM

## 2019-10-17 DIAGNOSIS — E11.65 TYPE 2 DIABETES MELLITUS WITH HYPERGLYCEMIA, WITHOUT LONG-TERM CURRENT USE OF INSULIN (H): ICD-10-CM

## 2019-10-17 DIAGNOSIS — E66.09 CLASS 1 OBESITY DUE TO EXCESS CALORIES WITH SERIOUS COMORBIDITY AND BODY MASS INDEX (BMI) OF 33.0 TO 33.9 IN ADULT: ICD-10-CM

## 2019-10-17 DIAGNOSIS — Z00.00 ENCOUNTER FOR MEDICARE ANNUAL WELLNESS EXAM: Primary | ICD-10-CM

## 2019-10-17 DIAGNOSIS — N18.30 CHRONIC KIDNEY DISEASE, STAGE 3 (MODERATE): ICD-10-CM

## 2019-10-17 DIAGNOSIS — F10.11 ALCOHOL ABUSE, IN REMISSION: ICD-10-CM

## 2019-10-17 DIAGNOSIS — E03.9 ACQUIRED HYPOTHYROIDISM: ICD-10-CM

## 2019-10-17 DIAGNOSIS — K76.0 FATTY LIVER: ICD-10-CM

## 2019-10-17 DIAGNOSIS — I10 ESSENTIAL HYPERTENSION, BENIGN: ICD-10-CM

## 2019-10-17 DIAGNOSIS — Z12.11 SPECIAL SCREENING FOR MALIGNANT NEOPLASMS, COLON: ICD-10-CM

## 2019-10-17 DIAGNOSIS — Z00.00 MEDICARE ANNUAL WELLNESS VISIT, SUBSEQUENT: ICD-10-CM

## 2019-10-17 DIAGNOSIS — Z71.89 ADVANCED DIRECTIVES, COUNSELING/DISCUSSION: ICD-10-CM

## 2019-10-17 DIAGNOSIS — F41.9 ANXIETY: ICD-10-CM

## 2019-10-17 DIAGNOSIS — E78.5 HYPERLIPIDEMIA LDL GOAL <70: ICD-10-CM

## 2019-10-17 DIAGNOSIS — E66.811 CLASS 1 OBESITY DUE TO EXCESS CALORIES WITH SERIOUS COMORBIDITY AND BODY MASS INDEX (BMI) OF 33.0 TO 33.9 IN ADULT: ICD-10-CM

## 2019-10-17 DIAGNOSIS — R79.89 ELEVATED LFTS: ICD-10-CM

## 2019-10-17 LAB
ALBUMIN SERPL-MCNC: 3.4 G/DL (ref 3.4–5)
ALP SERPL-CCNC: 89 U/L (ref 40–150)
ALT SERPL W P-5'-P-CCNC: 40 U/L (ref 0–50)
AST SERPL W P-5'-P-CCNC: 33 U/L (ref 0–45)
BILIRUB DIRECT SERPL-MCNC: 0.1 MG/DL (ref 0–0.2)
BILIRUB SERPL-MCNC: 0.5 MG/DL (ref 0.2–1.3)
CREAT UR-MCNC: 85 MG/DL
HBA1C MFR BLD: 5.4 % (ref 0–5.6)
MICROALBUMIN UR-MCNC: 6 MG/L
MICROALBUMIN/CREAT UR: 6.82 MG/G CR (ref 0–25)
PROT SERPL-MCNC: 7.7 G/DL (ref 6.8–8.8)

## 2019-10-17 PROCEDURE — 83036 HEMOGLOBIN GLYCOSYLATED A1C: CPT | Performed by: FAMILY MEDICINE

## 2019-10-17 PROCEDURE — 99213 OFFICE O/P EST LOW 20 MIN: CPT | Mod: 25 | Performed by: FAMILY MEDICINE

## 2019-10-17 PROCEDURE — 82043 UR ALBUMIN QUANTITATIVE: CPT | Performed by: FAMILY MEDICINE

## 2019-10-17 PROCEDURE — 36415 COLL VENOUS BLD VENIPUNCTURE: CPT | Performed by: FAMILY MEDICINE

## 2019-10-17 PROCEDURE — 80076 HEPATIC FUNCTION PANEL: CPT | Performed by: FAMILY MEDICINE

## 2019-10-17 PROCEDURE — 99397 PER PM REEVAL EST PAT 65+ YR: CPT | Performed by: FAMILY MEDICINE

## 2019-10-17 RX ORDER — ATENOLOL 25 MG/1
25 TABLET ORAL 2 TIMES DAILY
Qty: 180 TABLET | Refills: 3 | Status: SHIPPED | OUTPATIENT
Start: 2019-10-17 | End: 2020-07-08

## 2019-10-17 ASSESSMENT — ENCOUNTER SYMPTOMS
PALPITATIONS: 0
HEADACHES: 0
NERVOUS/ANXIOUS: 0
FREQUENCY: 0
BREAST MASS: 0
PARESTHESIAS: 0
COUGH: 0
NAUSEA: 0
SORE THROAT: 0
ABDOMINAL PAIN: 0
MYALGIAS: 0
HEMATURIA: 0
JOINT SWELLING: 0
EYE PAIN: 0
CONSTIPATION: 0
CHILLS: 0
HEARTBURN: 0
DYSURIA: 0
ARTHRALGIAS: 0
FEVER: 0
HEMATOCHEZIA: 0
DIZZINESS: 0
WEAKNESS: 0
DIARRHEA: 0
SHORTNESS OF BREATH: 0

## 2019-10-17 ASSESSMENT — ACTIVITIES OF DAILY LIVING (ADL): CURRENT_FUNCTION: NO ASSISTANCE NEEDED

## 2019-10-17 ASSESSMENT — MIFFLIN-ST. JEOR: SCORE: 1328.75

## 2019-10-17 NOTE — PATIENT INSTRUCTIONS
Patient Education   Personalized Prevention Plan  You are due for the preventive services outlined below.  Your care team is available to assist you in scheduling these services.  If you have already completed any of these items, please share that information with your care team to update in your medical record.  Health Maintenance Due   Topic Date Due     Kidney Microalbumin Urine Test  1950     Discuss Advance Care Planning  1950     Eye Exam  1950     FIT Test  11/20/1959     Annual Wellness Visit  04/24/2019       Understanding [employer] MyPlate  The USDA (U.S. Department of Agriculture) has guidelines to help you make healthy food choices. These are called MyPlate. MyPlate shows the food groups that make up healthy meals using the image of a place setting. Before you eat, think about the healthiest choices for what to put onto your plate or into your cup or bowl. To learn more about building a healthy plate, visit www.[employer].gov.    The food groups    Fruits. Any fruit or 100% fruit juice counts as part of the Fruit Group. Fruits may be fresh, canned, frozen, or dried, and may be whole, cut-up, or pureed. Make half your plate fruits and vegetables.    Vegetables. Any vegetable or 100% vegetable juice counts as a member of the Vegetable Group. Vegetables may be fresh, frozen, canned, or dried. They can be served raw or cooked and may be whole, cut-up, or mashed. Make half your plate fruits and vegetables.    Grains. All foods made from grains are part of the Grains Group. These include wheat, rice, oats, cornmeal, and barley such as bread, pasta, oatmeal, cereal, tortillas, and grits. Grains should be no more than a quarter of your plate. At least half of your grains should be whole grains.    Protein. This group includes meat, poultry, seafood, beans and peas, eggs, processed soy products (like tofu), nuts (including nut butters), and seeds. Make protein choices no more than a quarter of  your plate. Meat and poultry choices should be lean or low fat.    Dairy. All fluid milk products and foods made from milk that contain calcium, like yogurt and cheese, are part of the Dairy Group. (Foods that have little calcium, such as cream, butter, and cream cheese, are not part of the group.) Most dairy choices should be low-fat or fat-free.    Oils. These are fats that are liquid at room temperature. They include canola, corn, olive, soybean, and sunflower oil. Foods that are mainly oil include mayonnaise, certain salad dressings, and soft margarines. You should have only 5 to 7 teaspoons of oils a day. You probably already get this much from the food you eat.  Date Last Reviewed: 8/1/2017 2000-2018 [employer]. [address] [place] [address], [place] [address]. All rights reserved. This information is not intended as a substitute for professional medical care. Always follow your healthcare professional's instructions.

## 2019-10-17 NOTE — PROGRESS NOTES
The patient was counseled and encouraged to consider modifying their diet and eating habits. She was provided with information on recommended healthy diet options.

## 2019-11-06 ENCOUNTER — OFFICE VISIT (OUTPATIENT)
Dept: FAMILY MEDICINE | Facility: CLINIC | Age: 69
End: 2019-11-06
Payer: MEDICARE

## 2019-11-06 VITALS
WEIGHT: 180 LBS | HEIGHT: 63 IN | RESPIRATION RATE: 16 BRPM | DIASTOLIC BLOOD PRESSURE: 68 MMHG | OXYGEN SATURATION: 97 % | HEART RATE: 63 BPM | BODY MASS INDEX: 31.89 KG/M2 | SYSTOLIC BLOOD PRESSURE: 132 MMHG | TEMPERATURE: 97.6 F

## 2019-11-06 DIAGNOSIS — R25.2 LEG CRAMPS: Primary | ICD-10-CM

## 2019-11-06 LAB
ANION GAP SERPL CALCULATED.3IONS-SCNC: 6 MMOL/L (ref 3–14)
BUN SERPL-MCNC: 19 MG/DL (ref 7–30)
CALCIUM SERPL-MCNC: 9.2 MG/DL (ref 8.5–10.1)
CHLORIDE SERPL-SCNC: 104 MMOL/L (ref 94–109)
CO2 SERPL-SCNC: 28 MMOL/L (ref 20–32)
CREAT SERPL-MCNC: 1.02 MG/DL (ref 0.52–1.04)
ERYTHROCYTE [DISTWIDTH] IN BLOOD BY AUTOMATED COUNT: 13.5 % (ref 10–15)
GFR SERPL CREATININE-BSD FRML MDRD: 56 ML/MIN/{1.73_M2}
GLUCOSE SERPL-MCNC: 86 MG/DL (ref 70–99)
HCT VFR BLD AUTO: 45.6 % (ref 35–47)
HGB BLD-MCNC: 15.2 G/DL (ref 11.7–15.7)
MAGNESIUM SERPL-MCNC: 2 MG/DL (ref 1.6–2.3)
MCH RBC QN AUTO: 31.5 PG (ref 26.5–33)
MCHC RBC AUTO-ENTMCNC: 33.3 G/DL (ref 31.5–36.5)
MCV RBC AUTO: 94 FL (ref 78–100)
PLATELET # BLD AUTO: 221 10E9/L (ref 150–450)
POTASSIUM SERPL-SCNC: 3.9 MMOL/L (ref 3.4–5.3)
RBC # BLD AUTO: 4.83 10E12/L (ref 3.8–5.2)
SODIUM SERPL-SCNC: 138 MMOL/L (ref 133–144)
WBC # BLD AUTO: 6.1 10E9/L (ref 4–11)

## 2019-11-06 PROCEDURE — 83735 ASSAY OF MAGNESIUM: CPT | Performed by: FAMILY MEDICINE

## 2019-11-06 PROCEDURE — 99213 OFFICE O/P EST LOW 20 MIN: CPT | Performed by: FAMILY MEDICINE

## 2019-11-06 PROCEDURE — 36415 COLL VENOUS BLD VENIPUNCTURE: CPT | Performed by: FAMILY MEDICINE

## 2019-11-06 PROCEDURE — 80048 BASIC METABOLIC PNL TOTAL CA: CPT | Performed by: FAMILY MEDICINE

## 2019-11-06 PROCEDURE — 85027 COMPLETE CBC AUTOMATED: CPT | Performed by: FAMILY MEDICINE

## 2019-11-06 ASSESSMENT — MIFFLIN-ST. JEOR: SCORE: 1310.6

## 2019-11-06 NOTE — PROGRESS NOTES
Subjective     Glenda Bales is a 68 year old female who presents to clinic today for the following health issues:    HPI   Leg/Feet Cramps     Onset: About a couples weeks    Description:   Location: both legs  Character: Cramping    Intensity: mild    Progression of Symptoms: better, worse    Accompanying Signs & Symptoms:  Other symptoms: none    History:   Previous similar pain: no       Precipitating factors:   Trauma or overuse: no     Alleviating factors:  Improved by: patient recently started taking magnesium and it has helped.    Therapies Tried and outcome: magnesium        Patient Active Problem List   Diagnosis     Essential hypertension, benign     Chronic rhinitis     Acquired hypothyroidism     Hyperlipidemia LDL goal <70     Gastroesophageal reflux disease without esophagitis     Nonrheumatic aortic valve insufficiency     Anxiety     Elevated LFTs     Osteoarthritis of knee, unspecified laterality, unspecified osteoarthritis type     Other irritable bowel syndrome     Alcohol abuse, in remission     Fatty liver     Diabetes mellitus, type 2 (H)     Class 1 obesity due to excess calories with serious comorbidity and body mass index (BMI) of 33.0 to 33.9 in adult     Chronic kidney disease, stage 3 (moderate) (H)     Advanced directives, counseling/discussion     Past Surgical History:   Procedure Laterality Date     NO HISTORY OF SURGERY         Social History     Tobacco Use     Smoking status: Former Smoker     Packs/day: 0.00     Years: 1.00     Pack years: 0.00     Types: Cigarettes     Last attempt to quit: 6/15/1978     Years since quittin.4     Smokeless tobacco: Never Used     Tobacco comment: quit in    Substance Use Topics     Alcohol use: Yes     Comment: 1 red wine before bef     Family History   Problem Relation Age of Onset     Diabetes Mother      Coronary Artery Disease Father          with a heart attack     Colon Cancer No family hx of      Breast Cancer No family hx of           Current Outpatient Medications   Medication Sig Dispense Refill     albuterol (PROAIR HFA/PROVENTIL HFA/VENTOLIN HFA) 108 (90 Base) MCG/ACT inhaler Inhale 2 puffs into the lungs every 6 hours 1 Inhaler 0     alcohol swab prep pads Use to swab area of injection/milena as directed. 100 each 3     amLODIPine (NORVASC) 5 MG tablet Take 1 tablet (5 mg) by mouth daily 90 tablet 3     atenolol (TENORMIN) 25 MG tablet Take 1 tablet (25 mg) by mouth 2 times daily 180 tablet 3     atorvastatin (LIPITOR) 10 MG tablet TAKE 1 TABLET(10 MG) BY MOUTH DAILY 90 tablet 2     azelastine (ASTELIN) 0.1 % spray Spray 1-2 sprays into both nostrils 2 times daily 1 Bottle 11     blood glucose (NO BRAND SPECIFIED) test strip Use to test blood sugar 1 times daily or as directed. To accompany: Blood Glucose Monitor Brands: per insurance. 100 strip 6     blood glucose calibration (NO BRAND SPECIFIED) solution To accompany: Blood Glucose Monitor Brands: per insurance. 1 Bottle 3     blood glucose monitoring (NO BRAND SPECIFIED) meter device kit Use to test blood sugar 1 times daily or as directed. Preferred blood glucose meter OR supplies to accompany: Blood Glucose Monitor Brands: per insurance. 1 kit 0     cetirizine (ZYRTEC) 10 MG tablet Take 10 mg by mouth daily       fluticasone (FLONASE) 50 MCG/ACT spray Spray 1 spray into both nostrils daily       hydrocortisone (ANUSOL-HC) 2.5 % cream Place rectally 2 times daily as needed for hemorrhoids 30 g 0     ketotifen (ALAWAY) 0.025 % SOLN ophthalmic solution 1 drop 2 times daily       levothyroxine (SYNTHROID/LEVOTHROID) 112 MCG tablet TK 1 T PO  QD 90 tablet 4     omeprazole (PRILOSEC) 20 MG DR capsule TK 1 C PO D 90 capsule 4     thin (NO BRAND SPECIFIED) lancets Use with lanceting device. To accompany: Blood Glucose Monitor Brands: per insurance. 100 each 6     Allergies   Allergen Reactions     Losartan Other (See Comments)     Acute Kidney Injury     Seasonal      Recent Labs   Lab  "Test 10/17/19  0744 09/23/19  0827 09/12/19  1517 07/18/19  0733 05/23/19  0714  04/19/19  0925 03/04/19  0807  05/25/18  0735   A1C 5.4  --   --  6.5*  --   --  7.7*  --   --  6.0*   LDL  --   --   --   --   --   --  112* 175*  --  152*   HDL  --   --   --   --   --   --  74 77  --  82   TRIG  --   --   --   --   --   --  79 124  --  98   ALT 40  --   --   --   --   --  61* 70*  --  86*   CR  --   --  1.13*  --  0.90   < >  --  0.97   < > 0.94   GFRESTIMATED  --   --  49*  --  65   < >  --  59*   < > 59*   GFRESTBLACK  --   --  57*  --  76   < >  --  69   < > 71   POTASSIUM  --  3.9 3.0*  --  3.4   < >  --  3.6   < > 4.0   TSH  --   --  1.50  --   --   --  2.25 4.93*  --  3.00    < > = values in this interval not displayed.      BP Readings from Last 3 Encounters:   11/06/19 132/68   10/17/19 128/70   09/23/19 138/82    Wt Readings from Last 3 Encounters:   11/06/19 81.6 kg (180 lb)   10/17/19 83.5 kg (184 lb)   09/23/19 83.5 kg (184 lb)                      Reviewed and updated as needed this visit by Provider  Tobacco  Allergies  Meds  Problems  Med Hx  Surg Hx  Fam Hx         Review of Systems   ROS COMP: CONSTITUTIONAL: NEGATIVE for fever, chills, change in weight  ENT/MOUTH: NEGATIVE for ear, mouth and throat problems  RESP: NEGATIVE for significant cough or SOB  CV: NEGATIVE for chest pain, palpitations or peripheral edema  GI: NEGATIVE for nausea, abdominal pain, heartburn, or change in bowel habits  MUSCULOSKELETAL: leg cramps-feet/ankle at Night-no swelling  PSYCHIATRIC: NEGATIVE for changes in mood or affect  ROS otherwise negative      Objective    /68   Pulse 63   Temp 97.6  F (36.4  C) (Oral)   Resp 16   Ht 1.6 m (5' 3\")   Wt 81.6 kg (180 lb)   SpO2 97%   Breastfeeding? No   BMI 31.89 kg/m    Body mass index is 31.89 kg/m .  Physical Exam   GENERAL: healthy, alert and no distress  RESP: lungs clear to auscultation - no rales, rhonchi or wheezes  CV: regular rate and rhythm, normal " "S1 S2, no S3 or S4, no murmur, click or rub, no peripheral edema and peripheral pulses strong  ABDOMEN: soft, nontender, no hepatosplenomegaly, no masses and bowel sounds normal  MS: no gross musculoskeletal defects noted, no edema    Diagnostic Test Results:  Labs reviewed in Epic  Pending         Assessment & Plan     1. Leg cramps  Will check labs  Bq 12 borderline  Advised take B12 otc  Follow up if not better  - Basic metabolic panel  - Magnesium  - CBC with platelets     BMI:   Estimated body mass index is 31.89 kg/m  as calculated from the following:    Height as of this encounter: 1.6 m (5' 3\").    Weight as of this encounter: 81.6 kg (180 lb).   Weight management plan: Discussed healthy diet and exercise guidelines            Return in about 3 months (around 2/6/2020) for Med check.    Marysol Cisse MD  [facility]        "

## 2020-01-28 ENCOUNTER — OFFICE VISIT (OUTPATIENT)
Dept: OPHTHALMOLOGY | Facility: CLINIC | Age: 70
End: 2020-01-28
Payer: MEDICARE

## 2020-01-28 DIAGNOSIS — H52.12 MYOPIA OF LEFT EYE: ICD-10-CM

## 2020-01-28 DIAGNOSIS — Z01.00 EXAMINATION OF EYES AND VISION: Primary | ICD-10-CM

## 2020-01-28 DIAGNOSIS — H52.4 PRESBYOPIA: ICD-10-CM

## 2020-01-28 DIAGNOSIS — H52.222 REGULAR ASTIGMATISM OF LEFT EYE: ICD-10-CM

## 2020-01-28 DIAGNOSIS — Z96.1 PSEUDOPHAKIA: ICD-10-CM

## 2020-01-28 DIAGNOSIS — E11.65 TYPE 2 DIABETES MELLITUS WITH HYPERGLYCEMIA, WITHOUT LONG-TERM CURRENT USE OF INSULIN (H): ICD-10-CM

## 2020-01-28 PROCEDURE — 92004 COMPRE OPH EXAM NEW PT 1/>: CPT | Performed by: STUDENT IN AN ORGANIZED HEALTH CARE EDUCATION/TRAINING PROGRAM

## 2020-01-28 PROCEDURE — 92015 DETERMINE REFRACTIVE STATE: CPT | Mod: GY | Performed by: STUDENT IN AN ORGANIZED HEALTH CARE EDUCATION/TRAINING PROGRAM

## 2020-01-28 ASSESSMENT — VISUAL ACUITY
OD_SC: 20/25
OD_SC+: +2
OS_CC: 4
OD_CC: 4
OS_SC: 20/30
METHOD: SNELLEN - LINEAR

## 2020-01-28 ASSESSMENT — SLIT LAMP EXAM - LIDS
COMMENTS: NORMAL
COMMENTS: 1+ DERMATOCHALASIS

## 2020-01-28 ASSESSMENT — REFRACTION_MANIFEST
OD_SPHERE: PLANO
OS_ADD: +2.50
OS_SPHERE: -0.50
OD_ADD: +2.50
OS_AXIS: 066
OD_CYLINDER: SPHERE
OS_CYLINDER: +0.75

## 2020-01-28 ASSESSMENT — EXTERNAL EXAM - LEFT EYE: OS_EXAM: NORMAL

## 2020-01-28 ASSESSMENT — CUP TO DISC RATIO
OS_RATIO: 0.3
OD_RATIO: 0.25

## 2020-01-28 ASSESSMENT — REFRACTION_WEARINGRX
SPECS_TYPE: OTC
OD_SPHERE: +2.50
OS_SPHERE: +2.50

## 2020-01-28 ASSESSMENT — TONOMETRY
OD_IOP_MMHG: 12
IOP_METHOD: APPLANATION
OS_IOP_MMHG: 13

## 2020-01-28 ASSESSMENT — CONF VISUAL FIELD
OD_NORMAL: 1
OS_NORMAL: 1

## 2020-01-28 ASSESSMENT — EXTERNAL EXAM - RIGHT EYE: OD_EXAM: NORMAL

## 2020-01-28 NOTE — LETTER
1/28/2020         RE: Glenda Bales  [address]  [place] [address]        Dear Colleague,    Thank you for referring your patient, Glenda Bales, to the [facility]. Please see a copy of my visit note below.     Current Eye Medications:  Alaway both eyes as needed for allergies.  Artificial tears both eyes as needed.  No eye vitamins.      Subjective:  Patient is here for a Diabetic Eye Exam.    Status/Post bilateral cataract surgery in December of 2018 with [facility].  Distance vision is good without correction.  She wears over-the-counter readers which are working well.      No previous eye injuries or other surgeries (than cataract surgery both eyes).     Lab Results   Component Value Date    A1C 5.4 10/17/2019    A1C 6.5 07/18/2019    A1C 7.7 04/19/2019    A1C 6.0 05/25/2018    A1C 6.1 01/15/2018      Objective:  See Ophthalmology Exam.      Assessment:  Glenda Bales is a 69 year old female who presents with:   Encounter Diagnoses   Name Primary?     Examination of eyes and vision      Myopia of left eye      Regular astigmatism of left eye      Presbyopia        Pseudophakia - Both Eyes      Type 2 diabetes mellitus with hyperglycemia, without long-term current use of insulin (H) Negative diabetic retinopathy        Plan:  Continue over the counter readers or can fill glasses prescription given    Keep blood sugars and blood pressure under good control.    Jama Carpenter MD  [phone]    Patient Education   Diabetes weakens the blood vessels all over the body, including the eyes. Damage to the blood vessels in the eyes can cause swelling or bleeding into part of the eye (called the retina). This is called diabetic retinopathy (BRIAN-tin-AH-puh-thee). If not treated, this disease can cause vision loss or blindness.   Symptoms may include blurred or distorted vision, but many people have no symptoms. It's important to see your eye doctor regularly to check for  problems.   Early treatment and good control can help protect your vision. Here are the things you can do to help prevent vision loss:      1. Keep your blood sugar levels under tight control.      2. Bring high blood pressure under control.      3. No smoking.      4. Have yearly dilated eye exams.           Again, thank you for allowing me to participate in the care of your patient.        Sincerely,        Jama Carpenter MD

## 2020-01-28 NOTE — PATIENT INSTRUCTIONS
Continue over the counter readers or can fill glasses prescription given    Keep blood sugars and blood pressure under good control.    Jama Carpenter MD  [phone]    Patient Education   Diabetes weakens the blood vessels all over the body, including the eyes. Damage to the blood vessels in the eyes can cause swelling or bleeding into part of the eye (called the retina). This is called diabetic retinopathy (BRIAN-tin--puh-thee). If not treated, this disease can cause vision loss or blindness.   Symptoms may include blurred or distorted vision, but many people have no symptoms. It's important to see your eye doctor regularly to check for problems.   Early treatment and good control can help protect your vision. Here are the things you can do to help prevent vision loss:      1. Keep your blood sugar levels under tight control.      2. Bring high blood pressure under control.      3. No smoking.      4. Have yearly dilated eye exams.

## 2020-01-28 NOTE — PROGRESS NOTES
Current Eye Medications:  Alaway both eyes as needed for allergies.  Artificial tears both eyes as needed.  No eye vitamins.      Subjective:  Patient is here for a Diabetic Eye Exam.    Status/Post bilateral cataract surgery in December of 2018 with [facility].  Distance vision is good without correction.  She wears over-the-counter readers which are working well.      No previous eye injuries or other surgeries (than cataract surgery both eyes).     Lab Results   Component Value Date    A1C 5.4 10/17/2019    A1C 6.5 07/18/2019    A1C 7.7 04/19/2019    A1C 6.0 05/25/2018    A1C 6.1 01/15/2018      Objective:  See Ophthalmology Exam.      Assessment:  Glenda Bales is a 69 year old female who presents with:   Encounter Diagnoses   Name Primary?     Examination of eyes and vision      Myopia of left eye      Regular astigmatism of left eye      Presbyopia        Pseudophakia - Both Eyes      Type 2 diabetes mellitus with hyperglycemia, without long-term current use of insulin (H) Negative diabetic retinopathy        Plan:  Continue over the counter readers or can fill glasses prescription given    Keep blood sugars and blood pressure under good control.    Jama Carpenter MD  [phone]    Patient Education   Diabetes weakens the blood vessels all over the body, including the eyes. Damage to the blood vessels in the eyes can cause swelling or bleeding into part of the eye (called the retina). This is called diabetic retinopathy (BRIAN-tin--pu-thee). If not treated, this disease can cause vision loss or blindness.   Symptoms may include blurred or distorted vision, but many people have no symptoms. It's important to see your eye doctor regularly to check for problems.   Early treatment and good control can help protect your vision. Here are the things you can do to help prevent vision loss:      1. Keep your blood sugar levels under tight control.      2. Bring high blood pressure under control.       3. No smoking.      4. Have yearly dilated eye exams.

## 2020-03-02 ENCOUNTER — TELEPHONE (OUTPATIENT)
Dept: OPHTHALMOLOGY | Facility: CLINIC | Age: 70
End: 2020-03-02

## 2020-03-15 ENCOUNTER — TELEPHONE (OUTPATIENT)
Dept: FAMILY MEDICINE | Facility: CLINIC | Age: 70
End: 2020-03-15

## 2020-03-15 DIAGNOSIS — K21.9 GASTROESOPHAGEAL REFLUX DISEASE, ESOPHAGITIS PRESENCE NOT SPECIFIED: ICD-10-CM

## 2020-03-15 DIAGNOSIS — E03.9 ACQUIRED HYPOTHYROIDISM: ICD-10-CM

## 2020-03-16 ENCOUNTER — MYC MEDICAL ADVICE (OUTPATIENT)
Dept: FAMILY MEDICINE | Facility: CLINIC | Age: 70
End: 2020-03-16

## 2020-03-18 RX ORDER — LEVOTHYROXINE SODIUM 112 UG/1
TABLET ORAL
Qty: 90 TABLET | Refills: 0 | Status: SHIPPED | OUTPATIENT
Start: 2020-03-18 | End: 2020-07-08

## 2020-03-18 NOTE — TELEPHONE ENCOUNTER
"Prescription approved per [facility] Refill Protocol.    Requested Prescriptions   Pending Prescriptions Disp Refills     levothyroxine (SYNTHROID/LEVOTHROID) 112 MCG tablet [Pharmacy Med Name: LEVOTHYROXINE 0.112MG (112MCG) TABS] 90 tablet 4     Sig: TAKE 1 TABLET BY MOUTH EVERY DAY       Thyroid Protocol Passed - 3/16/2020  6:09 PM        Passed - Patient is 12 years or older        Passed - Recent (12 mo) or future (30 days) visit within the authorizing provider's specialty     Patient has had an office visit with the authorizing provider or a provider within the authorizing providers department within the previous 12 mos or has a future within next 30 days. See \"Patient Info\" tab in inbasket, or \"Choose Columns\" in Meds & Orders section of the refill encounter.              Passed - Medication is active on med list        Passed - Normal TSH on file in past 12 months     Recent Labs   Lab Test 09/12/19  1517   TSH 1.50              Passed - No active pregnancy on record     If patient is pregnant or has had a positive pregnancy test, please check TSH.          Passed - No positive pregnancy test in past 12 months     If patient is pregnant or has had a positive pregnancy test, please check TSH.             omeprazole (PRILOSEC) 20 MG DR capsule [Pharmacy Med Name: OMEPRAZOLE 20MG CAPSULES] 90 capsule 4     Sig: TAKE 1 CAPSULE BY MOUTH EVERY DAY       PPI Protocol Passed - 3/16/2020  6:09 PM        Passed - Not on Clopidogrel (unless Pantoprazole ordered)        Passed - No diagnosis of osteoporosis on record        Passed - Recent (12 mo) or future (30 days) visit within the authorizing provider's specialty     Patient has had an office visit with the authorizing provider or a provider within the authorizing providers department within the previous 12 mos or has a future within next 30 days. See \"Patient Info\" tab in inbasket, or \"Choose Columns\" in Meds & Orders section of the refill encounter.              Passed - " Medication is active on med list        Passed - Patient is age 18 or older        Passed - No active pregnacy on record        Passed - No positive pregnancy test in past 12 months

## 2020-04-04 DIAGNOSIS — K21.9 GASTROESOPHAGEAL REFLUX DISEASE, ESOPHAGITIS PRESENCE NOT SPECIFIED: ICD-10-CM

## 2020-04-04 DIAGNOSIS — E03.9 ACQUIRED HYPOTHYROIDISM: ICD-10-CM

## 2020-04-06 RX ORDER — LEVOTHYROXINE SODIUM 112 UG/1
TABLET ORAL
Qty: 90 TABLET | Refills: 0 | OUTPATIENT
Start: 2020-04-06

## 2020-07-07 NOTE — PROGRESS NOTES
Subjective     Glenda Bales is a 69 year old female who presents to clinic today for the following health issues:    HPI   Diabetes Follow-up    How often are you checking your blood sugar? A few times a week  What time of day are you checking your blood sugars (select all that apply)?  Before meals  Have you had any blood sugars above 200?  No  Have you had any blood sugars below 70?  No    What symptoms do you notice when your blood sugar is low?  None    What concerns do you have today about your diabetes? None     Do you have any of these symptoms? (Select all that apply)  No numbness or tingling in feet.  No redness, sores or blisters on feet.  No complaints of excessive thirst.  No reports of blurry vision.  No significant changes to weight.        Hyperlipidemia Follow-Up      Are you regularly taking any medication or supplement to lower your cholesterol?   No    Are you having muscle aches or other side effects that you think could be caused by your cholesterol lowering medication?  No    Hypertension Follow-up      Do you check your blood pressure regularly outside of the clinic? Yes     Are you following a low salt diet? Yes    Are your blood pressures ever more than 140 on the top number (systolic) OR more   than 90 on the bottom number (diastolic), for example 140/90? Yes    BP Readings from Last 2 Encounters:   07/08/20 (!) 158/80   11/06/19 132/68     Hemoglobin A1C (%)   Date Value   07/08/2020 5.2   10/17/2019 5.4     LDL Cholesterol Calculated (mg/dL)   Date Value   04/19/2019 112 (H)   03/04/2019 175 (H)         How many servings of fruits and vegetables do you eat daily?  2-3    On average, how many sweetened beverages do you drink each day (Examples: soda, juice, sweet tea, etc.  Do NOT count diet or artificially sweetened beverages)?   0    How many days per week do you exercise enough to make your heart beat faster?     How many minutes a day do you exercise enough to make your heart beat  faster?   How many days per week do you miss taking your medication? Cholesterol     What makes it hard for you to take your medications?  Concens about raising blood sugar    Chronic Kidney Disease Follow-up      Do you take any over the counter pain medicine?: No      Patient Active Problem List   Diagnosis     Essential hypertension, benign     Chronic rhinitis     Acquired hypothyroidism     Hyperlipidemia LDL goal <70     Gastroesophageal reflux disease without esophagitis     Nonrheumatic aortic valve insufficiency     Anxiety     Elevated LFTs     Osteoarthritis of knee, unspecified laterality, unspecified osteoarthritis type     Other irritable bowel syndrome     Alcohol abuse, in remission     Fatty liver     Diabetes mellitus, type 2 (H)     Class 1 obesity due to excess calories with serious comorbidity and body mass index (BMI) of 33.0 to 33.9 in adult     Chronic kidney disease, stage 3 (moderate) (H)     Advanced directives, counseling/discussion     Past Surgical History:   Procedure Laterality Date     CATARACT IOL, RT/LT       NO HISTORY OF SURGERY         Social History     Tobacco Use     Smoking status: Former Smoker     Packs/day: 0.00     Years: 1.00     Pack years: 0.00     Types: Cigarettes     Last attempt to quit: 6/15/1978     Years since quittin.0     Smokeless tobacco: Never Used     Tobacco comment: quit in    Substance Use Topics     Alcohol use: Yes     Comment: 1 red wine before bef     Family History   Problem Relation Age of Onset     Diabetes Mother      Coronary Artery Disease Father          with a heart attack     Colon Cancer No family hx of      Breast Cancer No family hx of          Current Outpatient Medications   Medication Sig Dispense Refill     alcohol swab prep pads Use to swab area of injection/milena as directed. 100 each 3     amLODIPine (NORVASC) 5 MG tablet Take 1 tablet (5 mg) by mouth daily 90 tablet 3     atenolol (TENORMIN) 25 MG tablet Take 1  tablet (25 mg) by mouth 2 times daily 180 tablet 3     atorvastatin (LIPITOR) 10 MG tablet Take 1 tablet (10 mg) by mouth daily 90 tablet 3     blood glucose (NO BRAND SPECIFIED) test strip Use to test blood sugar 1 times daily or as directed. To accompany: Blood Glucose Monitor Brands: per insurance. 100 strip 6     blood glucose calibration (NO BRAND SPECIFIED) solution To accompany: Blood Glucose Monitor Brands: per insurance. 1 Bottle 3     blood glucose monitoring (NO BRAND SPECIFIED) meter device kit Use to test blood sugar 1 times daily or as directed. Preferred blood glucose meter OR supplies to accompany: Blood Glucose Monitor Brands: per insurance. 1 kit 0     cetirizine (ZYRTEC) 10 MG tablet Take 10 mg by mouth daily       fluticasone (FLONASE) 50 MCG/ACT spray Spray 1 spray into both nostrils daily as needed        hydrochlorothiazide (HYDRODIURIL) 12.5 MG tablet Take 1 tablet (12.5 mg) by mouth daily 30 tablet 0     ketotifen (ALAWAY) 0.025 % SOLN ophthalmic solution 1 drop 2 times daily       levothyroxine (SYNTHROID/LEVOTHROID) 112 MCG tablet TAKE 1 TABLET BY MOUTH EVERY DAY 90 tablet 0     omeprazole (PRILOSEC) 20 MG DR capsule TAKE 1 CAPSULE BY MOUTH EVERY DAY 90 capsule 0     thin (NO BRAND SPECIFIED) lancets Use with lanceting device. To accompany: Blood Glucose Monitor Brands: per insurance. 100 each 6     albuterol (PROAIR HFA/PROVENTIL HFA/VENTOLIN HFA) 108 (90 Base) MCG/ACT inhaler Inhale 2 puffs into the lungs every 6 hours (Patient not taking: Reported on 7/8/2020) 1 Inhaler 0     Allergies   Allergen Reactions     Losartan Other (See Comments)     Acute Kidney Injury     Seasonal      Recent Labs   Lab Test 07/08/20  0951 11/06/19  0935 10/17/19  0744 09/23/19  0827 09/12/19  1517 07/18/19  0733  04/19/19  0925 03/04/19  0807  05/25/18  0735   A1C 5.2  --  5.4  --   --  6.5*  --  7.7*  --   --  6.0*   LDL  --   --   --   --   --   --   --  112* 175*  --  152*   HDL  --   --   --   --   --    "--   --  74 77  --  82   TRIG  --   --   --   --   --   --   --  79 124  --  98   ALT  --   --  40  --   --   --   --  61* 70*  --  86*   CR  --  1.02  --   --  1.13*  --    < >  --  0.97   < > 0.94   GFRESTIMATED  --  56*  --   --  49*  --    < >  --  59*   < > 59*   GFRESTBLACK  --  65  --   --  57*  --    < >  --  69   < > 71   POTASSIUM  --  3.9  --  3.9 3.0*  --    < >  --  3.6   < > 4.0   TSH  --   --   --   --  1.50  --   --  2.25 4.93*  --  3.00    < > = values in this interval not displayed.      BP Readings from Last 3 Encounters:   07/08/20 (!) 158/80   11/06/19 132/68   10/17/19 128/70    Wt Readings from Last 3 Encounters:   07/08/20 85.7 kg (189 lb)   11/06/19 81.6 kg (180 lb)   10/17/19 83.5 kg (184 lb)                      Reviewed and updated as needed this visit by Provider  Tobacco  Allergies  Meds  Problems  Med Hx  Surg Hx  Fam Hx         Review of Systems   CONSTITUTIONAL: NEGATIVE for fever, chills, change in weight  ENT/MOUTH: NEGATIVE for ear, mouth and throat problems  RESP: NEGATIVE for significant cough or SOB  CV: NEGATIVE for chest pain, palpitations or peripheral edema  GI: NEGATIVE for nausea, abdominal pain, heartburn, or change in bowel habits  PSYCHIATRIC: NEGATIVE for changes in mood or affect  ROS otherwise negative      Objective    BP (!) 158/80   Pulse 56   Temp 98.4  F (36.9  C) (Oral)   Resp 12   Ht 1.6 m (5' 3\")   Wt 85.7 kg (189 lb)   SpO2 98%   Breastfeeding No   BMI 33.48 kg/m    Body mass index is 33.48 kg/m .  Physical Exam   GENERAL: healthy, alert and no distress  NECK: no adenopathy, no asymmetry, masses, or scars and thyroid normal to palpation  RESP: lungs clear to auscultation - no rales, rhonchi or wheezes  CV: regular rate and rhythm, normal S1 S2, no S3 or S4, no murmur, click or rub, no peripheral edema and peripheral pulses strong  ABDOMEN: soft, nontender, no hepatosplenomegaly, no masses and bowel sounds normal  MS: no gross musculoskeletal " defects noted, no edema  Diabetic foot exam: normal DP and PT pulses, no trophic changes or ulcerative lesions and normal sensory exam    Diagnostic Test Results:  Labs reviewed in Epic  Results for orders placed or performed in visit on 07/08/20 (from the past 24 hour(s))   HEMOGLOBIN A1C   Result Value Ref Range    Hemoglobin A1C 5.2 0 - 5.6 %           Assessment & Plan     1. Type 2 diabetes mellitus with hyperglycemia, without long-term current use of insulin (H)  Doing well  - HEMOGLOBIN A1C  - Lipid panel reflex to direct LDL Fasting  - blood glucose (NO BRAND SPECIFIED) test strip; Use to test blood sugar 1 times daily or as directed. To accompany: Blood Glucose Monitor Brands: per insurance.  Dispense: 100 strip; Refill: 6  - thin (NO BRAND SPECIFIED) lancets; Use with lanceting device. To accompany: Blood Glucose Monitor Brands: per insurance.  Dispense: 100 each; Refill: 6  - Basic metabolic panel    2. Essential hypertension, benign  High  Add HCTZ  - amLODIPine (NORVASC) 5 MG tablet; Take 1 tablet (5 mg) by mouth daily  Dispense: 90 tablet; Refill: 3  - atenolol (TENORMIN) 25 MG tablet; Take 1 tablet (25 mg) by mouth 2 times daily  Dispense: 180 tablet; Refill: 3  - Basic metabolic panel  - hydrochlorothiazide (HYDRODIURIL) 12.5 MG tablet; Take 1 tablet (12.5 mg) by mouth daily  Dispense: 30 tablet; Refill: 0    3. Hyperlipidemia LDL goal <70  At goal  - Lipid panel reflex to direct LDL Fasting  - atorvastatin (LIPITOR) 10 MG tablet; Take 1 tablet (10 mg) by mouth daily  Dispense: 90 tablet; Refill: 3    4. Acquired hypothyroidism  Stable   - levothyroxine (SYNTHROID/LEVOTHROID) 112 MCG tablet; TAKE 1 TABLET BY MOUTH EVERY DAY  Dispense: 90 tablet; Refill: 0    5. Gastroesophageal reflux disease, esophagitis presence not specified  Stable   - omeprazole (PRILOSEC) 20 MG DR capsule; TAKE 1 CAPSULE BY MOUTH EVERY DAY  Dispense: 90 capsule; Refill: 0    6. Chronic kidney disease, stage 3 (moderate)  "(H)  Stable     7. Hyperlipidemia LDL goal <70  Labs pending        BMI:   Estimated body mass index is 33.48 kg/m  as calculated from the following:    Height as of this encounter: 1.6 m (5' 3\").    Weight as of this encounter: 85.7 kg (189 lb).   Weight management plan: Discussed healthy diet and exercise guidelines            Return in about 2 weeks (around 7/22/2020) for Lab Work, Ancillary Blood Pressure check.    Marysol Cisse MD  [facility]        "

## 2020-07-08 ENCOUNTER — TELEPHONE (OUTPATIENT)
Dept: FAMILY MEDICINE | Facility: CLINIC | Age: 70
End: 2020-07-08

## 2020-07-08 ENCOUNTER — OFFICE VISIT (OUTPATIENT)
Dept: FAMILY MEDICINE | Facility: CLINIC | Age: 70
End: 2020-07-08
Payer: MEDICARE

## 2020-07-08 VITALS
OXYGEN SATURATION: 98 % | RESPIRATION RATE: 12 BRPM | HEIGHT: 63 IN | HEART RATE: 56 BPM | SYSTOLIC BLOOD PRESSURE: 158 MMHG | WEIGHT: 189 LBS | DIASTOLIC BLOOD PRESSURE: 80 MMHG | BODY MASS INDEX: 33.49 KG/M2 | TEMPERATURE: 98.4 F

## 2020-07-08 DIAGNOSIS — E03.9 ACQUIRED HYPOTHYROIDISM: ICD-10-CM

## 2020-07-08 DIAGNOSIS — E78.5 HYPERLIPIDEMIA LDL GOAL <70: ICD-10-CM

## 2020-07-08 DIAGNOSIS — E11.65 TYPE 2 DIABETES MELLITUS WITH HYPERGLYCEMIA, WITHOUT LONG-TERM CURRENT USE OF INSULIN (H): Primary | ICD-10-CM

## 2020-07-08 DIAGNOSIS — F10.11 ALCOHOL ABUSE, IN REMISSION: ICD-10-CM

## 2020-07-08 DIAGNOSIS — N18.30 CHRONIC KIDNEY DISEASE, STAGE 3 (MODERATE): ICD-10-CM

## 2020-07-08 DIAGNOSIS — K21.9 GASTROESOPHAGEAL REFLUX DISEASE, ESOPHAGITIS PRESENCE NOT SPECIFIED: ICD-10-CM

## 2020-07-08 DIAGNOSIS — I10 ESSENTIAL HYPERTENSION, BENIGN: ICD-10-CM

## 2020-07-08 DIAGNOSIS — E78.5 HYPERLIPIDEMIA LDL GOAL <100: ICD-10-CM

## 2020-07-08 LAB
ANION GAP SERPL CALCULATED.3IONS-SCNC: 4 MMOL/L (ref 3–14)
BUN SERPL-MCNC: 19 MG/DL (ref 7–30)
CALCIUM SERPL-MCNC: 9.1 MG/DL (ref 8.5–10.1)
CHLORIDE SERPL-SCNC: 106 MMOL/L (ref 94–109)
CHOLEST SERPL-MCNC: 278 MG/DL
CO2 SERPL-SCNC: 29 MMOL/L (ref 20–32)
CREAT SERPL-MCNC: 0.86 MG/DL (ref 0.52–1.04)
GFR SERPL CREATININE-BSD FRML MDRD: 68 ML/MIN/{1.73_M2}
GLUCOSE SERPL-MCNC: 100 MG/DL (ref 70–99)
HBA1C MFR BLD: 5.2 % (ref 0–5.6)
HDLC SERPL-MCNC: 100 MG/DL
LDLC SERPL CALC-MCNC: 162 MG/DL
NONHDLC SERPL-MCNC: 178 MG/DL
POTASSIUM SERPL-SCNC: 4.3 MMOL/L (ref 3.4–5.3)
SODIUM SERPL-SCNC: 139 MMOL/L (ref 133–144)
TRIGL SERPL-MCNC: 82 MG/DL

## 2020-07-08 PROCEDURE — 80048 BASIC METABOLIC PNL TOTAL CA: CPT | Performed by: FAMILY MEDICINE

## 2020-07-08 PROCEDURE — 36415 COLL VENOUS BLD VENIPUNCTURE: CPT | Performed by: FAMILY MEDICINE

## 2020-07-08 PROCEDURE — 83036 HEMOGLOBIN GLYCOSYLATED A1C: CPT | Performed by: FAMILY MEDICINE

## 2020-07-08 PROCEDURE — 80061 LIPID PANEL: CPT | Performed by: FAMILY MEDICINE

## 2020-07-08 PROCEDURE — 99214 OFFICE O/P EST MOD 30 MIN: CPT | Performed by: FAMILY MEDICINE

## 2020-07-08 RX ORDER — LEVOTHYROXINE SODIUM 112 UG/1
TABLET ORAL
Qty: 90 TABLET | Refills: 0 | Status: SHIPPED | OUTPATIENT
Start: 2020-07-08 | End: 2020-09-30

## 2020-07-08 RX ORDER — LANCETS
EACH MISCELLANEOUS
Qty: 100 EACH | Refills: 6 | Status: SHIPPED | OUTPATIENT
Start: 2020-07-08 | End: 2021-06-21

## 2020-07-08 RX ORDER — ATENOLOL 25 MG/1
25 TABLET ORAL 2 TIMES DAILY
Qty: 180 TABLET | Refills: 3 | Status: SHIPPED | OUTPATIENT
Start: 2020-07-08 | End: 2021-09-29

## 2020-07-08 RX ORDER — HYDROCHLOROTHIAZIDE 12.5 MG/1
12.5 TABLET ORAL DAILY
Qty: 30 TABLET | Refills: 0 | Status: SHIPPED | OUTPATIENT
Start: 2020-07-08 | End: 2020-07-22

## 2020-07-08 RX ORDER — ATORVASTATIN CALCIUM 10 MG/1
10 TABLET, FILM COATED ORAL DAILY
Qty: 90 TABLET | Refills: 3 | Status: SHIPPED | OUTPATIENT
Start: 2020-07-08 | End: 2020-09-30

## 2020-07-08 RX ORDER — AMLODIPINE BESYLATE 5 MG/1
5 TABLET ORAL DAILY
Qty: 90 TABLET | Refills: 3 | Status: SHIPPED | OUTPATIENT
Start: 2020-07-08 | End: 2021-06-29

## 2020-07-08 ASSESSMENT — ANXIETY QUESTIONNAIRES
IF YOU CHECKED OFF ANY PROBLEMS ON THIS QUESTIONNAIRE, HOW DIFFICULT HAVE THESE PROBLEMS MADE IT FOR YOU TO DO YOUR WORK, TAKE CARE OF THINGS AT HOME, OR GET ALONG WITH OTHER PEOPLE: NOT DIFFICULT AT ALL
1. FEELING NERVOUS, ANXIOUS, OR ON EDGE: SEVERAL DAYS
6. BECOMING EASILY ANNOYED OR IRRITABLE: NOT AT ALL
3. WORRYING TOO MUCH ABOUT DIFFERENT THINGS: SEVERAL DAYS
2. NOT BEING ABLE TO STOP OR CONTROL WORRYING: SEVERAL DAYS
5. BEING SO RESTLESS THAT IT IS HARD TO SIT STILL: NOT AT ALL
GAD7 TOTAL SCORE: 3
7. FEELING AFRAID AS IF SOMETHING AWFUL MIGHT HAPPEN: NOT AT ALL

## 2020-07-08 ASSESSMENT — MIFFLIN-ST. JEOR: SCORE: 1346.43

## 2020-07-08 ASSESSMENT — PATIENT HEALTH QUESTIONNAIRE - PHQ9: 5. POOR APPETITE OR OVEREATING: NOT AT ALL

## 2020-07-09 RX ORDER — HYDROCHLOROTHIAZIDE 12.5 MG/1
TABLET ORAL
Qty: 90 TABLET
Start: 2020-07-09

## 2020-07-09 ASSESSMENT — ANXIETY QUESTIONNAIRES: GAD7 TOTAL SCORE: 3

## 2020-07-09 NOTE — TELEPHONE ENCOUNTER
Pharmacy requesting 90 day supply of hydrochlorothiazide     Please call pharmacy  Provider sent in 30 day supply because patient will need labs and BP rechecked in 2 weeks    Todd Henderson RN

## 2020-07-09 NOTE — TELEPHONE ENCOUNTER
Spoke to pharmacy and informed them of below message from RN.  Gladis LOPEZ CMA ([facility])

## 2020-07-17 ENCOUNTER — TRANSFERRED RECORDS (OUTPATIENT)
Dept: HEALTH INFORMATION MANAGEMENT | Facility: CLINIC | Age: 70
End: 2020-07-17

## 2020-07-17 ENCOUNTER — VIRTUAL VISIT (OUTPATIENT)
Dept: FAMILY MEDICINE | Facility: CLINIC | Age: 70
End: 2020-07-17
Payer: MEDICARE

## 2020-07-17 ENCOUNTER — NURSE TRIAGE (OUTPATIENT)
Dept: NURSING | Facility: CLINIC | Age: 70
End: 2020-07-17

## 2020-07-17 DIAGNOSIS — Z20.822 EXPOSURE TO COVID-19 VIRUS: Primary | ICD-10-CM

## 2020-07-17 PROCEDURE — 99441 ZZC PHYSICIAN TELEPHONE EVALUATION 5-10 MIN: CPT | Performed by: NURSE PRACTITIONER

## 2020-07-17 NOTE — TELEPHONE ENCOUNTER
Caller called about exposure to a close family member who tested positive for corona virus on several occasions for greater that 1 hour. Caller states that she had close contact with a niece over the holiday time and last Saturday.  States that she is not having any symptom at this time, concerned due to her medical conditions and age.  Claudia Pierce RN  COVID 19 Nurse Triage Plan/Patient Instructions    Please be aware that novel coronavirus (COVID-19) may be circulating in the community. If you develop symptoms such as fever, cough, or SOB or if you have concerns about the presence of another infection including coronavirus (COVID-19), please contact your health care provider or visit [email or web address].     Disposition/Instructions    Home care recommended. Follow home care protocol based instructions.    Thank you for taking steps to prevent the spread of this virus.  o Limit your contact with others.  o Wear a simple mask to cover your cough.  o Wash your hands well and often.    Resources    [facility]: About COVID-19: [email or web address]    CDC: What to Do If You're Sick: www.cdc.gov/coronavirus/2019-ncov/about/steps-when-sick.html    CDC: Ending Home Isolation: www.cdc.gov/coronavirus/2019-ncov/hcp/disposition-in-home-patients.html     CDC: Caring for Someone: www.cdc.gov/coronavirus/2019-ncov/if-you-are-sick/care-for-someone.html     [facility]: Interim Guidance for Hospital Discharge to Home: www.health.[facility].mn.us/diseases/coronavirus/hcp/hospdischarge.pdf    [facility] clinical trials (COVID-19 research studies): [email or web address]     Below are the COVID-19 hotlines at the [facility] of Health ([facility]). Interpreters are available.   o For health questions: Call 976-965-7213 or 1-574.255.9381 (7 a.m. to 7 p.m.)  o For questions about schools and childcare: Call 358-095-3581 or 1-473.400.8120 (7 a.m. 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 healthcare worker who was NOT using all recommended personal protective equipment (i.e., a respirator-N95 mask, eye protection, gloves, and gown) AND [3] NO symptoms    Negative: COVID-19 has been diagnosed by a healthcare provider (HCP)    Negative: COVID-19 lab test positive    Negative: [1] Symptoms of COVID-19 (e.g., cough, fever, SOB, or others) AND [2] lives in an area with community spread    Negative: [1] Symptoms of COVID-19 (e.g., cough, fever, SOB, or others) AND [2] within 14 days of EXPOSURE (close contact) with diagnosed or suspected COVID-19 patient    Negative: [1] Symptoms of COVID-19 (e.g., cough, fever, SOB, or others) AND [2] within 14 days of travel from high-risk area for COVID-19 community spread (identified by CDC)    Negative: [1] Difficulty breathing (shortness of breath) occurs AND [2] onset > 14 days after COVID-19 EXPOSURE (Close Contact) AND [3] no community spread where patient lives    Negative: [1] Dry cough occurs AND [2] onset > 14 days after COVID-19 EXPOSURE AND [3] no community spread where patient lives    Negative: [1] Wet cough (i.e., white-yellow, yellow, green, or pranav colored sputum) AND [2] onset > 14 days after COVID-19 EXPOSURE AND [3] no community spread where patient lives    Negative: [1] Common cold symptoms AND [2] onset > 14 days after COVID-19 EXPOSURE AND [3] no community spread where patient lives    Answer Assessment - Initial Assessment Questions  1. CLOSE CONTACT: \"Who is the person with the confirmed or suspected COVID-19 infection that you were exposed to?\"      " "Niece  2. PLACE of CONTACT: \"Where were you when you were exposed to COVID-19?\" (e.g., home, school, medical waiting room; which city?)      Home-several times  3. TYPE of CONTACT: \"How much contact was there?\" (e.g., sitting next to, live in same house, work in same office, same building)      Close contact  4. DURATION of CONTACT: \"How long were you in contact with the COVID-19 patient?\" (e.g., a few seconds, passed by person, a few minutes, live with the patient)      Greater than 1 hour  5. DATE of CONTACT: \"When did you have contact with a COVID-19 patient?\" (e.g., how many days ago)      Last Saturday-7/11and over the holiday   6. TRAVEL: \"Have you traveled out of the country recently?\" If so, \"When and where?\"      * Also ask about out-of-state travel, since the [facility] has identified some high-risk cities for community spread in the .      * Note: Travel becomes less relevant if there is widespread community transmission where the patient lives.      No  7. COMMUNITY SPREAD: \"Are there lots of cases of COVID-19 (community spread) where you live?\" (See public health department website, if unsure)        Yes  8. SYMPTOMS: \"Do you have any symptoms?\" (e.g., fever, cough, breathing difficulty)      No  9. PREGNANCY OR POSTPARTUM: \"Is there any chance you are pregnant?\" \"When was your last menstrual period?\" \"Did you deliver in the last 2 weeks?\"      No  10. HIGH RISK: \"Do you have any heart or lung problems? Do you have a weak immune system?\" (e.g., CHF, COPD, asthma, HIV positive, chemotherapy, renal failure, diabetes mellitus, sickle cell anemia)        Yes-controlled diabetes, high blood pressure    Protocols used: CORONAVIRUS (COVID-19) EXPOSURE-A- 5.16.20      "

## 2020-07-17 NOTE — TELEPHONE ENCOUNTER
Additional Information    Commented on: [1] COVID-19 EXPOSURE (Close Contact) AND [2] within last 14 days BUT [3] NO symptoms     Virtual visit was initiated due to exposure    Protocols used: CORONAVIRUS (COVID-19) EXPOSURE-A- 5.16.20

## 2020-07-17 NOTE — PROGRESS NOTES
"Glenda Bales is a 69 year old female who is being evaluated via a billable telephone visit.      The patient has been notified of following:     \"This telephone visit will be conducted via a call between you and your physician/provider. We have found that certain health care needs can be provided without the need for a physical exam.  This service lets us provide the care you need with a short phone conversation.  If a prescription is necessary we can send it directly to your pharmacy.  If lab work is needed we can place an order for that and you can then stop by our lab to have the test done at a later time.    Telephone visits are billed at different rates depending on your insurance coverage. During this emergency period, for some insurers they may be billed the same as an in-person visit.  Please reach out to your insurance provider with any questions.    If during the course of the call the physician/provider feels a telephone visit is not appropriate, you will not be charged for this service.\"    Patient has given verbal consent for Telephone visit?  Yes    What phone number would you like to be contacted at? [phone]    How would you like to obtain your AVS? Guillermo Chisholm     Glenda Bales is a 69 year old female who presents via phone visit today for the following health issues:    HPI    Chief Complaint   Patient presents with     Covid exposure     Per FNA     Over the 4th of July had contact with niece, both indoors and outdoors, also with family from [place]. Did have a meal with them indoors on Saturday also. Niece developed symptoms on Tuesday and has tested positive for COVID-19. No fever. Does have some nasal congestion with slgith drainage into chest, which she attributes to allergies.      Reviewed and updated as needed this visit by Provider         Review of Systems   Constitutional, HEENT, cardiovascular, pulmonary, gi and gu systems are negative, except as otherwise noted.   "     Objective   Reported vitals:  There were no vitals taken for this visit.   healthy, alert and no distress  PSYCH: Alert and oriented times 3; coherent speech, normal   rate and volume, able to articulate logical thoughts, able   to abstract reason, no tangential thoughts, no hallucinations   or delusions  Her affect is normal  RESP: No cough, no audible wheezing, able to talk in full sentences  Remainder of exam unable to be completed due to telephone visits    Diagnostic Test Results:  Labs reviewed in Epic        Assessment/Plan:    1. Exposure to COVID-19 virus  Had close contact and recommend testing; discussed 14 days home quarantine.   - Asymptomatic COVID-19 Virus (Coronavirus) by PCR; Future    Return for COVID testing per .      Phone call duration:  9 minutes    EDILBERTO Hu CNP

## 2020-07-17 NOTE — PATIENT INSTRUCTIONS
"Discharge Instructions for COVID-19 Patients  You have--or may have--COVID-19. Please follow the instructions listed below.   If you have a weakened immune system, discuss with your doctor any other actions you need to take.  How can I protect others?  If you have symptoms (fever, cough, body aches or trouble breathing):    Stay home and away from others (self-isolate) until:  ? At least 10 days have passed since your symptoms started. And   ? You've had no fever--and no medicine that reduces fever--for 3 full days (72 hours). And   ? Your other symptoms have resolved (gotten better).  If you don't show symptoms, but testing showed that you have COVID-19:    Stay home and away from others (self-isolate) until at least 10 days have passed since the date of your first positive COVID-19 test.  During this time    Stay in your own room, even for meals. Use your own bathroom if you can.    Stay away from others in your home. No hugging, kissing or shaking hands. No visitors.    Don't go to work, school or anywhere else.    Clean \"high touch\" surfaces often (doorknobs, counters, handles). Use household cleaning spray or wipes. You'll find a full list of  on the EPA website: www.epa.gov/pesticide-registration/list-n-disinfectants-use-against-sars-cov-2.    Cover your mouth and nose with a mask, tissue or wash cloth to avoid spreading germs.    Wash your hands and face often. Use soap and water.    Caregivers in these groups are at risk for severe illness due to COVID-19:  ? People 65 years and older  ? People who live in a nursing home or long-term care facility  ? People with chronic disease (lung, heart, cancer, diabetes, kidney, liver, immunologic)  ? People who have a weakened immune system, including those who:    Are in cancer treatment    Take medicine that weakens the immune system, such as corticosteroids    Had a bone marrow or organ transplant    Have an immune deficiency    Have poorly controlled HIV or " AIDS    Are obese (body mass index of 40 or higher)    Smoke regularly    Caregivers should wear gloves while washing dishes, handling laundry and cleaning bedrooms and bathrooms.    Use caution when washing and drying laundry: Don't shake dirty laundry and use the warmest water setting that you can.    For more tips on managing your health at home, go to www.cdc.gov/coronavirus/2019-ncov/downloads/10Things.pdf.  How can I take care of myself at home?  1. Get lots of rest. Drink extra fluids (unless a doctor has told you not to).  2. Take Tylenol (acetaminophen) for fever or pain. If you have liver or kidney problems, ask your family doctor if it's okay to take Tylenol.     Adults can take either:  ? 650 mg (two 325 mg pills) every 4 to 6 hours, or   ? 1,000 mg (two 500 mg pills) every 8 hours as needed.  ? Note: Don't take more than 3,000 mg in one day. Acetaminophen is found in many medicines (both prescribed and over-the-counter medicines). Read all labels to be sure you don't take too much.   For children, check the Tylenol bottle for the right dose. The dose is based on the child's age or weight.  3. If you have other health problems (like cancer, heart failure, an organ transplant or severe kidney disease): Call your specialty clinic if you don't feel better in the next 2 days.  4. Know when to call 911. Emergency warning signs include:  ? Trouble breathing or shortness of breath  ? Pain or pressure in the chest that doesn't go away  ? Feeling confused like you haven't felt before, or not being able to wake up  ? Bluish-colored lips or face  5. Your doctor may have prescribed a blood thinner medicine. Follow their instructions.  Where can I get more information?     [employer] [facility] - About COVID-19:   [email or web address]    CDC - What to Do If You're Sick: www.cdc.gov/coronavirus/2019-ncov/about/steps-when-sick.html    CDC - Ending Home Isolation:  www.cdc.gov/coronavirus/2019-ncov/hcp/disposition-in-home-patients.html    CDC - Caring for Someone: www.cdc.gov/coronavirus/2019-ncov/if-you-are-sick/care-for-someone.html    [facility] - Interim Guidance for Hospital Discharge to Home: www.[facility].[place]/diseases/coronavirus/hcp/hospdischarge.pdf    [facility] clinical trials (COVID-19 research studies): clinicalaffairs.[facility]-clinical-trials    Below are the COVID-19 hotlines at the [place] Department of Health ([facility]). Interpreters are available.  ? For health questions: Call 964-643-2564 or 1-976.447.6672 (7 a.m. to 7 p.m.)  ? For questions about schools and childcare: Call 869-598-9090 or 1-257.887.9906 (7 a.m. to 7 p.m.)    For informational purposes only. Not to replace the advice of your health care provider. Clinically reviewed by the Infection Prevention Team.Copyright   2020 [facility]. All rights reserved. [employer] [id number] - 06/20.

## 2020-07-20 DIAGNOSIS — Z20.822 EXPOSURE TO COVID-19 VIRUS: ICD-10-CM

## 2020-07-20 LAB
SARS-COV-2 RNA SPEC QL NAA+PROBE: NOT DETECTED
SPECIMEN SOURCE: NORMAL

## 2020-07-20 PROCEDURE — U0003 INFECTIOUS AGENT DETECTION BY NUCLEIC ACID (DNA OR RNA); SEVERE ACUTE RESPIRATORY SYNDROME CORONAVIRUS 2 (SARS-COV-2) (CORONAVIRUS DISEASE [COVID-19]), AMPLIFIED PROBE TECHNIQUE, MAKING USE OF HIGH THROUGHPUT TECHNOLOGIES AS DESCRIBED BY CMS-2020-01-R: HCPCS | Performed by: NURSE PRACTITIONER

## 2020-07-20 NOTE — LETTER
July 22, 2020        [facility] [place]  [address]   [place] [address]    This letter provides a written record that you were tested for COVID-19 on 7/20/20.       Your result was negative. This means that we didn t find the virus that causes COVID-19 in your sample. A test may show negative when you do actually have the virus. This can happen when the virus is in the early stages of infection, before you feel illness symptoms.    If you have symptoms   Stay home and away from others (self-isolate) until you meet ALL of the guidelines below:    You ve had no fever--and no medicine that reduces fever--for 3 full days (72 hours). And      Your other symptoms have gotten better. For example, your cough or breathing has improved. And     At least 10 days have passed since your symptoms started.    During this time:    Stay home. Don t go to work, school or anywhere else.     Stay in your own room, including for meals. Use your own bathroom if you can.    Stay away from others in your home. No hugging, kissing or shaking hands. No visitors.    Clean  high touch  surfaces often (doorknobs, counters, handles, etc.). Use a household cleaning spray or wipes. You can find a full list on the EPA website at www.epa.gov/pesticide-registration/list-n-disinfectants-use-against-sars-cov-2.    Cover your mouth and nose with a mask, tissue or washcloth to avoid spreading germs.    Wash your hands and face often with soap and water.    Going back to work  Check with your employer for any guidelines to follow for going back to work.    Employers: This document serves as formal notice that your employee tested negative for COVID-19, as of the testing date shown above.

## 2020-07-21 ENCOUNTER — MYC MEDICAL ADVICE (OUTPATIENT)
Dept: FAMILY MEDICINE | Facility: CLINIC | Age: 70
End: 2020-07-21

## 2020-07-22 ENCOUNTER — ALLIED HEALTH/NURSE VISIT (OUTPATIENT)
Dept: NURSING | Facility: CLINIC | Age: 70
End: 2020-07-22
Payer: MEDICARE

## 2020-07-22 VITALS — DIASTOLIC BLOOD PRESSURE: 80 MMHG | SYSTOLIC BLOOD PRESSURE: 132 MMHG | HEART RATE: 66 BPM

## 2020-07-22 DIAGNOSIS — I10 HTN (HYPERTENSION): Primary | ICD-10-CM

## 2020-07-22 DIAGNOSIS — I10 ESSENTIAL HYPERTENSION, BENIGN: ICD-10-CM

## 2020-07-22 DIAGNOSIS — E11.65 TYPE 2 DIABETES MELLITUS WITH HYPERGLYCEMIA, WITHOUT LONG-TERM CURRENT USE OF INSULIN (H): ICD-10-CM

## 2020-07-22 LAB
ANION GAP SERPL CALCULATED.3IONS-SCNC: 7 MMOL/L (ref 3–14)
BUN SERPL-MCNC: 18 MG/DL (ref 7–30)
CALCIUM SERPL-MCNC: 9.3 MG/DL (ref 8.5–10.1)
CHLORIDE SERPL-SCNC: 106 MMOL/L (ref 94–109)
CHOLEST SERPL-MCNC: 237 MG/DL
CO2 SERPL-SCNC: 28 MMOL/L (ref 20–32)
CREAT SERPL-MCNC: 0.98 MG/DL (ref 0.52–1.04)
GFR SERPL CREATININE-BSD FRML MDRD: 58 ML/MIN/{1.73_M2}
GLUCOSE SERPL-MCNC: 96 MG/DL (ref 70–99)
HBA1C MFR BLD: 5.4 % (ref 0–5.6)
HDLC SERPL-MCNC: 91 MG/DL
LDLC SERPL CALC-MCNC: 125 MG/DL
NONHDLC SERPL-MCNC: 146 MG/DL
POTASSIUM SERPL-SCNC: 3.6 MMOL/L (ref 3.4–5.3)
SODIUM SERPL-SCNC: 141 MMOL/L (ref 133–144)
TRIGL SERPL-MCNC: 106 MG/DL

## 2020-07-22 PROCEDURE — 80061 LIPID PANEL: CPT | Performed by: FAMILY MEDICINE

## 2020-07-22 PROCEDURE — 83036 HEMOGLOBIN GLYCOSYLATED A1C: CPT | Performed by: FAMILY MEDICINE

## 2020-07-22 PROCEDURE — 80048 BASIC METABOLIC PNL TOTAL CA: CPT | Performed by: FAMILY MEDICINE

## 2020-07-22 PROCEDURE — 36415 COLL VENOUS BLD VENIPUNCTURE: CPT | Performed by: FAMILY MEDICINE

## 2020-07-23 RX ORDER — HYDROCHLOROTHIAZIDE 12.5 MG/1
12.5 TABLET ORAL DAILY
Qty: 90 TABLET | Refills: 0 | Status: SHIPPED | OUTPATIENT
Start: 2020-07-23 | End: 2020-09-30

## 2020-08-07 ENCOUNTER — DOCUMENTATION ONLY (OUTPATIENT)
Dept: LAB | Facility: CLINIC | Age: 70
End: 2020-08-07

## 2020-08-07 DIAGNOSIS — E78.5 HYPERLIPIDEMIA LDL GOAL <70: Primary | ICD-10-CM

## 2020-08-20 ENCOUNTER — TELEPHONE (OUTPATIENT)
Dept: FAMILY MEDICINE | Facility: CLINIC | Age: 70
End: 2020-08-20

## 2020-08-20 DIAGNOSIS — E78.5 HYPERLIPIDEMIA LDL GOAL <70: ICD-10-CM

## 2020-08-20 LAB
CHOLEST SERPL-MCNC: 277 MG/DL
HDLC SERPL-MCNC: 91 MG/DL
LDLC SERPL CALC-MCNC: 163 MG/DL
NONHDLC SERPL-MCNC: 186 MG/DL
TRIGL SERPL-MCNC: 117 MG/DL

## 2020-08-20 PROCEDURE — 36415 COLL VENOUS BLD VENIPUNCTURE: CPT | Performed by: FAMILY MEDICINE

## 2020-08-20 PROCEDURE — 80061 LIPID PANEL: CPT | Performed by: FAMILY MEDICINE

## 2020-08-20 NOTE — TELEPHONE ENCOUNTER
Voice mail left for patient to call RN hotline at [phone].      The 10-year ASCVD-heart  risk score ([place] Jr., et al., 2013) is: 24.2%   Please check with pt if she is taking her Lipitor   Increase Lipitor to 20 mg daily and follow up labs in 6 weeks -Lipid   Marysol Tee RN

## 2020-08-20 NOTE — TELEPHONE ENCOUNTER
Patient notified of providers message as written. Patient has not been taking the Lipitor, she will restart the 10mg dosage.  She does not want to increase the dosage at this time but will schedule a follow up with provider in the next month or so to discuss further.   Maribel Tee RN

## 2020-09-30 ENCOUNTER — OFFICE VISIT (OUTPATIENT)
Dept: FAMILY MEDICINE | Facility: CLINIC | Age: 70
End: 2020-09-30
Payer: MEDICARE

## 2020-09-30 VITALS
SYSTOLIC BLOOD PRESSURE: 136 MMHG | BODY MASS INDEX: 33.84 KG/M2 | WEIGHT: 191 LBS | RESPIRATION RATE: 18 BRPM | DIASTOLIC BLOOD PRESSURE: 82 MMHG | HEIGHT: 63 IN | OXYGEN SATURATION: 97 % | TEMPERATURE: 98.3 F | HEART RATE: 67 BPM

## 2020-09-30 DIAGNOSIS — Z23 NEED FOR PROPHYLACTIC VACCINATION AND INOCULATION AGAINST INFLUENZA: ICD-10-CM

## 2020-09-30 DIAGNOSIS — E03.9 ACQUIRED HYPOTHYROIDISM: ICD-10-CM

## 2020-09-30 DIAGNOSIS — I10 ESSENTIAL HYPERTENSION, BENIGN: ICD-10-CM

## 2020-09-30 DIAGNOSIS — E78.5 HYPERLIPIDEMIA LDL GOAL <70: ICD-10-CM

## 2020-09-30 DIAGNOSIS — Z12.11 SCREEN FOR COLON CANCER: ICD-10-CM

## 2020-09-30 DIAGNOSIS — K21.9 GASTROESOPHAGEAL REFLUX DISEASE, ESOPHAGITIS PRESENCE NOT SPECIFIED: ICD-10-CM

## 2020-09-30 LAB
BASOPHILS # BLD AUTO: 0 10E9/L (ref 0–0.2)
BASOPHILS NFR BLD AUTO: 0.8 %
DIFFERENTIAL METHOD BLD: NORMAL
EOSINOPHIL # BLD AUTO: 0.1 10E9/L (ref 0–0.7)
EOSINOPHIL NFR BLD AUTO: 1 %
ERYTHROCYTE [DISTWIDTH] IN BLOOD BY AUTOMATED COUNT: 13.6 % (ref 10–15)
HCT VFR BLD AUTO: 44 % (ref 35–47)
HGB BLD-MCNC: 14.8 G/DL (ref 11.7–15.7)
LYMPHOCYTES # BLD AUTO: 1.7 10E9/L (ref 0.8–5.3)
LYMPHOCYTES NFR BLD AUTO: 33.7 %
MCH RBC QN AUTO: 31.6 PG (ref 26.5–33)
MCHC RBC AUTO-ENTMCNC: 33.6 G/DL (ref 31.5–36.5)
MCV RBC AUTO: 94 FL (ref 78–100)
MONOCYTES # BLD AUTO: 0.4 10E9/L (ref 0–1.3)
MONOCYTES NFR BLD AUTO: 8.3 %
NEUTROPHILS # BLD AUTO: 2.9 10E9/L (ref 1.6–8.3)
NEUTROPHILS NFR BLD AUTO: 56.2 %
PLATELET # BLD AUTO: 200 10E9/L (ref 150–450)
RBC # BLD AUTO: 4.69 10E12/L (ref 3.8–5.2)
WBC # BLD AUTO: 5.2 10E9/L (ref 4–11)

## 2020-09-30 PROCEDURE — 36415 COLL VENOUS BLD VENIPUNCTURE: CPT | Performed by: FAMILY MEDICINE

## 2020-09-30 PROCEDURE — 99214 OFFICE O/P EST MOD 30 MIN: CPT | Mod: 25 | Performed by: FAMILY MEDICINE

## 2020-09-30 PROCEDURE — 85025 COMPLETE CBC W/AUTO DIFF WBC: CPT | Performed by: FAMILY MEDICINE

## 2020-09-30 PROCEDURE — 84443 ASSAY THYROID STIM HORMONE: CPT | Performed by: FAMILY MEDICINE

## 2020-09-30 PROCEDURE — G0008 ADMIN INFLUENZA VIRUS VAC: HCPCS | Performed by: FAMILY MEDICINE

## 2020-09-30 PROCEDURE — 82043 UR ALBUMIN QUANTITATIVE: CPT | Performed by: FAMILY MEDICINE

## 2020-09-30 PROCEDURE — 90662 IIV NO PRSV INCREASED AG IM: CPT | Performed by: FAMILY MEDICINE

## 2020-09-30 RX ORDER — LEVOTHYROXINE SODIUM 112 UG/1
TABLET ORAL
Qty: 90 TABLET | Refills: 3 | Status: SHIPPED | OUTPATIENT
Start: 2020-09-30 | End: 2021-09-29

## 2020-09-30 RX ORDER — ATORVASTATIN CALCIUM 10 MG/1
10 TABLET, FILM COATED ORAL DAILY
Qty: 90 TABLET | Refills: 3 | Status: SHIPPED | OUTPATIENT
Start: 2020-09-30 | End: 2021-06-21

## 2020-09-30 RX ORDER — HYDROCHLOROTHIAZIDE 12.5 MG/1
12.5 TABLET ORAL DAILY
Qty: 90 TABLET | Refills: 3 | Status: SHIPPED | OUTPATIENT
Start: 2020-09-30 | End: 2021-09-30

## 2020-09-30 ASSESSMENT — MIFFLIN-ST. JEOR: SCORE: 1355.5

## 2020-09-30 ASSESSMENT — PAIN SCALES - GENERAL: PAINLEVEL: NO PAIN (0)

## 2020-09-30 NOTE — PROGRESS NOTES
"Subjective     Glenda Bales is a 69 year old female who presents to clinic today for the following health issues:    History of Present Illness        Hyperlipidemia:  She presents for follow up of hyperlipidemia.  She is not taking medication to lower cholesterol. She is having (joint pain) myalgia or other side effects to statin medications.    Hypertension: She presents for follow up of hypertension.  She regularly outside of the clinic. Outpatient blood pressures have not been over 140/90.: sometimes. She does not follow a low salt diet.     Hypothyroidism:     Since last visit, patient describes the following symptoms::  None    She eats 2-3 servings of fruits and vegetables daily.She consumes 0 sweetened beverage(s) daily.She exercises with enough effort to increase her heart rate 9 or less minutes per day.  She exercises with enough effort to increase her heart rate 3 or less days per week.   She is not taking prescribed medications regularly due to side effects.   has NOT been taking Lipitor  Review of Systems   CONSTITUTIONAL: NEGATIVE for fever, chills, change in weight  ENT/MOUTH: NEGATIVE for ear, mouth and throat problems  RESP: NEGATIVE for significant cough or SOB  CV: NEGATIVE for chest pain, palpitations or peripheral edema  GI: NEGATIVE for nausea, abdominal pain, heartburn, or change in bowel habits  PSYCHIATRIC: NEGATIVE for changes in mood or affect  ROS otherwise negative      Objective    /82   Pulse 67   Temp 98.3  F (36.8  C) (Oral)   Resp 18   Ht 1.6 m (5' 3\")   Wt 86.6 kg (191 lb)   SpO2 97%   BMI 33.83 kg/m    Body mass index is 33.83 kg/m .  Physical Exam   GENERAL: healthy, alert and no distress  NECK: no adenopathy, no asymmetry, masses, or scars and thyroid normal to palpation  RESP: lungs clear to auscultation - no rales, rhonchi or wheezes  CV: regular rate and rhythm, normal S1 S2, no S3 or S4, no murmur, click or rub, no peripheral edema and peripheral pulses " strong  ABDOMEN: soft, nontender, no hepatosplenomegaly, no masses and bowel sounds normal  MS: no gross musculoskeletal defects noted, no edema  PSYCH: mentation appears normal, affect normal/bright    pnd         Assessment & Plan     Gastroesophageal reflux disease, esophagitis presence not specified  Pt feels she needs PPI  The potential side effects of this medication have been discussed with the patient.  Call if any significant problems with these are experienced.    - omeprazole (PRILOSEC) 20 MG DR capsule; TAKE 1 CAPSULE BY MOUTH EVERY DAY  - CBC with platelets and differential    Essential hypertension, benign  Stable   - hydrochlorothiazide (HYDRODIURIL) 12.5 MG tablet; Take 1 tablet (12.5 mg) by mouth daily  - Albumin Random Urine Quantitative with Creat Ratio    Acquired hypothyroidism  Labs pending   - levothyroxine (SYNTHROID/LEVOTHROID) 112 MCG tablet; TAKE 1 TABLET BY MOUTH EVERY DAY  - TSH with free T4 reflex    Hyperlipidemia LDL goal <70  Follow up labs 6 weeks  Advised take Lipitor as recommended    - atorvastatin (LIPITOR) 10 MG tablet; Take 1 tablet (10 mg) by mouth daily    Need for prophylactic vaccination and inoculation against influenza  Advised   - FLUZONE HIGH DOSE 65+  [59070]  - VACCINE ADMINISTRATION, INITIAL    Screen for colon cancer  Advised   - Fecal colorectal cancer screen (FIT); Future       Follow up 3 months    Return in about 3 months (around 12/30/2020) for Diabetes, VIDEO VISIT.    Marysol Cisse MD  [facility]

## 2020-10-01 LAB
CREAT UR-MCNC: 92 MG/DL
MICROALBUMIN UR-MCNC: 10 MG/L
MICROALBUMIN/CREAT UR: 10.37 MG/G CR (ref 0–25)
TSH SERPL DL<=0.005 MIU/L-ACNC: 1.54 MU/L (ref 0.4–4)

## 2020-10-14 NOTE — PROGRESS NOTES
"Glenda Bales is a 69 year old female who is being evaluated via a billable video visit.      The patient has been notified of following:     \"This video visit will be conducted via a call between you and your physician/provider. We have found that certain health care needs can be provided without the need for an in-person physical exam.  This service lets us provide the care you need with a video conversation.  If a prescription is necessary we can send it directly to your pharmacy.  If lab work is needed we can place an order for that and you can then stop by our lab to have the test done at a later time.    Video visits are billed at different rates depending on your insurance coverage.  Please reach out to your insurance provider with any questions.    If during the course of the call the physician/provider feels a video visit is not appropriate, you will not be charged for this service.\"    Patient has given verbal consent for Video visit? Yes  How would you like to obtain your AVS? MyChart  If you are dropped from the video visit, the video invite should be resent to: Text to cell phone: [phone]  Will anyone else be joining your video visit? No    Subjective     Glenda Bales is a 69 year old female who presents today via video visit for the following health issues:    HPI     Acute Illness  Acute illness concerns:  Runny nose, postnasal drip  Onset/Duration: 5 days  Symptoms:  Fever: no  Chills/Sweats: no  Headache (location?): no  Sinus Pressure:no  Drainage down her Throat  Pt has had sinus conditions before  Conjunctivitis:  No  Ear Pain: no  Rhinorrhea: YES  Congestion: no  Sore Throat: no  Cough: YES-productive of clear sputum  Wheeze: no  Decreased Appetite: no  Nausea: no  Vomiting: no  Diarrhea: no  Dysuria/Freq.: no  Dysuria or Hematuria: no  Fatigue/Achiness: no  Sick/Strep Exposure: no  Therapies tried and outcome: None    Has Known allergies  Video Start Time: 9.10 AM    Review of Systems "   CONSTITUTIONAL: NEGATIVE for fever, chills, change in weight  ENT/MOUTH: NEGATIVE for ear, mouth and throat problems  RESP: NEGATIVE for significant cough or SOB  CV: NEGATIVE for chest pain, palpitations or peripheral edema  GI: NEGATIVE for nausea, abdominal pain, heartburn, or change in bowel habits  MUSCULOSKELETAL: NEGATIVE for significant arthralgias or myalgia  ROS otherwise negative      Objective           Vitals:  No vitals were obtained today due to virtual visit.    Physical Exam     GENERAL: Healthy, alert and no distress  EYES: Eyes grossly normal to inspection.  No discharge or erythema, or obvious scleral/conjunctival abnormalities.  RESP: No audible wheeze, cough, or visible cyanosis.  No visible retractions or increased work of breathing.    SKIN: Visible skin clear. No significant rash, abnormal pigmentation or lesions.  NEURO: Cranial nerves grossly intact.  Mentation and speech appropriate for age.  PSYCH: Mentation appears normal, affect normal/bright, judgement and insight intact, normal speech and appearance well-groomed.              Assessment & Plan     Cough    - Symptomatic COVID-19 Virus (Coronavirus) by PCR; Future    Viral upper respiratory tract infection    - Symptomatic COVID-19 Virus (Coronavirus) by PCR; Future    Type 2 diabetes mellitus with stage 3 chronic kidney disease, without long-term current use of insulin, unspecified whether stage 3a or 3b CKD (H)  Stable   9:20 AM -visit complete-on doximity  Discharge Instructions for COVID-19 Patients  You have--or may have--COVID-19. Please follow the instructions listed below.   If you have a weakened immune system, discuss with your doctor any other actions you need to take.  How can I protect others?  If you have symptoms (fever, cough, body aches or trouble breathing):    Stay home and away from others (self-isolate) until:  ? At least 10 days have passed since your symptoms started, And   ? You've had no fever--and no  "medicine that reduces fever--for 1 full day (24 hours), And    ? Your other symptoms have resolved (gotten better).  If you don't show symptoms, but testing showed that you have COVID-19:    Stay home and away from others (self-isolate). Follow the tips under \"How do I self-isolate?\" below for 10 days (20 days if you have a weak immune system).    You don't need to be retested for COVID-19 before going back to school or work. As long as you're fever-free and feeling better, you can go back to school, work and other activities after waiting the 10 or 20 days.   How do I self-isolate?    Stay in your own room, even for meals. Use your own bathroom if you can.    Stay away from others in your home. No hugging, kissing or shaking hands. No visitors.    Don't go to work, school or anywhere else.    Clean \"high touch\" surfaces often (doorknobs, counters, handles). Use household cleaning spray or wipes. You'll find a full list of  on the EPA website: www.epa.gov/pesticide-registration/list-n-disinfectants-use-against-sars-cov-2.    Cover your mouth and nose with a mask or other face covering to avoid spreading germs.    Wash your hands and face often. Use soap and water.    Caregivers in these groups are at risk for severe illness due to COVID-19:  ? People 65 years and older  ? People who live in a nursing home or long-term care facility  ? People with chronic disease (lung, heart, cancer, diabetes, kidney, liver, immunologic)  ? People who have a weakened immune system, including those who:    Are in cancer treatment    Take medicine that weakens the immune system, such as corticosteroids    Had a bone marrow or organ transplant    Have an immune deficiency    Have poorly controlled HIV or AIDS    Are obese (body mass index of 40 or higher)    Smoke regularly    Caregivers should wear gloves while washing dishes, handling laundry and cleaning bedrooms and bathrooms.    Use caution when washing and drying laundry: " Don't shake dirty laundry and use the warmest water setting that you can.    For more tips on managing your health at home, go to www.cdc.gov/coronavirus/2019-ncov/downloads/10Things.pdf.  How can I take care of myself at home?  1. Get lots of rest. Drink extra fluids (unless a doctor has told you not to).    2. Take Tylenol (acetaminophen) for fever or pain. If you have liver or kidney problems, ask your family doctor if it's okay to take Tylenol.     Adults can take either:  ? 650 mg (two 325 mg pills) every 4 to 6 hours, or   ? 1,000 mg (two 500 mg pills) every 8 hours as needed.  ? Note: Don't take more than 3,000 mg in one day. Acetaminophen is found in many medicines (both prescribed and over-the-counter medicines). Read all labels to be sure you don't take too much.   For children, check the Tylenol bottle for the right dose. The dose is based on the child's age or weight.  3. If you have other health problems (like cancer, heart failure, an organ transplant or severe kidney disease): Call your specialty clinic if you don't feel better in the next 2 days.    4. Know when to call 911. Emergency warning signs include:  ? Trouble breathing or shortness of breath  ? Pain or pressure in the chest that doesn't go away  ? Feeling confused like you haven't felt before, or not being able to wake up  ? Bluish-colored lips or face    5. Your doctor may have prescribed a blood thinner medicine. Follow their instructions.  Where can I get more information?     [employer] [facility] - About COVID-19: [email or web address]    CDC - What to Do If You're Sick: www.cdc.gov/coronavirus/2019-ncov/about/steps-when-sick.html    CDC - Ending Home Isolation: www.cdc.gov/coronavirus/2019-ncov/hcp/disposition-in-home-patients.html    CDC - Caring for Someone: www.cdc.gov/coronavirus/2019-ncov/if-you-are-sick/care-for-someone.html    [facility] - Interim Guidance for Hospital Discharge to Home:  www.health.[facility].[place]/diseases/coronavirus/hcp/hospdischarge.pdf    [facility] clinical trials (COVID-19 research studies): clinicalaffairs.[facility]/n-clinical-trials    Below are the COVID-19 hotlines at the [place] Department of Health ([facility]). Interpreters are available.  ? For health questions: Call 423-812-9195 or 1-700.749.3069 (7 a.m. to 7 p.m.)  ? For questions about schools and childcare: Call 615-627-5721 or 1-269.936.6478 (7 a.m. to 7 p.m.)    For informational purposes only. Not to replace the advice of your health care provider. Clinically reviewed by the Infection Prevention Team. Copyright   2020 [facility] [employer]. All rights reserved. [employer] [id number] - REV 08/04/20.                  Return in about 2 weeks (around 10/29/2020), or if symptoms worsen or fail to improve, for VIDEO VISIT/sooner if worse.    Marysol Cisse MD  [facility]      Video-Visit Details    Type of service:  Video Visit    Video End Time:9.16 AM    Originating Location (pt. Location): Home    Distant Location (provider location):  [facility]     Platform used for Video Visit: Frannie

## 2020-10-15 ENCOUNTER — VIRTUAL VISIT (OUTPATIENT)
Dept: FAMILY MEDICINE | Facility: CLINIC | Age: 70
End: 2020-10-15
Payer: MEDICARE

## 2020-10-15 DIAGNOSIS — E11.22 TYPE 2 DIABETES MELLITUS WITH STAGE 3 CHRONIC KIDNEY DISEASE, WITHOUT LONG-TERM CURRENT USE OF INSULIN, UNSPECIFIED WHETHER STAGE 3A OR 3B CKD (H): Primary | ICD-10-CM

## 2020-10-15 DIAGNOSIS — N18.30 TYPE 2 DIABETES MELLITUS WITH STAGE 3 CHRONIC KIDNEY DISEASE, WITHOUT LONG-TERM CURRENT USE OF INSULIN, UNSPECIFIED WHETHER STAGE 3A OR 3B CKD (H): Primary | ICD-10-CM

## 2020-10-15 DIAGNOSIS — R05.9 COUGH: ICD-10-CM

## 2020-10-15 DIAGNOSIS — J06.9 VIRAL UPPER RESPIRATORY TRACT INFECTION: ICD-10-CM

## 2020-10-15 PROCEDURE — 99213 OFFICE O/P EST LOW 20 MIN: CPT | Mod: 95 | Performed by: FAMILY MEDICINE

## 2020-10-15 NOTE — PATIENT INSTRUCTIONS
"Discharge Instructions for COVID-19 Patients  You have--or may have--COVID-19. Please follow the instructions listed below.   If you have a weakened immune system, discuss with your doctor any other actions you need to take.  How can I protect others?  If you have symptoms (fever, cough, body aches or trouble breathing):    Stay home and away from others (self-isolate) until:  ? At least 10 days have passed since your symptoms started, And   ? You've had no fever--and no medicine that reduces fever--for 1 full day (24 hours), And    ? Your other symptoms have resolved (gotten better).  If you don't show symptoms, but testing showed that you have COVID-19:    Stay home and away from others (self-isolate). Follow the tips under \"How do I self-isolate?\" below for 10 days (20 days if you have a weak immune system).    You don't need to be retested for COVID-19 before going back to school or work. As long as you're fever-free and feeling better, you can go back to school, work and other activities after waiting the 10 or 20 days.   How do I self-isolate?    Stay in your own room, even for meals. Use your own bathroom if you can.    Stay away from others in your home. No hugging, kissing or shaking hands. No visitors.    Don't go to work, school or anywhere else.    Clean \"high touch\" surfaces often (doorknobs, counters, handles). Use household cleaning spray or wipes. You'll find a full list of  on the EPA website: www.epa.gov/pesticide-registration/list-n-disinfectants-use-against-sars-cov-2.    Cover your mouth and nose with a mask or other face covering to avoid spreading germs.    Wash your hands and face often. Use soap and water.    Caregivers in these groups are at risk for severe illness due to COVID-19:  ? People 65 years and older  ? People who live in a nursing home or long-term care facility  ? People with chronic disease (lung, heart, cancer, diabetes, kidney, liver, immunologic)  ? People who have a " weakened immune system, including those who:    Are in cancer treatment    Take medicine that weakens the immune system, such as corticosteroids    Had a bone marrow or organ transplant    Have an immune deficiency    Have poorly controlled HIV or AIDS    Are obese (body mass index of 40 or higher)    Smoke regularly    Caregivers should wear gloves while washing dishes, handling laundry and cleaning bedrooms and bathrooms.    Use caution when washing and drying laundry: Don't shake dirty laundry and use the warmest water setting that you can.    For more tips on managing your health at home, go to www.cdc.gov/coronavirus/2019-ncov/downloads/10Things.pdf.  How can I take care of myself at home?  1. Get lots of rest. Drink extra fluids (unless a doctor has told you not to).    2. Take Tylenol (acetaminophen) for fever or pain. If you have liver or kidney problems, ask your family doctor if it's okay to take Tylenol.     Adults can take either:  ? 650 mg (two 325 mg pills) every 4 to 6 hours, or   ? 1,000 mg (two 500 mg pills) every 8 hours as needed.  ? Note: Don't take more than 3,000 mg in one day. Acetaminophen is found in many medicines (both prescribed and over-the-counter medicines). Read all labels to be sure you don't take too much.   For children, check the Tylenol bottle for the right dose. The dose is based on the child's age or weight.  3. If you have other health problems (like cancer, heart failure, an organ transplant or severe kidney disease): Call your specialty clinic if you don't feel better in the next 2 days.    4. Know when to call 911. Emergency warning signs include:  ? Trouble breathing or shortness of breath  ? Pain or pressure in the chest that doesn't go away  ? Feeling confused like you haven't felt before, or not being able to wake up  ? Bluish-colored lips or face    5. Your doctor may have prescribed a blood thinner medicine. Follow their instructions.  Where can I get more  information?    [facility] - About COVID-19: [email or web address]    CDC - What to Do If You're Sick: www.cdc.gov/coronavirus/2019-ncov/about/steps-when-sick.html    CDC - Ending Home Isolation: www.cdc.gov/coronavirus/2019-ncov/hcp/disposition-in-home-patients.html    CDC - Caring for Someone: www.cdc.gov/coronavirus/2019-ncov/if-you-are-sick/care-for-someone.html    [facility] - Interim Guidance for Hospital Discharge to Home: www.health.[facility].[place]./diseases/coronavirus/hcp/hospdischarge.pdf    [facility] clinical trials (COVID-19 research studies): [email or web address]-clinical-trials    Below are the COVID-19 hotlines at the [place] Department of Health ([facility]). Interpreters are available.  ? For health questions: Call 825-239-3638 or 1-358.510.7885 (7 a.m. to 7 p.m.)  ? For questions about schools and childcare: Call 521-318-6311 or 1-721.651.3347 (7 a.m. to 7 p.m.)    For informational purposes only. Not to replace the advice of your health care provider. Clinically reviewed by the Infection Prevention Team. Copyright   2020 [employer]. All rights reserved. [employer] 024873 - REV 08/04/20.

## 2020-10-19 DIAGNOSIS — R05.9 COUGH: ICD-10-CM

## 2020-10-19 DIAGNOSIS — J06.9 VIRAL UPPER RESPIRATORY TRACT INFECTION: ICD-10-CM

## 2020-10-19 PROCEDURE — U0003 INFECTIOUS AGENT DETECTION BY NUCLEIC ACID (DNA OR RNA); SEVERE ACUTE RESPIRATORY SYNDROME CORONAVIRUS 2 (SARS-COV-2) (CORONAVIRUS DISEASE [COVID-19]), AMPLIFIED PROBE TECHNIQUE, MAKING USE OF HIGH THROUGHPUT TECHNOLOGIES AS DESCRIBED BY CMS-2020-01-R: HCPCS | Performed by: FAMILY MEDICINE

## 2020-10-20 LAB
SARS-COV-2 RNA SPEC QL NAA+PROBE: NOT DETECTED
SPECIMEN SOURCE: NORMAL

## 2020-12-16 NOTE — PROGRESS NOTES
Subjective   Glenda Bales is a 70 year old female who presents to clinic today for the following health issues:  HPI         {SUPERLIST (Optional):788721}  {additonal problems for provider to add (Optional):238230}    Review of Systems   {ROS COMP (Optional):658203}      Objective    There were no vitals taken for this visit.  There is no height or weight on file to calculate BMI.  Physical Exam   {Exam List (Optional):859626}    {Diagnostic Test Results (Optional):114441}        {PROVIDER CHARTING PREFERENCE:469010}

## 2020-12-16 NOTE — PROGRESS NOTES
"Glenda Bales is a 70 year old female who is being evaluated via a billable video visit.      The patient has been notified of following:     \"This video visit will be conducted via a call between you and your physician/provider. We have found that certain health care needs can be provided without the need for an in-person physical exam.  This service lets us provide the care you need with a video conversation.  If a prescription is necessary we can send it directly to your pharmacy.  If lab work is needed we can place an order for that and you can then stop by our lab to have the test done at a later time.    Video visits are billed at different rates depending on your insurance coverage.  Please reach out to your insurance provider with any questions.    If during the course of the call the physician/provider feels a video visit is not appropriate, you will not be charged for this service.\"    Patient has given verbal consent for Video visit? Yes  How would you like to obtain your AVS? MyChart  If you are dropped from the video visit, the video invite should be resent to: Text to cell phone: [phone]  Will anyone else be joining your video visit? No    Subjective     Glenda Bales is a 70 year old female who presents today via video visit for the following health issues:    HPI     Musculoskeletal problem/pain  Onset/Duration: off and for long time- worse for 3 weeks  Description  Location: hip - bilateral worse is left  Joint Swelling: no  Redness: no  Pain: YES  Warmth: no  Intensity:  moderate  Progression of Symptoms:  worsening  Accompanying signs and symptoms:   Fevers: no  Numbness/tingling/weakness: no  History  Trauma to the area: no  Recent illness:  no  Previous similar problem: YES- but not that bad  Previous evaluation:  no  Precipitating or alleviating factors:  Aggravating factors include: lying flat is worse, once gets up and moving it gets better  Therapies tried and outcome: cold wash cloth " helps  Video Start Time: 12:30 PM        Review of Systems   Rest of the ROS is Negative except see above and Problem list [stable]        Objective           Vitals:  No vitals were obtained today due to virtual visit.    Physical Exam     GENERAL: Healthy, alert and no distress  EYES: Eyes grossly normal to inspection.  No discharge or erythema, or obvious scleral/conjunctival abnormalities.  RESP: No audible wheeze, cough, or visible cyanosis.  No visible retractions or increased work of breathing.    SKIN: Visible skin clear. No significant rash, abnormal pigmentation or lesions.  NEURO: Cranial nerves grossly intact.  Mentation and speech appropriate for age.  PSYCH: Mentation appears normal, affect normal/bright, judgement and insight intact, normal speech and appearance well-groomed.  Points to left Hip where she has pain  [no trauma]      Pending         Assessment & Plan     Hip pain, left  Advised Tylenol otc  See me 2 weeks  Please  Come to clinic for Xray  - XR Hip Left 2-3 Views; Future  - [facility] REFERRAL; Future      Advised PT.lisa Cisse MD  [facility]      Video-Visit Details    Type of service:  Video Visit    Video End Time:12.35 PM    Originating Location (pt. Location): Home    Distant Location (provider location):  [facility]     Platform used for Video Visit: [employer]    12:45 PM -video  vsiit complete

## 2020-12-17 ENCOUNTER — OFFICE VISIT (OUTPATIENT)
Dept: FAMILY MEDICINE | Facility: CLINIC | Age: 70
End: 2020-12-17
Payer: MEDICARE

## 2020-12-17 DIAGNOSIS — M25.552 HIP PAIN, LEFT: Primary | ICD-10-CM

## 2020-12-17 PROCEDURE — 99213 OFFICE O/P EST LOW 20 MIN: CPT | Mod: 95 | Performed by: FAMILY MEDICINE

## 2020-12-21 ENCOUNTER — ANCILLARY PROCEDURE (OUTPATIENT)
Dept: GENERAL RADIOLOGY | Facility: CLINIC | Age: 70
End: 2020-12-21
Attending: FAMILY MEDICINE
Payer: MEDICARE

## 2020-12-21 DIAGNOSIS — M25.552 HIP PAIN, LEFT: ICD-10-CM

## 2020-12-21 PROCEDURE — 73502 X-RAY EXAM HIP UNI 2-3 VIEWS: CPT | Mod: LT | Performed by: RADIOLOGY

## 2021-01-03 ENCOUNTER — HEALTH MAINTENANCE LETTER (OUTPATIENT)
Age: 71
End: 2021-01-03

## 2021-01-12 ENCOUNTER — OFFICE VISIT (OUTPATIENT)
Dept: FAMILY MEDICINE | Facility: CLINIC | Age: 71
End: 2021-01-12
Payer: MEDICARE

## 2021-01-12 VITALS
WEIGHT: 194 LBS | DIASTOLIC BLOOD PRESSURE: 81 MMHG | RESPIRATION RATE: 20 BRPM | OXYGEN SATURATION: 96 % | TEMPERATURE: 99 F | HEIGHT: 63 IN | BODY MASS INDEX: 34.38 KG/M2 | HEART RATE: 58 BPM | SYSTOLIC BLOOD PRESSURE: 137 MMHG

## 2021-01-12 DIAGNOSIS — N18.31 TYPE 2 DIABETES MELLITUS WITH STAGE 3A CHRONIC KIDNEY DISEASE, WITHOUT LONG-TERM CURRENT USE OF INSULIN (H): ICD-10-CM

## 2021-01-12 DIAGNOSIS — E78.5 HYPERLIPIDEMIA LDL GOAL <70: ICD-10-CM

## 2021-01-12 DIAGNOSIS — F10.11 ALCOHOL ABUSE, IN REMISSION: Primary | ICD-10-CM

## 2021-01-12 DIAGNOSIS — Z12.11 SCREEN FOR COLON CANCER: ICD-10-CM

## 2021-01-12 DIAGNOSIS — M16.12 PRIMARY OSTEOARTHRITIS OF LEFT HIP: ICD-10-CM

## 2021-01-12 DIAGNOSIS — E11.22 TYPE 2 DIABETES MELLITUS WITH STAGE 3A CHRONIC KIDNEY DISEASE, WITHOUT LONG-TERM CURRENT USE OF INSULIN (H): ICD-10-CM

## 2021-01-12 DIAGNOSIS — Z00.00 ENCOUNTER FOR MEDICARE ANNUAL WELLNESS EXAM: ICD-10-CM

## 2021-01-12 DIAGNOSIS — E66.09 CLASS 1 OBESITY DUE TO EXCESS CALORIES WITH SERIOUS COMORBIDITY AND BODY MASS INDEX (BMI) OF 33.0 TO 33.9 IN ADULT: ICD-10-CM

## 2021-01-12 DIAGNOSIS — N18.31 STAGE 3A CHRONIC KIDNEY DISEASE (H): ICD-10-CM

## 2021-01-12 DIAGNOSIS — E66.811 CLASS 1 OBESITY DUE TO EXCESS CALORIES WITH SERIOUS COMORBIDITY AND BODY MASS INDEX (BMI) OF 33.0 TO 33.9 IN ADULT: ICD-10-CM

## 2021-01-12 DIAGNOSIS — I10 ESSENTIAL HYPERTENSION, BENIGN: ICD-10-CM

## 2021-01-12 PROBLEM — J06.9 VIRAL UPPER RESPIRATORY TRACT INFECTION: Status: RESOLVED | Noted: 2020-10-15 | Resolved: 2021-01-12

## 2021-01-12 PROBLEM — R79.89 ELEVATED LFTS: Status: RESOLVED | Noted: 2018-05-02 | Resolved: 2021-01-12

## 2021-01-12 LAB — HBA1C MFR BLD: 5.6 % (ref 0–5.6)

## 2021-01-12 PROCEDURE — 83036 HEMOGLOBIN GLYCOSYLATED A1C: CPT | Performed by: FAMILY MEDICINE

## 2021-01-12 PROCEDURE — 36415 COLL VENOUS BLD VENIPUNCTURE: CPT | Performed by: FAMILY MEDICINE

## 2021-01-12 PROCEDURE — 99214 OFFICE O/P EST MOD 30 MIN: CPT | Mod: 25 | Performed by: FAMILY MEDICINE

## 2021-01-12 PROCEDURE — G0439 PPPS, SUBSEQ VISIT: HCPCS | Performed by: FAMILY MEDICINE

## 2021-01-12 PROCEDURE — 99207 PR FOOT EXAM NO CHARGE: CPT | Performed by: FAMILY MEDICINE

## 2021-01-12 ASSESSMENT — ACTIVITIES OF DAILY LIVING (ADL): CURRENT_FUNCTION: NO ASSISTANCE NEEDED

## 2021-01-12 ASSESSMENT — MIFFLIN-ST. JEOR: SCORE: 1364.11

## 2021-01-12 ASSESSMENT — PAIN SCALES - GENERAL: PAINLEVEL: NO PAIN (0)

## 2021-01-12 NOTE — PROGRESS NOTES
"SUBJECTIVE:   Glenda Bales is a 70 year old female who presents for Preventive Visit.      Patient has been advised of split billing requirements and indicates understanding: Yes   Are you in the first 12 months of your Medicare coverage?  No    Healthy Habits:     In general, how would you rate your overall health?  Good    Frequency of exercise:  None    Do you usually eat at least 4 servings of fruit and vegetables a day, include whole grains    & fiber and avoid regularly eating high fat or \"junk\" foods?  No    Taking medications regularly:  0    Barriers to taking medications:  Not applicable    Medication side effects:  Not applicable    Ability to successfully perform activities of daily living:  No assistance needed    Home Safety:  No safety concerns identified    Hearing Impairment:  No hearing concerns    In the past 6 months, have you been bothered by leaking of urine?  No    In general, how would you rate your overall mental or emotional health?  Very good      PHQ-2 Total Score: 0    Additional concerns today:  No  History of Present Illness       Diabetes:   She presents for follow up of diabetes.  She is checking home blood glucose a few times a week. She checks blood glucose before meals.  Blood glucose is never over 200 and never under 70. She is not experiencing numbness or burning in feet, excessive thirst, blurry vision, weight changes or redness, sores or blisters on feet. The patient has not had a diabetic eye exam in the last 12 months.         Hyperlipidemia:  She presents for follow up of hyperlipidemia.  She is taking medication to lower cholesterol. She is having myalgia or other side effects to statin medications.    Hypertension: She presents for follow up of hypertension.  She does not check blood pressure  regularly outside of the clinic.  She does not follow a low salt diet.     She eats 2-3 servings of fruits and vegetables daily.She consumes 0 sweetened beverage(s) daily.She " exercises with enough effort to increase her heart rate 9 or less minutes per day.  She exercises with enough effort to increase her heart rate 3 or less days per week.   She is taking medications regularly.  She is not taking prescribed medications regularly due to Not applicable.    Do you feel safe in your environment? Yes    Have you ever done Advance Care Planning? (For example, a Health Directive, POLST, or a discussion with a medical provider or your loved ones about your wishes): Yes, advance care planning is on file.      Fall risk  Fallen 2 or more times in the past year?: No  Any fall with injury in the past year?: No    Cognitive Screening   1) Repeat 3 items (Leader, Season, Table)    2) Clock draw: NORMAL  3) 3 item recall: Recalls 3 objects  Results: 3 items recalled: COGNITIVE IMPAIRMENT LESS LIKELY    Mini-CogTM Copyright S Javy. Licensed by the author for use in [facility]; reprinted with permission (kevin@[email or web address]). All rights reserved.      Do you have sleep apnea, excessive snoring or daytime drowsiness?: no    Reviewed and updated as needed this visit by clinical staff  Tobacco  Allergies  Meds  Problems  Med Hx  Surg Hx  Fam Hx  Soc Hx        Left hip pain-has been going on for a while  Reviewed and updated as needed this visit by Provider  Tobacco  Allergies  Meds  Problems  Med Hx  Surg Hx  Fam Hx         Social History     Tobacco Use     Smoking status: Former Smoker     Packs/day: 0.00     Years: 1.00     Pack years: 0.00     Types: Cigarettes     Quit date: 6/15/1978     Years since quittin.6     Smokeless tobacco: Never Used     Tobacco comment: quit in    Substance Use Topics     Alcohol use: Yes     Comment: 1 red wine before bef     If you drink alcohol do you typically have >3 drinks per day or >7 drinks per week? No    Alcohol Use 10/17/2019   Prescreen: >3 drinks/day or >7 drinks/week? No   Prescreen: >3 drinks/day or >7 drinks/week? -   No  flowsheet data found.            Current providers sharing in care for this patient include:   Patient Care Team:  Marysol Cisse MD as PCP - General (Family Practice)  Marysol Cisse MD as Assigned PCP  Stacie Reno, RN as Diabetes Educator (Diabetes Education)  Jama Carpenter MD as Assigned Surgical Provider    The following health maintenance items are reviewed in Epic and correct as of today:  Health Maintenance   Topic Date Due     COLORECTAL CANCER SCREENING  11/20/1960     EYE EXAM  01/28/2021     FALL RISK ASSESSMENT  07/08/2021     A1C  07/12/2021     BMP  07/22/2021     MAMMO SCREENING  07/30/2021     LIPID  08/20/2021     MICROALBUMIN  09/30/2021     TSH W/FREE T4 REFLEX  09/30/2021     MEDICARE ANNUAL WELLNESS VISIT  01/12/2022     DIABETIC FOOT EXAM  01/12/2022     ADVANCE CARE PLANNING  01/12/2026     DTAP/TDAP/TD IMMUNIZATION (4 - Td) 04/24/2028     DEXA  05/14/2033     HEPATITIS C SCREENING  Completed     PHQ-2  Completed     INFLUENZA VACCINE  Completed     Pneumococcal Vaccine: 65+ Years  Completed     ZOSTER IMMUNIZATION  Completed     Pneumococcal Vaccine: Pediatrics (0 to 5 Years) and At-Risk Patients (6 to 64 Years)  Aged Out     IPV IMMUNIZATION  Aged Out     MENINGITIS IMMUNIZATION  Aged Out     Lab work is in process  Labs reviewed in EPIC  BP Readings from Last 3 Encounters:   01/12/21 137/81   09/30/20 136/82   07/22/20 132/80    Wt Readings from Last 3 Encounters:   01/12/21 88 kg (194 lb)   09/30/20 86.6 kg (191 lb)   07/08/20 85.7 kg (189 lb)                  Patient Active Problem List   Diagnosis     Essential hypertension, benign     Chronic rhinitis     Acquired hypothyroidism     Hyperlipidemia LDL goal <70     Gastroesophageal reflux disease without esophagitis     Nonrheumatic aortic valve insufficiency     Anxiety     Osteoarthritis of knee, unspecified laterality, unspecified osteoarthritis type     Other irritable bowel syndrome     Alcohol abuse, in remission     Fatty  liver     Type 2 diabetes mellitus with stage 3 chronic kidney disease, without long-term current use of insulin (H)     Class 1 obesity due to excess calories with serious comorbidity and body mass index (BMI) of 33.0 to 33.9 in adult     Chronic kidney disease, stage 3 (moderate)     Advanced directives, counseling/discussion     Cough     Primary osteoarthritis of left hip     Past Surgical History:   Procedure Laterality Date     CATARACT IOL, RT/LT       NO HISTORY OF SURGERY         Social History     Tobacco Use     Smoking status: Former Smoker     Packs/day: 0.00     Years: 1.00     Pack years: 0.00     Types: Cigarettes     Quit date: 6/15/1978     Years since quittin.6     Smokeless tobacco: Never Used     Tobacco comment: quit in    Substance Use Topics     Alcohol use: Yes     Comment: 1 red wine before bef     Family History   Problem Relation Age of Onset     Diabetes Mother      Coronary Artery Disease Father          with a heart attack     Colon Cancer No family hx of      Breast Cancer No family hx of          Current Outpatient Medications   Medication Sig Dispense Refill     ACE/ARB/ARNI NOT PRESCRIBED (INTENTIONAL) Please choose reason not prescribed from choices below.       alcohol swab prep pads Use to swab area of injection/milena as directed. 100 each 3     amLODIPine (NORVASC) 5 MG tablet Take 1 tablet (5 mg) by mouth daily 90 tablet 3     atenolol (TENORMIN) 25 MG tablet Take 1 tablet (25 mg) by mouth 2 times daily 180 tablet 3     atorvastatin (LIPITOR) 10 MG tablet Take 1 tablet (10 mg) by mouth daily 90 tablet 3     blood glucose (NO BRAND SPECIFIED) test strip Use to test blood sugar 1 times daily or as directed. To accompany: Blood Glucose Monitor Brands: per insurance. 100 strip 6     blood glucose calibration (NO BRAND SPECIFIED) solution To accompany: Blood Glucose Monitor Brands: per insurance. 1 Bottle 3     blood glucose monitoring (NO BRAND SPECIFIED) meter device  kit Use to test blood sugar 1 times daily or as directed. Preferred blood glucose meter OR supplies to accompany: Blood Glucose Monitor Brands: per insurance. 1 kit 0     cetirizine (ZYRTEC) 10 MG tablet Take 10 mg by mouth daily       diclofenac (VOLTAREN) 1 % topical gel Apply 2 g topically 4 times daily 50 g 0     fluticasone (FLONASE) 50 MCG/ACT spray Spray 1 spray into both nostrils daily as needed        hydrochlorothiazide (HYDRODIURIL) 12.5 MG tablet Take 1 tablet (12.5 mg) by mouth daily 90 tablet 3     ketotifen (ALAWAY) 0.025 % SOLN ophthalmic solution 1 drop 2 times daily       levothyroxine (SYNTHROID/LEVOTHROID) 112 MCG tablet TAKE 1 TABLET BY MOUTH EVERY DAY 90 tablet 3     omeprazole (PRILOSEC) 20 MG DR capsule TAKE 1 CAPSULE BY MOUTH EVERY DAY 90 capsule 3     thin (NO BRAND SPECIFIED) lancets Use with lanceting device. To accompany: Blood Glucose Monitor Brands: per insurance. 100 each 6     albuterol (PROAIR HFA/PROVENTIL HFA/VENTOLIN HFA) 108 (90 Base) MCG/ACT inhaler Inhale 2 puffs into the lungs every 6 hours (Patient not taking: Reported on 10/14/2020) 1 Inhaler 0     Allergies   Allergen Reactions     Losartan Other (See Comments)     Acute Kidney Injury     Seasonal      Seasonal Allergies      Recent Labs   Lab Test 01/12/21  1001 09/30/20  1135 08/20/20  0733 07/22/20  0737 07/08/20  0951 10/17/19  0744 10/17/19  0744 09/12/19  1517 09/12/19  1517 04/19/19  0925 04/19/19  0925 03/04/19  0807   A1C 5.6  --   --  5.4 5.2  --  5.4  --   --    < > 7.7*  --    LDL  --   --  163* 125* 162*  --   --   --   --   --  112* 175*   HDL  --   --  91 91 100  --   --   --   --   --  74 77   TRIG  --   --  117 106 82  --   --   --   --   --  79 124   ALT  --   --   --   --   --   --  40  --   --   --  61* 70*   CR  --   --   --  0.98 0.86   < >  --   --  1.13*   < >  --  0.97   GFRESTIMATED  --   --   --  58* 68   < >  --   --  49*   < >  --  59*   GFRESTBLACK  --   --   --  67 79   < >  --   --  57*   <  ">  --  69   POTASSIUM  --   --   --  3.6 4.3   < >  --    < > 3.0*   < >  --  3.6   TSH  --  1.54  --   --   --   --   --   --  1.50  --  2.25 4.93*    < > = values in this interval not displayed.          Review of Systems  CONSTITUTIONAL: NEGATIVE for fever, chills, change in weight  INTEGUMENTARY/SKIN: NEGATIVE for worrisome rashes, moles or lesions  EYES: NEGATIVE for vision changes or irritation  ENT/MOUTH: NEGATIVE for ear, mouth and throat problems  RESP: NEGATIVE for significant cough or SOB  BREAST: NEGATIVE for masses, tenderness or discharge  CV: NEGATIVE for chest pain, palpitations or peripheral edema  GI: NEGATIVE for nausea, abdominal pain, heartburn, or change in bowel habits  : NEGATIVE for frequency, dysuria, or hematuria  MUSCULOSKELETAL:oa hip  NEURO: NEGATIVE for weakness, dizziness or paresthesias  ENDOCRINE: NEGATIVE for temperature intolerance, skin/hair changes  HEME: NEGATIVE for bleeding problems  PSYCHIATRIC: NEGATIVE for changes in mood or affect    OBJECTIVE:   /81   Pulse 58   Temp 99  F (37.2  C) (Oral)   Resp 20   Ht 1.6 m (5' 3\")   Wt 88 kg (194 lb)   SpO2 96%   BMI 34.37 kg/m   Estimated body mass index is 34.37 kg/m  as calculated from the following:    Height as of this encounter: 1.6 m (5' 3\").    Weight as of this encounter: 88 kg (194 lb).  Physical Exam  GENERAL APPEARANCE: healthy, alert and no distress  EYES: Eyes grossly normal to inspection, PERRL and conjunctivae and sclerae normal  HENT: ear canals and TM's normal, nose and mouth without ulcers or lesions, oropharynx clear and oral mucous membranes moist  NECK: no adenopathy, no asymmetry, masses, or scars and thyroid normal to palpation  RESP: lungs clear to auscultation - no rales, rhonchi or wheezes  BREAST: normal without masses, tenderness or nipple discharge and no palpable axillary masses or adenopathy  CV: regular rate and rhythm, normal S1 S2, no S3 or S4, no murmur, click or rub, no peripheral " edema and peripheral pulses strong  ABDOMEN: soft, nontender, no hepatosplenomegaly, no masses and bowel sounds normal  MS: no musculoskeletal defects are noted and gait is age appropriate without ataxia  MS: no musculoskeletal defects are noted, walks slowly due to hip pain  SKIN: no suspicious lesions or rashes  NEURO: Normal strength and tone, sensory exam grossly normal, mentation intact and speech normal  PSYCH: mentation appears normal and affect normal/bright    Diagnostic Test Results:  Labs reviewed in Epic    ASSESSMENT / PLAN:   1. Encounter for Medicare annual wellness exam      2. Type 2 diabetes mellitus with stage 3a chronic kidney disease, without long-term current use of insulin (H)  controlled  - HEMOGLOBIN A1C  - ACE/ARB/ARNI NOT PRESCRIBED (INTENTIONAL); Please choose reason not prescribed from choices below.  Dispense:    - FOOT EXAM  - EYE ADULT REFERRAL; Future    3. Hyperlipidemia LDL goal <70  Pt has Not been taking her statins  Advised take it 2 days a week with co q 10  If myalgias call me  Check Lipid next visit    4. Essential hypertension, benign  controlled    5. Stage 3a chronic kidney disease  Stable     6. Class 1 obesity due to excess calories with serious comorbidity and body mass index (BMI) of 33.0 to 33.9 in adult  Low carroll diet/Exercise    7. Primary osteoarthritis of left hip  Advised PT  If not better -see ortho for a steroid shot  - [facility]IROPRACTIC REFERRAL; Future  - diclofenac (VOLTAREN) 1 % topical gel; Apply 2 g topically 4 times daily  Dispense: 50 g; Refill: 0    8. Alcohol abuse, in remission      9. Screen for colon cancer  Advised again and discussed Importance of early detectin  - [facility]([employer])    Patient has been advised of split billing requirements and indicates understanding: Yes  COUNSELING:  Reviewed preventive health counseling, as reflected in patient instructions       Regular exercise       Healthy diet/nutrition       Vision  "screening       Hearing screening       Dental care       Bladder control       Fall risk prevention       Osteoporosis Prevention/Bone Health       Colon cancer screening       The 10-year ASCVD risk score (Stacey BUI Jr., et al., 2013) is: 28.6%    Values used to calculate the score:      Age: 70 years      Sex: Female      Is Non- : No      Diabetic: Yes      Tobacco smoker: No      Systolic Blood Pressure: 137 mmHg      Is BP treated: Yes      HDL Cholesterol: 91 mg/dL      Total Cholesterol: 277 mg/dL       Advanced Planning        Advised covid immunization when available    Estimated body mass index is 34.37 kg/m  as calculated from the following:    Height as of this encounter: 1.6 m (5' 3\").    Weight as of this encounter: 88 kg (194 lb).    Weight management plan: Discussed healthy diet and exercise guidelines    She reports that she quit smoking about 42 years ago. Her smoking use included cigarettes. She smoked 0.00 packs per day for 1.00 year. She has never used smokeless tobacco.      Appropriate preventive services were discussed with this patient, including applicable screening as appropriate for cardiovascular disease, diabetes, osteopenia/osteoporosis, and glaucoma.  As appropriate for age/gender, discussed screening for colorectal cancer, prostate cancer, breast cancer, and cervical cancer. Checklist reviewing preventive services available has been given to the patient.    Reviewed patients plan of care and provided an AVS. The Complex Care Plan (for patients with higher acuity and needing more deliberate coordination of services) for Glenda meets the Care Plan requirement. This Care Plan has been established and reviewed with the Patient.    Counseling Resources:  ATP IV Guidelines  Pooled Cohorts Equation Calculator  Breast Cancer Risk Calculator  Breast Cancer: Medication to Reduce Risk  FRAX Risk Assessment  ICSI Preventive Guidelines  Dietary Guidelines for Americans, " 2010  USDA's MyPlate  ASA Prophylaxis  Lung CA Screening    Marysol Cisse MD  [facility]    Identified Health Risks:    She is at risk for lack of exercise and has been provided with information to increase physical activity for the benefit of her well-being.  The patient was counseled and encouraged to consider modifying their diet and eating habits. She was provided with information on recommended healthy diet options.

## 2021-01-12 NOTE — PATIENT INSTRUCTIONS
Patient Education   Personalized Prevention Plan  You are due for the preventive services outlined below.  Your care team is available to assist you in scheduling these services.  If you have already completed any of these items, please share that information with your care team to update in your medical record.  Health Maintenance Due   Topic Date Due     Colorectal Cancer Screening  11/20/1960     Eye Exam  01/28/2021       Exercise for a Healthier Heart     Exercise with a friend. When activity is fun, you're more likely to stick with it.   You may wonder how you can improve the health of your heart. If you re thinking about exercise, you re on the right track. You don t need to become an athlete. But you do need a certain amount of brisk exercise to help strengthen your heart. If you have been diagnosed with a heart condition, your healthcare provider may advise exercise to help stabilize your condition. To help make exercise a habit, choose safe, fun activities.   Before you start  Check with your healthcare provider before starting an exercise program. This is especially important if you have not been active for a while. It's also important if you have a long-term (chronic) health problem such as heart disease, diabetes, or obesity. Or if you are at high risk for having these problems.   Why exercise?  Exercising regularly offers many healthy rewards. It can help you do all of the following:    Improve your blood cholesterol level to help prevent further heart trouble    Lower your blood pressure to help prevent a stroke or heart attack    Control diabetes, or reduce your risk of getting this disease    Improve your heart and lung function    Reach and stay at a healthy weight    Make your muscles stronger so you can stay active    Prevent falls and fractures by slowing the loss of bone mass (osteoporosis)    Manage stress better    Reduce your blood pressure    Improve your sense of self and your body  image  Exercise tips      Ease into your routine. Set small goals. Then build on them. If you are not sure what your activity level should be, talk with your healthcare provider first before starting an exercise routine.    Exercise on most days. Aim for a total of 150 minutes (2 hours and 30 minutes) or more of moderate-intensity aerobic activity each week. Or 75 minutes (1 hour and 15 minutes) or more of vigorous-intensity aerobic activity each week. Or try for a combination of both. Moderate activity means that you breathe heavier and your heart rate increases but you can still talk. Think about doing 40 minutes of moderate exercise, 3 to 4 times a week. For best results, activity should last for about 40 minutes to lower blood pressure and cholesterol. It's OK to work up to the 40-minute period over time. Examples of moderate-intensity activity are walking 1 mile in 15 minutes. Or doing 30 to 45 minutes of yard work.    Step up your daily activity level.  Along with your exercise program, try being more active the whole day. Walk instead of drive. Or park further away so that you take more steps each day. Do more household tasks or yard work. You may not be able to meet the advised mount of physical activity. But doing some moderate- or vigorous-intensity aerobic activity can help reduce your risk for heart disease. Your healthcare provider can help you figure out what is best for you.    Choose 1 or more activities you enjoy.  Walking is one of the easiest things you can do. You can also try swimming, riding a bike, dancing, or taking an exercise class.    When to call your healthcare provider  Call your healthcare provider if you have any of these:     Chest pain or feel dizzy or lightheaded    Burning, tightness, pressure, or heaviness in your chest, neck, shoulders, back, or arms    Abnormal shortness of breath    More joint or muscle pain    A very fast or irregular heartbeat (palpitations)  [employer]Well last  reviewed this educational content on 7/1/2019 2000-2020 [employer]. [address] [place] [address], [place] [address]. All rights reserved. This information is not intended as a substitute for professional medical care. Always follow your healthcare professional's instructions.          Understanding USDA MyPlate  The USDA (U.S. Department of Agriculture) has guidelines to help you make healthy food choices. These are called MyPlate. MyPlate shows the food groups that make up healthy meals using the image of a place setting. Before you eat, think about the healthiest choices for what to put onto your plate or into your cup or bowl. To learn more about building a healthy plate, visit www.choosemyplate.gov.    The food groups    Fruits. Any fruit or 100% fruit juice counts as part of the Fruit Group. Fruits may be fresh, canned, frozen, or dried, and may be whole, cut-up, or pureed. Make half your plate fruits and vegetables.    Vegetables. Any vegetable or 100% vegetable juice counts as a member of the Vegetable Group. Vegetables may be fresh, frozen, canned, or dried. They can be served raw or cooked and may be whole, cut-up, or mashed. Make half your plate fruits and vegetables.    Grains. All foods made from grains are part of the Grains Group. These include wheat, rice, oats, cornmeal, and barley such as bread, pasta, oatmeal, cereal, tortillas, and grits. Grains should be no more than a quarter of your plate. At least half of your grains should be whole grains.    Protein. This group includes meat, poultry, seafood, beans and peas, eggs, processed soy products (like tofu), nuts (including nut butters), and seeds. Make protein choices no more than a quarter of your plate. Meat and poultry choices should be lean or low fat.    Dairy. All fluid milk products and foods made from milk that contain calcium, like yogurt and cheese, are part of the Dairy Group. (Foods that have little calcium, such as cream,  butter, and cream cheese, are not part of the group.) Most dairy choices should be low-fat or fat-free.    Oils. These are fats that are liquid at room temperature. They include canola, corn, olive, soybean, and sunflower oil. Foods that are mainly oil include mayonnaise, certain salad dressings, and soft margarines. You should have only 5 to 7 teaspoons of oils a day. You probably already get this much from the food you eat.  [employer] last reviewed this educational content on 8/1/2017 2000-2020 [employer]. [address] [place] [address]. All rights reserved. This information is not intended as a substitute for professional medical care. Always follow your healthcare professional's instructions.      Please schedule eye exam and mammogram  Sincerely,  Marysol Cisse MD

## 2021-01-13 ENCOUNTER — THERAPY VISIT (OUTPATIENT)
Dept: PHYSICAL THERAPY | Facility: CLINIC | Age: 71
End: 2021-01-13
Attending: FAMILY MEDICINE
Payer: MEDICARE

## 2021-01-13 DIAGNOSIS — M16.12 PRIMARY OSTEOARTHRITIS OF LEFT HIP: ICD-10-CM

## 2021-01-13 PROCEDURE — 97162 PT EVAL MOD COMPLEX 30 MIN: CPT | Mod: GP | Performed by: PHYSICAL THERAPIST

## 2021-01-13 PROCEDURE — 97110 THERAPEUTIC EXERCISES: CPT | Mod: GP | Performed by: PHYSICAL THERAPIST

## 2021-01-13 ASSESSMENT — HOOS S4: HOW SEVERE IS YOUR HIP JOINT STIFFNESS AFTER FIRST WAKENING IN THE MORNING?: MODERATE

## 2021-01-13 NOTE — LETTER
DEPARTMENT OF HEALTH AND HUMAN SERVICES  [facility] FOR MEDICARE & MEDICAID SERVICES    PLAN/UPDATED PLAN OF PROGRESS FOR OUTPATIENT REHABILITATION          PATIENTS NAME:  Glenda Bales   : 1950  PROVIDER NUMBER:    [id number]  HICN:  [id number]   PROVIDER NAME: KATHERINE GARCIA PT  MEDICAL RECORD NUMBER: [id number]     START OF CARE DATE:  SOC Date: 21   TYPE:  PT    PRIMARY/TREATMENT DIAGNOSIS: (Pertinent Medical Diagnosis)  Primary osteoarthritis of left hip    VISITS FROM START OF CARE:  Rxs Used: 1     [facility] Initial Evaluation  Subjective:  Patient is referred with c/o L anterior and lateral hip pain. Sxs have been gradually increasing over the past year, especially the past 2 months. She feels that sxs have worsened with decreased activity due to COVID. Notes pain when starting to walk and then sxs improve. Difficulty ascending stairs. X-rays indicate moderate OA in L hip. She is also noting similar sxs in R hip but not as bad. MD visit on 20.    Therapist Generated HPI Evaluation   Type of problem:  Bilateral hips.  This is a chronic condition.  Condition occurred with:  Degenerative joint disease.  Where condition occurred: for unknown reasons.  Patient reports pain:  Anterior, groin, lateral and joint.  Pain is described as aching and is intermittent.  Pain radiates to:  No radiation. Pain is the same all the time.  Since onset symptoms are gradually worsening.  Associated symptoms:  Loss of motion/stiffness and loss of strength. Symptoms are exacerbated by bending/squatting, walking, standing, ascending stairs and transfers  and relieved by rest.  Special tests included:  X-ray.    Barriers include:  None as reported by patient.    Patient Health History  Glenda Bales being seen for left hip OA.   Pain is reported as 2/10 on pain scale.  Pertinent medical history includes: osteoarthritis, high blood pressure and overweight.   Red flags:  Pain at  rest/night.  Surgeries include:  None.    Current medications:  High blood pressure medication and thyroid medication.          PATIENTS NAME:  Glenda Bales   : 1950               Objective:  Gait:    Assistive Devices:  None  Deviations:  General Deviations:  Toe out L, toe out R, stance time decr, stride length decr and base of support decr       Hip Evaluation  Hip PROM:    Flexion: Left: 100   Right: 105  Internal Rotation: Left: 10    Right: 10  External Rotation: Left: 20    Right: 25  Knee Flexion: Left:  110     Right:  110  Knee Extension:  Left: 10   Right:  10      Hip Strength:    Flexion:   Left: 3/5   Pain:  Right: 3/5   Pain:            Extension:  Left: 2+/5  Pain:Right: 2+/5    Pain:    Abduction:  Left: 3/5     Pain:Right: 3/5    Pain:  Adduction:  Left: 4/5    Pain:Right: 4/5   Pain:      Hip Special Testing:    Left hip positive for the following special tests:  Fadir/Labrum and Distraction  Right hip positive for the following special tests:  Fadir/Labrum      Hip Palpation:  Not Assessed   Functional Testing:  not assessed    Assessment/Plan:    Patient is a 70 year old female with both sides hip complaints.    Patient has the following significant findings with corresponding treatment plan.                Diagnosis 1:  Bilateral hip OA, L>R  Pain -  hot/cold therapy, manual therapy, self management, education and home program  Decreased joint mobility - manual therapy, therapeutic exercise and home program  Decreased strength - therapeutic exercise, therapeutic activities and home program  Impaired gait - gait training and home program  Impaired muscle performance - neuro re-education and home program  Decreased function - therapeutic activities and home program    Therapy Evaluation Codes:   1) History comprised of:   Personal factors that impact the plan of care:      Age and Time since onset of symptoms.    Comorbidity factors that impact the plan of care are:      Osteoarthritis.      Medications impacting care: High blood pressure.  2) Examination of Body Systems comprised of:   Body structures and functions that impact the plan of care:      Hip.   Activity limitations that impact the plan of care are:      Bending, Squatting/kneeling, Stairs and Walking.  3) Clinical presentation characteristics are:  PATIENTS NAME:  Glenda Bales   : 1950       Evolving/Changing.  4) Decision-Making    Moderate complexity using standardized patient assessment instrument and/or measureable assessment of functional outcome.  Cumulative Therapy Evaluation is: Moderate complexity.    Previous and current functional limitations:  (See Goal Flow Sheet for this information)    Short term and Long term goals: (See Goal Flow Sheet for this information)     Communication ability:  Patient appears to be able to clearly communicate and understand verbal and written communication and follow directions correctly.  Treatment Explanation - The following has been discussed with the patient:   RX ordered/plan of care  Anticipated outcomes  Possible risks and side effects  This patient would benefit from PT intervention to resume normal activities.   Rehab potential is good.    Frequency:  1 X week, once daily  Duration:  for 4 weeks  Discharge Plan:  Achieve all LTG.  Independent in home treatment program.  Reach maximal therapeutic benefit.    Please refer to the daily flowsheet for treatment today, total treatment time and time spent performing 1:1 timed codes.         Caregiver Signature/Credentials _____________________________ Date ________       Treating Provider: Benson Baez PT   I have reviewed and certified the need for these services and plan of treatment while under my care.        PHYSICIAN'S SIGNATURE:   _________________________________________  Date___________   Marysol Cisse MD    Certification period:  Beginning of Cert date period: 21 to  End of Cert period date: 21     Functional Level  "Progress Report: Please see attached \"Goal Flow sheet for Functional level.\"    ____X____ Continue Services or       ________ DC Services                Service dates: From  SOC Date: 01/13/21 date to present                         "

## 2021-01-13 NOTE — PROGRESS NOTES
[facility] Initial Evaluation  Subjective:  Patient is referred with c/o L anterior and lateral hip pain. Sxs have been gradually increasing over the past year, especially the past 2 months. She feels that sxs have worsened with decreased activity due to COVID. Notes pain when starting to walk and then sxs improve. Difficulty ascending stairs. X-rays indicate moderate OA in L hip. She is also noting similar sxs in R hip but not as bad. MD visit on 12/17/20.    The history is provided by the patient.   Therapist Generated HPI Evaluation         Type of problem:  Bilateral hips.    This is a chronic condition.  Condition occurred with:  Degenerative joint disease.  Where condition occurred: for unknown reasons.  Patient reports pain:  Anterior, groin, lateral and joint.  Pain is described as aching and is intermittent.  Pain radiates to:  No radiation. Pain is the same all the time.  Since onset symptoms are gradually worsening.  Associated symptoms:  Loss of motion/stiffness and loss of strength. Symptoms are exacerbated by bending/squatting, walking, standing, ascending stairs and transfers  and relieved by rest.  Special tests included:  X-ray.    Barriers include:  None as reported by patient.    Patient Health History  Glenda Bales being seen for left hip OA.          Pain is reported as 2/10 on pain scale.    Pertinent medical history includes: osteoarthritis, high blood pressure and overweight.   Red flags:  Pain at rest/night.     Surgeries include:  None.    Current medications:  High blood pressure medication and thyroid medication.                                           Objective:    Gait:    Assistive Devices:  None  Deviations:  General Deviations:  Toe out L, toe out R, stance time decr, stride length decr and base of support decr                                                 Hip Evaluation  Hip PROM:    Flexion: Left: 100   Right: 105        Internal Rotation: Left: 10     Right: 10  External Rotation: Left: 20    Right: 25  Knee Flexion: Left:  110     Right:  110  Knee Extension:  Left: 10   Right:  10          Hip Strength:    Flexion:   Left: 3/5   Pain:  Right: 3/5   Pain:                    Extension:  Left: 2+/5  Pain:Right: 2+/5    Pain:    Abduction:  Left: 3/5     Pain:Right: 3/5    Pain:  Adduction:  Left: 4/5    Pain:Right: 4/5   Pain:                Hip Special Testing:    Left hip positive for the following special tests:  Fadir/Labrum and Distraction   Right hip positive for the following special tests:  Fadir/Labrum    Hip Palpation:  Not Assessed         Functional Testing:  not assessed                       General     ROS    Assessment/Plan:    Patient is a 70 year old female with both sides hip complaints.    Patient has the following significant findings with corresponding treatment plan.                Diagnosis 1:  Bilateral hip OA, L>R  Pain -  hot/cold therapy, manual therapy, self management, education and home program  Decreased joint mobility - manual therapy, therapeutic exercise and home program  Decreased strength - therapeutic exercise, therapeutic activities and home program  Impaired gait - gait training and home program  Impaired muscle performance - neuro re-education and home program  Decreased function - therapeutic activities and home program    Therapy Evaluation Codes:   1) History comprised of:   Personal factors that impact the plan of care:      Age and Time since onset of symptoms.    Comorbidity factors that impact the plan of care are:      Osteoarthritis.     Medications impacting care: High blood pressure.  2) Examination of Body Systems comprised of:   Body structures and functions that impact the plan of care:      Hip.   Activity limitations that impact the plan of care are:      Bending, Squatting/kneeling, Stairs and Walking.  3) Clinical presentation characteristics are:   Evolving/Changing.  4) Decision-Making    Moderate  complexity using standardized patient assessment instrument and/or measureable assessment of functional outcome.  Cumulative Therapy Evaluation is: Moderate complexity.    Previous and current functional limitations:  (See Goal Flow Sheet for this information)    Short term and Long term goals: (See Goal Flow Sheet for this information)     Communication ability:  Patient appears to be able to clearly communicate and understand verbal and written communication and follow directions correctly.  Treatment Explanation - The following has been discussed with the patient:   RX ordered/plan of care  Anticipated outcomes  Possible risks and side effects  This patient would benefit from PT intervention to resume normal activities.   Rehab potential is good.    Frequency:  1 X week, once daily  Duration:  for 4 weeks  Discharge Plan:  Achieve all LTG.  Independent in home treatment program.  Reach maximal therapeutic benefit.    Please refer to the daily flowsheet for treatment today, total treatment time and time spent performing 1:1 timed codes.

## 2021-01-14 ASSESSMENT — ACTIVITIES OF DAILY LIVING (ADL)
WALKING_INITIALLY: NO DIFFICULTY AT ALL
PUTTING_ON_SOCKS_AND_SHOES: EXTREME DIFFICULTY
HOS_ADL_COUNT: 12
SITTING_FOR_15_MINUTES: NO DIFFICULTY AT ALL
GOING_DOWN_1_FLIGHT_OF_STAIRS: NO DIFFICULTY AT ALL
WALKING_UP_STEEP_HILLS: SLIGHT DIFFICULTY
DEEP_SQUATTING: EXTREME DIFFICULTY
GOING_UP_1_FLIGHT_OF_STAIRS: MODERATE DIFFICULTY
STEPPING_UP_AND_DOWN_CURBS: NO DIFFICULTY AT ALL
WALKING_15_MINUTES_OR_GREATER: NO DIFFICULTY AT ALL
STANDING_FOR_15_MINUTES: SLIGHT DIFFICULTY
GETTING_INTO_AND_OUT_OF_AN_AVERAGE_CAR: SLIGHT DIFFICULTY
WALKING_APPROXIMATELY_10_MINUTES: NO DIFFICULTY AT ALL
WALKING_DOWN_STEEP_HILLS: SLIGHT DIFFICULTY
HOS_ADL_HIGHEST_POTENTIAL_SCORE: 48
GETTING_INTO_AND_OUT_OF_A_BATHTUB: SLIGHT DIFFICULTY
HOS_ADL_ITEM_SCORE_TOTAL: 38

## 2021-01-19 ENCOUNTER — OFFICE VISIT (OUTPATIENT)
Dept: FAMILY MEDICINE | Facility: CLINIC | Age: 71
End: 2021-01-19
Payer: MEDICARE

## 2021-01-19 VITALS
BODY MASS INDEX: 34.91 KG/M2 | WEIGHT: 197 LBS | TEMPERATURE: 96.6 F | HEART RATE: 68 BPM | SYSTOLIC BLOOD PRESSURE: 136 MMHG | DIASTOLIC BLOOD PRESSURE: 66 MMHG | HEIGHT: 63 IN

## 2021-01-19 DIAGNOSIS — K59.00 CONSTIPATION, UNSPECIFIED CONSTIPATION TYPE: ICD-10-CM

## 2021-01-19 DIAGNOSIS — R10.9 STOMACH ACHE: Primary | ICD-10-CM

## 2021-01-19 PROCEDURE — 99213 OFFICE O/P EST LOW 20 MIN: CPT | Performed by: FAMILY MEDICINE

## 2021-01-19 RX ORDER — SENNA AND DOCUSATE SODIUM 50; 8.6 MG/1; MG/1
1-2 TABLET, FILM COATED ORAL AT BEDTIME
Qty: 14 TABLET | Refills: 1 | COMMUNITY
Start: 2021-01-19 | End: 2021-06-21

## 2021-01-19 ASSESSMENT — MIFFLIN-ST. JEOR: SCORE: 1377.72

## 2021-01-19 NOTE — PROGRESS NOTES
Assessment & Plan      Glenda is a 71 yo F with a hx of hyppothyroidism, htn, gerd, dm2, hpld, aortic valve insufficiency presenting with abdomonal pain.  Her bowel movement have not been regular in last 2 days.    Stomach Pain    Differentials: Gerd, constipation, bowel obstruction.     -  Likely from constipation.      Constipation, unspecified constipation type    I discussed the etiologies of constipation as well as factors that will influence this including diet, activity, fluid intake and medications. Reviewed benefits of fiber and specific directives given. Will try short course of stool softener.    If persist or worsen will pursue further work up with possible lower endoscopy/imaging    - SENNA-docusate sodium (SENNA S) 8.6-50 MG tablet; Take 1-2 tablets by mouth At Bedtime    Return in about 1 week (around 1/26/2021) for Virtual visit.    Uriah Ferrell MD  [facility]    Kathrine Doshi is a 70 year old who presents to clinic today for the following health issues:    HPI     Abdominal/Flank Pain,getting much better   Was pretty sharp last night; 9/10 last night though 1/10.  She has not had a BM.  Pain was in lower abdomen, felt gassy, and BM been irregular; none this morning.  Bladder function is normal/.    Onset/Duration: started last night   Description:   Character: Sharp  Location: yovani-umbilical region   Radiation: None  Intensity: 1/10 today  last night 9/10   Progression of Symptoms:  improving  Accompanying Signs & Symptoms:  Fever/Chills: no  Gas/Bloating: YES  Nausea: YES last night   Vomitting: no  Diarrhea: no  Constipation: YES  Dysuria or Hematuria: no  History:   Trauma: no  Previous similar pain: no  Previous tests done: none  Precipitating factors:   Does the pain change with:     Food: YES possible    Bowel Movement: no    Urination: no   Other factors:  no  Therapies tried and outcome: None  No LMP recorded. Patient is postmenopausal.    Review of  "Systems   Constitutional, HEENT, cardiovascular, pulmonary and gu systems are negative, except as otherwise noted.      Objective    BP (!) 140/68   Pulse 68   Temp 96.6  F (35.9  C) (Tympanic)   Ht 1.6 m (5' 3\")   Wt 89.4 kg (197 lb)   BMI 34.90 kg/m    Body mass index is 34.9 kg/m .  Physical Exam   GENERAL: healthy, alert and no distress  RESP: lungs clear to auscultation - no rales, rhonchi or wheezes  CV: regular rate and rhythm,  no peripheral edema and peripheral pulses strong  ABDOMEN: soft, nontender, no hepatosplenomegaly, no masses and bowel sounds normal      "

## 2021-01-19 NOTE — PATIENT INSTRUCTIONS
Patient Education     Constipation (Adult)  Constipation means that you have bowel movements that are less frequent than usual. Stools often become very hard and difficult to pass.  Constipation is very common. At some point in life, it affects almost everyone. Since everyone's bowel habits are different, what is constipation to one person may not be to another. Your healthcare provider may do tests to diagnose constipation. It depends on what he or she finds when evaluating you.    Symptoms of constipation include:    Abdominal pain    Bloating    Vomiting    Painful bowel movements    Itching, swelling, bleeding, or pain around the anus  Causes  Constipation can have many causes. These include:    Diet low in fiber    Too much dairy    Not drinking enough liquids    Lack of exercise or physical activity (especially true for older adults)    Changes in lifestyle or daily routine, including pregnancy, aging, work, and travel    Frequent use or misuse of laxatives    Ignoring the urge to have a bowel movement or delaying it until later    Medicines, such as certain prescription pain medicines, iron supplements, antacids, certain antidepressants, and calcium supplements    Diseases like irritable bowel syndrome, bowel obstructions, stroke, diabetes, thyroid disease, Parkinson disease, hemorrhoids, and colon cancer  Complications  Potential complications of constipation can include:    Hemorrhoids    Rectal bleeding from hemorrhoids or anal fissures (skin tears)    Hernias    Dependency on laxatives    Chronic constipation    Fecal impaction, a severe form of constipation in which a large amount of hard stool is in your rectum that you can't pass    Bowel obstruction or perforation  Home care  All treatment should be done after talking with your healthcare provider. This is especially true if you have another medical problems, are taking prescription medicines, or are an older adult. Treatment most often involves  lifestyle changes. You may also need medicines. Your healthcare provider will tell you which will work best for you. Follow the advice below to help avoid this problem in the future.  Lifestyle changes  These lifestyle changes can help prevent constipation:    Diet. Eat a high-fiber diet, with fresh fruit and vegetables, and reduce dairy intake, meats, and processed foods    Fluids. It's important to get enough fluids each day. Drink plenty of water when you eat more fiber. If you are on diet that limits the amount of fluid you can have, talk about this with your healthcare provider.    Regular exercise. Check with your healthcare provider first.  Medicines  Take any medicines as directed. Some laxatives are safe to use only every now and then. Others can be taken on a regular basis. While laxatives don't cause bowel dependence, they are treating the symptoms. So your constipation may return if you don't make other changes. Talk with your healthcare provider or pharmacist if you have questions.  Prescription pain medicines can cause constipation. If you are taking this kind of medicine, ask your healthcare provider if you should also take a stool softener.  Medicines you may take to treat constipation include:    Fiber supplements    Stool softeners    Laxatives    Enemas    Rectal suppositories  Follow-up care  Follow up with your healthcare provider if symptoms don't get better in the next few days. You may need to have more tests or see a specialist.  Call 911  Call 911 if any of these occur:    Trouble breathing    Stiff, rigid abdomen that is severely painful to touch    Confusion    Fainting or loss of consciousness    Rapid heart rate    Chest pain  When to seek medical advice  Call your healthcare provider right away if any of these occur:    Fever of 100.4 F (38 C) or higher, or as directed by your healthcare provider    Failure to resume normal bowel movements    Pain in your abdomen or back gets  worse    Nausea or vomiting    Swelling in your abdomen    Blood in the stool    Black, tarry stool    Involuntary weight loss    Weakness  [employer] last reviewed this educational content on 6/1/2018 2000-2020 [employer]. [address] [place] [address], [place] [address]. All rights reserved. This information is not intended as a substitute for professional medical care. Always follow your healthcare professional's instructions.           Patient Education     Constipation (Adult)  Constipation means that you have bowel movements that are less frequent than usual. Stools often become very hard and difficult to pass.  Constipation is very common. At some point in life, it affects almost everyone. Since everyone's bowel habits are different, what is constipation to one person may not be to another. Your healthcare provider may do tests to diagnose constipation. It depends on what he or she finds when evaluating you.    Symptoms of constipation include:    Abdominal pain    Bloating    Vomiting    Painful bowel movements    Itching, swelling, bleeding, or pain around the anus  Causes  Constipation can have many causes. These include:    Diet low in fiber    Too much dairy    Not drinking enough liquids    Lack of exercise or physical activity (especially true for older adults)    Changes in lifestyle or daily routine, including pregnancy, aging, work, and travel    Frequent use or misuse of laxatives    Ignoring the urge to have a bowel movement or delaying it until later    Medicines, such as certain prescription pain medicines, iron supplements, antacids, certain antidepressants, and calcium supplements    Diseases like irritable bowel syndrome, bowel obstructions, stroke, diabetes, thyroid disease, Parkinson disease, hemorrhoids, and colon cancer  Complications  Potential complications of constipation can include:    Hemorrhoids    Rectal bleeding from hemorrhoids or anal fissures (skin  tears)    Hernias    Dependency on laxatives    Chronic constipation    Fecal impaction, a severe form of constipation in which a large amount of hard stool is in your rectum that you can't pass    Bowel obstruction or perforation  Home care  All treatment should be done after talking with your healthcare provider. This is especially true if you have another medical problems, are taking prescription medicines, or are an older adult. Treatment most often involves lifestyle changes. You may also need medicines. Your healthcare provider will tell you which will work best for you. Follow the advice below to help avoid this problem in the future.  Lifestyle changes  These lifestyle changes can help prevent constipation:    Diet. Eat a high-fiber diet, with fresh fruit and vegetables, and reduce dairy intake, meats, and processed foods    Fluids. It's important to get enough fluids each day. Drink plenty of water when you eat more fiber. If you are on diet that limits the amount of fluid you can have, talk about this with your healthcare provider.    Regular exercise. Check with your healthcare provider first.  Medicines  Take any medicines as directed. Some laxatives are safe to use only every now and then. Others can be taken on a regular basis. While laxatives don't cause bowel dependence, they are treating the symptoms. So your constipation may return if you don't make other changes. Talk with your healthcare provider or pharmacist if you have questions.  Prescription pain medicines can cause constipation. If you are taking this kind of medicine, ask your healthcare provider if you should also take a stool softener.  Medicines you may take to treat constipation include:    Fiber supplements    Stool softeners    Laxatives    Enemas    Rectal suppositories  Follow-up care  Follow up with your healthcare provider if symptoms don't get better in the next few days. You may need to have more tests or see a  specialist.  Call 911  Call 911 if any of these occur:    Trouble breathing    Stiff, rigid abdomen that is severely painful to touch    Confusion    Fainting or loss of consciousness    Rapid heart rate    Chest pain  When to seek medical advice  Call your healthcare provider right away if any of these occur:    Fever of 100.4 F (38 C) or higher, or as directed by your healthcare provider    Failure to resume normal bowel movements    Pain in your abdomen or back gets worse    Nausea or vomiting    Swelling in your abdomen    Blood in the stool    Black, tarry stool    Involuntary weight loss    Weakness  [employer] last reviewed this educational content on 6/1/2018 2000-2020 [employer]. [address] [place] [address]. All rights reserved. This information is not intended as a substitute for professional medical care. Always follow your healthcare professional's instructions.           Patient Education     Abdominal Pain  Abdominal pain is pain in the stomach or belly area. Everyone has this pain from time to time. In many cases it goes away on its own. But abdominal pain can sometimes be due to a serious problem, such as appendicitis. So it s important to know when to get help.     Causes of abdominal pain   There are many possible causes of abdominal pain. Common causes in adults include:    Constipation, diarrhea, or gas    Stomach acid flowing back up into the esophagus (acid reflux or heartburn)    Severe acid reflux, called GERD (gastroesophageal reflux disease)    A sore in the lining of the stomach or small intestine (peptic ulcer)    Inflammation of the gallbladder, liver, or pancreas    Gallstones or kidney stones    Appendicitis     Intestinal blockage     An internal organ pushing through a muscle or other tissue (hernia)    Urinary tract infections    In women, menstrual cramps, fibroids, ovarian cysts, pelvic inflammatory disease, or endometriosis    Inflammation or  infection of the intestines, including Crohn's disease and ulcerative colitis    Irritable bowel syndrome  Diagnosing the cause of abdominal pain   Your healthcare provider will give you a physical exam help find the cause of your pain. If needed, you will have tests. Belly pain has many possible causes. So it can be hard to find the reason for your pain. Giving details about your pain can help. Tell your provider where and when you feel the pain, and what makes it better or worse. Also let your provider know if you have other symptoms such as:     Fever    Tiredness    Upset stomach (nausea)    Vomiting    Changes in bathroom habits    Blood in the stool or black, tarry stool    Weight loss that you can't explain (involuntary weight loss?)  Also report any family history of stomach or intestinal problems, or cancers. Tell your provider about all your alcohol use and drug use. Tell your provider about all medicines you use, including herbs, vitamins, and supplements.   Treating abdominal pain   Some causes of pain need emergency medical treatment right away. These include appendicitis or a bowel blockage. Other problems can be treated with rest, fluids, or medicines. Your healthcare provider can give you specific instructions for treatment or self-care based on what is causing your pain.      If you have vomiting or diarrhea, sip water or other clear fluids. When you are ready to eat solid foods again, start with small amounts of easy-to-digest, low-fat foods. These include apple sauce, toast, or crackers.   When to get medical care   Call 911 or go to the hospital right away if you:     Can t pass stool and are vomiting    Are vomiting blood or have bloody diarrhea or black, tarry diarrhea    Have chest, neck, or shoulder pain    Feel like you might pass out    Have pain in your shoulder blades with nausea    Have sudden, severe belly pain    Have new, severe pain unlike any you have felt before    Have a belly  that is rigid, hard, and hurts to touch  Call your healthcare provider if you have:     Pain for more than 5 days    Bloating for more than 2 days    Diarrhea for more than 5 days    A fever of 100.4 F (38 C) or higher, or as directed by your healthcare provider    Pain that gets worse    Weight loss for no reason    Continued lack of appetite    Blood in your stool  How to prevent abdominal pain   Here are some tips to help prevent abdominal pain:     Eat smaller amounts of food at each meal.    Don't eat greasy, fried, or other high-fat foods.    Don't eat foods that give you gas.    Exercise regularly.    Drink plenty of fluids.  To help prevent GERD symptoms:     Quit smoking.    Reduce alcohol and foods that increase stomach acid.    Don't use aspirin or over-the-counter pain and fever medicines, if possible. This includes nonsteroidal anti-inflammatory drugs (NSAIDs).    Lose excess weight.    Finish eating at least 2 hours before you go to bed or lie down.    Raise the head of your bed.  [employer] last reviewed this educational content on 4/1/2019 2000-2020 [employer], [employer]. [address] [place] [address], [place] [address]. All rights reserved. This information is not intended as a substitute for professional medical care. Always follow your healthcare professional's instructions.

## 2021-01-27 ENCOUNTER — THERAPY VISIT (OUTPATIENT)
Dept: PHYSICAL THERAPY | Facility: CLINIC | Age: 71
End: 2021-01-27
Payer: MEDICARE

## 2021-01-27 DIAGNOSIS — M16.12 PRIMARY OSTEOARTHRITIS OF LEFT HIP: Primary | ICD-10-CM

## 2021-01-27 PROCEDURE — 97110 THERAPEUTIC EXERCISES: CPT | Mod: GP | Performed by: PHYSICAL THERAPIST

## 2021-01-27 PROCEDURE — 97140 MANUAL THERAPY 1/> REGIONS: CPT | Mod: GP | Performed by: PHYSICAL THERAPIST

## 2021-02-17 ENCOUNTER — MYC MEDICAL ADVICE (OUTPATIENT)
Dept: FAMILY MEDICINE | Facility: CLINIC | Age: 71
End: 2021-02-17

## 2021-03-18 NOTE — PROGRESS NOTES
Discharge Note    Progress reporting period is from last progress note on   to Jan 27, 2021.    Glenda failed to follow up and current status is unknown.  Please see information below for last relevant information on current status.  Patient seen for 2 visits.    SUBJECTIVE  Subjective changes noted by patient:  No issues reported but has not done her HEP consistently.  .  Current pain level is  .     Previous pain level was  2/10.   Changes in function:  Yes (See Goal flowsheet attached for changes in current functional level)  Adverse reaction to treatment or activity: None    OBJECTIVE  Changes noted in objective findings: Added knee bends, SLS and belt mobs per flow.     ASSESSMENT/PLAN  Diagnosis: L hip OA   Updated problem list and treatment plan:   Pain - HEP  STG/LTGs have been met or progress has been made towards goals:  Yes, please see goal flowsheet for most current information  Assessment of Progress: current status is unknown.    Last current status: Pt is progressing as expected   Self Management Plans:  HEP  I have re-evaluated this patient and find that the nature, scope, duration and intensity of the therapy is appropriate for the medical condition of the patient.  Glenda continues to require the following intervention to meet STG and LTG's:  HEP.    Recommendations:  Discharge with current home program.  Patient to follow up with MD as needed.    Please refer to the daily flowsheet for treatment today, total treatment time and time spent performing 1:1 timed codes.

## 2021-05-17 ENCOUNTER — NURSE TRIAGE (OUTPATIENT)
Dept: FAMILY MEDICINE | Facility: CLINIC | Age: 71
End: 2021-05-17

## 2021-05-17 NOTE — PROGRESS NOTES
Assessment & Plan     Venous stasis  Advised elevate legs  Consider van Hose   Follow up if not better or any sob /new symptoms  - Basic metabolic panel  - CBC with platelets  Low salt diet  Keep legs elevated  Gait abnormality  Referral PT to evaluate for assistive device  - [facility]; Future    Stage 3a chronic kidney disease  Labs pending     Screen for colon cancer  Advised   - Fecal colorectal cancer screen (FIT); Future    Visit for screening mammogram  Advised   - MA Screening Digital Bilateral; Future    Preventative health care  Advised   - EYE ADULT REFERRAL; Future  Return in about 2 months (around 7/19/2021) for Diabetes.    Marysol Cisse MD  [facility] JOSE Doshi is a 70 year old who presents for the following health issues  With Known history of HTN , CKD comes in with swelling both feet-which is better now     HPI     Concern - Swelling    Onset: last week  Description: feet and ankles  Intensity: mild, moderate  Progression of Symptoms:  same  Accompanying Signs & Symptoms: numbness and tingling  Previous history of similar problem: none  Precipitating factors:        Worsened by: none  Alleviating factors:        Improved by: none  Therapies tried and outcome: Ice, some relief   The swelling is better Now  No sob  No chest pain  No PND  No GI issues  No abdominal pain  No travel  No fatigue  Pt also has had balance issues/gait Problems which are getting worse over the last several months  No stroke Like symptoms  Review of Systems   CONSTITUTIONAL: NEGATIVE for fever, chills, change in weight  ENT/MOUTH: NEGATIVE for ear, mouth and throat problems  RESP: NEGATIVE for significant cough or SOB  CV: NEGATIVE for chest pain, palpitations or peripheral edema  GI: NEGATIVE for nausea, abdominal pain, heartburn, or change in bowel habits  : none  PSYCHIATRIC: NEGATIVE for changes in mood or affect  ROS otherwise negative      Objective    /76    "Pulse 90   Temp 98  F (36.7  C) (Oral)   Resp 15   Ht 1.6 m (5' 3\")   Wt 89.6 kg (197 lb 9.6 oz)   SpO2 95%   BMI 35.00 kg/m    Body mass index is 35 kg/m .  Physical Exam   GENERAL: alert, no distress and elderly  NECK: no adenopathy, no asymmetry, masses, or scars and thyroid normal to palpation  RESP: lungs clear to auscultation - no rales, rhonchi or wheezes  CV: regular rate and rhythm, normal S1 S2, no S3 or S4, no murmur, click or rub, no peripheral edema and peripheral pulses strong  ABDOMEN: soft, nontender, no hepatosplenomegaly, no masses and bowel sounds normal  MS: no gross musculoskeletal defects noted, no edema  SKIN: varicose veins legs  No pedal edema now  PSYCH: mentation appears normal    Labs pending             "

## 2021-05-17 NOTE — TELEPHONE ENCOUNTER
Priority call received, see triage note    S-(situation): noticed feet/ankle swelling past few days    B-(background): onset past week, takes hydrochlorothiazide 12.5 daily, amlodipine and atenolol, pt has appointment 5/19/21 with PCP, denies SOB or dyspnea, does not check BP at home but last BP was 120's over 70's    A-(assessment): bilateral ankle swelling    R-(recommendations): pt will see PCP on 5/19/21, ok per protocol to see PCP in 3 days, pt will elevate legs 20 minutes twice daily, routed to PCP, inform pt if other recommendations or if okay to wait for appointment      Reason for Disposition    Swelling of both ankles (i.e., pedal edema)    [1] MILD swelling of both ankles (i.e., pedal edema) AND [2] new onset or worsening    Additional Information    Negative: Chest pain    Negative: Followed an ankle injury    Negative: Ankle pain is main symptom    Negative: Severe difficulty breathing (e.g., struggling for each breath, speaks in single words)    Negative: Looks like a broken bone or dislocated joint (e.g., crooked or deformed)    Negative: Sounds like a life-threatening emergency to the triager    Negative: Chest pain    Negative: Followed a leg injury    Negative: [1] Small area of swelling AND [2] followed an insect bite to the area    Negative: Swelling of one ankle joint    Negative: Swelling of knee is main symptom    Negative: Pregnant    Negative: Postpartum (from 0 to 6 weeks after delivery)    Negative: Difficulty breathing at rest    Negative: Entire foot is cool or blue in comparison to other side    Negative: [1] Can't walk or can barely walk AND [2] new onset    Negative: [1] Difficulty breathing with exertion (e.g., walking) AND [2] new onset or worsening    Negative: [1] Red area or streak AND [2] fever    Negative: [1] Swelling is painful to touch AND [2] fever    Negative: [1] Cast on leg or ankle AND [2] now increased pain    Negative: Patient sounds very sick or weak to the  "triager    Negative: SEVERE leg swelling (e.g., swelling extends above knee, entire leg is swollen, weeping fluid)    Negative: [1] Red area or streak [2] large (> 2 in. or 5 cm)    Negative: [1] Thigh or calf pain AND [2] only 1 side AND [3] present > 1 hour    Negative: [1] Thigh, calf, or ankle swelling AND [2] only 1 side    Negative: [1] Thigh, calf, or ankle swelling AND [2] bilateral AND [3] 1 side is more swollen    Negative: [1] MODERATE leg swelling (e.g., swelling extends up to knees) AND [2] new onset or worsening    Negative: Swelling of face, arm or hands  (Exception: slight puffiness of fingers occurring during hot weather)    Negative: Looks like a boil, infected sore, deep ulcer or other infected rash (spreading redness, pus)    Answer Assessment - Initial Assessment Questions  1 LOCATION: \"Which joint is swollen?\"      Both feet and ankles  2. ONSET: \"When did the swelling start?\"      Last week, \"shoes started to feel tight\"  3. SIZE: \"How large is the swelling?\"      Can barely see ankle bone  4. PAIN: \"Is there any pain?\" If so, ask: \"How bad is it?\" (Scale 1-10; or mild, moderate, severe)      No, but itchy  5. CAUSE: \"What do you think caused the swollen joint?\"       meds  6. OTHER SYMPTOMS: \"Do you have any other symptoms?\" (e.g., fever, chest pain, difficulty breathing, calf pain)      No  7. PREGNANCY: \"Is there any chance you are pregnant?\" \"When was your last menstrual period?\"      n/a    Protocols used: ANKLE SWELLING-A-, LEG SWELLING AND EDEMA-A-    Viviana Magana, RUBY, BSN  Message handled by CLINIC NURSE.    "

## 2021-05-19 ENCOUNTER — OFFICE VISIT (OUTPATIENT)
Dept: FAMILY MEDICINE | Facility: CLINIC | Age: 71
End: 2021-05-19
Payer: MEDICARE

## 2021-05-19 VITALS
TEMPERATURE: 98 F | OXYGEN SATURATION: 95 % | DIASTOLIC BLOOD PRESSURE: 76 MMHG | BODY MASS INDEX: 35.01 KG/M2 | HEIGHT: 63 IN | RESPIRATION RATE: 15 BRPM | HEART RATE: 90 BPM | SYSTOLIC BLOOD PRESSURE: 138 MMHG | WEIGHT: 197.6 LBS

## 2021-05-19 DIAGNOSIS — R26.9 GAIT ABNORMALITY: ICD-10-CM

## 2021-05-19 DIAGNOSIS — N18.31 STAGE 3A CHRONIC KIDNEY DISEASE (H): ICD-10-CM

## 2021-05-19 DIAGNOSIS — Z12.31 VISIT FOR SCREENING MAMMOGRAM: ICD-10-CM

## 2021-05-19 DIAGNOSIS — Z00.00 PREVENTATIVE HEALTH CARE: ICD-10-CM

## 2021-05-19 DIAGNOSIS — I87.8 VENOUS STASIS: ICD-10-CM

## 2021-05-19 DIAGNOSIS — Z12.11 SCREEN FOR COLON CANCER: ICD-10-CM

## 2021-05-19 LAB
ANION GAP SERPL CALCULATED.3IONS-SCNC: 3 MMOL/L (ref 3–14)
BUN SERPL-MCNC: 16 MG/DL (ref 7–30)
CALCIUM SERPL-MCNC: 8.9 MG/DL (ref 8.5–10.1)
CHLORIDE SERPL-SCNC: 105 MMOL/L (ref 94–109)
CO2 SERPL-SCNC: 29 MMOL/L (ref 20–32)
CREAT SERPL-MCNC: 1.02 MG/DL (ref 0.52–1.04)
ERYTHROCYTE [DISTWIDTH] IN BLOOD BY AUTOMATED COUNT: 13.5 % (ref 10–15)
GFR SERPL CREATININE-BSD FRML MDRD: 55 ML/MIN/{1.73_M2}
GLUCOSE SERPL-MCNC: 135 MG/DL (ref 70–99)
HCT VFR BLD AUTO: 43.3 % (ref 35–47)
HGB BLD-MCNC: 14.6 G/DL (ref 11.7–15.7)
MCH RBC QN AUTO: 31.7 PG (ref 26.5–33)
MCHC RBC AUTO-ENTMCNC: 33.7 G/DL (ref 31.5–36.5)
MCV RBC AUTO: 94 FL (ref 78–100)
PLATELET # BLD AUTO: 192 10E9/L (ref 150–450)
POTASSIUM SERPL-SCNC: 3.6 MMOL/L (ref 3.4–5.3)
RBC # BLD AUTO: 4.6 10E12/L (ref 3.8–5.2)
SODIUM SERPL-SCNC: 137 MMOL/L (ref 133–144)
WBC # BLD AUTO: 6 10E9/L (ref 4–11)

## 2021-05-19 PROCEDURE — 85027 COMPLETE CBC AUTOMATED: CPT | Performed by: FAMILY MEDICINE

## 2021-05-19 PROCEDURE — 36415 COLL VENOUS BLD VENIPUNCTURE: CPT | Performed by: FAMILY MEDICINE

## 2021-05-19 PROCEDURE — 80048 BASIC METABOLIC PNL TOTAL CA: CPT | Performed by: FAMILY MEDICINE

## 2021-05-19 PROCEDURE — 99214 OFFICE O/P EST MOD 30 MIN: CPT | Performed by: FAMILY MEDICINE

## 2021-05-19 ASSESSMENT — MIFFLIN-ST. JEOR: SCORE: 1380.44

## 2021-05-19 NOTE — TELEPHONE ENCOUNTER
Encounter closed, PCP reviewed and doned message, pt has appointment scheduled  Viviana Magana RN, BSN  Message handled by CLINIC NURSE.

## 2021-05-20 ENCOUNTER — THERAPY VISIT (OUTPATIENT)
Dept: PHYSICAL THERAPY | Facility: CLINIC | Age: 71
End: 2021-05-20
Attending: FAMILY MEDICINE
Payer: MEDICARE

## 2021-05-20 DIAGNOSIS — R26.9 GAIT ABNORMALITY: ICD-10-CM

## 2021-05-20 PROCEDURE — 97110 THERAPEUTIC EXERCISES: CPT | Mod: GP | Performed by: PHYSICAL THERAPIST

## 2021-05-20 PROCEDURE — 97161 PT EVAL LOW COMPLEX 20 MIN: CPT | Mod: GP | Performed by: PHYSICAL THERAPIST

## 2021-05-20 NOTE — PROGRESS NOTES
"Physical Therapy Initial Evaluation  Subjective:  HPI                    Objective:  System    Physical Exam    General     ROS     Physical Therapy Initial Examination/Evaluation May 20, 2021   Glenda Bales is a 70 year old female referred to physical therapy by Dr. Marysol Cisse for treatment of Gait Abnormality  with Precautions/Restrictions/MD instructions E&T    Therapist Assessment:   Clinical Impression: Pt presents to PT with primary complaint of impaired balance and weakness.  Per clinical examination, pt with impaired functional strength and at high risk for falls according to PT testing.   Pt will benefit from skilled physical therapy to improve functional strength and possible trial of assistive device for improved safety at home.      Subjective: Pt has noticed that balance has gotten worse gradually over the past couple of months. She had a fall a couple of nights ago when she was carrying objects in both hands. After she fell, she tried to get up and fell again with \"wobbly\" legs. Getting up off the floor is difficult. She has a sore R knee but improving. She did order a life alert for home but it has not come yet.   DOI/onset: MD order: 5/19/2021   Mechanism of injury: Fall, impaired balance    DOS NA   Related PMH: None  Previous treatment: PT for hip   Imaging: NA   Chief Complaint: Balance issues   Pain: rest 0 /10, activity 3/10  Described as: wobbly, achy, stiffness   Alleviated by: being more active  Exacerbated by: unsure Progression of symptoms since initial onset: staying the same or worsening  Time of day when pain is worse: none noted   Sleeping: no issues ; gets up numerous times to urinate and has no issues with balance then    Social history: Lives alone in apartment ; has ordered a medical alert   Occupation: Retired  Job duties: household tasks   Current HEP/exercise regimen: [employer] and thinking about going back; has not gone since start of Covid   Patient's goals are be " able to continue to live alone    Other pertinent PMH: diabetes, htn, overweight  General health as reported by patient: Fair    Return to MD:  Prn     OBJECTIVE    Gait: shuffling, feet ER    TUG:   Trial 1: 20 seconds  Trial 2: 15 seconds  Trial 3: 14 seconds     30 Second Chair Stand   Trial 1: 5 reps  Trial 2: 7 reps     Assessment/Plan:    Patient is a 70 year old female with Gait and balance complaints.    Patient has the following significant findings with corresponding treatment plan.                Diagnosis 1:  Gait Abnormality   Pain -  hot/cold therapy, self management and home program  Decreased strength - therapeutic exercise, therapeutic activities and home program  Impaired gait - gait training, assistive devices and home program  Impaired muscle performance - neuro re-education and home program  Decreased function - therapeutic activities and home program    Therapy Evaluation Codes:   1) History comprised of:   Personal factors that impact the plan of care:      Living environment, Past/current experiences and Time since onset of symptoms.    Comorbidity factors that impact the plan of care are:      Diabetes, High blood pressure and Overweight.     Medications impacting care: High blood pressure.  2) Examination of Body Systems comprised of:   Body structures and functions that impact the plan of care:      Knee and core.   Activity limitations that impact the plan of care are:      Squatting/kneeling, Stairs, Standing and Walking.  3) Clinical presentation characteristics are:   Evolving/Changing.  4) Decision-Making    Low complexity using standardized patient assessment instrument and/or measureable assessment of functional outcome.  Cumulative Therapy Evaluation is: Low complexity.    Previous and current functional limitations:  (See Goal Flow Sheet for this information)    Short term and Long term goals: (See Goal Flow Sheet for this information)     Communication ability:  Patient appears  to be able to clearly communicate and understand verbal and written communication and follow directions correctly.  Treatment Explanation - The following has been discussed with the patient:   RX ordered/plan of care  Anticipated outcomes  Possible risks and side effects  This patient would benefit from PT intervention to resume normal activities.   Rehab potential is good.    Frequency:  1 X week, once daily  Duration:  for 6 weeks  Discharge Plan:  Achieve all LTG.  Independent in home treatment program.  Reach maximal therapeutic benefit.    Please refer to the daily flowsheet for treatment today, total treatment time and time spent performing 1:1 timed codes.

## 2021-05-20 NOTE — LETTER
"DEPARTMENT OF HEALTH AND HUMAN SERVICES  [facility] MEDICA[facility]    PLAN/UPDATED PLAN OF PROGRESS FOR OUTPATIENT REHABILITATION          PATIENTS NAME:  Glenda Bales   : 1950  PROVIDER NUMBER:    [id number]  HICN:  [id number]   PROVIDER NAME: [facility]  MEDICAL RECORD NUMBER: [id number]   START OF CARE DATE:  SOC Date: 21   TYPE:  PT    PRIMARY/TREATMENT DIAGNOSIS: (Pertinent Medical Diagnosis)  Gait abnormality    VISITS FROM START OF CARE:  Rxs Used: 1     Physical Therapy Initial Evaluation  Physical Therapy Initial Examination/Evaluation May 20, 2021   Glenda Bales is a 70 year old female referred to physical therapy by Dr. Marysol Cisse for treatment of Gait Abnormality  with Precautions/Restrictions/MD instructions E&T    Therapist Assessment:   Clinical Impression: Pt presents to PT with primary complaint of impaired balance and weakness.  Per clinical examination, pt with impaired functional strength and at high risk for falls according to PT testing.   Pt will benefit from skilled physical therapy to improve functional strength and possible trial of assistive device for improved safety at home.    Subjective: Pt has noticed that balance has gotten worse gradually over the past couple of months. She had a fall a couple of nights ago when she was carrying objects in both hands. After she fell, she tried to get up and fell again with \"wobbly\" legs. Getting up off the floor is difficult. She has a sore R knee but improving. She did order a life alert for home but it has not come yet.   DOI/onset: MD order: 2021   Mechanism of injury: Fall, impaired balance    DOS NA   Related PMH: None  Previous treatment: PT for hip   Imaging: NA   Chief Complaint: Balance issues   Pain: rest 0 /10, activity 3/10  Described as: wobbly, achy, stiffness   Alleviated by: being more active  Exacerbated by: unsure Progression of symptoms since initial " onset: staying the same or worsening  Time of day when pain is worse: none noted   Sleeping: no issues ; gets up numerous times to urinate and has no issues with balance then    PATIENTS NAME:  Glenda Bales   : 1950  Social history: Lives alone in apartment ; has ordered a medical alert   Occupation: Retired  Job duties: household tasks   Current HEP/exercise regimen: [employer] and thinking about going back; has not gone since start of Covid   Patient's goals are be able to continue to live alone    Other pertinent PMH: diabetes, htn, overweight  General health as reported by patient: Fair    Return to MD:  Prn   OBJECTIVE  Gait: shuffling, feet ER  TUG:   Trial 1: 20 seconds  Trial 2: 15 seconds  Trial 3: 14 seconds   30 Second Chair Stand   Trial 1: 5 reps  Trial 2: 7 reps     Assessment/Plan:    Patient is a 70 year old female with Gait and balance complaints.    Patient has the following significant findings with corresponding treatment plan.                Diagnosis 1:  Gait Abnormality   Pain -  hot/cold therapy, self management and home program  Decreased strength - therapeutic exercise, therapeutic activities and home program  Impaired gait - gait training, assistive devices and home program  Impaired muscle performance - neuro re-education and home program  Decreased function - therapeutic activities and home program    Therapy Evaluation Codes:   1) History comprised of:   Personal factors that impact the plan of care:      Living environment, Past/current experiences and Time since onset of symptoms.    Comorbidity factors that impact the plan of care are:      Diabetes, High blood pressure and Overweight.     Medications impacting care: High blood pressure.  2) Examination of Body Systems comprised of:   Body structures and functions that impact the plan of care:      Knee and core.   Activity limitations that impact the plan of care are:      Squatting/kneeling, Stairs, Standing and  "Walking.  PATIENTS NAME:  Glenda Bales   : 1950    3) Clinical presentation characteristics are:   Evolving/Changing.  4) Decision-Making    Low complexity using standardized patient assessment instrument and/or measureable assessment of functional outcome.  Cumulative Therapy Evaluation is: Low complexity.    Previous and current functional limitations:  (See Goal Flow Sheet for this information)    Short term and Long term goals: (See Goal Flow Sheet for this information)     Communication ability:  Patient appears to be able to clearly communicate and understand verbal and written communication and follow directions correctly.  Treatment Explanation - The following has been discussed with the patient:   RX ordered/plan of care  Anticipated outcomes  Possible risks and side effects  This patient would benefit from PT intervention to resume normal activities.   Rehab potential is good.    Frequency:  1 X week, once daily  Duration:  for 6 weeks  Discharge Plan:  Achieve all LTG.  Independent in home treatment program.  Reach maximal therapeutic benefit.    Please refer to the daily flowsheet for treatment today, total treatment time and time spent performing 1:1 timed codes.       Caregiver Signature/Credentials _____________________________ Date ________       Treating Provider: Kayleigh Harrison, PT, DPT    I have reviewed and certified the need for these services and plan of treatment while under my care.        PHYSICIAN'S SIGNATURE:   _________________________________________  Date___________   Marysol Cisse MD    Certification period:  Beginning of Cert date period: 21 to  End of Cert period date: 21     Functional Level Progress Report: Please see attached \"Goal Flow sheet for Functional level.\"    ____X____ Continue Services or       ________ DC Services                Service dates: From  SOC Date: 21 date to present                         "

## 2021-05-20 NOTE — LETTER
"DEPARTMENT OF HEALTH AND HUMAN SERVICES  [facility]IC[facility] & MEDICA[facility]    PLAN/UPDATED PLAN OF PROGRESS FOR OUTPATIENT REHABILITATION          PATIENTS NAME:  Glenda Bales   : 1950  PROVIDER NUMBER:    [id number]  HICN:  [id number]   [facility]: [facility]  MEDICAL RECORD NUMBER: [id number]   START OF CARE DATE:  SOC Date: 21   TYPE:  PT    PRIMARY/TREATMENT DIAGNOSIS: (Pertinent Medical Diagnosis)  Gait abnormality    VISITS FROM START OF CARE:  Rxs Used: 1     Physical Therapy Initial Evaluation  Physical Therapy Initial Examination/Evaluation May 20, 2021   Glenda Bales is a 70 year old female referred to physical therapy by Dr. Marysol Cisse for treatment of Gait Abnormality  with Precautions/Restrictions/MD instructions E&T    Therapist Assessment:   Clinical Impression: Pt presents to PT with primary complaint of impaired balance and weakness.  Per clinical examination, pt with impaired functional strength and at high risk for falls according to PT testing.   Pt will benefit from skilled physical therapy to improve functional strength and possible trial of assistive device for improved safety at home.    Subjective: Pt has noticed that balance has gotten worse gradually over the past couple of months. She had a fall a couple of nights ago when she was carrying objects in both hands. After she fell, she tried to get up and fell again with \"wobbly\" legs. Getting up off the floor is difficult. She has a sore R knee but improving. She did order a life alert for home but it has not come yet.   DOI/onset: MD order: 2021   Mechanism of injury: Fall, impaired balance    DOS NA   Related PMH: None  Previous treatment: PT for hip   Imaging: NA   Chief Complaint: Balance issues   Pain: rest 0 /10, activity 3/10  Described as: wobbly, achy, stiffness   Alleviated by: being more active  Exacerbated by: unsure Progression of symptoms since initial " onset: staying the same or worsening  Time of day when pain is worse: none noted   Sleeping: no issues ; gets up numerous times to urinate and has no issues with balance then    PATIENTS NAME:  Glenda Bales   : 1950  Social history: Lives alone in apartment ; has ordered a medical alert   Occupation: Retired  Job duties: household tasks   Current HEP/exercise regimen: [employer] and thinking about going back; has not gone since start of Covid   Patient's goals are be able to continue to live alone    Other pertinent PMH: diabetes, htn, overweight  General health as reported by patient: Fair    Return to MD:  Prn   OBJECTIVE  Gait: shuffling, feet ER  TUG:   Trial 1: 20 seconds  Trial 2: 15 seconds  Trial 3: 14 seconds   30 Second Chair Stand   Trial 1: 5 reps  Trial 2: 7 reps     Assessment/Plan:    Patient is a 70 year old female with Gait and balance complaints.    Patient has the following significant findings with corresponding treatment plan.                Diagnosis 1:  Gait Abnormality   Pain -  hot/cold therapy, self management and home program  Decreased strength - therapeutic exercise, therapeutic activities and home program  Impaired gait - gait training, assistive devices and home program  Impaired muscle performance - neuro re-education and home program  Decreased function - therapeutic activities and home program    Therapy Evaluation Codes:   1) History comprised of:   Personal factors that impact the plan of care:      Living environment, Past/current experiences and Time since onset of symptoms.    Comorbidity factors that impact the plan of care are:      Diabetes, High blood pressure and Overweight.     Medications impacting care: High blood pressure.  2) Examination of Body Systems comprised of:   Body structures and functions that impact the plan of care:      Knee and core.   Activity limitations that impact the plan of care are:      Squatting/kneeling, Stairs, Standing and  "Walking.  PATIENTS NAME:  Glenda Bales   : 1950    3) Clinical presentation characteristics are:   Evolving/Changing.  4) Decision-Making    Low complexity using standardized patient assessment instrument and/or measureable assessment of functional outcome.  Cumulative Therapy Evaluation is: Low complexity.    Previous and current functional limitations:  (See Goal Flow Sheet for this information)    Short term and Long term goals: (See Goal Flow Sheet for this information)     Communication ability:  Patient appears to be able to clearly communicate and understand verbal and written communication and follow directions correctly.  Treatment Explanation - The following has been discussed with the patient:   RX ordered/plan of care  Anticipated outcomes  Possible risks and side effects  This patient would benefit from PT intervention to resume normal activities.   Rehab potential is good.    Frequency:  1 X week, once daily  Duration:  for 6 weeks  Discharge Plan:  Achieve all LTG.  Independent in home treatment program.  Reach maximal therapeutic benefit.    Please refer to the daily flowsheet for treatment today, total treatment time and time spent performing 1:1 timed codes.       Caregiver Signature/Credentials _____________________________ Date ________       Treating Provider: Kayleigh Harrison, PT, DPT    I have reviewed and certified the need for these services and plan of treatment while under my care.        PHYSICIAN'S SIGNATURE:   _________________________________________  Date___________   Marysol Cisse MD    Certification period:  Beginning of Cert date period: 21 to  End of Cert period date: 21     Functional Level Progress Report: Please see attached \"Goal Flow sheet for Functional level.\"    ____X____ Continue Services or       ________ DC Services                Service dates: From  SOC Date: 21 date to present                         "

## 2021-05-27 ENCOUNTER — THERAPY VISIT (OUTPATIENT)
Dept: PHYSICAL THERAPY | Facility: CLINIC | Age: 71
End: 2021-05-27
Payer: MEDICARE

## 2021-05-27 DIAGNOSIS — R26.9 GAIT ABNORMALITY: ICD-10-CM

## 2021-05-27 PROCEDURE — 97112 NEUROMUSCULAR REEDUCATION: CPT | Mod: GP | Performed by: PHYSICAL THERAPIST

## 2021-05-27 PROCEDURE — 97110 THERAPEUTIC EXERCISES: CPT | Mod: GP | Performed by: PHYSICAL THERAPIST

## 2021-06-01 ENCOUNTER — ANCILLARY PROCEDURE (OUTPATIENT)
Dept: MAMMOGRAPHY | Facility: CLINIC | Age: 71
End: 2021-06-01
Attending: FAMILY MEDICINE
Payer: MEDICARE

## 2021-06-01 DIAGNOSIS — Z12.31 VISIT FOR SCREENING MAMMOGRAM: ICD-10-CM

## 2021-06-01 PROCEDURE — 77063 BREAST TOMOSYNTHESIS BI: CPT | Mod: TC | Performed by: RADIOLOGY

## 2021-06-01 PROCEDURE — 77067 SCR MAMMO BI INCL CAD: CPT | Mod: TC | Performed by: RADIOLOGY

## 2021-06-15 ENCOUNTER — OFFICE VISIT (OUTPATIENT)
Dept: FAMILY MEDICINE | Facility: CLINIC | Age: 71
End: 2021-06-15
Payer: MEDICARE

## 2021-06-15 VITALS
DIASTOLIC BLOOD PRESSURE: 78 MMHG | BODY MASS INDEX: 34.9 KG/M2 | SYSTOLIC BLOOD PRESSURE: 138 MMHG | HEART RATE: 69 BPM | TEMPERATURE: 97.8 F | OXYGEN SATURATION: 96 % | WEIGHT: 197 LBS

## 2021-06-15 DIAGNOSIS — R20.0 NUMBNESS AND TINGLING OF BOTH FEET: Primary | ICD-10-CM

## 2021-06-15 DIAGNOSIS — E11.9 TYPE 2 DIABETES MELLITUS WITHOUT COMPLICATION, WITHOUT LONG-TERM CURRENT USE OF INSULIN (H): ICD-10-CM

## 2021-06-15 DIAGNOSIS — G62.9 PERIPHERAL POLYNEUROPATHY: ICD-10-CM

## 2021-06-15 DIAGNOSIS — R20.2 NUMBNESS AND TINGLING OF BOTH FEET: Primary | ICD-10-CM

## 2021-06-15 DIAGNOSIS — I78.1 SPIDER VEINS: ICD-10-CM

## 2021-06-15 LAB
GLUCOSE SERPL-MCNC: 138 MG/DL (ref 70–99)
HBA1C MFR BLD: 5.5 % (ref 0–5.6)
VIT B12 SERPL-MCNC: 397 PG/ML (ref 193–986)

## 2021-06-15 PROCEDURE — 84425 ASSAY OF VITAMIN B-1: CPT | Mod: 90 | Performed by: FAMILY MEDICINE

## 2021-06-15 PROCEDURE — 99214 OFFICE O/P EST MOD 30 MIN: CPT | Performed by: FAMILY MEDICINE

## 2021-06-15 PROCEDURE — 83036 HEMOGLOBIN GLYCOSYLATED A1C: CPT | Performed by: FAMILY MEDICINE

## 2021-06-15 PROCEDURE — 36415 COLL VENOUS BLD VENIPUNCTURE: CPT | Performed by: FAMILY MEDICINE

## 2021-06-15 PROCEDURE — 82607 VITAMIN B-12: CPT | Performed by: FAMILY MEDICINE

## 2021-06-15 PROCEDURE — 82947 ASSAY GLUCOSE BLOOD QUANT: CPT | Performed by: FAMILY MEDICINE

## 2021-06-15 PROCEDURE — 99000 SPECIMEN HANDLING OFFICE-LAB: CPT | Performed by: FAMILY MEDICINE

## 2021-06-15 NOTE — PROGRESS NOTES
Assessment & Plan   Glenda is a 71 yo F with a hx of hyppothyroidism, htn, gerd, dm2, ckd 3, hpld, aortic valve insufficiency, venous stasis    Numbness and tingling of both feet   Differentials: diabetes, CTS,  vitamin deficiency, idiopathic. Will try Gabapentin for symptom relief.  - Glucose  - Vitamin B12  - Vitamin B1 whole blood    Peripheral polyneuropathy    Most likely diabetic  - Glucose  - Vitamin B12  - Vitamin B1 whole blood    Spider veins: around ankle areas bilaterally     -  Asymptomatic unlikely cause of symptoms.     Type 2 diabetes mellitus without complication, without long-term current use of insulin (H)    A1c at goal.   - Hemoglobin A1c    Return for follow up with results.    Uriah Ferrell MD  [facility]    Kathrine Doshi is a 70 year old who presents for the following health issues:    HPI     Numbness and tingling x over 1 week  Does feel it at night; though does not keep her awake.    Wt Readings from Last 4 Encounters:   06/15/21 89.4 kg (197 lb)   05/19/21 89.6 kg (197 lb 9.6 oz)   01/19/21 89.4 kg (197 lb)   01/12/21 88 kg (194 lb)       Diabetes Follow-up    On diet alone      How often are you checking your blood sugar? Not at all    What concerns do you have today about your diabetes? Numbness and tingling in feet and toes     Do you have any of these symptoms? (Select all that apply)  Numbness in feet and Burning in feet    Have you had a diabetic eye exam in the last 12 months? No      BP Readings from Last 2 Encounters:   06/15/21 138/78   05/19/21 138/76     Hemoglobin A1C (%)   Date Value   06/15/2021 5.5   01/12/2021 5.6     LDL Cholesterol Calculated (mg/dL)   Date Value   08/20/2020 163 (H)   07/22/2020 125 (H)       How many servings of fruits and vegetables do you eat daily?  2-3    On average, how many sweetened beverages do you drink each day (Examples: soda, juice, sweet tea, etc.  Do NOT count diet or artificially sweetened  beverages)?   0    How many days per week do you exercise enough to make your heart beat faster? 3 or less    How many minutes a day do you exercise enough to make your heart beat faster? 9 or less    How many days per week do you miss taking your medication? 0      Review of Systems   HEENT, cardiovascular, pulmonary, gi and gu systems are negative, except as otherwise noted.      Objective    /78   Pulse 69   Temp 97.8  F (36.6  C) (Oral)   Wt 89.4 kg (197 lb)   SpO2 96%   Breastfeeding No   BMI 34.90 kg/m    Body mass index is 34.9 kg/m .  Physical Exam   GENERAL: healthy, alert and no distress  RESP: lungs clear to auscultation - no rales, rhonchi or wheezes  CV: regular rate and rhythm,  no murmur, click or rub, no peripheral edema   MS: Feet  Reduced DP and PT pulses.  Patchy loss of sensation    Results for orders placed or performed in visit on 06/15/21   Hemoglobin A1c     Status: None   Result Value Ref Range    Hemoglobin A1C 5.5 0 - 5.6 %   Glucose     Status: Abnormal   Result Value Ref Range    Glucose 138 (H) 70 - 99 mg/dL   Vitamin B12     Status: None   Result Value Ref Range    Vitamin B12 397 193 - 986 pg/mL

## 2021-06-16 RX ORDER — GABAPENTIN 100 MG/1
100 CAPSULE ORAL 2 TIMES DAILY
Qty: 60 CAPSULE | Refills: 0 | Status: SHIPPED | OUTPATIENT
Start: 2021-06-16 | End: 2021-06-22

## 2021-06-17 ENCOUNTER — THERAPY VISIT (OUTPATIENT)
Dept: PHYSICAL THERAPY | Facility: CLINIC | Age: 71
End: 2021-06-17
Payer: MEDICARE

## 2021-06-17 DIAGNOSIS — R26.9 GAIT ABNORMALITY: ICD-10-CM

## 2021-06-17 PROCEDURE — 97112 NEUROMUSCULAR REEDUCATION: CPT | Mod: GP | Performed by: PHYSICAL THERAPIST

## 2021-06-17 PROCEDURE — 97110 THERAPEUTIC EXERCISES: CPT | Mod: GP | Performed by: PHYSICAL THERAPIST

## 2021-06-21 LAB — VIT B1 BLD-MCNC: 140 NMOL/L (ref 70–180)

## 2021-06-21 NOTE — PROGRESS NOTES
Assessment & Plan   Glenda is a 69 yo F with a hx of hyppothyroidism, htn, gerd, dm2, ckd 3, hpld, aortic valve insufficiency, venous stasis    f/u numbness, neeedles in feet     Idiopathic peripheral neuropathy  (primary encounter diagnosis)  Comment: Neuropathy of unclear etiology; likely related to diabetes.  Gabapentin seems to help discussed that is a safe medication to take.  Will extend prescription for 3 months and recheck again; if persisting might consider ankle-brachial index study  Plan: gabapentin (NEURONTIN) 100 MG capsule    (Z68.34) BMI 34.0-34.9,adult  Comment: Not been walking much, will start walking    (R20.0,  R20.2) Numbness and tingling of both feet  Plan: gabapentin (NEURONTIN) 100 MG capsule    (R26.9) Abnormal gait  Comment: Not wanting to use cane at this time; will consider down the road  [id number]}  Return in about 3 months (around 9/22/2021) for Follow up for symptoms recheck.    Uriah Ferrell MD  [facility]    Kathrine Doshi is a 70 year old who presents for the following health issues:  HPI     Chief Complaint   Patient presents with     Numbness     and tingling and both feet 1 week f/u     Patient is following up for numbness and tingling in the feet.  She has been taking gabapentin for the last 5 days; the symptom seems to be improving.  She is wondering if it is a safe medication to take for a long time.  Otherwise has not experienced any side effects; Also helping the knee pain as well for which she is doing physical therapy  She has diabetes which is well controlled .  Check vitamin B 12on thiamine levels were normal.  She is wondering if is a circulation problem as well.      Review of Systems   Constitutional, HEENT, cardiovascular, pulmonary, gi and gu systems are negative, except as otherwise noted.      Objective    /74   Pulse 71   Temp 97.9  F (36.6  C) (Oral)   Wt 89.4 kg (197 lb)   SpO2 98%   BMI 34.90 kg/m    Body mass  index is 34.9 kg/m .  Physical Exam   GENERAL: Balance impairment, alert and no distress  RESP: lungs clear to auscultation - no rales, rhonchi or wheezes  CV: regular rate and rhythm, no murmur, click or rub, no peripheral edema   MS: no gross musculoskeletal defects noted, no edema  Foot exam: Dorsalis pedis pulses not easily palpable, posterior tibial pulses palpable and strong    {

## 2021-06-22 ENCOUNTER — OFFICE VISIT (OUTPATIENT)
Dept: FAMILY MEDICINE | Facility: CLINIC | Age: 71
End: 2021-06-22
Payer: MEDICARE

## 2021-06-22 VITALS
TEMPERATURE: 97.9 F | DIASTOLIC BLOOD PRESSURE: 74 MMHG | BODY MASS INDEX: 34.9 KG/M2 | WEIGHT: 197 LBS | SYSTOLIC BLOOD PRESSURE: 128 MMHG | OXYGEN SATURATION: 98 % | HEART RATE: 71 BPM

## 2021-06-22 DIAGNOSIS — R26.9 ABNORMAL GAIT: ICD-10-CM

## 2021-06-22 DIAGNOSIS — R20.2 NUMBNESS AND TINGLING OF BOTH FEET: ICD-10-CM

## 2021-06-22 DIAGNOSIS — G60.9 IDIOPATHIC PERIPHERAL NEUROPATHY: Primary | ICD-10-CM

## 2021-06-22 DIAGNOSIS — R20.0 NUMBNESS AND TINGLING OF BOTH FEET: ICD-10-CM

## 2021-06-22 PROCEDURE — 99213 OFFICE O/P EST LOW 20 MIN: CPT | Performed by: FAMILY MEDICINE

## 2021-06-22 RX ORDER — GABAPENTIN 100 MG/1
100 CAPSULE ORAL 2 TIMES DAILY
Qty: 180 CAPSULE | Refills: 0 | Status: SHIPPED | OUTPATIENT
Start: 2021-07-15 | End: 2021-07-14

## 2021-06-28 ENCOUNTER — THERAPY VISIT (OUTPATIENT)
Dept: PHYSICAL THERAPY | Facility: CLINIC | Age: 71
End: 2021-06-28
Payer: MEDICARE

## 2021-06-28 DIAGNOSIS — R26.9 GAIT ABNORMALITY: ICD-10-CM

## 2021-06-28 DIAGNOSIS — I10 ESSENTIAL HYPERTENSION, BENIGN: ICD-10-CM

## 2021-06-28 PROCEDURE — 97112 NEUROMUSCULAR REEDUCATION: CPT | Mod: GP | Performed by: PHYSICAL THERAPIST

## 2021-06-28 PROCEDURE — 97110 THERAPEUTIC EXERCISES: CPT | Mod: GP | Performed by: PHYSICAL THERAPIST

## 2021-06-29 RX ORDER — AMLODIPINE BESYLATE 5 MG/1
TABLET ORAL
Qty: 90 TABLET | Refills: 3 | Status: SHIPPED | OUTPATIENT
Start: 2021-06-29 | End: 2021-10-11

## 2021-07-09 ENCOUNTER — THERAPY VISIT (OUTPATIENT)
Dept: PHYSICAL THERAPY | Facility: CLINIC | Age: 71
End: 2021-07-09
Payer: MEDICARE

## 2021-07-09 DIAGNOSIS — R26.9 GAIT ABNORMALITY: ICD-10-CM

## 2021-07-09 PROCEDURE — 97110 THERAPEUTIC EXERCISES: CPT | Mod: GP | Performed by: PHYSICAL THERAPIST

## 2021-07-14 ENCOUNTER — OFFICE VISIT (OUTPATIENT)
Dept: FAMILY MEDICINE | Facility: CLINIC | Age: 71
End: 2021-07-14
Payer: MEDICARE

## 2021-07-14 VITALS
OXYGEN SATURATION: 96 % | WEIGHT: 196 LBS | HEIGHT: 63 IN | RESPIRATION RATE: 18 BRPM | TEMPERATURE: 97.9 F | DIASTOLIC BLOOD PRESSURE: 70 MMHG | HEART RATE: 65 BPM | SYSTOLIC BLOOD PRESSURE: 138 MMHG | BODY MASS INDEX: 34.73 KG/M2

## 2021-07-14 DIAGNOSIS — N18.31 TYPE 2 DIABETES MELLITUS WITH STAGE 3A CHRONIC KIDNEY DISEASE, WITHOUT LONG-TERM CURRENT USE OF INSULIN (H): ICD-10-CM

## 2021-07-14 DIAGNOSIS — E11.22 TYPE 2 DIABETES MELLITUS WITH STAGE 3A CHRONIC KIDNEY DISEASE, WITHOUT LONG-TERM CURRENT USE OF INSULIN (H): ICD-10-CM

## 2021-07-14 DIAGNOSIS — R20.0 NUMBNESS AND TINGLING OF BOTH FEET: ICD-10-CM

## 2021-07-14 DIAGNOSIS — N18.31 STAGE 3A CHRONIC KIDNEY DISEASE (H): ICD-10-CM

## 2021-07-14 DIAGNOSIS — E03.9 ACQUIRED HYPOTHYROIDISM: ICD-10-CM

## 2021-07-14 DIAGNOSIS — E78.5 HYPERLIPIDEMIA LDL GOAL <70: Primary | ICD-10-CM

## 2021-07-14 DIAGNOSIS — G60.9 IDIOPATHIC PERIPHERAL NEUROPATHY: ICD-10-CM

## 2021-07-14 DIAGNOSIS — R20.2 NUMBNESS AND TINGLING OF BOTH FEET: ICD-10-CM

## 2021-07-14 LAB
CHOLEST SERPL-MCNC: 272 MG/DL
FASTING STATUS PATIENT QL REPORTED: YES
HDLC SERPL-MCNC: 88 MG/DL
LDLC SERPL CALC-MCNC: 164 MG/DL
NONHDLC SERPL-MCNC: 184 MG/DL
TRIGL SERPL-MCNC: 99 MG/DL
TSH SERPL DL<=0.005 MIU/L-ACNC: 2.06 MU/L (ref 0.4–4)

## 2021-07-14 PROCEDURE — 99214 OFFICE O/P EST MOD 30 MIN: CPT | Performed by: FAMILY MEDICINE

## 2021-07-14 PROCEDURE — 36415 COLL VENOUS BLD VENIPUNCTURE: CPT | Performed by: FAMILY MEDICINE

## 2021-07-14 PROCEDURE — 84443 ASSAY THYROID STIM HORMONE: CPT | Performed by: FAMILY MEDICINE

## 2021-07-14 PROCEDURE — 80061 LIPID PANEL: CPT | Performed by: FAMILY MEDICINE

## 2021-07-14 RX ORDER — GABAPENTIN 100 MG/1
100 CAPSULE ORAL 2 TIMES DAILY
Qty: 180 CAPSULE | Refills: 3 | Status: SHIPPED | OUTPATIENT
Start: 2021-07-15 | End: 2022-06-13

## 2021-07-14 RX ORDER — ATORVASTATIN CALCIUM 10 MG/1
10 TABLET, FILM COATED ORAL DAILY
Qty: 90 TABLET | Refills: 3 | Status: SHIPPED | OUTPATIENT
Start: 2021-07-14 | End: 2022-03-17

## 2021-07-14 ASSESSMENT — MIFFLIN-ST. JEOR: SCORE: 1373.18

## 2021-07-14 ASSESSMENT — PAIN SCALES - GENERAL: PAINLEVEL: NO PAIN (0)

## 2021-07-14 NOTE — PROGRESS NOTES
"    Assessment & Plan     Numbness and tingling of both feet  refilled  - gabapentin (NEURONTIN) 100 MG capsule; Take 1 capsule (100 mg) by mouth 2 times daily    Idiopathic peripheral neuropathy  refilled  - gabapentin (NEURONTIN) 100 MG capsule; Take 1 capsule (100 mg) by mouth 2 times daily    Hyperlipidemia LDL goal <70  Advised take Lipitor   SEE [facility] care orders  The potential side effects of this medication have been discussed with the patient.  Call if any significant problems with these are experienced.    - atorvastatin (LIPITOR) 10 MG tablet; Take 1 tablet (10 mg) by mouth daily  - Lipid panel reflex to direct LDL Non-fasting; Future  - Lipid panel reflex to direct LDL Non-fasting    Acquired hypothyroidism  Pending labs  Doing well on meds  - TSH with free T4 reflex; Future  - TSH with free T4 reflex    Type 2 diabetes mellitus with stage 3a chronic kidney disease, without long-term current use of insulin (H)  Stable   - Albumin Random Urine Quantitative with Creat Ratio; Future  - Albumin Random Urine Quantitative with Creat Ratio  Estimated body mass index is 34.72 kg/m  as calculated from the following:    Height as of this encounter: 1.6 m (5' 3\").    Weight as of this encounter: 88.9 kg (196 lb).   Weight management plan: Discussed healthy diet and exercise guidelines        Return in about 6 months (around 1/14/2022) for Diabetes, Medicare wellness Exam.    Marysol Cisse MD  [facility] 70 [facility] who presents for the following health issues with Known History of Diabetes 2 / neuropathy comes in for gabapentin refill  Her neuropathy is better  Diabetes is doing well   Labs reviewed   Pt has Not been taking her statin as recommended    Blood Pressure is stable   No side effects from meds    HPI      Chief Complaint   Patient presents with     Medication Question     regarding Gabapentin for numbness of the feet     Review of Systems   CONSTITUTIONAL: " "NEGATIVE for fever, chills, change in weight  ENT/MOUTH: NEGATIVE for ear, mouth and throat problems  RESP: NEGATIVE for significant cough or SOB  CV: NEGATIVE for chest pain, palpitations or peripheral edema  GI: NEGATIVE for nausea, abdominal pain, heartburn, or change in bowel habits  PSYCHIATRIC: NEGATIVE for changes in mood or affect  ROS otherwise negative      Objective    /70   Pulse 65   Temp 97.9  F (36.6  C) (Oral)   Resp 18   Ht 1.6 m (5' 3\")   Wt 88.9 kg (196 lb)   SpO2 96%   BMI 34.72 kg/m    Body mass index is 34.72 kg/m .  Physical Exam   GENERAL: healthy, alert and no distress  NECK: no adenopathy, no asymmetry, masses, or scars and thyroid normal to palpation  RESP: lungs clear to auscultation - no rales, rhonchi or wheezes  CV: regular rate and rhythm, normal S1 S2, no S3 or S4, no murmur, click or rub, no peripheral edema and peripheral pulses strong  ABDOMEN: soft, nontender, no hepatosplenomegaly, no masses and bowel sounds normal  MS: no gross musculoskeletal defects noted, no edema  Diabetic foot exam: normal DP and PT pulses and no trophic changes or ulcerative lesions    Office Visit on 06/15/2021   Component Date Value Ref Range Status     Hemoglobin A1C 06/15/2021 5.5  0 - 5.6 % Final    Comment: Normal <5.7% Prediabetes 5.7-6.4%  Diabetes 6.5% or higher - adopted from ADA   consensus guidelines.       Glucose 06/15/2021 138* 70 - 99 mg/dL Final     Vitamin B12 06/15/2021 397  193 - 986 pg/mL Final     Vitamin B1 Whole Blood Level 06/15/2021 140  70 - 180 nmol/L Final    Comment: (Note)  INTERPRETIVE INFORMATION: Vitamin B1, Whole Blood  This assay measures the concentration of thiamine   diphosphate (TDP), the primary active form of vitamin B1.   Approximately 90 percent of vitamin B1 present in whole   blood is TDP. Thiamine and thiamine monophosphate, which   comprise the remaining 10 percent, are not measured.  This test was developed and its performance characteristics "   determined by [facility]. It has not been cleared or   approved by the US Food and Drug Administration. This test   was performed in a CLIA certified laboratory and is   intended for clinical purposes.  Performed By: [facility]  [address] [place] [id number]  : Stephanie Mcdaniels MD

## 2021-07-15 NOTE — RESULT ENCOUNTER NOTE
The 10-year ASCVD risk score (Padenmarcus BUI Jr., et al., 2013) is: 29%    Please take Lipitor as recommended   Follow up cholesterol check in 6 weeks  Thyroid test is normal   Sincerely,  Marysol Cisse MD

## 2021-07-20 ENCOUNTER — THERAPY VISIT (OUTPATIENT)
Dept: PHYSICAL THERAPY | Facility: CLINIC | Age: 71
End: 2021-07-20
Payer: MEDICARE

## 2021-07-20 DIAGNOSIS — R26.9 GAIT ABNORMALITY: ICD-10-CM

## 2021-07-20 PROCEDURE — 97110 THERAPEUTIC EXERCISES: CPT | Mod: GP | Performed by: PHYSICAL THERAPIST

## 2021-07-20 PROCEDURE — 97112 NEUROMUSCULAR REEDUCATION: CPT | Mod: GP | Performed by: PHYSICAL THERAPIST

## 2021-07-23 ENCOUNTER — OFFICE VISIT (OUTPATIENT)
Dept: OPHTHALMOLOGY | Facility: CLINIC | Age: 71
End: 2021-07-23
Payer: MEDICARE

## 2021-07-23 DIAGNOSIS — H52.223 MYOPIA OF BOTH EYES WITH REGULAR ASTIGMATISM AND PRESBYOPIA: ICD-10-CM

## 2021-07-23 DIAGNOSIS — H10.13 ALLERGIC CONJUNCTIVITIS OF BOTH EYES: ICD-10-CM

## 2021-07-23 DIAGNOSIS — E11.65 TYPE 2 DIABETES MELLITUS WITH HYPERGLYCEMIA, WITHOUT LONG-TERM CURRENT USE OF INSULIN (H): ICD-10-CM

## 2021-07-23 DIAGNOSIS — H52.13 MYOPIA OF BOTH EYES WITH REGULAR ASTIGMATISM AND PRESBYOPIA: ICD-10-CM

## 2021-07-23 DIAGNOSIS — Z01.00 EXAMINATION OF EYES AND VISION: Primary | ICD-10-CM

## 2021-07-23 DIAGNOSIS — Z96.1 PSEUDOPHAKIA: ICD-10-CM

## 2021-07-23 DIAGNOSIS — H52.4 MYOPIA OF BOTH EYES WITH REGULAR ASTIGMATISM AND PRESBYOPIA: ICD-10-CM

## 2021-07-23 PROCEDURE — 92015 DETERMINE REFRACTIVE STATE: CPT | Mod: GY | Performed by: STUDENT IN AN ORGANIZED HEALTH CARE EDUCATION/TRAINING PROGRAM

## 2021-07-23 PROCEDURE — 92014 COMPRE OPH EXAM EST PT 1/>: CPT | Performed by: STUDENT IN AN ORGANIZED HEALTH CARE EDUCATION/TRAINING PROGRAM

## 2021-07-23 ASSESSMENT — VISUAL ACUITY
METHOD: SNELLEN - LINEAR
OD_SC: 20/25
OS_SC+: -1
OD_SC+: -1
OS_SC: 20/20

## 2021-07-23 ASSESSMENT — SLIT LAMP EXAM - LIDS
COMMENTS: 1+ DERMATOCHALASIS
COMMENTS: 1+ DERMATOCHALASIS

## 2021-07-23 ASSESSMENT — EXTERNAL EXAM - LEFT EYE: OS_EXAM: NORMAL

## 2021-07-23 ASSESSMENT — REFRACTION_MANIFEST
OD_SPHERE: -0.50
OD_ADD: +2.75
OS_ADD: +2.75
OS_SPHERE: -0.50
OS_CYLINDER: +0.50
OD_CYLINDER: +0.50
OS_AXIS: 065
OD_AXIS: 105

## 2021-07-23 ASSESSMENT — CONF VISUAL FIELD
OS_NORMAL: 1
METHOD: COUNTING FINGERS
OD_NORMAL: 1

## 2021-07-23 ASSESSMENT — EXTERNAL EXAM - RIGHT EYE: OD_EXAM: NORMAL

## 2021-07-23 ASSESSMENT — CUP TO DISC RATIO
OD_RATIO: 0.25
OS_RATIO: 0.3

## 2021-07-23 ASSESSMENT — TONOMETRY
OD_IOP_MMHG: 14
IOP_METHOD: APPLANATION
OS_IOP_MMHG: 15

## 2021-07-23 NOTE — PROGRESS NOTES
" Current Eye Medications:  Alaway as needed both eyes, didn't take this morning so  Could see with out.       Subjective:  Complete eye exam. Vision is doing pretty well distance and near. Uses OTC readers +2.50 for near. A lot of itching and irritation in eyes off and on. No eye pain in either eye.     Diabetic, but doesn't need to take medications for it.   Lab Results   Component Value Date    A1C 5.5 06/15/2021    A1C 5.6 01/12/2021    A1C 5.4 07/22/2020    A1C 5.2 07/08/2020    A1C 5.4 10/17/2019        Objective:  See Ophthalmology Exam.       Assessment:  Glenda Bales is a 70 year old female who presents with:   Encounter Diagnoses   Name Primary?     Examination of eyes and vision      Myopia of both eyes with regular astigmatism and presbyopia        Type 2 diabetes mellitus with hyperglycemia, without long-term current use of insulin (H) Negative diabetic retinopathy      Pseudophakia - Both Eyes      Allergic conjunctivitis of both eyes        Plan:  Continue over the counter readers     Continue Alaway drops as needed for itchiness     Use artificial tears up to four times a day (like Refresh Optive, Systane Balance, TheraTears, or generic artificial tears are ok. Avoid \"get the red out\" drops) when the eyes are feeling itchy and irritated.     Keep blood sugars and blood pressure under good control.    Jama Carpenter MD  [phone]    Patient Education   Diabetes weakens the blood vessels all over the body, including the eyes. Damage to the blood vessels in the eyes can cause swelling or bleeding into part of the eye (called the retina). This is called diabetic retinopathy ([facility]-tin--puh-thee). If not treated, this disease can cause vision loss or blindness.   Symptoms may include blurred or distorted vision, but many people have no symptoms. It's important to see your eye doctor regularly to check for problems.   Early treatment and good control can help protect your vision. Here are the " things you can do to help prevent vision loss:      1. Keep your blood sugar levels under tight control.      2. Bring high blood pressure under control.      3. No smoking.      4. Have yearly dilated eye exams.

## 2021-07-23 NOTE — PATIENT INSTRUCTIONS
"Continue over the counter readers     Continue Alaway drops as needed for itchiness     Use artificial tears up to four times a day (like Refresh Optive, Systane Balance, TheraTears, or generic artificial tears are ok. Avoid \"get the red out\" drops) when the eyes are feeling itchy and irritated.     Keep blood sugars and blood pressure under good control.    Jama Carpenter MD  [phone]    Patient Education   Diabetes weakens the blood vessels all over the body, including the eyes. Damage to the blood vessels in the eyes can cause swelling or bleeding into part of the eye (called the retina). This is called diabetic retinopathy (BRIAN-tin-AH-puh-thee). If not treated, this disease can cause vision loss or blindness.   Symptoms may include blurred or distorted vision, but many people have no symptoms. It's important to see your eye doctor regularly to check for problems.   Early treatment and good control can help protect your vision. Here are the things you can do to help prevent vision loss:      1. Keep your blood sugar levels under tight control.      2. Bring high blood pressure under control.      3. No smoking.      4. Have yearly dilated eye exams.       "

## 2021-07-23 NOTE — LETTER
"    7/23/2021         RE: Glenda Bales  [address]  [facility] [address]        Dear Colleague,    Thank you for referring your patient, Glenda Bales, to the [facility]. Please see a copy of my visit note below.     Current Eye Medications:  Alaway as needed both eyes, didn't take this morning so  Could see with out.       Subjective:  Complete eye exam. Vision is doing pretty well distance and near. Uses OTC readers +2.50 for near. A lot of itching and irritation in eyes off and on. No eye pain in either eye.     Diabetic, but doesn't need to take medications for it.   Lab Results   Component Value Date    A1C 5.5 06/15/2021    A1C 5.6 01/12/2021    A1C 5.4 07/22/2020    A1C 5.2 07/08/2020    A1C 5.4 10/17/2019        Objective:  See Ophthalmology Exam.       Assessment:  Glenda Bales is a 70 year old female who presents with:   Encounter Diagnoses   Name Primary?     Examination of eyes and vision      Myopia of both eyes with regular astigmatism and presbyopia        Type 2 diabetes mellitus with hyperglycemia, without long-term current use of insulin (H) Negative diabetic retinopathy      Pseudophakia - Both Eyes      Allergic conjunctivitis of both eyes        Plan:  Continue over the counter readers     Continue Alaway drops as needed for itchiness     Use artificial tears up to four times a day (like Refresh Optive, Systane Balance, TheraTears, or generic artificial tears are ok. Avoid \"get the red out\" drops) when the eyes are feeling itchy and irritated.     Keep blood sugars and blood pressure under good control.    Jama Carpenter MD  [phone]    Patient Education   Diabetes weakens the blood vessels all over the body, including the eyes. Damage to the blood vessels in the eyes can cause swelling or bleeding into part of the eye (called the retina). This is called diabetic retinopathy (BRIAN-tin-AH-puh-thee). If not treated, this disease can cause vision loss or " blindness.   Symptoms may include blurred or distorted vision, but many people have no symptoms. It's important to see your eye doctor regularly to check for problems.   Early treatment and good control can help protect your vision. Here are the things you can do to help prevent vision loss:      1. Keep your blood sugar levels under tight control.      2. Bring high blood pressure under control.      3. No smoking.      4. Have yearly dilated eye exams.             Again, thank you for allowing me to participate in the care of your patient.        Sincerely,        Jama Carpenter MD

## 2021-08-10 ENCOUNTER — OFFICE VISIT (OUTPATIENT)
Dept: FAMILY MEDICINE | Facility: CLINIC | Age: 71
End: 2021-08-10
Payer: MEDICARE

## 2021-08-10 VITALS
BODY MASS INDEX: 35.26 KG/M2 | TEMPERATURE: 97.8 F | DIASTOLIC BLOOD PRESSURE: 80 MMHG | RESPIRATION RATE: 17 BRPM | HEART RATE: 67 BPM | SYSTOLIC BLOOD PRESSURE: 136 MMHG | HEIGHT: 63 IN | OXYGEN SATURATION: 93 % | WEIGHT: 199 LBS

## 2021-08-10 DIAGNOSIS — N89.8 VAGINAL ITCHING: Primary | ICD-10-CM

## 2021-08-10 DIAGNOSIS — E66.01 MORBID OBESITY (H): ICD-10-CM

## 2021-08-10 LAB
CLUE CELLS: NORMAL
TRICHOMONAS, WET PREP: NORMAL
WBC'S/HIGH POWER FIELD, WET PREP: NORMAL
YEAST, WET PREP: NORMAL

## 2021-08-10 PROCEDURE — 87210 SMEAR WET MOUNT SALINE/INK: CPT | Performed by: FAMILY MEDICINE

## 2021-08-10 PROCEDURE — 99213 OFFICE O/P EST LOW 20 MIN: CPT | Performed by: FAMILY MEDICINE

## 2021-08-10 ASSESSMENT — MIFFLIN-ST. JEOR: SCORE: 1386.79

## 2021-08-10 NOTE — PROGRESS NOTES
"    Assessment & Plan     Vaginal itching  Pt is feeling better  Follow up prn  - Wet prep - Clinic Collect  - Wet prep - Clinic Collect    Morbid obesity (H)    Could be the cause     Return in about 6 months (around 2/10/2022) for Diabetes.    Marysol Cisse MD  [facility] JOSE Doshi is a 70 year old who presents for the following health issues   HPI   Vaginal Symptoms  Onset/Duration: Patient states about a week   Description:  Vaginal Discharge: none   Itching (Pruritis): YES  Burning sensation:  YES  Odor: no  Accompanying Signs & Symptoms:  Urinary symptoms: no  Abdominal pain: no  Fever: no  History:   Sexually active: no  New Partner: no  Possibility of Pregnancy:  no  Recent antibiotic use: no  Previous vaginitis issues: no  Precipitating or alleviating factors: None  Therapies tried and outcome: Vagisil  It feels better  Pt is obeseReview of Systems   Rest of the ROS is Negative except see above and Problem list [stable]        Objective    /80   Pulse 67   Temp 97.8  F (36.6  C) (Oral)   Resp 17   Ht 1.6 m (5' 3\")   Wt 90.3 kg (199 lb)   SpO2 93%   BMI 35.25 kg/m    Body mass index is 35.25 kg/m .  Physical Exam   GENERAL: healthy, alert and no distress  RESP: lungs clear to auscultation - no rales, rhonchi or wheezes  CV: regular rate and rhythm, normal S1 S2, no S3 or S4, no murmur, click or rub, no peripheral edema and peripheral pulses strong  ABDOMEN: soft, nontender, no hepatosplenomegaly, no masses and bowel sounds normal   (female): deferred    Results for orders placed or performed in visit on 08/10/21   Wet prep - Clinic Collect     Status: Normal    Specimen: Vagina; Swab   Result Value Ref Range    Trichomonas Absent Absent    Yeast Absent Absent    Clue Cells Absent Absent    WBCs/high power field None None               "

## 2021-09-02 ENCOUNTER — MYC MEDICAL ADVICE (OUTPATIENT)
Dept: FAMILY MEDICINE | Facility: CLINIC | Age: 71
End: 2021-09-02

## 2021-09-03 NOTE — TELEPHONE ENCOUNTER
Guillermo sent. Patient to send in BP readings from home.  RN will then route readings to PCP to see if amlodipine should be discontinued prior to upcoming appointment on 9/14 due to ankle/feet swelling.    Manfred Soto RN

## 2021-09-14 ENCOUNTER — OFFICE VISIT (OUTPATIENT)
Dept: FAMILY MEDICINE | Facility: CLINIC | Age: 71
End: 2021-09-14
Payer: MEDICARE

## 2021-09-14 VITALS
HEART RATE: 69 BPM | RESPIRATION RATE: 18 BRPM | OXYGEN SATURATION: 96 % | BODY MASS INDEX: 35.97 KG/M2 | DIASTOLIC BLOOD PRESSURE: 81 MMHG | TEMPERATURE: 98.5 F | HEIGHT: 63 IN | SYSTOLIC BLOOD PRESSURE: 137 MMHG | WEIGHT: 203 LBS

## 2021-09-14 DIAGNOSIS — I87.8 VENOUS STASIS: ICD-10-CM

## 2021-09-14 DIAGNOSIS — Z23 NEED FOR PROPHYLACTIC VACCINATION AND INOCULATION AGAINST INFLUENZA: Primary | ICD-10-CM

## 2021-09-14 LAB
ALBUMIN SERPL-MCNC: 3.5 G/DL (ref 3.4–5)
ALP SERPL-CCNC: 77 U/L (ref 40–150)
ALT SERPL W P-5'-P-CCNC: 52 U/L (ref 0–50)
ANION GAP SERPL CALCULATED.3IONS-SCNC: 4 MMOL/L (ref 3–14)
AST SERPL W P-5'-P-CCNC: 33 U/L (ref 0–45)
BILIRUB SERPL-MCNC: 0.6 MG/DL (ref 0.2–1.3)
BUN SERPL-MCNC: 24 MG/DL (ref 7–30)
CALCIUM SERPL-MCNC: 9.5 MG/DL (ref 8.5–10.1)
CHLORIDE BLD-SCNC: 106 MMOL/L (ref 94–109)
CO2 SERPL-SCNC: 30 MMOL/L (ref 20–32)
CREAT SERPL-MCNC: 1.02 MG/DL (ref 0.52–1.04)
ERYTHROCYTE [DISTWIDTH] IN BLOOD BY AUTOMATED COUNT: 13.6 % (ref 10–15)
GFR SERPL CREATININE-BSD FRML MDRD: 55 ML/MIN/1.73M2
GLUCOSE BLD-MCNC: 110 MG/DL (ref 70–99)
HCT VFR BLD AUTO: 42 % (ref 35–47)
HGB BLD-MCNC: 13.9 G/DL (ref 11.7–15.7)
MCH RBC QN AUTO: 31.9 PG (ref 26.5–33)
MCHC RBC AUTO-ENTMCNC: 33.1 G/DL (ref 31.5–36.5)
MCV RBC AUTO: 96 FL (ref 78–100)
PLATELET # BLD AUTO: 187 10E3/UL (ref 150–450)
POTASSIUM BLD-SCNC: 3.7 MMOL/L (ref 3.4–5.3)
PROT SERPL-MCNC: 7.7 G/DL (ref 6.8–8.8)
RBC # BLD AUTO: 4.36 10E6/UL (ref 3.8–5.2)
SODIUM SERPL-SCNC: 140 MMOL/L (ref 133–144)
WBC # BLD AUTO: 6.5 10E3/UL (ref 4–11)

## 2021-09-14 PROCEDURE — 82043 UR ALBUMIN QUANTITATIVE: CPT | Performed by: FAMILY MEDICINE

## 2021-09-14 PROCEDURE — 99213 OFFICE O/P EST LOW 20 MIN: CPT | Mod: 25 | Performed by: FAMILY MEDICINE

## 2021-09-14 PROCEDURE — 85027 COMPLETE CBC AUTOMATED: CPT | Performed by: FAMILY MEDICINE

## 2021-09-14 PROCEDURE — 36415 COLL VENOUS BLD VENIPUNCTURE: CPT | Performed by: FAMILY MEDICINE

## 2021-09-14 PROCEDURE — 80053 COMPREHEN METABOLIC PANEL: CPT | Performed by: FAMILY MEDICINE

## 2021-09-14 PROCEDURE — 90662 IIV NO PRSV INCREASED AG IM: CPT | Performed by: FAMILY MEDICINE

## 2021-09-14 PROCEDURE — G0008 ADMIN INFLUENZA VIRUS VAC: HCPCS | Performed by: FAMILY MEDICINE

## 2021-09-14 ASSESSMENT — MIFFLIN-ST. JEOR: SCORE: 1404.93

## 2021-09-14 NOTE — PROGRESS NOTES
"    Assessment & Plan     Need for prophylactic vaccination and inoculation against influenza    - INFLUENZA, QUAD, HIGH DOSE, PF, 65YR + (FLUZONE HD)  - ADMIN INFLUENZA (For MEDICARE Patients ONLY) []    Venous stasis  Advised keep legs elevated  Johan Hose stockings   Low salt diet  Follow up if not better  If continues will decrease amlodipine  - Miscellaneous DME Order  - Comprehensive metabolic panel (BMP + Alb, Alk Phos, ALT, AST, Total. Bili, TP); Future  - CBC with platelets; Future  - Albumin Random Urine Quantitative with Creat Ratio; Future  - Albumin Random Urine Quantitative with Creat Ratio  - CBC with platelets  - Comprehensive metabolic panel (BMP + Alb, Alk Phos, ALT, AST, Total. Bili, TP)    Return in about 1 month (around 10/14/2021), or if symptoms worsen or fail to improve, for recheck/Please schedule appointment.    Marysol Cisse MD  [facility] JOSE Doshi is a 70 year old who presents for the following health issues     HPI pt comes in with swelling LE-both at the end of the day  Better in AM  No sob  No chest pain  No abdominal pain or Gi symptoms  Pt has gained weight  Has nasal congestion which has Improved  Review of Systems   CONSTITUTIONAL: NEGATIVE for fever, chills, change in weight  ENT/MOUTH: NEGATIVE for ear, mouth and throat problems  RESP: NEGATIVE for significant cough or SOB  CV: NEGATIVE for chest pain, palpitations or peripheral edema  GI: NEGATIVE for nausea, abdominal pain, heartburn, or change in bowel habits  NEURO: NEGATIVE for weakness, dizziness or paresthesias  PSYCHIATRIC: stable   ROS otherwise negative      Objective    /81   Pulse 69   Temp 98.5  F (36.9  C) (Oral)   Resp 18   Ht 1.6 m (5' 3\")   Wt 92.1 kg (203 lb)   SpO2 96%   BMI 35.96 kg/m    Body mass index is 35.96 kg/m .  Physical Exam   GENERAL: healthy, alert, no distress and obese  NECK: no adenopathy, no asymmetry, masses, or scars and thyroid normal to " palpation  RESP: lungs clear to auscultation - no rales, rhonchi or wheezes  CV: regular rates and rhythm, normal S1 S2, no S3 or S4, no murmur, click or rub and 1 + pedal edema  ABDOMEN: soft, nontender, no hepatosplenomegaly, no masses and bowel sounds normal  MS: 1 + pedal edema    Pending         DME (Durable Medical Equipment) Orders and Documentation  Orders Placed This Encounter   Procedures     Miscellaneous DME Order      The patient was assessed and it was determined the patient is in need of the following listed DME Supplies/Equipment. Please complete supporting documentation below to demonstrate medical necessity.      DME All Other Item(s) Documentation    List reason for need and supporting documentation for medical necessity below for each DME item.     1. As above

## 2021-09-15 LAB
CREAT UR-MCNC: 76 MG/DL
MICROALBUMIN UR-MCNC: 9 MG/L
MICROALBUMIN/CREAT UR: 11.84 MG/G CR (ref 0–25)

## 2021-09-26 DIAGNOSIS — I10 ESSENTIAL HYPERTENSION, BENIGN: ICD-10-CM

## 2021-09-26 DIAGNOSIS — K21.9 GASTROESOPHAGEAL REFLUX DISEASE: ICD-10-CM

## 2021-09-26 DIAGNOSIS — E03.9 ACQUIRED HYPOTHYROIDISM: ICD-10-CM

## 2021-09-28 ENCOUNTER — MYC MEDICAL ADVICE (OUTPATIENT)
Dept: FAMILY MEDICINE | Facility: CLINIC | Age: 71
End: 2021-09-28

## 2021-09-28 DIAGNOSIS — I10 ESSENTIAL HYPERTENSION, BENIGN: ICD-10-CM

## 2021-09-28 NOTE — TELEPHONE ENCOUNTER
From [facility] message:    Question about medications    Glenda Bales Sonia, MD 6 minutes ago (1:41 PM)       When I contacted [facility] to see if I had refills on file they did not show anything for following medications:  levothyroxine 0.112mg, omeprazole 20mg, hydrochlorothiade 12.5mg and atenolol 25mg.  Is there something I need to do.

## 2021-09-29 RX ORDER — ATENOLOL 25 MG/1
TABLET ORAL
Qty: 180 TABLET | Refills: 2 | Status: SHIPPED | OUTPATIENT
Start: 2021-09-29 | End: 2022-03-17

## 2021-09-29 RX ORDER — LEVOTHYROXINE SODIUM 112 UG/1
TABLET ORAL
Qty: 90 TABLET | Refills: 2 | Status: SHIPPED | OUTPATIENT
Start: 2021-09-29 | End: 2022-06-15

## 2021-09-29 NOTE — TELEPHONE ENCOUNTER
Prescription approved per [facility] Refill Protocol.  See [employer] message to patient.   Maribel Ocasio RN

## 2021-09-30 ENCOUNTER — MYC MEDICAL ADVICE (OUTPATIENT)
Dept: FAMILY MEDICINE | Facility: CLINIC | Age: 71
End: 2021-09-30

## 2021-09-30 RX ORDER — HYDROCHLOROTHIAZIDE 12.5 MG/1
TABLET ORAL
Qty: 90 TABLET | Refills: 2 | Status: SHIPPED | OUTPATIENT
Start: 2021-09-30 | End: 2021-10-11

## 2021-09-30 NOTE — TELEPHONE ENCOUNTER
From the [facility] message:    Please send over the refill:    Glenda Bales Sonia, MD 3 minutes ago (2:51 PM)       I contacted [facility] today about a refills for hydrochlorothiazide 12mg. They said they are still waiting on prescriber approval. I thought this was sent to them yesterday.  Is there anything else I need to do.  Thank you

## 2021-10-01 NOTE — TELEPHONE ENCOUNTER
Prescription approved per [facility] Refill Protocol.  See [facility] message.   Maribel Ocasio RN

## 2021-10-11 ENCOUNTER — OFFICE VISIT (OUTPATIENT)
Dept: FAMILY MEDICINE | Facility: CLINIC | Age: 71
End: 2021-10-11
Payer: MEDICARE

## 2021-10-11 VITALS
TEMPERATURE: 98.7 F | DIASTOLIC BLOOD PRESSURE: 64 MMHG | OXYGEN SATURATION: 96 % | HEART RATE: 65 BPM | SYSTOLIC BLOOD PRESSURE: 118 MMHG | BODY MASS INDEX: 36.32 KG/M2 | RESPIRATION RATE: 18 BRPM | WEIGHT: 205 LBS | HEIGHT: 63 IN

## 2021-10-11 DIAGNOSIS — I10 ESSENTIAL HYPERTENSION, BENIGN: ICD-10-CM

## 2021-10-11 DIAGNOSIS — I87.8 VENOUS STASIS: ICD-10-CM

## 2021-10-11 PROCEDURE — 99213 OFFICE O/P EST LOW 20 MIN: CPT | Performed by: FAMILY MEDICINE

## 2021-10-11 RX ORDER — AMLODIPINE BESYLATE 2.5 MG/1
2.5 TABLET ORAL DAILY
Qty: 90 TABLET | Refills: 1 | Status: SHIPPED | OUTPATIENT
Start: 2021-10-11 | End: 2022-03-17

## 2021-10-11 RX ORDER — HYDROCHLOROTHIAZIDE 25 MG/1
25 TABLET ORAL DAILY
Qty: 90 TABLET | Refills: 3 | Status: SHIPPED | OUTPATIENT
Start: 2021-10-11 | End: 2022-07-26

## 2021-10-11 ASSESSMENT — MIFFLIN-ST. JEOR: SCORE: 1414

## 2021-10-11 NOTE — PROGRESS NOTES
"    Assessment & Plan     Essential hypertension, benign  Advised decrease amlodipine  Increase HCTZZ  Follow up 1 month BP and potassium check  Hope this will help with leg swelling  - amLODIPine (NORVASC) 2.5 MG tablet; Take 1 tablet (2.5 mg) by mouth daily  - hydrochlorothiazide (HYDRODIURIL) 25 MG tablet; Take 1 tablet (25 mg) by mouth daily    Venous stasis  Advised elevate Legs  Wear compression socks  Low salt diet      Return in about 1 month (around 11/11/2021) for recheck/Please schedule appointment.    Marysol Cisse MD  [facility] JOSE Doshi is a 70 year old who presents for the following health issues   She says she gets swelling in both her legs by the end of the day  She is unable to wear compression socks  No sob  No PND  No abdominal pain or Bloating  Pt is on amlodipine  Review of Systems   CONSTITUTIONAL: NEGATIVE for fever, chills, change in weight  ENT/MOUTH: NEGATIVE for ear, mouth and throat problems  RESP: NEGATIVE for significant cough or SOB  CV: NEGATIVE for chest pain, palpitations or peripheral edema  GI: as above  PSYCHIATRIC: NEGATIVE for changes in mood or affect  ROS otherwise negative      Objective    /64   Pulse 65   Temp 98.7  F (37.1  C) (Oral)   Resp 18   Ht 1.6 m (5' 3\")   Wt 93 kg (205 lb)   SpO2 96%   BMI 36.31 kg/m    Body mass index is 36.31 kg/m .  Physical Exam   GENERAL: healthy, alert and no distress  NECK: no adenopathy, no asymmetry, masses, or scars and thyroid normal to palpation  RESP: lungs clear to auscultation - no rales, rhonchi or wheezes  CV: regular rate and rhythm, normal S1 S2, no S3 or S4, no murmur, click or rub, no peripheral edema and peripheral pulses strong  ABDOMEN: soft, nontender, no hepatosplenomegaly, no masses and bowel sounds normal  MS: no gross musculoskeletal defects noted, no edema  SKIN: no suspicious lesions or rashes  PSYCH: mentation appears normal, affect normal/bright    Pending       "

## 2021-10-21 ENCOUNTER — NURSE TRIAGE (OUTPATIENT)
Dept: FAMILY MEDICINE | Facility: CLINIC | Age: 71
End: 2021-10-21

## 2021-11-01 ENCOUNTER — OFFICE VISIT (OUTPATIENT)
Dept: FAMILY MEDICINE | Facility: CLINIC | Age: 71
End: 2021-11-01
Payer: MEDICARE

## 2021-11-01 VITALS
DIASTOLIC BLOOD PRESSURE: 82 MMHG | OXYGEN SATURATION: 96 % | HEART RATE: 70 BPM | BODY MASS INDEX: 35.44 KG/M2 | HEIGHT: 63 IN | RESPIRATION RATE: 18 BRPM | WEIGHT: 200 LBS | SYSTOLIC BLOOD PRESSURE: 130 MMHG | TEMPERATURE: 98.6 F

## 2021-11-01 DIAGNOSIS — I35.1 AORTIC VALVE INSUFFICIENCY, ETIOLOGY OF CARDIAC VALVE DISEASE UNSPECIFIED: ICD-10-CM

## 2021-11-01 DIAGNOSIS — I49.9 IRREGULAR HEART BEAT: ICD-10-CM

## 2021-11-01 DIAGNOSIS — I10 ESSENTIAL HYPERTENSION, BENIGN: ICD-10-CM

## 2021-11-01 DIAGNOSIS — Z23 HIGH PRIORITY FOR 2019-NCOV VACCINE: ICD-10-CM

## 2021-11-01 PROCEDURE — 0064A COVID-19,PF,MODERNA (18+ YRS PRIMARY SERIES .5ML): CPT | Performed by: FAMILY MEDICINE

## 2021-11-01 PROCEDURE — 91301 COVID-19,PF,MODERNA (18+ YRS PRIMARY SERIES .5ML): CPT | Performed by: FAMILY MEDICINE

## 2021-11-01 PROCEDURE — 93000 ELECTROCARDIOGRAM COMPLETE: CPT | Performed by: FAMILY MEDICINE

## 2021-11-01 PROCEDURE — 99214 OFFICE O/P EST MOD 30 MIN: CPT | Mod: 25 | Performed by: FAMILY MEDICINE

## 2021-11-01 ASSESSMENT — MIFFLIN-ST. JEOR: SCORE: 1391.32

## 2021-11-01 NOTE — PROGRESS NOTES
"  Assessment & Plan     Essential hypertension, benign  controlled  - Potassium; Future    Irregular heart beat  Pt has None Now  Advised zio  Pt declines   She says she will follow up had a brief episode when she was feeling anxious  - EKG 12-lead complete w/read - Clinics  - Leadless EKG Monitor 3 to 7 Days; Future    Aortic valve insufficiency, etiology of cardiac valve disease unspecified  Advised   - Echocardiogram Complete; Future    High priority for 2019-nCoV vaccine    - COVID-19,PF,MODERNA (18+ Yrs Primary Series .5mL)  Return in about 6 months (around 5/1/2022) for recheck/Please schedule appointment.    Marysol Cisse MD  [facility] JOSE Doshi is a 70 year old who presents for the following health issues     HPI   Chief Complaint   Patient presents with     Blood Pressure Check     Irregular Heart Beat     Imm/Inj     COVID-19 VACCINE   pt says her Blood Pressure goes  to 150/90 and then it is Good-she cjhecks her BP frequently and gets anxious  Now it is fine  She checks her Blood Pressure very often  Gets anxious and got palpitations x 1   Feels Fine Now  No sob  No wheezing  No chest pain  No sob  Feels fine now      Review of Systems   CONSTITUTIONAL: NEGATIVE for fever, chills, change in weight  ENT/MOUTH: NEGATIVE for ear, mouth and throat problems  RESP: NEGATIVE for significant cough or SOB  CV: NEGATIVE for chest pain, palpitations or peripheral edema  GI: NEGATIVE for nausea, abdominal pain, heartburn, or change in bowel habits  ROS otherwise negative      Objective    /82   Pulse 70   Temp 98.6  F (37  C) (Oral)   Resp 18   Ht 1.6 m (5' 3\")   Wt 90.7 kg (200 lb)   SpO2 96%   BMI 35.43 kg/m    Body mass index is 35.43 kg/m .  Physical Exam   GENERAL: healthy, alert and no distress  NECK: no adenopathy, no asymmetry, masses, or scars and thyroid normal to palpation  RESP: lungs clear to auscultation - no rales, rhonchi or wheezes  CV: regular rate and " rhythm, normal S1 S2, no S3 or S4, no murmur, click or rub, no peripheral edema and peripheral pulses strong  ABDOMEN: soft, nontender, no hepatosplenomegaly, no masses and bowel sounds normal  MS: no gross musculoskeletal defects noted, no edema  PSYCH: anxious

## 2021-11-02 ENCOUNTER — MYC MEDICAL ADVICE (OUTPATIENT)
Dept: FAMILY MEDICINE | Facility: CLINIC | Age: 71
End: 2021-11-02

## 2021-11-02 DIAGNOSIS — I10 ESSENTIAL HYPERTENSION, BENIGN: Primary | ICD-10-CM

## 2021-12-01 ENCOUNTER — LAB (OUTPATIENT)
Dept: LAB | Facility: CLINIC | Age: 71
End: 2021-12-01
Payer: MEDICARE

## 2021-12-01 DIAGNOSIS — N18.30 STAGE 3 CHRONIC KIDNEY DISEASE (H): Primary | ICD-10-CM

## 2021-12-01 DIAGNOSIS — I10 ESSENTIAL HYPERTENSION, BENIGN: ICD-10-CM

## 2021-12-01 LAB
PHOSPHATE SERPL-MCNC: 3.5 MG/DL (ref 2.5–4.5)
POTASSIUM BLD-SCNC: 4 MMOL/L (ref 3.4–5.3)

## 2021-12-01 PROCEDURE — 84100 ASSAY OF PHOSPHORUS: CPT

## 2021-12-01 PROCEDURE — 84132 ASSAY OF SERUM POTASSIUM: CPT

## 2021-12-01 PROCEDURE — 36415 COLL VENOUS BLD VENIPUNCTURE: CPT

## 2021-12-06 ENCOUNTER — ANCILLARY PROCEDURE (OUTPATIENT)
Dept: CARDIOLOGY | Facility: CLINIC | Age: 71
End: 2021-12-06
Attending: FAMILY MEDICINE
Payer: MEDICARE

## 2021-12-06 DIAGNOSIS — I35.1 AORTIC VALVE INSUFFICIENCY, ETIOLOGY OF CARDIAC VALVE DISEASE UNSPECIFIED: ICD-10-CM

## 2021-12-06 LAB — LVEF ECHO: NORMAL

## 2021-12-06 PROCEDURE — 93306 TTE W/DOPPLER COMPLETE: CPT | Performed by: INTERNAL MEDICINE

## 2022-01-17 ENCOUNTER — OFFICE VISIT (OUTPATIENT)
Dept: FAMILY MEDICINE | Facility: CLINIC | Age: 72
End: 2022-01-17
Payer: MEDICARE

## 2022-01-17 VITALS
HEART RATE: 58 BPM | WEIGHT: 200 LBS | RESPIRATION RATE: 18 BRPM | HEIGHT: 63 IN | OXYGEN SATURATION: 97 % | TEMPERATURE: 98 F | SYSTOLIC BLOOD PRESSURE: 152 MMHG | BODY MASS INDEX: 35.44 KG/M2 | DIASTOLIC BLOOD PRESSURE: 91 MMHG

## 2022-01-17 DIAGNOSIS — Z20.822 PERSON UNDER INVESTIGATION FOR COVID-19: Primary | ICD-10-CM

## 2022-01-17 DIAGNOSIS — E11.22 TYPE 2 DIABETES MELLITUS WITH STAGE 3A CHRONIC KIDNEY DISEASE, WITHOUT LONG-TERM CURRENT USE OF INSULIN (H): ICD-10-CM

## 2022-01-17 DIAGNOSIS — I10 ESSENTIAL HYPERTENSION, BENIGN: ICD-10-CM

## 2022-01-17 DIAGNOSIS — J06.9 UPPER RESPIRATORY TRACT INFECTION, UNSPECIFIED TYPE: ICD-10-CM

## 2022-01-17 DIAGNOSIS — N18.31 TYPE 2 DIABETES MELLITUS WITH STAGE 3A CHRONIC KIDNEY DISEASE, WITHOUT LONG-TERM CURRENT USE OF INSULIN (H): ICD-10-CM

## 2022-01-17 LAB
FLUAV AG SPEC QL IA: NEGATIVE
FLUBV AG SPEC QL IA: NEGATIVE

## 2022-01-17 PROCEDURE — 99214 OFFICE O/P EST MOD 30 MIN: CPT | Performed by: FAMILY MEDICINE

## 2022-01-17 PROCEDURE — U0003 INFECTIOUS AGENT DETECTION BY NUCLEIC ACID (DNA OR RNA); SEVERE ACUTE RESPIRATORY SYNDROME CORONAVIRUS 2 (SARS-COV-2) (CORONAVIRUS DISEASE [COVID-19]), AMPLIFIED PROBE TECHNIQUE, MAKING USE OF HIGH THROUGHPUT TECHNOLOGIES AS DESCRIBED BY CMS-2020-01-R: HCPCS | Performed by: FAMILY MEDICINE

## 2022-01-17 PROCEDURE — 87804 INFLUENZA ASSAY W/OPTIC: CPT | Performed by: FAMILY MEDICINE

## 2022-01-17 RX ORDER — BENZONATATE 200 MG/1
200 CAPSULE ORAL 3 TIMES DAILY PRN
Qty: 30 CAPSULE | Refills: 0 | Status: SHIPPED | OUTPATIENT
Start: 2022-01-17 | End: 2022-03-17

## 2022-01-17 ASSESSMENT — MIFFLIN-ST. JEOR: SCORE: 1386.32

## 2022-01-17 ASSESSMENT — ACTIVITIES OF DAILY LIVING (ADL): CURRENT_FUNCTION: NO ASSISTANCE NEEDED

## 2022-01-17 NOTE — PATIENT INSTRUCTIONS
"Discharge Instructions for COVID-19 Patients  You have--or may have--COVID-19. Please follow the instructions listed below.   If you have a weakened immune system, discuss with your doctor any other actions you need to take.  How can I protect others?  If you have symptoms (fever, cough, body aches or trouble breathing):    Stay home and away from others (self-isolate) until:  ? Your other symptoms have resolved (gotten better). And   ? You've had no fever--and no medicine that reduces fever--for 1 full day (24 hours). And   ? At least 10 days have passed since your symptoms started. (You may need to wait 20 days. Follow the advice of your care team.)  If you don't show symptoms, but testing showed that you have COVID-19:    Stay home and away from others (self-isolate) until at least 10 days have passed since the date of your first positive COVID-19 test.  During this time    Stay in your own room, even for meals. Use your own bathroom if you can.    Stay away from others in your home. No hugging, kissing or shaking hands. No visitors.    Don't go to work, school or anywhere else.    Clean \"high touch\" surfaces often (doorknobs, counters, handles). Use household cleaning spray or wipes.    You'll find a full list of  on the EPA website: www.epa.gov/pesticide-registration/list-n-disinfectants-use-against-sars-cov-2.    Cover your mouth and nose with a mask or other face covering to avoid spreading germs.    Wash your hands and face often. Use soap and water.    Caregivers in these groups are at risk for severe illness due to COVID-19:  ? People 65 years and older  ? People who live in a nursing home or long-term care facility  ? People with chronic disease (lung, heart, cancer, diabetes, kidney, liver, immunologic)  ? People who have a weakened immune system, including those who:    Are in cancer treatment    Take medicine that weakens the immune system, such as corticosteroids    Had a bone marrow or organ " transplant    Have an immune deficiency    Have poorly controlled HIV or AIDS    Are obese (body mass index of 40 or higher)    Smoke regularly    Caregivers should wear gloves while washing dishes, handling laundry and cleaning bedrooms and bathrooms.    Use caution when washing and drying laundry: Don't shake dirty laundry and use the warmest water setting that you can.    For more tips on managing your health at home, go to www.cdc.gov/coronavirus/2019-ncov/downloads/10Things.pdf.  How can I take care of myself at home?  1. Get lots of rest. Drink extra fluids (unless a doctor has told you not to).  2. Take Tylenol (acetaminophen) for fever or pain. If you have liver or kidney problems, ask your family doctor if it's okay to take Tylenol.   Adults can take either:   ? 650 mg (two 325 mg pills) every 4 to 6 hours, or   ? 1,000 mg (two 500 mg pills) every 8 hours as needed.  ? Note: Don't take more than 3,000 mg in one day. Acetaminophen is found in many medicines (both prescribed and over-the-counter medicines). Read all labels to be sure you don't take too much.   For children, check the Tylenol bottle for the right dose. The dose is based on the child's age or weight.  3. If you have other health problems (like cancer, heart failure, an organ transplant or severe kidney disease): Call your specialty clinic if you don't feel better in the next 2 days.  4. Know when to call 911. Emergency warning signs include:  ? Trouble breathing or shortness of breath  ? Pain or pressure in the chest that doesn't go away  ? Feeling confused like you haven't felt before, or not being able to wake up  ? Bluish-colored lips or face  5. Your doctor may have prescribed a blood thinner medicine. Follow their instructions.  Where can I get more information?     [facility] - About COVID-19:   [email or web address]    CDC - What to Do If You're Sick:  www.cdc.gov/coronavirus/2019-ncov/about/steps-when-sick.html    CDC - Ending Home Isolation: www.cdc.gov/coronavirus/2019-ncov/hcp/disposition-in-home-patients.html    CDC - Caring for Someone: www.cdc.gov/coronavirus/2019-ncov/if-you-are-sick/care-for-someone.html    [facility] - Interim Guidance for Hospital Discharge to Home: www.health.[facility].mn./diseases/coronavirus/hcp/hospdischarge.pdf    Below are the COVID-19 hotlines at the [place] Department of Health ([facility]). Interpreters are available.  ? For health questions: Call 260-187-4341 or 1-209.651.6526 (7 a.m. to 7 p.m.)  ? For questions about schools and childcare: Call 538-092-2649 or 1-473.443.8667 (7 a.m. to 7 p.m.)    For informational purposes only. Not to replace the advice of your health care provider. Clinically reviewed by Dr. Pastor Still.   Copyright   2020 [employer]. All rights reserved. [employer] 210437 - REV 01/05/21.      Patient Education   Personalized Prevention Plan  You are due for the preventive services outlined below.  Your care team is available to assist you in scheduling these services.  If you have already completed any of these items, please share that information with your care team to update in your medical record.  Health Maintenance Due   Topic Date Due     Parathyroid Lab  Never done     Colorectal Cancer Screening  Never done     Kidney Microalbumin Urine Test  12/14/2021     A1C Lab  12/15/2021     FALL RISK ASSESSMENT  01/12/2022     Hemoglobin  03/14/2022     Your Health Risk Assessment indicates you feel you are not in good health    A healthy lifestyle helps keep the body fit and the mind alert. It helps protect you from disease, helps you fight disease, and helps prevent chronic disease (disease that doesn't go away) from getting worse. This is important as you get older and begin to notice twinges in muscles and joints and a decline in the strength and stamina you once took for granted. A healthy lifestyle  includes good healthcare, good nutrition, weight control, recreation, and regular exercise. Avoid harmful substances and do what you can to keep safe. Another part of a healthy lifestyle is stay mentally active and socially involved.    Good healthcare     Have a wellness visit every year.     If you have new symptoms, let us know right away. Don't wait until the next checkup.     Take medicines exactly as prescribed and keep your medicines in a safe place. Tell us if your medicine causes problems.   Healthy diet and weight control     Eat 3 or 4 small, nutritious, low-fat, high-fiber meals a day. Include a variety of fruits, vegetables, and whole-grain foods.     Make sure you get enough calcium in your diet. Calcium, vitamin D, and exercise help prevent osteoporosis (bone thinning).     If you live alone, try eating with others when you can. That way you get a good meal and have company while you eat it.     Try to keep a healthy weight. If you eat more calories than your body uses for energy, it will be stored as fat and you will gain weight.     Recreation   Recreation is not limited to sports and team events. It includes any activity that provides relaxation, interest, enjoyment, and exercise. Recreation provides an outlet for physical, mental, and social energy. It can give a sense of worth and achievement. It can help you stay healthy.    Mental Exercise and Social Involvement  Mental and emotional health is as important as physical health. Keep in touch with friends and family. Stay as active as possible. Continue to learn and challenge yourself.   Things you can do to stay mentally active are:    Learn something new, like a foreign language or musical instrument.     Play SCRABBLE or do crossword puzzles. If you cannot find people to play these games with you at home, you can play them with others on your computer through the Internet.     Join a games club--anything from card games to chess or checkers or  lawn bowling.     Start a new hobby.     Go back to school.     Volunteer.     Read.   Keep up with world events.    Exercise for a Healthier Heart  You may wonder how you can improve the health of your heart. If you re thinking about exercise, you re on the right track. You don t need to become an athlete. But you do need a certain amount of brisk exercise to help strengthen your heart. If you have been diagnosed with a heart condition, your healthcare provider may advise exercise to help stabilize your condition. To help make exercise a habit, choose safe, fun activities.      Exercise with a friend. When activity is fun, you're more likely to stick with it.   Before you start  Check with your healthcare provider before starting an exercise program. This is especially important if you have not been active for a while. It's also important if you have a long-term (chronic) health problem such as heart disease, diabetes, or obesity. Or if you are at high risk for having these problems.   Why exercise?  Exercising regularly offers many healthy rewards. It can help you do all of the following:     Improve your blood cholesterol level to help prevent further heart trouble    Lower your blood pressure to help prevent a stroke or heart attack    Control diabetes, or reduce your risk of getting this disease    Improve your heart and lung function    Reach and stay at a healthy weight    Make your muscles stronger so you can stay active    Prevent falls and fractures by slowing the loss of bone mass (osteoporosis)    Manage stress better    Reduce your blood pressure    Improve your sense of self and your body image  Exercise tips      Ease into your routine. Set small goals. Then build on them. If you are not sure what your activity level should be, talk with your healthcare provider first before starting an exercise routine.    Exercise on most days. Aim for a total of 150 minutes (2 hours and 30 minutes) or more of  moderate-intensity aerobic activity each week. Or 75 minutes (1 hour and 15 minutes) or more of vigorous-intensity aerobic activity each week. Or try for a combination of both. Moderate activity means that you breathe heavier and your heart rate increases but you can still talk. Think about doing 40 minutes of moderate exercise, 3 to 4 times a week. For best results, activity should last for about 40 minutes to lower blood pressure and cholesterol. It's OK to work up to the 40-minute period over time. Examples of moderate-intensity activity are walking 1 mile in 15 minutes. Or doing 30 to 45 minutes of yard work.    Step up your daily activity level.  Along with your exercise program, try being more active the whole day. Walk instead of drive. Or park further away so that you take more steps each day. Do more household tasks or yard work. You may not be able to meet the advised mount of physical activity. But doing some moderate- or vigorous-intensity aerobic activity can help reduce your risk for heart disease. Your healthcare provider can help you figure out what is best for you.    Choose 1 or more activities you enjoy.  Walking is one of the easiest things you can do. You can also try swimming, riding a bike, dancing, or taking an exercise class.    When to call your healthcare provider  Call your healthcare provider if you have any of these:     Chest pain or feel dizzy or lightheaded    Burning, tightness, pressure, or heaviness in your chest, neck, shoulders, back, or arms    Abnormal shortness of breath    More joint or muscle pain    A very fast or irregular heartbeat (palpitations)  [employer] last reviewed this educational content on 7/1/2019 2000-2021 [employer]. All rights reserved. This information is not intended as a substitute for professional medical care. Always follow your healthcare professional's instructions.          Urinary Incontinence, Female (Adult)   Urinary incontinence  means loss of bladder control. This problem affects many women, especially as they get older. If you have incontinence, you may be embarrassed to ask for help. But know that this problem can be treated.   Types of Incontinence  There are different types of incontinence. Two of the main types are described here. You can have more than one type.     Stress incontinence. With this type, urine leaks when pressure (stress) is put on the bladder. This may happen when you cough, sneeze, or laugh. Stress incontinence most often occurs because the pelvic floor muscles that support the bladder and urethra are weak. This can happen after pregnancy and vaginal childbirth or a hysterectomy. It can also be due to excess body weight or hormone changes.    Urge incontinence (also called overactive bladder). With this type, a sudden urge to urinate is felt often. This may happen even though there may not be much urine in the bladder. The need to urinate often during the night is common. Urge incontinence most often occurs because of bladder spasms. This may be due to bladder irritation or infection. Damage to bladder nerves or pelvic muscles, constipation, and certain medicines can also lead to urge incontinence.  Treatment depends on the cause. Further evaluation is needed to find the type you have. This will likely include an exam and certain tests. Based on the results, you and your healthcare provider can then plan treatment. Until a diagnosis is made, the home care tips below can help ease symptoms.   Home care    Do pelvic floor muscle exercises, if they are prescribed. The pelvic floor muscles help support the bladder and urethra. Many women find that their symptoms improve when doing special exercises that strengthen these muscles. To do the exercises, contract the muscles you would use to stop your stream of urine. But do this when you re not urinating. Hold for 10 seconds, then relax. Repeat 10 to 20 times in a row, at  least 3 times a day. Your healthcare provider may give you other instructions for how to do the exercises and how often.    Keep a bladder diary. This helps track how often and how much you urinate over a set period of time. Bring this diary with you to your next visit with the provider. The information can help your provider learn more about your bladder problem.    Lose weight, if advised to by your provider. Extra weight puts pressure on the bladder. Your provider can help you create a weight-loss plan that s right for you. This may include exercising more and making certain diet changes.    Don't have foods and drinks that may irritate the bladder. These can include alcohol and caffeinated drinks.    Quit smoking. Smoking and other tobacco use can lead to a long-term (chronic) cough that strains the pelvic floor muscles. Smoking may also damage the bladder and urethra. Talk with your provider about treatments or methods you can use to quit smoking.    If drinking large amounts of fluid makes you have symptoms, you may be advised to limit your fluid intake. You may also be advised to drink most of your fluids during the day and to limit fluids at night.    If you re worried about urine leakage or accidents, you may wear absorbent pads to catch urine. Change the pads often. This helps reduce discomfort. It may also reduce the risk of skin or bladder infections.    Follow-up care  Follow up with your healthcare provider, or as directed. It may take some to find the right treatment for your problem. But healthy lifestyle changes can be made right away. These include such things as exercising on a regular basis, eating a healthy diet, losing weight (if needed), and quitting smoking. Your treatment plan may include special therapies or medicines. Certain procedures or surgery may also be options. Talk about any questions you have with your provider.   When to seek medical advice  Call the healthcare provider right  away if any of these occur:    Fever of 100.4 F (38 C) or higher, or as directed by your provider    Bladder pain or fullness    Belly swelling    Nausea or vomiting    Back pain    Weakness, dizziness, or fainting  [employer] last reviewed this educational content on 1/1/2020 2000-2021 [employer]. All rights reserved. This information is not intended as a substitute for professional medical care. Always follow your healthcare professional's instructions.

## 2022-01-17 NOTE — PROGRESS NOTES
"SUBJECTIVE:   Glenda Bales is a 71 year old female who presents for Preventive Visit.      Patient has been advised of split billing requirements and indicates understanding: Yes   Are you in the first 12 months of your Medicare coverage?  No    Healthy Habits:    In general, how would you rate your overall health?  Fair    Frequency of exercise:  1 day/week    Duration of exercise:  Less than 15 minutes    Do you usually eat at least 4 servings of fruit and vegetables a day, include whole grains    & fiber and avoid regularly eating high fat or \"junk\" foods?  Yes    Taking medications regularly:  Yes    Barriers to taking medications:  None    Medication side effects:  None    Ability to successfully perform activities of daily living:  No assistance needed    Home Safety:  No safety concerns identified    Hearing Impairment:  No hearing concerns    In the past 6 months, have you been bothered by leaking of urine? Yes    In general, how would you rate your overall mental or emotional health?  Good      PHQ-2 Total Score:    Additional concerns today:  No    Do you feel safe in your environment? Yes    Have you ever done Advance Care Planning? (For example, a Health Directive, POLST, or a discussion with a medical provider or your loved ones about your wishes): No, advance care planning information given to patient to review.  Patient declined advance care planning discussion at this time.    {Hearing Test Done (Optional):786892}   Fall risk  Fallen 2 or more times in the past year?: No  Any fall with injury in the past year?: No    Cognitive Screening   1) Repeat 3 items (Leader, Season, Table)    2) Clock draw: NORMAL  3) 3 item recall: Recalls 3 objects  Results: 3 items recalled: COGNITIVE IMPAIRMENT LESS LIKELY    Mini-CogTM Copyright LISETTE [employer]. Licensed by the author for use in [facility]; reprinted with permission (kevin@.[facility]). All rights reserved.      Do you have sleep apnea, excessive " "snoring or daytime drowsiness?: no    Reviewed and updated as needed this visit by clinical staff  Tobacco  Allergies  Meds             Reviewed and updated as needed this visit by Provider               Social History     Tobacco Use     Smoking status: Former Smoker     Packs/day: 0.00     Years: 1.00     Pack years: 0.00     Types: Cigarettes     Quit date: 6/15/1978     Years since quittin.6     Smokeless tobacco: Never Used     Tobacco comment: quit in    Substance Use Topics     Alcohol use: Yes     Comment: 1 red wine before bef     If you drink alcohol do you typically have >3 drinks per day or >7 drinks per week? No    Alcohol Use 2021   Prescreen: >3 drinks/day or >7 drinks/week? -   Prescreen: >3 drinks/day or >7 drinks/week? {AUDIT responses all:TXT,41540}   No flowsheet data found.    {Outside tests to abstract? :139269}    {additional problems to add (Optional):494629}    Current providers sharing in care for this patient include: {Rooming staff:  Please update Care Team in Rooming Activity, refresh this note and then delete this statement}  Patient Care Team:  Marysol Cisse MD as PCP - General (Family Practice)  Marysol Cisse MD as Assigned PCP  Stacie Reno RN as Diabetes Educator (Diabetes Education)  Jama Carpenter MD as Assigned Surgical Provider    The following health maintenance items are reviewed in Epic and correct as of today:  Health Maintenance Due   Topic Date Due     PARATHYROID  Never done     COLORECTAL CANCER SCREENING  Never done     MICROALBUMIN  2021     A1C  12/15/2021     PHQ-2  2022     FALL RISK ASSESSMENT  2022     MEDICARE ANNUAL WELLNESS VISIT  2022     HEMOGLOBIN  2022     {Chronicprobdata (optional):802209}  {Decision Support (Optional):505547}        Review of Systems  {ROS COMP (Optional):228926}    OBJECTIVE:   BP (!) 152/91   Pulse 58   Temp 98  F (36.7  C) (Oral)   Resp 18   Ht 1.6 m (5' 3\")   Wt 90.7 kg " "(200 lb)   SpO2 97%   BMI 35.43 kg/m   Estimated body mass index is 35.43 kg/m  as calculated from the following:    Height as of this encounter: 1.6 m (5' 3\").    Weight as of this encounter: 90.7 kg (200 lb).  Physical Exam  {Exam (Optional) :933974}    {Diagnostic Test Results (Optional):063696::\"Diagnostic Test Results:\",\"Labs reviewed in Epic\"}    ASSESSMENT / PLAN:   {Dia Picklist:815159}    Patient has been advised of split billing requirements and indicates understanding: {YES / NO:357383::\"Yes\"}  COUNSELING:  {Medicare Counselin}    Estimated body mass index is 35.43 kg/m  as calculated from the following:    Height as of this encounter: 1.6 m (5' 3\").    Weight as of this encounter: 90.7 kg (200 lb).    {Weight Management Plan (ACO) Complete if BMI is abnormal-  Ages 18-64  BMI >24.9.  Age 65+ with BMI <23 or >30 (Optional):594321}    She reports that she quit smoking about 43 years ago. Her smoking use included cigarettes. She smoked 0.00 packs per day for 1.00 year. She has never used smokeless tobacco.      Appropriate preventive services were discussed with this patient, including applicable screening as appropriate for cardiovascular disease, diabetes, osteopenia/osteoporosis, and glaucoma.  As appropriate for age/gender, discussed screening for colorectal cancer, prostate cancer, breast cancer, and cervical cancer. Checklist reviewing preventive services available has been given to the patient.    Reviewed patients plan of care and provided an AVS. The {CarePlan:190375} for Glenda meets the Care Plan requirement. This Care Plan has been established and reviewed with the {PATIENT, FAMILY MEMBER, CAREGIVER:296090}.    Counseling Resources:  ATP IV Guidelines  Pooled Cohorts Equation Calculator  Breast Cancer Risk Calculator  Breast Cancer: Medication to Reduce Risk  FRAX Risk Assessment  ICSI Preventive Guidelines  Dietary Guidelines for Americans, 2010  USDA's MyPlate  ASA Prophylaxis  Lung " CA Screening    Marysol Cisse MD  [facility]    Identified Health Risks:

## 2022-01-17 NOTE — PROGRESS NOTES
"  Assessment & Plan     ICD-10-CM    1. Person under investigation for COVID-19  Z20.822 Symptomatic; Yes COVID-19 Virus (Coronavirus) by PCR Nose     Influenza A/B antigen     benzonatate (TESSALON) 200 MG capsule   2. Upper respiratory tract infection, unspecified type  J06.9    3. Essential hypertension, benign  I10    4. Type 2 diabetes mellitus with stage 3a chronic kidney disease, without long-term current use of insulin (H)  E11.22     N18.31        See Covid AVS    Return in about 1 month (around 2/17/2022) for Annual Wellness Visit, Physical Exam.  Advised rest/fluids/ Tylenol See PI  If sob or worse-To ER  Diabetes is stable   Will need recheck BP 1 month  Marysol Cisse MD  [facility]    Kathrine Doshi is a 71 year old who presents for the following health issues -Pt here for a Physical which will be rescheduled By pt as she has cough and UROI symptoms-PUI    HPI     Acute Illness  Acute illness concerns: 3 days  Onset/Duration:   Symptoms:  Fever: no  Chills/Sweats: no  Headache (location?): YES  Sinus Pressure: nasal congestion  Conjunctivitis:  no  Ear Pain: no  Rhinorrhea: yes  Congestion: yes  Sore Throat: no  Cough: mild nonprodutive   Wheeze: no  Decreased Appetite: no  Nausea: no  Vomiting: no    Fatigue/Achiness: mild  ck/Strep Exposure: no  Therapies tried and outcome: None      Review of Systems   CONSTITUTIONAL: NEGATIVE for fever, chills, change in weight  INTEGUMENTARY/SKIN: NEGATIVE for worrisome rashes, moles or lesions  ENT/MOUTH: as above  RESP:cough nonprodutive , no sob  CV: NEGATIVE for chest pain, palpitations or peripheral edema  GI: NEGATIVE for nausea, abdominal pain, heartburn, or change in bowel habits  PSYCHIATRIC: NEGATIVE for changes in mood or affect      Objective    BP (!) 152/91   Pulse 58   Temp 98  F (36.7  C) (Oral)   Resp 18   Ht 1.6 m (5' 3\")   Wt 90.7 kg (200 lb)   SpO2 97%   BMI 35.43 kg/m    Body mass index is 35.43 kg/m .  Physical " Exam   GENERAL: healthy, alert and no distress  EYES: Eyes grossly normal to inspection, PERRL and conjunctivae and sclerae normal  HENT: normal cephalic/atraumatic, ear canals and TM's normal, oropharynx clear, oral mucous membranes moist and nasal congestion  NECK: no adenopathy, no asymmetry, masses, or scars and thyroid normal to palpation  RESP: lungs clear to auscultation - no rales, rhonchi or wheezes  CV: regular rate and rhythm, normal S1 S2, no S3 or S4, no murmur, click or rub, no peripheral edema and peripheral pulses strong  ABDOMEN: soft, nontender, no hepatosplenomegaly, no masses and bowel sounds normal  MS: no gross musculoskeletal defects noted, no edema  PSYCH: mentation appears normal, affect normal/bright    Pending

## 2022-01-18 PROBLEM — E66.09 CLASS 1 OBESITY DUE TO EXCESS CALORIES WITH SERIOUS COMORBIDITY AND BODY MASS INDEX (BMI) OF 33.0 TO 33.9 IN ADULT: Status: RESOLVED | Noted: 2019-07-18 | Resolved: 2022-01-18

## 2022-01-18 PROBLEM — E66.811 CLASS 1 OBESITY DUE TO EXCESS CALORIES WITH SERIOUS COMORBIDITY AND BODY MASS INDEX (BMI) OF 33.0 TO 33.9 IN ADULT: Status: RESOLVED | Noted: 2019-07-18 | Resolved: 2022-01-18

## 2022-01-18 LAB — SARS-COV-2 RNA RESP QL NAA+PROBE: DETECTED

## 2022-01-19 ENCOUNTER — TELEPHONE (OUTPATIENT)
Dept: FAMILY MEDICINE | Facility: CLINIC | Age: 72
End: 2022-01-19
Payer: MEDICARE

## 2022-01-20 NOTE — TELEPHONE ENCOUNTER
"Coronavirus (COVID-19) Notification    Caller Name (Patient, parent, daughter/son, grandparent, etc)  The patient      Reason for call  Notify of Positive Coronavirus (COVID-19) lab results, assess symptoms,  review  [employer] recommendations    Lab Result    Lab test:  2019-nCoV rRt-PCR or SARS-CoV-2 PCR    Oropharyngeal AND/OR nasopharyngeal swabs is POSITIVE for 2019-nCoV RNA/SARS-COV-2 PCR (COVID-19 virus)    RN Recommendations/Instructions per [facility] Coronavirus COVID-19 recommendations    Brief introduction script  Introduce self then review script:  \"I am calling on behalf of [employer].  We were notified that your Coronavirus test (COVID-19) for was POSITIVE for the virus.  I have some information to relay to you but first I wanted to mention that the [place] Dept of Health will be contacting you shortly [it's possible MD already called Patient] to talk to you more about how you are feeling and other people you have had contact with who might now also have the virus.  Also,  [employer] is Partnering with the [facility] for Covid-19 research, you may be contacted directly by research staff.\"      Assessment (Inquire about Patient's current symptoms)   Assessment   Current Symptoms at time of phone call: (if no symptoms, document No symptoms] Sinus congestion, laryngitis    Symptoms onset (if applicable) 6 days ago     If at time of call, Patients symptoms hare worsened, the Patient should contact 911 or have someone drive them to Emergency Dept promptly:      If Patient calling 911, inform 911 personal that you have tested positive for the Coronavirus (COVID-19).  Place mask on and await 911 to arrive.    If Emergency Dept, If possible, please have another adult drive you to the Emergency Dept but you need to wear mask when in contact with other people.          Treatment Options:   Patient classified as COVID treatment eligible by Epic high risk algorithm: No  Is the patient " symptomatic at the time of result notification? No.     The patient states she was offered he monoclonal per her PCP and applied through the [facility] website which the patient states there is no monoclonal treatment available.     Review information with Patient    Your result was positive. This means you have COVID-19 (coronavirus).  We have sent you a letter that reviews the information that I'll be reviewing with you now.    How can I protect others?    If you have symptoms: stay home and away from others (self-isolate) until:    You've had no fever--and no medicine that reduces fever--for 1 full day (24 hours). And       Your other symptoms have gotten better. For example, your cough or breathing has improved. And     At least 10 days have passed since your symptoms started. (If you've been told by a doctor that you have a weak immune system, wait 20 days.)     If you don't have symptoms: Stay home and away from others (self-isolate) until at least 10 days have passed since your first positive COVID-19 test. (Date test collected)    During this time:    Stay in your own room, including for meals. Use your own bathroom if you can.    Stay away from others in your home. No hugging, kissing or shaking hands. No visitors.     Don't go to work, school or anywhere else.     Clean  high touch  surfaces often (doorknobs, counters, handles, etc.). Use a household cleaning spray or wipes. You'll find a full list on the EPA website at www.epa.gov/pesticide-registration/list-n-disinfectants-use-against-sars-cov-2.     Cover your mouth and nose with a mask, tissue or other face covering to avoid spreading germs.    Wash your hands and face often with soap and water.    Make a list of people you have been in close contact with recently, even if either of you wore a face covering.   - Start your list from 2 days before you became ill or had a positive test.  - Include anyone that was within 6 feet of you for a cumulative total of  15 minutes or more in 24 hours. (Example: if you sat next to Bhanu for 5 minutes in the morning and 10 minutes in the afternoon, then you were in close contact for 15 minutes total that day. Bhanu would be added to your list.)    A public health worker will call or text you. It is important that you answer. They will ask you questions about possible exposures to COVID-19, such as people you have been in direct contact with and places you have visited.    Tell the people on your list that you have COVID-19; they should stay away from others for 14 days starting from the last time they were in contact with you (unless you are told something different from a public health worker).     Caregivers in these groups are at risk for severe illness due to COVID-19:  o People 65 years and older  o People who live in a nursing home or long-term care facility  o People with chronic disease (lung, heart, cancer, diabetes, kidney, liver, immunologic)  o People who have a weakened immune system, including those who:  - Are in cancer treatment  - Take medicine that weakens the immune system, such as corticosteroids  - Had a bone marrow or organ transplant  - Have an immune deficiency  - Have poorly controlled HIV or AIDS  - Are obese (body mass index of 40 or higher)  - Smoke regularly    Caregivers should wear gloves while washing dishes, handling laundry and cleaning bedrooms and bathrooms.    Wash and dry laundry with special caution. Don't shake dirty laundry, and use the warmest water setting you can.    If you have a weakened immune system, ask your doctor about other actions you should take.    For more tips, go to www.cdc.gov/coronavirus/2019-ncov/downloads/10Things.pdf.    You should not go back to work until you meet the guidelines above for ending your home isolation. You don't need to be retested for COVID-19 before going back to work--studies show that you won't spread the virus if it's been at least 10 days since your  symptoms started (or 20 days, if you have a weak immune system).    Employers: This document serves as formal notice of your employee's medical guidelines for going back to work. They must meet the above guidelines before going back to work in person.    How can I take care of myself?    1. Get lots of rest. Drink extra fluids (unless a doctor has told you not to).    2. Take Tylenol (acetaminophen) for fever or pain. If you have liver or kidney problems, ask your family doctor if it's okay to take Tylenol.     Take either:     650 mg (two 325 mg pills) every 4 to 6 hours, or     1,000 mg (two 500 mg pills) every 8 hours as needed.     Note: Don't take more than 3,000 mg in one day. Acetaminophen is found in many medicines (both prescribed and over-the-counter medicines). Read all labels to be sure you don't take too much.    For children, check the Tylenol bottle for the right dose (based on their age or weight).    3. If you have other health problems (like cancer, heart failure, an organ transplant or severe kidney disease): Call your specialty clinic if you don't feel better in the next 2 days.    4. Know when to call 911: Emergency warning signs include:    Trouble breathing or shortness of breath    Pain or pressure in the chest that doesn't go away    Feeling confused like you haven't felt before, or not being able to wake up    Bluish-colored lips or face    5. Sign up for [employer]. We know it's scary to hear that you have COVID-19. We want to track your symptoms to make sure you're okay over the next 2 weeks. Please look for an email from [employer]--this is a free, online program that we'll use to keep in touch. To sign up, follow the link in the email. Learn more at [email or web address].    Where can I get more information?    [facility]: [email or web address]    Coronavirus Basics: www.health.[email or web address].[place]/diseases/coronavirus/basics.html    What to Do If You're Sick:  www.cdc.gov/coronavirus/2019-ncov/about/steps-when-sick.html    Ending Home Isolation: www.cdc.gov/coronavirus/2019-ncov/hcp/disposition-in-home-patients.html     Caring for Someone with COVID-19: www.cdc.gov/coronavirus/2019-ncov/if-you-are-sick/care-for-someone.html     [facility] clinical trials (COVID-19 research studies): [email or web address].[facility]-clinical-trials     A Positive COVID-19 letter will be sent via [employer] or the mail. (Exception, no letters sent to Presurgerical/Preprocedure Patients)    Carolina Ferris LPN

## 2022-01-25 ENCOUNTER — VIRTUAL VISIT (OUTPATIENT)
Dept: FAMILY MEDICINE | Facility: CLINIC | Age: 72
End: 2022-01-25
Payer: MEDICARE

## 2022-01-25 DIAGNOSIS — E11.22 TYPE 2 DIABETES MELLITUS WITH STAGE 3A CHRONIC KIDNEY DISEASE, WITHOUT LONG-TERM CURRENT USE OF INSULIN (H): ICD-10-CM

## 2022-01-25 DIAGNOSIS — R05.9 COUGH: ICD-10-CM

## 2022-01-25 DIAGNOSIS — U07.1 INFECTION DUE TO 2019 NOVEL CORONAVIRUS: ICD-10-CM

## 2022-01-25 DIAGNOSIS — E66.01 MORBID OBESITY (H): ICD-10-CM

## 2022-01-25 DIAGNOSIS — N18.31 TYPE 2 DIABETES MELLITUS WITH STAGE 3A CHRONIC KIDNEY DISEASE, WITHOUT LONG-TERM CURRENT USE OF INSULIN (H): ICD-10-CM

## 2022-01-25 PROCEDURE — 99442 PR PHYSICIAN TELEPHONE EVALUATION 11-20 MIN: CPT | Performed by: FAMILY MEDICINE

## 2022-01-25 RX ORDER — ALBUTEROL SULFATE 90 UG/1
2 AEROSOL, METERED RESPIRATORY (INHALATION) EVERY 4 HOURS PRN
Qty: 8 G | Refills: 1 | Status: SHIPPED | OUTPATIENT
Start: 2022-01-25 | End: 2023-01-23

## 2022-01-25 RX ORDER — AZITHROMYCIN 250 MG/1
TABLET, FILM COATED ORAL
Qty: 6 TABLET | Refills: 0 | Status: SHIPPED | OUTPATIENT
Start: 2022-01-25 | End: 2022-01-30

## 2022-01-25 NOTE — PATIENT INSTRUCTIONS
"Discharge Instructions for COVID-19 Patients  You have--or may have--COVID-19. Please follow the instructions listed below.   If you have a weakened immune system, discuss with your doctor any other actions you need to take.  How can I protect others?  If you have symptoms (fever, cough, body aches or trouble breathing):    Stay home and away from others (self-isolate) until:  ? Your other symptoms have resolved (gotten better). And   ? You've had no fever--and no medicine that reduces fever--for 1 full day (24 hours). And   ? At least 10 days have passed since your symptoms started. (You may need to wait 20 days. Follow the advice of your care team.)  If you don't show symptoms, but testing showed that you have COVID-19:    Stay home and away from others (self-isolate) until at least 10 days have passed since the date of your first positive COVID-19 test.  During this time    Stay in your own room, even for meals. Use your own bathroom if you can.    Stay away from others in your home. No hugging, kissing or shaking hands. No visitors.    Don't go to work, school or anywhere else.    Clean \"high touch\" surfaces often (doorknobs, counters, handles). Use household cleaning spray or wipes.    You'll find a full list of  on the EPA website: www.epa.gov/pesticide-registration/list-n-disinfectants-use-against-sars-cov-2.    Cover your mouth and nose with a mask or other face covering to avoid spreading germs.    Wash your hands and face often. Use soap and water.    Caregivers in these groups are at risk for severe illness due to COVID-19:  ? People 65 years and older  ? People who live in a nursing home or long-term care facility  ? People with chronic disease (lung, heart, cancer, diabetes, kidney, liver, immunologic)  ? People who have a weakened immune system, including those who:    Are in cancer treatment    Take medicine that weakens the immune system, such as corticosteroids    Had a bone marrow or organ " transplant    Have an immune deficiency    Have poorly controlled HIV or AIDS    Are obese (body mass index of 40 or higher)    Smoke regularly    Caregivers should wear gloves while washing dishes, handling laundry and cleaning bedrooms and bathrooms.    Use caution when washing and drying laundry: Don't shake dirty laundry and use the warmest water setting that you can.    For more tips on managing your health at home, go to www.cdc.gov/coronavirus/2019-ncov/downloads/10Things.pdf.  How can I take care of myself at home?  1. Get lots of rest. Drink extra fluids (unless a doctor has told you not to).  2. Take Tylenol (acetaminophen) for fever or pain. If you have liver or kidney problems, ask your family doctor if it's okay to take Tylenol.   Adults can take either:   ? 650 mg (two 325 mg pills) every 4 to 6 hours, or   ? 1,000 mg (two 500 mg pills) every 8 hours as needed.  ? Note: Don't take more than 3,000 mg in one day. Acetaminophen is found in many medicines (both prescribed and over-the-counter medicines). Read all labels to be sure you don't take too much.   For children, check the Tylenol bottle for the right dose. The dose is based on the child's age or weight.  3. If you have other health problems (like cancer, heart failure, an organ transplant or severe kidney disease): Call your specialty clinic if you don't feel better in the next 2 days.  4. Know when to call 911. Emergency warning signs include:  ? Trouble breathing or shortness of breath  ? Pain or pressure in the chest that doesn't go away  ? Feeling confused like you haven't felt before, or not being able to wake up  ? Bluish-colored lips or face  5. Your doctor may have prescribed a blood thinner medicine. Follow their instructions.  Where can I get more information?     [facility] - About COVID-19:   [email or web address]    CDC - What to Do If You're Sick:  www.cdc.gov/coronavirus/2019-ncov/about/steps-when-sick.html    CDC - Ending Home Isolation: www.cdc.gov/coronavirus/2019-ncov/hcp/disposition-in-home-patients.html    CDC - Caring for Someone: www.cdc.gov/coronavirus/2019-ncov/if-you-are-sick/care-for-someone.html    [facility] - Interim Guidance for Hospital Discharge to Home: www.health.[facility].[place]./diseases/coronavirus/hcp/hospdischarge.pdf    Below are the COVID-19 hotlines at the [place] Department of Health ([facility]). Interpreters are available.  ? For health questions: Call 763-948-6220 or 1-191.818.7993 (7 a.m. to 7 p.m.)  ? For questions about schools and childcare: Call 520-938-5142 or 1-373.822.6403 (7 a.m. to 7 p.m.)    For informational purposes only. Not to replace the advice of your health care provider. Clinically reviewed by Dr. Pastor Still.   Copyright   2020 [facility]. All rights reserved. [employer] [id number] - REV 01/05/21.

## 2022-01-25 NOTE — PROGRESS NOTES
"Glenda is a 71 year old who is being evaluated via a billable telephone visit.    4:50 PM-start visit    What phone number would you like to be contacted at? [phone]  How would you like to obtain your AVS? MyChart    Assessment & Plan     Infection due to 2019 novel coronavirus  Advised as she has Mild wheezing  Use Tessalon perles  - albuterol (PROAIR HFA/PROVENTIL HFA/VENTOLIN HFA) 108 (90 Base) MCG/ACT inhaler; Inhale 2 puffs into the lungs every 4 hours as needed for shortness of breath / dyspnea or wheezing    Morbid obesity (H)      Type 2 diabetes mellitus with stage 3a chronic kidney disease, without long-term current use of insulin (H)  Stable     Cough  If not  Starting to get better 3-4 days  - azithromycin (ZITHROMAX) 250 MG tablet; Take 2 tablets (500 mg) by mouth daily for 1 day, THEN 1 tablet (250 mg) daily for 4 days.  The potential side effects of this medication have been discussed with the patient.  Call if any significant problems with these are experienced.  If sob or worse -to call or ER  [id number]}     BMI:   Estimated body mass index is 35.43 kg/m  as calculated from the following:    Height as of 1/17/22: 1.6 m (5' 3\").    Weight as of 1/17/22: 90.7 kg (200 lb).   Weight management plan: see previous Notes        Return in about 1 week (around 2/1/2022), or if symptoms worsen or fail to improve, for recheck/ sooner if worse or New symptoms.    Marysol Cisse MD  [facility]    Subjective   Glenda is a 71 year old who presents for the following health issues  accompanied by her  Tested Positive for covid last Monday    HPI     Acute Illness  Acute illness concerns: sinus infection  Onset/Duration:  10 days  Symptoms:  Fever: no  Chills/Sweats: no  Headache (location?): YES  Sinus Pressure: no  Conjunctivitis:  no  Ear Pain: no  Rhinorrhea: YES  Congestion: YES  Sore Throat: no  Cough: YES  Wheeze: mild  Decreased Appetite: no  Nausea: no  Vomiting: no  Diarrhea: " no  Dysuria/Freq.: no  Dysuria or Hematuria: no  Fatigue/Achiness: YES  Sick/Strep Exposure: YES  Therapies tried and outcome: None  History of sinus infections  No asthma    Review of Systems   CONSTITUTIONAL: NEGATIVE for fever, chills, change in weight  ENT/MOUTH: as above  RESP:as above  CV: NEGATIVE for chest pain, palpitations or peripheral edema  GI: NEGATIVE for nausea, abdominal pain, heartburn, or change in bowel habits  HEME/ALLERGY/IMMUNE: NEGATIVE for bleeding problems  PSYCHIATRIC: NEGATIVE for changes in mood or affect      Objective    Vitals - Patient Reported  Systolic (Patient Reported): 137  Diastolic (Patient Reported): 89  Pulse (Patient Reported): 64      Vitals:  No vitals were obtained today due to virtual visit.    Physical Exam   healthy, alert and no distress  PSYCH: Alert and oriented times 3; coherent speech, normal   rate and volume, able to articulate logical thoughts, able   to abstract reason, no tangential thoughts, no hallucinations   or delusions  Her affect is normal  RESP: has a cough, no audible wheezing, able to talk in full sentences  Remainder of exam unable to be completed due to telephone visits    Office Visit on 01/17/2022   Component Date Value Ref Range Status     Influenza A antigen 01/17/2022 Negative  Negative Final     Influenza B antigen 01/17/2022 Negative  Negative Final     COVID-19 Virus PCR - Result 01/17/2022 DETECTED*  Final    Comment: Detected  Abnormal Result    Positive for 2019-nCoV.    Patient sample was heat inactivated and amplified using the   HDPCR(TM) SARS-CoV-2 assay ([employer] The HDPCRTM   SARS-CoV-2 assay is a reverse transcription real-time   polymerase chain reaction (qRT-PCR) test intended for the   qualitative detection of nucleic acid from SARS-CoV-2 in   human nasopharyngeal swabs, oropharyngeal swabs, anterior   nasal swabs, mid-turbinate nasal swabs as well as nasal   aspirate, nasal wash, and bronchoalveolar lavage (BAL)    specimens from individuals who are suspected of COVID-19 by   their healthcare provider.    Positive results should also be reported in accordance with   local, state, and federal regulations.    Nasopharyngeal specimen is the preferred choice for   swab-based SARS CoV2 testing. When collection of a   nasopharyngeal swab is not possible the following are   acceptable alternatives:  an oropharyngeal (OP) specimen collected by a healthcare   professional, or nasal                            mid-turbinate (NMT) swab collected by   a healthcare professional or by onsite self-collection   (using a flocked tapered swab), or an anterior nares   specimen collected by a healthcare professional or by onsite   self-collection (using a round foam swab). ([facility]   [facility])    Testing performed by [facility] at   the [facility] [address] [facility]   [address].    The test performance characteristics were determined by   [facility]. It has not been cleared or approved by the FDA.    The laboratory is regulated under the Clinical Laboratory   Improvement Amendments of 1988 (CLIA-88) as qualified to   perform high-complexity testing. This test is used for   clinical purposes. It should not be regarded as   investigational or for research.       5:03 PM -end Visit    Phone call duration: as above minutes

## 2022-01-29 ENCOUNTER — HEALTH MAINTENANCE LETTER (OUTPATIENT)
Age: 72
End: 2022-01-29

## 2022-02-07 ENCOUNTER — ANCILLARY PROCEDURE (OUTPATIENT)
Dept: GENERAL RADIOLOGY | Facility: CLINIC | Age: 72
End: 2022-02-07
Attending: FAMILY MEDICINE
Payer: MEDICARE

## 2022-02-07 ENCOUNTER — OFFICE VISIT (OUTPATIENT)
Dept: FAMILY MEDICINE | Facility: CLINIC | Age: 72
End: 2022-02-07
Payer: MEDICARE

## 2022-02-07 VITALS
BODY MASS INDEX: 35.44 KG/M2 | SYSTOLIC BLOOD PRESSURE: 136 MMHG | HEART RATE: 68 BPM | OXYGEN SATURATION: 96 % | RESPIRATION RATE: 18 BRPM | WEIGHT: 200 LBS | HEIGHT: 63 IN | DIASTOLIC BLOOD PRESSURE: 87 MMHG | TEMPERATURE: 98 F

## 2022-02-07 DIAGNOSIS — U07.1 INFECTION DUE TO 2019 NOVEL CORONAVIRUS: Primary | ICD-10-CM

## 2022-02-07 DIAGNOSIS — U07.1 INFECTION DUE TO 2019 NOVEL CORONAVIRUS: ICD-10-CM

## 2022-02-07 DIAGNOSIS — Z12.11 SCREEN FOR COLON CANCER: ICD-10-CM

## 2022-02-07 PROCEDURE — 99213 OFFICE O/P EST LOW 20 MIN: CPT | Performed by: FAMILY MEDICINE

## 2022-02-07 PROCEDURE — 71046 X-RAY EXAM CHEST 2 VIEWS: CPT | Performed by: RADIOLOGY

## 2022-02-07 ASSESSMENT — MIFFLIN-ST. JEOR: SCORE: 1386.32

## 2022-02-07 NOTE — PROGRESS NOTES
"  Assessment & Plan     Infection due to 2019 novel coronavirus    - XR Chest 2 Views; Future  Use Flonase  Follow up if not better    Screen for colon cancer  [place]   - BOBY([employer])      Return in about 1 month (around 3/7/2022) for Diabetes, Medicare wellness Exam.    MD ANIL [facility] JOSE Doshi is a 71 year old who presents for the following health issues follow up  on covid 19    HPI     Acute Illness  Acute illness concerns: nasal congestion   Onset/Duration: since 1/17/22  Symptoms:  Fever: no  Chills/Sweats: no  Headache (location?): no  Sinus Pressure: YES  Conjunctivitis:  no  Ear Pain: no  Rhinorrhea: no  Congestion: YES  Sore Throat: no  Cough: no  Wheeze: no  Decreased Appetite: no  Nausea: no  Vomiting: no  Diarrhea: no  Dysuria/Freq.: no  Dysuria or Hematuria: no  Fatigue/Achiness: YES  Sick/Strep Exposure: no but had Positive Covid positive  Feels better than last week  No sob  Therapies tried and outcome: taken antibiotic      Review of Systems   CONSTITUTIONAL: NEGATIVE for fever, chills, change in weight  INTEGUMENTARY/SKIN: NEGATIVE for worrisome rashes, moles or lesions  ENT/MOUTH: as above  RESP: NEGATIVE for significant cough or SOB  CV: NEGATIVE for chest pain, palpitations or peripheral edema  GI: NEGATIVE for nausea, abdominal pain, heartburn, or change in bowel habits  MUSCULOSKELETAL: NEGATIVE for significant arthralgias or myalgia  PSYCHIATRIC: NEGATIVE for changes in mood or affect  ROS otherwise negative      Objective    /87   Pulse 68   Temp 98  F (36.7  C) (Oral)   Resp 18   Ht 1.6 m (5' 3\")   Wt 90.7 kg (200 lb)   SpO2 96%   BMI 35.43 kg/m    Body mass index is 35.43 kg/m .  Physical Exam   GENERAL: healthy, alert and no distress  EYES: Eyes grossly normal to inspection  HENT: ear canals and TM's normal, nose and mouth without ulcers or lesions  NECK: no adenopathy, no asymmetry, masses, or scars and thyroid " normal to palpation  RESP: lungs clear to auscultation - no rales, rhonchi or wheezes  CV: regular rate and rhythm, normal S1 S2, no S3 or S4, no murmur, click or rub, no peripheral edema and peripheral pulses strong  ABDOMEN: soft, nontender, no hepatosplenomegaly, no masses and bowel sounds normal  MS: no gross musculoskeletal defects noted, no edema    Pending

## 2022-02-17 PROBLEM — N18.30 STAGE 3 CHRONIC KIDNEY DISEASE (H): Status: ACTIVE | Noted: 2019-09-23

## 2022-03-14 PROBLEM — R26.9 GAIT ABNORMALITY: Status: RESOLVED | Noted: 2021-05-20 | Resolved: 2022-03-14

## 2022-03-17 ENCOUNTER — OFFICE VISIT (OUTPATIENT)
Dept: FAMILY MEDICINE | Facility: CLINIC | Age: 72
End: 2022-03-17
Payer: MEDICARE

## 2022-03-17 VITALS
OXYGEN SATURATION: 95 % | DIASTOLIC BLOOD PRESSURE: 83 MMHG | SYSTOLIC BLOOD PRESSURE: 157 MMHG | BODY MASS INDEX: 37.88 KG/M2 | RESPIRATION RATE: 20 BRPM | TEMPERATURE: 97.9 F | WEIGHT: 213.8 LBS | HEIGHT: 63 IN | HEART RATE: 70 BPM

## 2022-03-17 DIAGNOSIS — Z00.00 ENCOUNTER FOR MEDICARE ANNUAL WELLNESS EXAM: Primary | ICD-10-CM

## 2022-03-17 DIAGNOSIS — N18.30 BENIGN HYPERTENSION WITH CKD (CHRONIC KIDNEY DISEASE) STAGE III (H): ICD-10-CM

## 2022-03-17 DIAGNOSIS — E03.9 ACQUIRED HYPOTHYROIDISM: ICD-10-CM

## 2022-03-17 DIAGNOSIS — I12.9 BENIGN HYPERTENSION WITH CKD (CHRONIC KIDNEY DISEASE) STAGE III (H): ICD-10-CM

## 2022-03-17 DIAGNOSIS — E11.22 TYPE 2 DIABETES MELLITUS WITH STAGE 3A CHRONIC KIDNEY DISEASE, WITHOUT LONG-TERM CURRENT USE OF INSULIN (H): ICD-10-CM

## 2022-03-17 DIAGNOSIS — E78.5 HYPERLIPIDEMIA LDL GOAL <70: ICD-10-CM

## 2022-03-17 DIAGNOSIS — I10 ESSENTIAL HYPERTENSION, BENIGN: ICD-10-CM

## 2022-03-17 DIAGNOSIS — N18.31 TYPE 2 DIABETES MELLITUS WITH STAGE 3A CHRONIC KIDNEY DISEASE, WITHOUT LONG-TERM CURRENT USE OF INSULIN (H): ICD-10-CM

## 2022-03-17 DIAGNOSIS — F10.11 ALCOHOL ABUSE, IN REMISSION: ICD-10-CM

## 2022-03-17 DIAGNOSIS — K21.9 GASTROESOPHAGEAL REFLUX DISEASE, UNSPECIFIED WHETHER ESOPHAGITIS PRESENT: ICD-10-CM

## 2022-03-17 DIAGNOSIS — E66.01 MORBID OBESITY (H): ICD-10-CM

## 2022-03-17 DIAGNOSIS — K76.0 FATTY LIVER: ICD-10-CM

## 2022-03-17 DIAGNOSIS — N18.31 STAGE 3A CHRONIC KIDNEY DISEASE (H): ICD-10-CM

## 2022-03-17 LAB
CHOLEST SERPL-MCNC: 296 MG/DL
CREAT UR-MCNC: 79 MG/DL
HBA1C MFR BLD: 6 % (ref 0–5.6)
HDLC SERPL-MCNC: 98 MG/DL
HGB BLD-MCNC: 13.8 G/DL (ref 11.7–15.7)
HOLD SPECIMEN: NORMAL
HOLD SPECIMEN: NORMAL
LDLC SERPL CALC-MCNC: 176 MG/DL
MICROALBUMIN UR-MCNC: 10 MG/L
MICROALBUMIN/CREAT UR: 12.66 MG/G CR (ref 0–25)
NONHDLC SERPL-MCNC: 198 MG/DL
PTH-INTACT SERPL-MCNC: 68 PG/ML (ref 18–80)
TRIGL SERPL-MCNC: 111 MG/DL

## 2022-03-17 PROCEDURE — 82043 UR ALBUMIN QUANTITATIVE: CPT | Performed by: FAMILY MEDICINE

## 2022-03-17 PROCEDURE — 83036 HEMOGLOBIN GLYCOSYLATED A1C: CPT | Performed by: FAMILY MEDICINE

## 2022-03-17 PROCEDURE — 83970 ASSAY OF PARATHORMONE: CPT | Performed by: FAMILY MEDICINE

## 2022-03-17 PROCEDURE — G0439 PPPS, SUBSEQ VISIT: HCPCS | Performed by: FAMILY MEDICINE

## 2022-03-17 PROCEDURE — 99207 PR FOOT EXAM NO CHARGE: CPT | Mod: 25 | Performed by: FAMILY MEDICINE

## 2022-03-17 PROCEDURE — 99214 OFFICE O/P EST MOD 30 MIN: CPT | Mod: 25 | Performed by: FAMILY MEDICINE

## 2022-03-17 PROCEDURE — 85018 HEMOGLOBIN: CPT | Performed by: FAMILY MEDICINE

## 2022-03-17 PROCEDURE — 80061 LIPID PANEL: CPT | Performed by: FAMILY MEDICINE

## 2022-03-17 PROCEDURE — 36415 COLL VENOUS BLD VENIPUNCTURE: CPT | Performed by: FAMILY MEDICINE

## 2022-03-17 RX ORDER — ATENOLOL 25 MG/1
TABLET ORAL
Qty: 270 TABLET | Status: CANCELLED | OUTPATIENT
Start: 2022-03-17

## 2022-03-17 RX ORDER — AMLODIPINE BESYLATE 2.5 MG/1
2.5 TABLET ORAL DAILY
Qty: 90 TABLET | Refills: 3 | Status: SHIPPED | OUTPATIENT
Start: 2022-03-17 | End: 2022-06-13

## 2022-03-17 RX ORDER — ATORVASTATIN CALCIUM 10 MG/1
10 TABLET, FILM COATED ORAL DAILY
Qty: 90 TABLET | Refills: 3 | Status: SHIPPED | OUTPATIENT
Start: 2022-03-17 | End: 2023-03-14

## 2022-03-17 RX ORDER — ATENOLOL 25 MG/1
TABLET ORAL
Qty: 75 TABLET | Refills: 1 | Status: SHIPPED | OUTPATIENT
Start: 2022-03-17 | End: 2022-03-18

## 2022-03-17 RX ORDER — ATENOLOL 25 MG/1
25 TABLET ORAL 2 TIMES DAILY
Qty: 75 TABLET | Refills: 1 | Status: SHIPPED | OUTPATIENT
Start: 2022-03-17 | End: 2022-03-17

## 2022-03-17 RX ORDER — FAMOTIDINE 40 MG/1
40 TABLET, FILM COATED ORAL DAILY
Qty: 30 TABLET | Refills: 3 | Status: SHIPPED | OUTPATIENT
Start: 2022-03-17 | End: 2022-03-21

## 2022-03-17 ASSESSMENT — ACTIVITIES OF DAILY LIVING (ADL): CURRENT_FUNCTION: NO ASSISTANCE NEEDED

## 2022-03-17 NOTE — PATIENT INSTRUCTIONS
Patient Education   Personalized Prevention Plan  You are due for the preventive services outlined below.  Your care team is available to assist you in scheduling these services.  If you have already completed any of these items, please share that information with your care team to update in your medical record.  Health Maintenance Due   Topic Date Due     Parathyroid Lab  Never done     Colorectal Cancer Screening  Never done     Kidney Microalbumin Urine Test  12/14/2021     A1C Lab  12/15/2021     Annual Wellness Visit  01/12/2022     Hemoglobin  03/14/2022

## 2022-03-17 NOTE — PROGRESS NOTES
SUBJECTIVE:   Glenda Bales is a 71 year old female who presents for Preventive Visit.    Patient has been advised of split billing requirements and indicates understanding: Yes  Are you in the first 12 months of your Medicare coverage?  No    Healthy Habits:    In general, how would you rate your overall health?  Good    Frequency of exercise:  None    Duration of exercise:  Less than 15 minutes    Taking medications regularly:  Yes    Barriers to taking medications:  Not applicable    Medication side effects:  Not applicable    Ability to successfully perform activities of daily living:  No assistance needed    Home Safety:  No safety concerns identified    Hearing Impairment:  No hearing concerns    In the past 6 months, have you been bothered by leaking of urine? Yes    In general, how would you rate your overall mental or emotional health?  Good      PHQ-2 Total Score:    Additional concerns today:  No     Pt has been using flonase more frequently for allergies/sinus issues - notes intermittent epistaxis, would like to discuss alternatives.     Diabetes Follow-up      How often are you checking your blood sugar? Not at all    What concerns do you have today about your diabetes? None     Do you have any of these symptoms? (Select all that apply)  Numbness in feet and Burning in feet and ankles x3 weeks, also swollen      BP Readings from Last 2 Encounters:   03/17/22 (!) 157/83   02/07/22 136/87     Hemoglobin A1C POCT (%)   Date Value   06/15/2021 5.5   01/12/2021 5.6     LDL Cholesterol Calculated (mg/dL)   Date Value   07/14/2021 164 (H)   08/20/2020 163 (H)   07/22/2020 125 (H)         Hypertension Follow-up      Do you check your blood pressure regularly outside of the clinic? Yes     Are you following a low salt diet? Yes    Are your blood pressures ever more than 140 on the top number (systolic) OR more than 90 on the bottom number (diastolic), for example 140/90? sometimes     Do you feel safe in your  environment? Yes    Have you ever done Advance Care Planning? (For example, a Health Directive, POLST, or a discussion with a medical provider or your loved ones about your wishes): No, advance care planning information given to patient to review.  Patient declined advance care planning discussion at this time.    Fall risk  Fallen 2 or more times in the past year?: No  Any fall with injury in the past year?: No    Cognitive Screening   1) Repeat 3 items (Leader, Season, Table)    2) Clock draw: NORMAL  3) 3 item recall: Recalls 3 objects  Results: 3 items recalled: COGNITIVE IMPAIRMENT LESS LIKELY    Mini-CogTM Copyright S Javy. Licensed by the author for use in [facility]; reprinted with permission (soob@[facility]). All rights reserved.      Do you have sleep apnea, excessive snoring or daytime drowsiness?: no    Reviewed and updated as needed this visit by clinical staff   Tobacco  Allergies  Meds              Reviewed and updated as needed this visit by Provider                 Social History     Tobacco Use     Smoking status: Former Smoker     Packs/day: 0.00     Years: 1.00     Pack years: 0.00     Types: Cigarettes     Quit date: 6/15/1978     Years since quittin.7     Smokeless tobacco: Never Used     Tobacco comment: quit in    Substance Use Topics     Alcohol use: Yes     Comment: 1 red wine before bef     If you drink alcohol do you typically have >3 drinks per day or >7 drinks per week? No    No flowsheet data found.        Hyperlipidemia Follow-Up      Are you regularly taking any medication or supplement to lower your cholesterol?   Yes- statins    Are you having muscle aches or other side effects that you think could be caused by your cholesterol lowering medication?  No      Current providers sharing in care for this patient include:   Patient Care Team:  Maryslo Cisse MD as PCP - General (Family Practice)  Marysol Cisse MD as Assigned PCP  Stacie Reno RN as Diabetes  "Educator (Diabetes Education)  Jama Carpenter MD as Assigned Surgical Provider    The following health maintenance items are reviewed in Epic and correct as of today:  Health Maintenance Due   Topic Date Due     PARATHYROID  Never done     COLORECTAL CANCER SCREENING  Never done     MICROALBUMIN  12/14/2021     A1C  12/15/2021     HEMOGLOBIN  03/14/2022     GERD is stable   The patient denies abdominal or flank pain, anorexia, nausea or vomiting, dysphagia, change in bowel habits or black or bloody stools.            Review of Systems  CONSTITUTIONAL: NEGATIVE for fever, chills, change in weight  INTEGUMENTARY/SKIN: NEGATIVE for worrisome rashes, moles or lesions  EYES: NEGATIVE for vision changes or irritation  ENT/MOUTH: NEGATIVE for ear, mouth and throat problems  RESP: NEGATIVE for significant cough or SOB  BREAST: NEGATIVE for masses, tenderness or discharge  CV: NEGATIVE for chest pain, palpitations or peripheral edema  GI: NEGATIVE for nausea, abdominal pain, heartburn, or change in bowel habits  : NEGATIVE for frequency, dysuria, or hematuria  MUSCULOSKELETAL: NEGATIVE for significant arthralgias or myalgia  NEURO: NEGATIVE for weakness, dizziness or paresthesias  ENDOCRINE: NEGATIVE for temperature intolerance, skin/hair changes  HEME: NEGATIVE for bleeding problems  PSYCHIATRIC: NEGATIVE for changes in mood or affect  Has  Swelling LE at the end of the day  She has gained weight  No sob    OBJECTIVE:   BP (!) 157/83 (Patient Position: Sitting, Cuff Size: Adult Large)   Pulse 70   Temp 97.9  F (36.6  C) (Oral)   Resp 20   Ht 1.6 m (5' 3\")   Wt 97 kg (213 lb 12.8 oz)   SpO2 95%   BMI 37.87 kg/m   Estimated body mass index is 37.87 kg/m  as calculated from the following:    Height as of this encounter: 1.6 m (5' 3\").    Weight as of this encounter: 97 kg (213 lb 12.8 oz).  Physical Exam  GENERAL APPEARANCE: healthy, alert and no distress  EYES: Eyes grossly normal to inspection, PERRL and " conjunctivae and sclerae normal  HENT: ear canals and TM's normal, nose and mouth without ulcers or lesions, oropharynx clear and oral mucous membranes moist  NECK: no adenopathy, no asymmetry, masses, or scars and thyroid normal to palpation  RESP: lungs clear to auscultation - no rales, rhonchi or wheezes  BREAST: normal without masses, tenderness or nipple discharge and no palpable axillary masses or adenopathy  CV: regular rate and rhythm, normal S1 S2, no S3 or S4, no murmur, click or rub, and peripheral pulses strong  ABDOMEN: soft, nontender, no hepatosplenomegaly, no masses and bowel sounds normal  MS: no musculoskeletal defects are noted and gait is age appropriate without ataxia  1 plus pedal edema  SKIN: no suspicious lesions or rashes  NEURO: Normal strength and tone, sensory exam grossly normal, mentation intact and speech normal  DIABETIC FOOT EXAM: normal DP and PT pulses, no trophic changes or ulcerative lesions and normal sensory exam  PSYCH: mentation appears normal and affect normal/bright    Diagnostic Test Results:  Labs reviewed in Epic  Results for orders placed or performed in visit on 03/17/22 (from the past 24 hour(s))   HEMOGLOBIN A1C   Result Value Ref Range    Hemoglobin A1C 6.0 (H) 0.0 - 5.6 %   Hemoglobin   Result Value Ref Range    Hemoglobin 13.8 11.7 - 15.7 g/dL   Extra Tube    Narrative    The following orders were created for panel order Extra Tube.  Procedure                               Abnormality         Status                     ---------                               -----------         ------                     Extra Serum Separator Tu...[[id number]]                      In process                 Extra Green Top (Lithium...[[id number]]                      In process                   Please view results for these tests on the individual orders.       ASSESSMENT / PLAN:   (Z00.00) Encounter for Medicare annual wellness exam  (primary encounter diagnosis)  Comment:   Plan:  "    (N18.31) Stage 3a chronic kidney disease (H)  Comment:   Plan: Hemoglobin            (E11.22,  N18.31) Type 2 diabetes mellitus with stage 3a chronic kidney disease, without long-term current use of insulin (H)  Comment: stable   Plan: Albumin Random Urine Quantitative with Creat         Ratio, HEMOGLOBIN A1C, FOOT EXAM            (I12.9,  N18.30) Benign hypertension with CKD (chronic kidney disease) stage III (H)  Comment: High  Increase atenolol  Plan: amLODIPine (NORVASC) 2.5 MG tablet, atenolol         (TENORMIN) 25 MG tablet, DISCONTINUED: atenolol        (TENORMIN) 25 MG tablet            (K76.0) Fatty liver  Comment:   Plan:watch diet           (K21.9) Gastroesophageal reflux disease, unspecified whether esophagitis present  Comment: advised try Pepcid  Long tern problems with PPI discussed   Plan: omeprazole (PRILOSEC) 20 MG DR capsule,         famotidine (PEPCID) 40 MG tablet        Watch diet  Pt Drinks 1 glass of wine daily    (E78.5) Hyperlipidemia LDL goal <70  Comment: stable   Plan: Lipid panel reflex to direct LDL Fasting,         atorvastatin (LIPITOR) 10 MG tablet            (E66.01) Morbid obesity (H)  Comment: Low carroll diet/Exercise  Discussed needs to lose weight    (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    Weight as of this encounter: 97 kg (213 lb 12.8 oz).    Weight management plan: Discussed healthy diet and exercise guidelines    She reports that she quit smoking about 43 years ago. Her smoking " use included cigarettes. She smoked 0.00 packs per day for 1.00 year. She has never used smokeless tobacco.      Appropriate preventive services were discussed with this patient, including applicable screening as appropriate for cardiovascular disease, diabetes, osteopenia/osteoporosis, and glaucoma.  As appropriate for age/gender, discussed screening for colorectal cancer, prostate cancer, breast cancer, and cervical cancer. Checklist reviewing preventive services available has been given to the patient.    Reviewed patients plan of care and provided an AVS. The Complex Care Plan (for patients with higher acuity and needing more deliberate coordination of services) for Glenda meets the Care Plan requirement. This Care Plan has been established and reviewed with the Patient.    Counseling Resources:  ATP IV Guidelines  Pooled Cohorts Equation Calculator  Breast Cancer Risk Calculator  Breast Cancer: Medication to Reduce Risk  FRAX Risk Assessment  ICSI Preventive Guidelines  Dietary Guidelines for Americans, 2010  USDA's MyPlate  ASA Prophylaxis  Lung CA Screening    Marysol Cisse MD  [facility]    Identified Health Risks:

## 2022-03-18 NOTE — TELEPHONE ENCOUNTER
Requesting 90 day supply    Routing refill request to provider for review/approval because:  This was a new med and/or a dose change  Please advise if appropriate for a 90 day supply      Todd Wolff RN  [facility]

## 2022-03-21 RX ORDER — ATENOLOL 25 MG/1
TABLET ORAL
Qty: 270 TABLET | Refills: 3 | Status: SHIPPED | OUTPATIENT
Start: 2022-03-21 | End: 2022-06-13

## 2022-03-21 RX ORDER — FAMOTIDINE 40 MG/1
TABLET, FILM COATED ORAL
Qty: 90 TABLET | Refills: 1 | Status: SHIPPED | OUTPATIENT
Start: 2022-03-21 | End: 2022-06-13

## 2022-04-07 ENCOUNTER — ALLIED HEALTH/NURSE VISIT (OUTPATIENT)
Dept: FAMILY MEDICINE | Facility: CLINIC | Age: 72
End: 2022-04-07
Payer: MEDICARE

## 2022-04-07 VITALS — SYSTOLIC BLOOD PRESSURE: 134 MMHG | DIASTOLIC BLOOD PRESSURE: 88 MMHG | HEART RATE: 72 BPM

## 2022-04-07 DIAGNOSIS — I10 HTN (HYPERTENSION): Primary | ICD-10-CM

## 2022-04-07 PROCEDURE — 99207 PR NO CHARGE NURSE ONLY: CPT

## 2022-04-07 NOTE — NURSING NOTE
Glenda Bales is a 71 year old patient who comes in today for a Blood Pressure check.  Initial BP:  /88   Pulse 72      72  Disposition: follow-up as previously indicated by provider

## 2022-04-28 ENCOUNTER — LAB (OUTPATIENT)
Dept: LAB | Facility: CLINIC | Age: 72
End: 2022-04-28
Payer: MEDICARE

## 2022-04-28 DIAGNOSIS — E78.5 HYPERLIPIDEMIA LDL GOAL <70: ICD-10-CM

## 2022-04-28 LAB
CHOLEST SERPL-MCNC: 228 MG/DL
FASTING STATUS PATIENT QL REPORTED: YES
HDLC SERPL-MCNC: 98 MG/DL
LDLC SERPL CALC-MCNC: 113 MG/DL
NONHDLC SERPL-MCNC: 130 MG/DL
TRIGL SERPL-MCNC: 87 MG/DL

## 2022-04-28 PROCEDURE — 80061 LIPID PANEL: CPT

## 2022-04-28 PROCEDURE — 36415 COLL VENOUS BLD VENIPUNCTURE: CPT

## 2022-05-18 ENCOUNTER — IMMUNIZATION (OUTPATIENT)
Dept: NURSING | Facility: CLINIC | Age: 72
End: 2022-05-18
Payer: MEDICARE

## 2022-05-18 VITALS — DIASTOLIC BLOOD PRESSURE: 84 MMHG | SYSTOLIC BLOOD PRESSURE: 134 MMHG

## 2022-05-18 DIAGNOSIS — Z01.30 BP CHECK: ICD-10-CM

## 2022-05-18 DIAGNOSIS — Z23 HIGH PRIORITY FOR 2019-NCOV VACCINE: Primary | ICD-10-CM

## 2022-05-18 PROCEDURE — 91306 COVID-19,PF,MODERNA (18+ YRS BOOSTER .25ML): CPT

## 2022-05-18 PROCEDURE — 0064A COVID-19,PF,MODERNA (18+ YRS BOOSTER .25ML): CPT

## 2022-05-18 PROCEDURE — 99207 PR NO CHARGE NURSE ONLY: CPT

## 2022-05-18 NOTE — PROGRESS NOTES
Prior to immunization administration, verified patients identity using patient s name and date of birth. Please see Immunization Activity for additional information.     Screening Questionnaire for Adult Immunization    Are you sick today?   No   Do you have allergies to medications, food, a vaccine component or latex?   No   Have you ever had a serious reaction after receiving a vaccination?   No   Do you have a long-term health problem with heart, lung, kidney, or metabolic disease (e.g., diabetes), asthma, a blood disorder, no spleen, complement component deficiency, a cochlear implant, or a spinal fluid leak?  Are you on long-term aspirin therapy?   No   Do you have cancer, leukemia, HIV/AIDS, or any other immune system problem?   No   Do you have a parent, brother, or sister with an immune system problem?   No   In the past 3 months, have you taken medications that affect  your immune system, such as prednisone, other steroids, or anticancer drugs; drugs for the treatment of rheumatoid arthritis, Crohn s disease, or psoriasis; or have you had radiation treatments?   No   Have you had a seizure, or a brain or other nervous system problem?   No   During the past year, have you received a transfusion of blood or blood    products, or been given immune (gamma) globulin or antiviral drug?   No   For women: Are you pregnant or is there a chance you could become       pregnant during the next month?   No   Have you received any vaccinations in the past 4 weeks?   No     Immunization questionnaire answers were all negative.        Per orders, injection of Moderna COVID-19 booster given by Bhakti Echols MA. Patient instructed to remain in clinic for 15 minutes afterwards, and to report any adverse reaction to me immediately.       Screening performed by Bhakti Echols MA on 5/18/2022 at 9:02 AM.  Bhakti Echols MA on 5/18/2022 at 9:02 AM

## 2022-06-08 ENCOUNTER — LAB (OUTPATIENT)
Dept: LAB | Facility: CLINIC | Age: 72
End: 2022-06-08
Payer: MEDICARE

## 2022-06-08 DIAGNOSIS — E78.5 HYPERLIPIDEMIA LDL GOAL <70: ICD-10-CM

## 2022-06-08 DIAGNOSIS — E03.9 ACQUIRED HYPOTHYROIDISM: ICD-10-CM

## 2022-06-08 LAB
CHOLEST SERPL-MCNC: 202 MG/DL
FASTING STATUS PATIENT QL REPORTED: YES
HDLC SERPL-MCNC: 94 MG/DL
LDLC SERPL CALC-MCNC: 92 MG/DL
NONHDLC SERPL-MCNC: 108 MG/DL
TRIGL SERPL-MCNC: 82 MG/DL

## 2022-06-08 PROCEDURE — 84439 ASSAY OF FREE THYROXINE: CPT

## 2022-06-08 PROCEDURE — 84443 ASSAY THYROID STIM HORMONE: CPT

## 2022-06-08 PROCEDURE — 80061 LIPID PANEL: CPT

## 2022-06-08 PROCEDURE — 36415 COLL VENOUS BLD VENIPUNCTURE: CPT

## 2022-06-13 ENCOUNTER — OFFICE VISIT (OUTPATIENT)
Dept: FAMILY MEDICINE | Facility: CLINIC | Age: 72
End: 2022-06-13
Payer: MEDICARE

## 2022-06-13 VITALS
TEMPERATURE: 96.9 F | HEART RATE: 71 BPM | WEIGHT: 215 LBS | DIASTOLIC BLOOD PRESSURE: 76 MMHG | OXYGEN SATURATION: 94 % | BODY MASS INDEX: 38.09 KG/M2 | SYSTOLIC BLOOD PRESSURE: 132 MMHG

## 2022-06-13 DIAGNOSIS — I12.9 BENIGN HYPERTENSION WITH CKD (CHRONIC KIDNEY DISEASE) STAGE III (H): ICD-10-CM

## 2022-06-13 DIAGNOSIS — R20.0 NUMBNESS AND TINGLING OF BOTH FEET: ICD-10-CM

## 2022-06-13 DIAGNOSIS — N18.30 BENIGN HYPERTENSION WITH CKD (CHRONIC KIDNEY DISEASE) STAGE III (H): ICD-10-CM

## 2022-06-13 DIAGNOSIS — R20.2 NUMBNESS AND TINGLING OF BOTH FEET: ICD-10-CM

## 2022-06-13 DIAGNOSIS — Z12.11 SCREEN FOR COLON CANCER: ICD-10-CM

## 2022-06-13 DIAGNOSIS — E03.9 ACQUIRED HYPOTHYROIDISM: ICD-10-CM

## 2022-06-13 DIAGNOSIS — E78.5 HYPERLIPIDEMIA LDL GOAL <70: Primary | ICD-10-CM

## 2022-06-13 DIAGNOSIS — G60.9 IDIOPATHIC PERIPHERAL NEUROPATHY: ICD-10-CM

## 2022-06-13 LAB
T4 FREE SERPL-MCNC: 1.25 NG/DL (ref 0.76–1.46)
TSH SERPL DL<=0.005 MIU/L-ACNC: 6.44 MU/L (ref 0.4–4)

## 2022-06-13 PROCEDURE — 99213 OFFICE O/P EST LOW 20 MIN: CPT | Performed by: FAMILY MEDICINE

## 2022-06-13 RX ORDER — GABAPENTIN 100 MG/1
100 CAPSULE ORAL 2 TIMES DAILY
Qty: 180 CAPSULE | Refills: 3 | Status: SHIPPED | OUTPATIENT
Start: 2022-06-13 | End: 2023-03-14

## 2022-06-13 RX ORDER — ATENOLOL 25 MG/1
50 TABLET ORAL 2 TIMES DAILY
Qty: 360 TABLET | Refills: 3 | Status: SHIPPED | OUTPATIENT
Start: 2022-06-13 | End: 2022-10-26

## 2022-06-13 RX ORDER — AMLODIPINE BESYLATE 2.5 MG/1
2.5 TABLET ORAL DAILY
Qty: 1 TABLET | Refills: 0 | Status: SHIPPED | OUTPATIENT
Start: 2022-06-13 | End: 2022-06-13

## 2022-06-13 ASSESSMENT — PAIN SCALES - GENERAL: PAINLEVEL: NO PAIN (0)

## 2022-06-13 NOTE — PROGRESS NOTES
Assessment & Plan     Hyperlipidemia LDL goal <70  Does not want to increase dose of statins    Numbness and tingling of both feet  Refilled   Stable   - gabapentin (NEURONTIN) 100 MG capsule; Take 1 capsule (100 mg) by mouth 2 times daily    Benign hypertension with CKD (chronic kidney disease) stage III (H)  Stop amlodipine due to swelling legs  Increase atenolol  Follow up ancillary BP check 4 weeks  - atenolol (TENORMIN) 25 MG tablet; Take 2 tablets (50 mg) by mouth 2 times daily    Idiopathic peripheral neuropathy  refilled  - gabapentin (NEURONTIN) 100 MG capsule; Take 1 capsule (100 mg) by mouth 2 times daily    Acquired hypothyroidism  Pending   - TSH with free T4 reflex; Future    Screen for colon cancer  Advised to send cologuard  Return in about 4 weeks (around 7/11/2022) for Ancillary Blood Pressure check.    Marysol Cisse MD  [facility] JOSE Doshi is a 71 year old who presents for the following health issues     HPI     Chief Complaint   Patient presents with     Hypertension     Swelling     Bilateral foot swelling       Hyperlipidemia Follow-Up      Are you regularly taking any medication or supplement to lower your cholesterol?   Yes- atorvastatin (LIPITOR) 10 MG tablet    Are you having muscle aches or other side effects that you think could be caused by your cholesterol lowering medication?  No  Does not want to increase dose as Muscle aches stat if she increases dose  Hypertension Follow-up      Do you check your blood pressure regularly outside of the clinic? Yes     Are you following a low salt diet? Yes    Are your blood pressures ever more than 140 on the top number (systolic) OR more   than 90 on the bottom number (diastolic), for example 140/90? Yes  Pt has swelling in her feet at the end of the day  No sob  Hypothyroidism Follow-up      Since last visit, patient describes the following symptoms: Weight stable, no hair loss, no skin changes, no  constipation, no loose stools      How many servings of fruits and vegetables do you eat daily?  2-3    On average, how many sweetened beverages do you drink each day (Examples: soda, juice, sweet tea, etc.  Do NOT count diet or artificially sweetened beverages)?   0    How many days per week do you exercise enough to make your heart beat faster? none    How many minutes a day do you exercise enough to make your heart beat faster? none    How many days per week do you miss taking your medication? 0  Review of Systems   CONSTITUTIONAL: NEGATIVE for fever, chills, change in weight  ENT/MOUTH: NEGATIVE for ear, mouth and throat problems  RESP: NEGATIVE for significant cough or SOB  CV: NEGATIVE for chest pain, palpitations or peripheral edema  GI: NEGATIVE for nausea, abdominal pain, heartburn, or change in bowel habits  MUSCULOSKELETAL: NEGATIVE for significant arthralgias or myalgia  ROS otherwise negative      Objective    /76   Pulse 71   Temp 96.9  F (36.1  C) (Tympanic)   Wt 97.5 kg (215 lb)   SpO2 94%   BMI 38.09 kg/m    Body mass index is 38.09 kg/m .  Physical Exam   GENERAL: healthy, alert and no distress  NECK: no adenopathy, no asymmetry, masses, or scars and thyroid normal to palpation  RESP: lungs clear to auscultation - no rales, rhonchi or wheezes  CV: regular rate and rhythm, normal S1 S2, no S3 or S4, no murmur, click or rub, no peripheral edema and peripheral pulses strong  ABDOMEN: soft, nontender, no hepatosplenomegaly, no masses and bowel sounds normal  MS: no gross musculoskeletal defects noted, no edema    Lab on 06/08/2022   Component Date Value Ref Range Status     Cholesterol 06/08/2022 202 (A) <200 mg/dL Final     Triglycerides 06/08/2022 82  <150 mg/dL Final     Direct Measure HDL 06/08/2022 94  >=50 mg/dL Final     LDL Cholesterol Calculated 06/08/2022 92  <=100 mg/dL Final     Non HDL Cholesterol 06/08/2022 108  <130 mg/dL Final     Patient Fasting > 8hrs? 06/08/2022 Yes    Final

## 2022-07-26 ENCOUNTER — OFFICE VISIT (OUTPATIENT)
Dept: FAMILY MEDICINE | Facility: CLINIC | Age: 72
End: 2022-07-26
Payer: MEDICARE

## 2022-07-26 VITALS
WEIGHT: 210.6 LBS | TEMPERATURE: 98.4 F | OXYGEN SATURATION: 97 % | DIASTOLIC BLOOD PRESSURE: 82 MMHG | HEART RATE: 63 BPM | BODY MASS INDEX: 37.31 KG/M2 | SYSTOLIC BLOOD PRESSURE: 136 MMHG

## 2022-07-26 DIAGNOSIS — Z12.11 SCREEN FOR COLON CANCER: ICD-10-CM

## 2022-07-26 DIAGNOSIS — E66.01 MORBID OBESITY (H): ICD-10-CM

## 2022-07-26 DIAGNOSIS — N18.31 TYPE 2 DIABETES MELLITUS WITH STAGE 3A CHRONIC KIDNEY DISEASE, WITHOUT LONG-TERM CURRENT USE OF INSULIN (H): ICD-10-CM

## 2022-07-26 DIAGNOSIS — K21.9 GASTROESOPHAGEAL REFLUX DISEASE, UNSPECIFIED WHETHER ESOPHAGITIS PRESENT: ICD-10-CM

## 2022-07-26 DIAGNOSIS — E11.22 TYPE 2 DIABETES MELLITUS WITH STAGE 3A CHRONIC KIDNEY DISEASE, WITHOUT LONG-TERM CURRENT USE OF INSULIN (H): ICD-10-CM

## 2022-07-26 DIAGNOSIS — I10 ESSENTIAL HYPERTENSION, BENIGN: ICD-10-CM

## 2022-07-26 LAB
ANION GAP SERPL CALCULATED.3IONS-SCNC: 7 MMOL/L (ref 3–14)
BUN SERPL-MCNC: 20 MG/DL (ref 7–30)
CALCIUM SERPL-MCNC: 9.4 MG/DL (ref 8.5–10.1)
CHLORIDE BLD-SCNC: 102 MMOL/L (ref 94–109)
CO2 SERPL-SCNC: 30 MMOL/L (ref 20–32)
CREAT SERPL-MCNC: 1.18 MG/DL (ref 0.52–1.04)
GFR SERPL CREATININE-BSD FRML MDRD: 49 ML/MIN/1.73M2
GLUCOSE BLD-MCNC: 140 MG/DL (ref 70–99)
POTASSIUM BLD-SCNC: 3.8 MMOL/L (ref 3.4–5.3)
SODIUM SERPL-SCNC: 139 MMOL/L (ref 133–144)

## 2022-07-26 PROCEDURE — 99213 OFFICE O/P EST LOW 20 MIN: CPT | Performed by: FAMILY MEDICINE

## 2022-07-26 PROCEDURE — 80048 BASIC METABOLIC PNL TOTAL CA: CPT | Performed by: FAMILY MEDICINE

## 2022-07-26 PROCEDURE — 36415 COLL VENOUS BLD VENIPUNCTURE: CPT | Performed by: FAMILY MEDICINE

## 2022-07-26 RX ORDER — HYDROCHLOROTHIAZIDE 25 MG/1
25 TABLET ORAL DAILY
Qty: 90 TABLET | Refills: 3 | Status: SHIPPED | OUTPATIENT
Start: 2022-07-26 | End: 2023-03-14

## 2022-07-26 ASSESSMENT — PAIN SCALES - GENERAL: PAINLEVEL: NO PAIN (0)

## 2022-07-26 NOTE — PROGRESS NOTES
Assessment & Plan     Essential hypertension, benign  controlled  - Basic metabolic panel  (Ca, Cl, CO2, Creat, Gluc, K, Na, BUN); Future  - hydrochlorothiazide (HYDRODIURIL) 25 MG tablet; Take 1 tablet (25 mg) by mouth daily    Screen for colon cancer  Discussed to do cologuard    BMI>35  Low carroll diet    Gastroesophageal reflux disease, unspecified whether esophagitis present  Discussed Try Pepcid and take PPI when needed   Discussed risk of Long term use  - omeprazole (PRILOSEC) 20 MG DR capsule; Take 1 capsule (20 mg) by mouth daily    Type 2 diabetes mellitus with stage 3a chronic kidney disease, without long-term current use of insulin (H)  Advised eye exam  - OPTOMETRY REFERRAL; Future  - Basic metabolic panel  (Ca, Cl, CO2, Creat, Gluc, K, Na, BUN); Future    Return in about 3 months (around 10/26/2022) for Diabetes.    Marysol Cisse MD  [facility] JOSE Doshi is a 71 year old, presenting for the following health issues:  Hypertension      HPI     Hypertension Follow-up      Do you check your blood pressure regularly outside of the clinic? Yes     Are you following a low salt diet? Yes    Are your blood pressures ever more than 140 on the top number (systolic) OR more   than 90 on the bottom number (diastolic), for example 140/90? Yes      How many servings of fruits and vegetables do you eat daily?  0-1    On average, how many sweetened beverages do you drink each day (Examples: soda, juice, sweet tea, etc.  Do NOT count diet or artificially sweetened beverages)?   0    How many days per week do you exercise enough to make your heart beat faster? none    How many minutes a day do you exercise enough to make your heart beat faster?     How many days per week do you miss taking your medication? 0  She needs refill of her PI    Review of Systems   CONSTITUTIONAL: NEGATIVE for fever, chills, change in weight  ENT/MOUTH: NEGATIVE for ear, mouth and throat problems  RESP: NEGATIVE  for significant cough or SOB  CV: NEGATIVE for chest pain, palpitations or peripheral edema  Gets leg cramps off and on  Swelling LE have stopped since she stopped amlodipine  ROS otherwise negative      Objective    /82   Pulse 63   Temp 98.4  F (36.9  C) (Tympanic)   Wt 95.5 kg (210 lb 9.6 oz)   SpO2 97%   BMI 37.31 kg/m    Body mass index is 37.31 kg/m .  Physical Exam   GENERAL: healthy, alert and no distress  NECK: no adenopathy, no asymmetry, masses, or scars and thyroid normal to palpation  RESP: lungs clear to auscultation - no rales, rhonchi or wheezes  CV: regular rate and rhythm, normal S1 S2, no S3 or S4, no murmur, click or rub, no peripheral edema and peripheral pulses strong  ABDOMEN: soft, nontender, no hepatosplenomegaly, no masses and bowel sounds normal  MS: no gross musculoskeletal defects noted, no edema    Pending               .  ..

## 2022-09-02 ENCOUNTER — OFFICE VISIT (OUTPATIENT)
Dept: OPHTHALMOLOGY | Facility: CLINIC | Age: 72
End: 2022-09-02
Payer: MEDICARE

## 2022-09-02 DIAGNOSIS — N18.31 TYPE 2 DIABETES MELLITUS WITH STAGE 3A CHRONIC KIDNEY DISEASE, WITHOUT LONG-TERM CURRENT USE OF INSULIN (H): ICD-10-CM

## 2022-09-02 DIAGNOSIS — E11.22 TYPE 2 DIABETES MELLITUS WITH STAGE 3A CHRONIC KIDNEY DISEASE, WITHOUT LONG-TERM CURRENT USE OF INSULIN (H): ICD-10-CM

## 2022-09-02 DIAGNOSIS — H52.4 MYOPIA OF BOTH EYES WITH REGULAR ASTIGMATISM AND PRESBYOPIA: ICD-10-CM

## 2022-09-02 DIAGNOSIS — Z96.1 PSEUDOPHAKIA: ICD-10-CM

## 2022-09-02 DIAGNOSIS — H10.13 ALLERGIC CONJUNCTIVITIS OF BOTH EYES: ICD-10-CM

## 2022-09-02 DIAGNOSIS — H52.13 MYOPIA OF BOTH EYES WITH REGULAR ASTIGMATISM AND PRESBYOPIA: ICD-10-CM

## 2022-09-02 DIAGNOSIS — H52.223 MYOPIA OF BOTH EYES WITH REGULAR ASTIGMATISM AND PRESBYOPIA: ICD-10-CM

## 2022-09-02 DIAGNOSIS — Z01.00 EXAMINATION OF EYES AND VISION: Primary | ICD-10-CM

## 2022-09-02 PROCEDURE — 92014 COMPRE OPH EXAM EST PT 1/>: CPT | Performed by: STUDENT IN AN ORGANIZED HEALTH CARE EDUCATION/TRAINING PROGRAM

## 2022-09-02 ASSESSMENT — REFRACTION_WEARINGRX
OS_CYLINDER: SPHERE
OD_CYLINDER: SPHERE
OD_SPHERE: +2.50
OS_SPHERE: +2.50
SPECS_TYPE: OTC READERS

## 2022-09-02 ASSESSMENT — TONOMETRY
OD_IOP_MMHG: 11
IOP_METHOD: APPLANATION
OS_IOP_MMHG: 13

## 2022-09-02 ASSESSMENT — VISUAL ACUITY
OS_SC: 20/20
OD_SC+: -1
OS_SC+: -2
OD_CC: J1+ SLOW
OS_CC: J1+ SLOW
OD_SC: 20/20
METHOD: SNELLEN - LINEAR

## 2022-09-02 ASSESSMENT — REFRACTION_MANIFEST
OS_SPHERE: PLANO
OD_SPHERE: PLANO
OS_CYLINDER: SPHERE
OS_ADD: +2.75
OD_ADD: +2.75
OD_CYLINDER: SPHERE

## 2022-09-02 ASSESSMENT — CUP TO DISC RATIO
OS_RATIO: 0.3
OD_RATIO: 0.25

## 2022-09-02 ASSESSMENT — CONF VISUAL FIELD
OS_NORMAL: 1
METHOD: COUNTING FINGERS
OD_NORMAL: 1

## 2022-09-02 ASSESSMENT — SLIT LAMP EXAM - LIDS
COMMENTS: 1+ DERMATOCHALASIS
COMMENTS: 1+ DERMATOCHALASIS

## 2022-09-02 ASSESSMENT — EXTERNAL EXAM - LEFT EYE: OS_EXAM: NORMAL

## 2022-09-02 ASSESSMENT — EXTERNAL EXAM - RIGHT EYE: OD_EXAM: NORMAL

## 2022-09-02 NOTE — PROGRESS NOTES
Current Eye Medications:  alaway - both eyes PRN     Subjective:  Diabetic, dilated exam - vision is stable without correction at distance. Uses OTC readers for reading.  Patient has occasional itchy eyes - uses alaway PRN.    Type II DM - diet controlled.  Lab Results   Component Value Date    A1C 6.0 03/17/2022    A1C 5.5 06/15/2021    A1C 5.6 01/12/2021    A1C 5.4 07/22/2020    A1C 5.2 07/08/2020    A1C 5.4 10/17/2019      Objective:  See Ophthalmology Exam.      Assessment:  Glenda Bales is a 71 year old female who presents with:   Encounter Diagnoses   Name Primary?     Examination of eyes and vision Yes     Type 2 diabetes mellitus with stage 3a chronic kidney disease, without long-term current use of insulin (H) Negative diabetic retinopathy      Pseudophakia - Both Eyes      Allergic conjunctivitis of both eyes      Myopia of both eyes with regular astigmatism and presbyopia      Stable eye exam.      Plan:  Continue Alaway as needed in both eyes     Keep blood sugars and blood pressure under good control.    Continue over the counter readers or can fill glasses prescription given     Jama Carpenter MD  [phone]    Patient Education   Diabetes weakens the blood vessels all over the body, including the eyes. Damage to the blood vessels in the eyes can cause swelling or bleeding into part of the eye (called the retina). This is called diabetic retinopathy (BRIAN-tin--[facility]-thee). If not treated, this disease can cause vision loss or blindness.   Symptoms may include blurred or distorted vision, but many people have no symptoms. It's important to see your eye doctor regularly to check for problems.   Early treatment and good control can help protect your vision. Here are the things you can do to help prevent vision loss:      1. Keep your blood sugar levels under tight control.      2. Bring high blood pressure under control.      3. No smoking.      4. Have yearly dilated eye exams.

## 2022-09-02 NOTE — LETTER
9/2/2022         RE: Glenda Bales  [address]  [facility] [address]        Dear Colleague,    Thank you for referring your patient, Glenda Bales, to the [facility]. Please see a copy of my visit note below.     Current Eye Medications:  alaway - both eyes PRN     Subjective:  Diabetic, dilated exam - vision is stable without correction at distance. Uses OTC readers for reading.  Patient has occasional itchy eyes - uses alaway PRN.    Type II DM - diet controlled.  Lab Results   Component Value Date    A1C 6.0 03/17/2022    A1C 5.5 06/15/2021    A1C 5.6 01/12/2021    A1C 5.4 07/22/2020    A1C 5.2 07/08/2020    A1C 5.4 10/17/2019      Objective:  See Ophthalmology Exam.      Assessment:  Glenda Bales is a 71 year old female who presents with:   Encounter Diagnoses   Name Primary?     Examination of eyes and vision Yes     Type 2 diabetes mellitus with stage 3a chronic kidney disease, without long-term current use of insulin (H) Negative diabetic retinopathy      Pseudophakia - Both Eyes      Allergic conjunctivitis of both eyes      Myopia of both eyes with regular astigmatism and presbyopia      Stable eye exam.      Plan:  Continue Alaway as needed in both eyes     Keep blood sugars and blood pressure under good control.    Continue over the counter readers or can fill glasses prescription given     Jama Carpenter MD  [phone]    Patient Education   Diabetes weakens the blood vessels all over the body, including the eyes. Damage to the blood vessels in the eyes can cause swelling or bleeding into part of the eye (called the retina). This is called diabetic retinopathy (BRIAN-tin-AH-puh-thee). If not treated, this disease can cause vision loss or blindness.   Symptoms may include blurred or distorted vision, but many people have no symptoms. It's important to see your eye doctor regularly to check for problems.   Early treatment and good control can help protect your  vision. Here are the things you can do to help prevent vision loss:      1. Keep your blood sugar levels under tight control.      2. Bring high blood pressure under control.      3. No smoking.      4. Have yearly dilated eye exams.             Again, thank you for allowing me to participate in the care of your patient.        Sincerely,        Jama Carpenter MD

## 2022-09-02 NOTE — PATIENT INSTRUCTIONS
Continue Alaway as needed in both eyes     Keep blood sugars and blood pressure under good control.    Continue over the counter readers or can fill glasses prescription given     Jama Carpenter MD  [phone]    Patient Education   Diabetes weakens the blood vessels all over the body, including the eyes. Damage to the blood vessels in the eyes can cause swelling or bleeding into part of the eye (called the retina). This is called diabetic retinopathy (BRIAN-tin--puh-thee). If not treated, this disease can cause vision loss or blindness.   Symptoms may include blurred or distorted vision, but many people have no symptoms. It's important to see your eye doctor regularly to check for problems.   Early treatment and good control can help protect your vision. Here are the things you can do to help prevent vision loss:      1. Keep your blood sugar levels under tight control.      2. Bring high blood pressure under control.      3. No smoking.      4. Have yearly dilated eye exams.

## 2022-09-18 ENCOUNTER — HEALTH MAINTENANCE LETTER (OUTPATIENT)
Age: 72
End: 2022-09-18

## 2022-09-22 ENCOUNTER — OFFICE VISIT (OUTPATIENT)
Dept: FAMILY MEDICINE | Facility: CLINIC | Age: 72
End: 2022-09-22
Payer: MEDICARE

## 2022-09-22 VITALS
WEIGHT: 208.6 LBS | SYSTOLIC BLOOD PRESSURE: 136 MMHG | OXYGEN SATURATION: 96 % | HEART RATE: 98 BPM | BODY MASS INDEX: 36.95 KG/M2 | DIASTOLIC BLOOD PRESSURE: 86 MMHG | TEMPERATURE: 97.6 F

## 2022-09-22 DIAGNOSIS — K21.9 GASTROESOPHAGEAL REFLUX DISEASE WITHOUT ESOPHAGITIS: ICD-10-CM

## 2022-09-22 DIAGNOSIS — Z12.11 SCREEN FOR COLON CANCER: ICD-10-CM

## 2022-09-22 DIAGNOSIS — E66.01 MORBID OBESITY (H): ICD-10-CM

## 2022-09-22 PROCEDURE — 99213 OFFICE O/P EST LOW 20 MIN: CPT | Performed by: FAMILY MEDICINE

## 2022-09-22 ASSESSMENT — PAIN SCALES - GENERAL: PAINLEVEL: NO PAIN (0)

## 2022-09-22 NOTE — PROGRESS NOTES
"  Assessment & Plan     Gastroesophageal reflux disease without esophagitis  SEE EPIC care orders  The potential side effects of this medication have been discussed with the patient.  Call if any significant problems with these are experienced.  Take PPI  Follow up if not better  Avoid gastric Irritants as discussed     Screen for colon cancer  Advised cologuard  Morbid obesity  Losing weight will help Decrease her symptoms and also decrease ASCVD risk  BMI:   Estimated body mass index is 36.95 kg/m  as calculated from the following:    Height as of 3/17/22: 1.6 m (5' 3\").    Weight as of this encounter: 94.6 kg (208 lb 9.6 oz).   Weight management plan: Discussed healthy diet and exercise guidelines        Return in about 1 month (around 10/22/2022) for Diabetes.    Marysol Cisse MD  [facility] JOSE Doshi is a 71 year old presenting for the following health issues:  No chief complaint on file.      HPI     Concern - Increase in Acid reflux  Onset: increase in symptoms in the last 3 weeks.   Therapies tried and outcome: omeprazole, smaller meals, avoiding caffeine    Pt says she started Prilosec and actually feels better Today  The patient denies abdominal or flank pain, anorexia, nausea or vomiting, dysphagia, change in bowel habits or black or bloody stools.  She does not want her Diabetic test Today  Review of Systems   CONSTITUTIONAL: NEGATIVE for fever, chills, change in weight  ENT/MOUTH: NEGATIVE for ear, mouth and throat problems  RESP: NEGATIVE for significant cough or SOB  CV: NEGATIVE for chest pain, palpitations or peripheral edema  GI: as above  The patient denies abdominal or flank pain, anorexia, nausea or vomiting, dysphagia, change in bowel habits or black or bloody stools.        Objective    /86   Pulse 98   Temp 97.6  F (36.4  C) (Temporal)   Wt 94.6 kg (208 lb 9.6 oz)   SpO2 96%   BMI 36.95 kg/m    Body mass index is 36.95 kg/m .  Physical Exam "   GENERAL: healthy, alert and no distress  NECK: no adenopathy, no asymmetry, masses, or scars and thyroid normal to palpation  RESP: lungs clear to auscultation - no rales, rhonchi or wheezes  CV: regular rate and rhythm, normal S1 S2, no S3 or S4, no murmur, click or rub, no peripheral edema and peripheral pulses strong  ABDOMEN: soft, nontender, no hepatosplenomegaly, no masses and bowel sounds normal  MS: no gross musculoskeletal defects noted, no edema    none

## 2022-10-26 ENCOUNTER — OFFICE VISIT (OUTPATIENT)
Dept: FAMILY MEDICINE | Facility: CLINIC | Age: 72
End: 2022-10-26
Payer: MEDICARE

## 2022-10-26 VITALS
DIASTOLIC BLOOD PRESSURE: 86 MMHG | TEMPERATURE: 97.6 F | HEART RATE: 77 BPM | BODY MASS INDEX: 37.62 KG/M2 | SYSTOLIC BLOOD PRESSURE: 144 MMHG | OXYGEN SATURATION: 96 % | WEIGHT: 212.4 LBS

## 2022-10-26 DIAGNOSIS — I12.9 BENIGN HYPERTENSION WITH CKD (CHRONIC KIDNEY DISEASE) STAGE III (H): ICD-10-CM

## 2022-10-26 DIAGNOSIS — E66.01 MORBID OBESITY (H): ICD-10-CM

## 2022-10-26 DIAGNOSIS — E11.22 TYPE 2 DIABETES MELLITUS WITH STAGE 3A CHRONIC KIDNEY DISEASE, WITHOUT LONG-TERM CURRENT USE OF INSULIN (H): ICD-10-CM

## 2022-10-26 DIAGNOSIS — Z12.11 SCREEN FOR COLON CANCER: ICD-10-CM

## 2022-10-26 DIAGNOSIS — Z23 HIGH PRIORITY FOR 2019-NCOV VACCINE: ICD-10-CM

## 2022-10-26 DIAGNOSIS — N18.31 TYPE 2 DIABETES MELLITUS WITH STAGE 3A CHRONIC KIDNEY DISEASE, WITHOUT LONG-TERM CURRENT USE OF INSULIN (H): ICD-10-CM

## 2022-10-26 DIAGNOSIS — I10 ESSENTIAL HYPERTENSION, BENIGN: ICD-10-CM

## 2022-10-26 DIAGNOSIS — N18.30 BENIGN HYPERTENSION WITH CKD (CHRONIC KIDNEY DISEASE) STAGE III (H): ICD-10-CM

## 2022-10-26 DIAGNOSIS — Z23 NEED FOR PROPHYLACTIC VACCINATION AND INOCULATION AGAINST INFLUENZA: ICD-10-CM

## 2022-10-26 DIAGNOSIS — N18.31 STAGE 3A CHRONIC KIDNEY DISEASE (H): ICD-10-CM

## 2022-10-26 DIAGNOSIS — E78.5 HYPERLIPIDEMIA LDL GOAL <70: ICD-10-CM

## 2022-10-26 LAB
HBA1C MFR BLD: 6.7 % (ref 0–5.6)
POTASSIUM BLD-SCNC: 3.9 MMOL/L (ref 3.4–5.3)

## 2022-10-26 PROCEDURE — 84132 ASSAY OF SERUM POTASSIUM: CPT | Performed by: FAMILY MEDICINE

## 2022-10-26 PROCEDURE — 36415 COLL VENOUS BLD VENIPUNCTURE: CPT | Performed by: FAMILY MEDICINE

## 2022-10-26 PROCEDURE — 90662 IIV NO PRSV INCREASED AG IM: CPT | Performed by: FAMILY MEDICINE

## 2022-10-26 PROCEDURE — 91313 COVID-19,PF,MODERNA BIVALENT: CPT | Performed by: FAMILY MEDICINE

## 2022-10-26 PROCEDURE — 83036 HEMOGLOBIN GLYCOSYLATED A1C: CPT | Performed by: FAMILY MEDICINE

## 2022-10-26 PROCEDURE — 0134A COVID-19,PF,MODERNA BIVALENT: CPT | Performed by: FAMILY MEDICINE

## 2022-10-26 PROCEDURE — G0008 ADMIN INFLUENZA VIRUS VAC: HCPCS | Mod: 59 | Performed by: FAMILY MEDICINE

## 2022-10-26 PROCEDURE — 99214 OFFICE O/P EST MOD 30 MIN: CPT | Mod: 25 | Performed by: FAMILY MEDICINE

## 2022-10-26 RX ORDER — METOPROLOL SUCCINATE 50 MG/1
150 TABLET, EXTENDED RELEASE ORAL DAILY
Qty: 90 TABLET | Refills: 11 | Status: SHIPPED | OUTPATIENT
Start: 2022-10-26 | End: 2022-10-26

## 2022-10-26 ASSESSMENT — PAIN SCALES - GENERAL: PAINLEVEL: NO PAIN (0)

## 2022-10-26 NOTE — PROGRESS NOTES
Assessment & Plan     Type 2 diabetes mellitus with stage 3a chronic kidney disease, without long-term current use of insulin (H)  controlled  - HEMOGLOBIN A1C; Future  - HEMOGLOBIN A1C    Hyperlipidemia LDL goal <70  Stable     Stage 3a chronic kidney disease (H)  Discussed Good control of ALLIE will help further  decline    Benign hypertension with CKD (chronic kidney disease) stage III (H)  Advised stop atenolol  Change to   - metoprolol succinate ER (TOPROL XL) 50 MG 24 hr tablet; Take 3 tablets (150 mg) by mouth daily    Morbid obesity (H)  Low carroll diet/Exercise    Screen for colon cancer  Advised cologuard   Discussed importance of doing this test     Need for prophylactic vaccination and inoculation against influenza  Advised   - INFLUENZA, QUAD, HIGH DOSE, PF, 65YR + (FLUZONE HD)    High priority for 2019-nCoV vaccine    - COVID-19,PF,MODERNA BIVALENT 18+Yrs    Essential hypertension, benign  Pending   - Potassium                 Return in about 1 month (around 11/26/2022) for Ancillary Blood Pressure check.    Marysol Cisse MD  [facility] JOSE Doshi is a 71 year old, presenting for the following health issues:  Diabetes, Lipids, and Hypertension      HPI     Diabetes Follow-up      How often are you checking your blood sugar? Not at all    What concerns do you have today about your diabetes? None     Do you have any of these symptoms? (Select all that apply)  Numbness in feet and Burning in feet              Hyperlipidemia Follow-Up      Are you regularly taking any medication or supplement to lower your cholesterol?   Yes- atorvastatin    Are you having muscle aches or other side effects that you think could be caused by your cholesterol lowering medication?  No    Hypertension Follow-up      Do you check your blood pressure regularly outside of the clinic? Yes     Are you following a low salt diet? No added salt    Are your blood pressures ever more than 140 on the top  number (systolic) OR more   than 90 on the bottom number (diastolic), for example 140/90? Yes    BP Readings from Last 2 Encounters:   10/26/22 (!) 144/86   09/22/22 136/86     Hemoglobin A1C (%)   Date Value   03/17/2022 6.0 (H)   06/15/2021 5.5   01/12/2021 5.6     LDL Cholesterol Calculated (mg/dL)   Date Value   06/08/2022 92   04/28/2022 113 (H)   08/20/2020 163 (H)   07/22/2020 125 (H)         How many servings of fruits and vegetables do you eat daily?  0-1    On average, how many sweetened beverages do you drink each day (Examples: soda, juice, sweet tea, etc.  Do NOT count diet or artificially sweetened beverages)?   0    How many days per week do you exercise enough to make your heart beat faster? none    How many minutes a day do you exercise enough to make your heart beat faster? none    How many days per week do you miss taking your medication? 0  Review of Systems   CONSTITUTIONAL: NEGATIVE for fever, chills, change in weight  ENT/MOUTH: NEGATIVE for ear, mouth and throat problems  RESP: NEGATIVE for significant cough or SOB  CV: NEGATIVE for chest pain, palpitations or peripheral edema  GI: NEGATIVE for nausea, abdominal pain, heartburn, or change in bowel habits  PSYCHIATRIC: NEGATIVE for changes in mood or affect  ROS otherwise negative      Objective    BP (!) 144/86   Pulse 77   Temp 97.6  F (36.4  C) (Temporal)   Wt 96.3 kg (212 lb 6.4 oz)   SpO2 96%   BMI 37.62 kg/m    Body mass index is 37.62 kg/m .  Physical Exam   GENERAL: healthy, alert and no distress  EYES: Eyes grossly normal to inspection  NECK: no adenopathy, no asymmetry, masses, or scars and thyroid normal to palpation  RESP: lungs clear to auscultation - no rales, rhonchi or wheezes  CV: regular rate and rhythm, normal S1 S2, no S3 or S4, no murmur, click or rub, no peripheral edema and peripheral pulses strong  ABDOMEN: soft, nontender, no hepatosplenomegaly, no masses and bowel sounds normal  MS: no gross musculoskeletal  defects noted, no edema  PSYCH: mentation appears normal, affect normal/bright  Diabetic foot exam: normal DP and PT pulses, no trophic changes or ulcerative lesions and normal sensory exam    Office Visit on 07/26/2022   Component Date Value Ref Range Status     Sodium 07/26/2022 139  133 - 144 mmol/L Final     Potassium 07/26/2022 3.8  3.4 - 5.3 mmol/L Final     Chloride 07/26/2022 102  94 - 109 mmol/L Final     Carbon Dioxide (CO2) 07/26/2022 30  20 - 32 mmol/L Final     Anion Gap 07/26/2022 7  3 - 14 mmol/L Final     Urea Nitrogen 07/26/2022 20  7 - 30 mg/dL Final     Creatinine 07/26/2022 1.18 (H)  0.52 - 1.04 mg/dL Final     Calcium 07/26/2022 9.4  8.5 - 10.1 mg/dL Final     Glucose 07/26/2022 140 (H)  70 - 99 mg/dL Final     GFR Estimate 07/26/2022 49 (L)  >60 mL/min/1.73m2 Final    Effective December 21, 2021 eGFRcr in adults is calculated using the 2021 CKD-EPI creatinine equation which includes age and gender (Salvatore et al., NEJM, DOI: 10.1056/DMOJxh1035339)     Results for orders placed or performed in visit on 10/26/22   HEMOGLOBIN A1C     Status: Abnormal   Result Value Ref Range    Hemoglobin A1C 6.7 (H) 0.0 - 5.6 %

## 2022-11-28 ENCOUNTER — ALLIED HEALTH/NURSE VISIT (OUTPATIENT)
Dept: FAMILY MEDICINE | Facility: CLINIC | Age: 72
End: 2022-11-28
Payer: MEDICARE

## 2022-11-28 VITALS — DIASTOLIC BLOOD PRESSURE: 80 MMHG | SYSTOLIC BLOOD PRESSURE: 136 MMHG | HEART RATE: 60 BPM

## 2022-11-28 DIAGNOSIS — I12.9 BENIGN HYPERTENSION WITH CKD (CHRONIC KIDNEY DISEASE) STAGE III (H): Primary | ICD-10-CM

## 2022-11-28 DIAGNOSIS — N18.30 BENIGN HYPERTENSION WITH CKD (CHRONIC KIDNEY DISEASE) STAGE III (H): Primary | ICD-10-CM

## 2022-11-28 PROCEDURE — 99207 PR NO CHARGE NURSE ONLY: CPT

## 2022-12-06 ENCOUNTER — ALLIED HEALTH/NURSE VISIT (OUTPATIENT)
Dept: FAMILY MEDICINE | Facility: CLINIC | Age: 72
End: 2022-12-06
Payer: MEDICARE

## 2022-12-06 VITALS — DIASTOLIC BLOOD PRESSURE: 76 MMHG | SYSTOLIC BLOOD PRESSURE: 172 MMHG

## 2022-12-06 DIAGNOSIS — I10 HTN (HYPERTENSION): Primary | ICD-10-CM

## 2022-12-06 PROCEDURE — 99207 PR NO CHARGE NURSE ONLY: CPT

## 2022-12-06 NOTE — PROGRESS NOTES
Glenda Bales is a 72 year old patient who comes in today for a Blood Pressure check.  Initial BP:  BP (!) 172/76      Data Unavailable  Disposition: results routed to provider      Dr Cisse was out of office, went to huddle Dr Saha. Dr Saha let me know to tell the patient to monitor her Blood pressure for 3 to 4 day and make appointment to talk about blood pressure reading. Patient want me to schedule appointment with provider so I schedule with Dr Ferrell on 12/20 at 3:10pm.      Wilberto Hernandez. MA

## 2022-12-12 ENCOUNTER — OFFICE VISIT (OUTPATIENT)
Dept: FAMILY MEDICINE | Facility: CLINIC | Age: 72
End: 2022-12-12
Payer: MEDICARE

## 2022-12-12 VITALS
TEMPERATURE: 97.9 F | BODY MASS INDEX: 37.27 KG/M2 | SYSTOLIC BLOOD PRESSURE: 148 MMHG | DIASTOLIC BLOOD PRESSURE: 86 MMHG | HEART RATE: 68 BPM | WEIGHT: 210.4 LBS | OXYGEN SATURATION: 96 % | RESPIRATION RATE: 19 BRPM

## 2022-12-12 DIAGNOSIS — I10 HTN, GOAL BELOW 140/90: Primary | ICD-10-CM

## 2022-12-12 PROCEDURE — 99213 OFFICE O/P EST LOW 20 MIN: CPT | Performed by: FAMILY MEDICINE

## 2022-12-12 RX ORDER — ATENOLOL 50 MG/1
50 TABLET ORAL 2 TIMES DAILY
Qty: 180 TABLET | Refills: 0 | Status: SHIPPED | OUTPATIENT
Start: 2022-12-12 | End: 2023-03-14

## 2022-12-12 ASSESSMENT — PAIN SCALES - GENERAL: PAINLEVEL: NO PAIN (0)

## 2022-12-12 NOTE — PATIENT INSTRUCTIONS
HTN:   BP readings from home and clinic above goal   Reviewed BP reading were lower on Atenolol   Plan:     Will revert back to Atenolol at 50 mg twice daily and recheck  BP in 1 month

## 2022-12-12 NOTE — PROGRESS NOTES
Assessment & Plan     HTN, goal below 140/90  Patient has had ongoing hypotension; she was originally on atenolol then switched to metoprolol; but according to her readings atenolol was achieving better control than the metoprolol.  She had been on amlodipine which was stopped because of leg swelling.  Discussed reverting to atenolol and if blood pressures not at goal might consider an additional medication; either Aldactone on a low-dose of amlodipine 2.5 mg.  We will recheck levels in 2 weeks  - atenolol (TENORMIN) 50 MG tablet; Take 1 tablet (50 mg) by mouth 2 times daily for 90 days      Return in about 1 month (around 1/12/2023) for Follow up with PCP, Follow up in 1 month for BP recheck.    Uriah Ferrell MD  [facility] JOSE Doshi is a 72 year old, presenting for the following health issues:  Hypertension  Changed medication in November and ever since BP has been high     HPI   Hypertension Follow-up  Changed:    Taken Off Atenolol 50 mg twice daily and changed to metoprolol 150 mg daily   Also taking hydrochlorothiazide 25 mg   Reviewed past readings and appears like       Do you check your blood pressure regularly outside of the clinic? Yes     Are you following a low salt diet? Yes No added salt     Are your blood pressures ever more than 140 on the top number (systolic) OR more   than 90 on the bottom number (diastolic), for example 140/90? Yes    BP Readings from Last 6 Encounters:   12/12/22 (!) 148/86   12/06/22 (!) 172/76   11/28/22 136/80   10/26/22 (!) 144/86   09/22/22 136/86   07/26/22 136/82       Home readings usually borderline high in morning and normal in afternoon        How many servings of fruits and vegetables do you eat daily?  0-1    On average, how many sweetened beverages do you drink each day (Examples: soda, juice, sweet tea, etc.  Do NOT count diet or artificially sweetened beverages)?   0 - diet coke     How many days per week do you  exercise enough to make your heart beat faster? 3 or less    How many minutes a day do you exercise enough to make your heart beat faster? 9 or less    How many days per week do you miss taking your medication? 0      Review of Systems   Constitutional, HEENT, cardiovascular, pulmonary, gi and gu systems are negative, except as otherwise noted.      Objective    BP (!) 148/86   Pulse 68   Temp 97.9  F (36.6  C) (Oral)   Resp 19   Wt 95.4 kg (210 lb 6.4 oz)   SpO2 96%   BMI 37.27 kg/m    Body mass index is 37.27 kg/m .  Physical Exam   GENERAL: healthy, alert and no distress  RESP: lungs clear to auscultation - no rales, rhonchi or wheezes  CV: regular rate and rhythm, no murmur, click or rub, no peripheral edema   MS: no gross musculoskeletal defects noted, no edema  PSYCH: mentation appears normal, affect normal/bright

## 2023-01-02 ENCOUNTER — NURSE TRIAGE (OUTPATIENT)
Dept: FAMILY MEDICINE | Facility: CLINIC | Age: 73
End: 2023-01-02

## 2023-01-02 NOTE — TELEPHONE ENCOUNTER
S-(situation): Home BP monitor shows elevated pulse yesterday. I reviewed how to take a radial and carotid pulse. Advised if happens again to try taking pulse to verify machine accuracy.     B-(background): Unsure accuracy, but patient says she took it several times. She did not confirm by manual pulse reading    A-(assessment): Tachycardia noted yesterday, now resolved. No symptoms at time of elevated heart rate. Home monitor also reads it was irregular (See below triage assessment for further details.)     R-(recommendations): Patient wants appointment for follow up.       Patient has home BP monitor and says was getting irregular heart beat Yesterday.  Says took BP this morning and has not gotten elevated rate since. Had echo done about one year ago that she says was normal.     Yesterday pulse was 130, but then went down. Says had elevated heart rate was when she got up for the day. Says she has anxiety and worries so this may or may not have contributed. .     Today has taken BP today twice.   130/78-63    140/81-61    Wants to schedule appointment. Says BP medication was changed recently (see med section for details). And was recommended she come back in at that time.     Please advise, and call patient back.   Thank you,  Riana Hatch Triage    Reason for Disposition    Patient wants to be seen    Additional Information    Negative: Passed out (i.e., lost consciousness, collapsed and was not responding)    Negative: Shock suspected (e.g., cold/pale/clammy skin, too weak to stand, low BP, rapid pulse)    Negative: Difficult to awaken or acting confused (e.g., disoriented, slurred speech)    Negative: Visible sweat on face or sweat dripping down face    Negative: Unable to walk, or can only walk with assistance (e.g., requires support)    Negative: Received SHOCK from implantable cardiac defibrillator and has persisting symptoms (i.e., palpitations, lightheadedness)    Negative: Dizziness,  "lightheadedness, or weakness and heart beating very rapidly (e.g., > 140 / minute)    Negative: Dizziness, lightheadedness, or weakness and heart beating very slowly (e.g., < 50 / minute)    Negative: Sounds like a life-threatening emergency to the triager    Negative: Chest pain    Negative: Difficulty breathing    Negative: Dizziness, lightheadedness, or weakness    Negative: Heart beating very rapidly (e.g., > 140 / minute) and present now  (Exception: During exercise.)    Negative: Heart beating very slowly (e.g., < 50 / minute)  (Exception: Athlete and heart rate normal for caller.)    Negative: New or worsened shortness of breath with activity (dyspnea on exertion)    Negative: Patient sounds very sick or weak to the triager    Negative: Wearing a 'Holter monitor' or 'cardiac event monitor'    Negative: Received SHOCK from implantable cardiac defibrillator (and now feels well)    Answer Assessment - Initial Assessment Questions  1. DESCRIPTION: \"Please describe your heart rate or heartbeat that you are having\" (e.g., fast/slow, regular/irregular, skipped or extra beats, \"palpitations\")      130 and irregular--did not verify.   2. ONSET: \"When did it start?\" (Minutes, hours or days)       Yesterday  3. DURATION: \"How long does it last\" (e.g., seconds, minutes, hours)      All day  4. PATTERN \"Does it come and go, or has it been constant since it started?\"  \"Does it get worse with exertion?\"   \"Are you feeling it now?\"      NA  5. TAP: \"Using your hand, can you tap out what you are feeling on a chair or table in front of you, so that I can hear?\" (Note: not all patients can do this)        Says is okay now.   6. HEART RATE: \"Can you tell me your heart rate?\" \"How many beats in 15 seconds?\"  (Note: not all patients can do this)        61  7. RECURRENT SYMPTOM: \"Have you ever had this before?\" If Yes, ask: \"When was the last time?\" and \"What happened that time?\"       No  8. CAUSE: \"What do you think is causing " "the palpitations?\"      Unsure--may not be accurate due to monitor  9. CARDIAC HISTORY: \"Do you have any history of heart disease?\" (e.g., heart attack, angina, bypass surgery, angioplasty, arrhythmia)       No  10. OTHER SYMPTOMS: \"Do you have any other symptoms?\" (e.g., dizziness, chest pain, sweating, difficulty breathing)        Denies other than anxiety  11. PREGNANCY: \"Is there any chance you are pregnant?\" \"When was your last menstrual period?\"        NA    Protocols used: HEART RATE AND HEARTBEAT [id number]      "

## 2023-01-02 NOTE — TELEPHONE ENCOUNTER
Dr. Cisse,     To clarify. You stated ancillary BP and pulse. Do you want her to have these checked with an MA? Or with RN?    Thanks,  RUBY Cox  [facility]

## 2023-01-02 NOTE — TELEPHONE ENCOUNTER
Called patient and notified her of response from Dr. Cisse. She verbalized understanding. Scheduled appt for tomorrow. Advised to call again to speak to RN or go to UC/ED if rapid heart rate again and symptoms.    Lisseth Cuellar RN   [facility]

## 2023-01-09 ENCOUNTER — ALLIED HEALTH/NURSE VISIT (OUTPATIENT)
Dept: FAMILY MEDICINE | Facility: CLINIC | Age: 73
End: 2023-01-09
Payer: MEDICARE

## 2023-01-09 VITALS — SYSTOLIC BLOOD PRESSURE: 150 MMHG | DIASTOLIC BLOOD PRESSURE: 78 MMHG | HEART RATE: 77 BPM

## 2023-01-09 DIAGNOSIS — N18.30 BENIGN HYPERTENSION WITH CKD (CHRONIC KIDNEY DISEASE) STAGE III (H): Primary | ICD-10-CM

## 2023-01-09 DIAGNOSIS — I12.9 BENIGN HYPERTENSION WITH CKD (CHRONIC KIDNEY DISEASE) STAGE III (H): Primary | ICD-10-CM

## 2023-01-09 PROCEDURE — 99207 PR NO CHARGE NURSE ONLY: CPT

## 2023-01-09 NOTE — PROGRESS NOTES
Glenda Bales is a 72 year old patient who comes in today for a Blood Pressure check.  Initial BP:  BP (!) 150/78   Pulse 77      77  Disposition: provider notified while patient in the clinic.Appointmet with Dr. Cisse in 2 weeks.      Independent MLP  HPI:  9/20/20, Time: 10:57 PM EDT         Jose Coyne is a 31 y.o. female presenting to the ED for  Right breast pain  beginning 2 weeks  ago. The complaint has been persistent, moderate in severity, and worsened by nothing. Patient comes in with complaint of increasing right breast pain. Patient states the pain initially started 2 weeks ago she has been seen in the ER had ultrasound of the breast which was inconclusive for breast mass versus abscess. She followed up with OB/GYN and has an outpatient mammogram scheduled for tomorrow. She has been taking Keflex Bactrim and Norco with no improvement. She denies any fever chills. No history of diabetes. Patient has not been breast-feeding. Previous ultrasound inconclusive for abscess versus mass. She has no fluctuance on exam she was placed on clindamycin she is to follow-up with OB/GYN and have her outpatient mammogram tomorrow  Review of Systems:   Pertinent positives and negatives are stated within HPI, all other systems reviewed and are negative.          --------------------------------------------- PAST HISTORY ---------------------------------------------  Past Medical History:  has a past medical history of Asthma, Brachial neuritis or radiculitis NOS, Headache(784.0), Migraine, and Migraine with aura, without mention of intractable migraine without mention of status migrainosus. Past Surgical History:  has no past surgical history on file. Social History:  reports that she has quit smoking. Her smoking use included cigarettes. She smoked 0.50 packs per day. She has never used smokeless tobacco. She reports that she does not drink alcohol or use drugs. Family History: family history includes Arthritis in her mother; Asthma in her mother; High Blood Pressure in her mother. The patients home medications have been reviewed.     Allergies: Patient has no known allergies. -------------------------------------------------- RESULTS -------------------------------------------------  All laboratory and radiology results have been personally reviewed by myself   LABS:  No results found for this visit on 09/20/20. RADIOLOGY:  Interpreted by Radiologist.  No orders to display       ------------------------- NURSING NOTES AND VITALS REVIEWED ---------------------------   The nursing notes within the ED encounter and vital signs as below have been reviewed. /80   Pulse 80   Temp 97.6 °F (36.4 °C)   Resp 19   Ht 5' 6\" (1.676 m)   Wt 220 lb (99.8 kg)   LMP 09/18/2020   SpO2 99%   BMI 35.51 kg/m²   Oxygen Saturation Interpretation: Normal      ---------------------------------------------------PHYSICAL EXAM--------------------------------------      Constitutional/General: Alert and oriented x3, well appearing, non toxic in NAD  Head: Normocephalic and atraumatic  Eyes: PERRL, EOMI  Mouth: Oropharynx clear, handling secretions, no trismus  Neck: Supple, full ROM,   Pulmonary: Lungs clear to auscultation bilaterally, no wheezes, rales, or rhonchi. Not in respiratory distress  Cardiovascular:  Regular rate and rhythm, no murmurs, gallops, or rubs. 2+ distal pulses  Right breast with tenderness slight firmness with mild erythema present there is no drainage from the breast present no fluctuance of the breast noted  Abdomen: Soft, non tender, non distended,   Extremities: Moves all extremities x 4.  Warm and well perfused  Skin: warm and dry without rash  Neurologic: GCS 15,  Psych: Normal Affect      ------------------------------ ED COURSE/MEDICAL DECISION MAKING----------------------  Medications   oxyCODONE-acetaminophen (PERCOCET) 5-325 MG per tablet 1 tablet (1 tablet Oral Given 9/20/20 2312)   clindamycin (CLEOCIN) capsule 300 mg (300 mg Oral Given 9/20/20 2345)         ED COURSE:   Review of previous ultrasound was inconclusive for breast mass versus abscess. Medical Decision Making:   *Patient came in with complaint of persistent right breast pain with no fever present no history of diabetes. She had previously taken Bactrim Keflex and Norco with no improvement she states she was unable to tolerate the pain this evening she has a outpatient scheduled appointment at 1:00 for a mammogram and is following presently with her OB/GYN  Dr. Angela Carroll. Counseling: The emergency provider has spoken with the patient and discussed todays results, in addition to providing specific details for the plan of care and counseling regarding the diagnosis and prognosis. Questions are answered at this time and they are agreeable with the plan.      --------------------------------- IMPRESSION AND DISPOSITION ---------------------------------    IMPRESSION  1. Breast mass in female    2. Cellulitis of female breast        DISPOSITION  Disposition: Discharge to home  Patient condition is good      NOTE: This report was transcribed using voice recognition software.  Every effort was made to ensure accuracy; however, inadvertent computerized transcription errors may be present     Ethan Smith  09/21/20 0000

## 2023-01-10 ENCOUNTER — NURSE TRIAGE (OUTPATIENT)
Dept: NURSING | Facility: CLINIC | Age: 73
End: 2023-01-10

## 2023-01-10 ENCOUNTER — MYC MEDICAL ADVICE (OUTPATIENT)
Dept: FAMILY MEDICINE | Facility: CLINIC | Age: 73
End: 2023-01-10

## 2023-01-10 DIAGNOSIS — U07.1 INFECTION DUE TO 2019 NOVEL CORONAVIRUS: ICD-10-CM

## 2023-01-10 DIAGNOSIS — R05.9 COUGH: ICD-10-CM

## 2023-01-10 NOTE — TELEPHONE ENCOUNTER
Coronavirus (COVID-19) Notification    Caller Name (Patient, parent, daughter/son, grandparent, etc)  Glenda Amairani    Reason for call  Notify of Positive Coronavirus (COVID-19) lab results, assess symptoms,  review [facility] recommendations    Lab Result   Lab test:  2019-nCoV rRt-PCR or SARS-CoV-2 PCR    Oropharyngeal AND/OR nasopharyngeal swabs is POSITIVE for 2019-nCoV RNA/SARS-COV-2 PCR (COVID-19 virus)    Gather patient reported symptoms   Assessment   Current Symptoms at time of phone call, reported by patient: (if no symptoms, document No symptoms] Chest tightness and coughing   Date of Symptom(s) onset (if applicable) 1/09/23     If at time of call, Patients symptoms hare worsened, the Patient should contact 911 or have someone drive them to Emergency Dept promptly:      If Patient calling 911, inform 911 personal that you have tested positive for the Coronavirus (COVID-19).  Place mask on and await 911 to arrive.    If Emergency Dept, If possible, please have another adult drive you to the Emergency Dept but you need to wear mask when in contact with other people.      Monoclonal Antibody Administration    You may be eligible to receive a new treatment with a monoclonal antibody for preventing hospitalization in patients at high risk for complications from COVID-19. This medication is still experimental and available on a limited basis; it is given through an IV and must be given at an infusion center. Please note that not all people who are eligible will receive the medication since it is in limited supply.  Is the patient symptomatic and onset of symptoms within the last 7 days?  Yes  Is the patient interested in a visit with a provider to discuss treatment options?: Yes  Is the patient seen at [facility]?  No: Warm transfer caller to [phone] to be scheduled with a virtual urgent provider.  During transfer, instruct  on appropriate time frame for visit     Review  information with Patient    Your result was positive. This means you have COVID-19 (coronavirus).      How can I protect others?    These guidelines are for isolating before returning to work, school or .       If you DO have symptoms:  o Stay home and away from others  - For at least 5 days after your symptoms started, AND   - You are fever free for 24 hours (with no medicine that reduces fever), AND  - Your other symptoms are better.  o Wear a mask for 10 full days any time you are around others.    If you DON'T have symptoms:  o Stay at home and away from others for at least 5 days after your positive test.  o Wear a mask for 10 full days any time you are around others.    There may be different guidelines for healthcare facilities. Please check with the specific sites before arriving.     If you've been told by a doctor that you were severely ill with COVID-19 or are immunocompromised, you should isolate for at least 10 days.    You should not go back to work until you meet the guidelines above for ending your home isolation. You don't need to be retested for COVID-19 before going back to work--studies show that you won't spread the virus if it's been at least 10 days since your symptoms started (or 20 days, if you have a weak immune system).    Employers, schools, and daycares: This is an official notice for this person's medical guidelines for returning in-person. They must meet the above guidelines before going back to work, school, or  in person.    You will receive a positive COVID-19 letter via [employer] or the mail soon with additional self-care information.      Would you like me to review some of that information with you now?  Yes    How can I take care of myself?      Get lots of rest. Drink extra fluids (unless a doctor has told you not to).      Take Tylenol (acetaminophen) for fever or pain. If you have liver or kidney problems, ask your family doctor if it's okay to take Tylenol.      Take either:     650 mg (two 325 mg pills) every 4 to 6 hours, or     1,000 mg (two 500 mg pills) every 8 hours as needed.     Note: Do not take more than 3,000 mg in one day. Acetaminophen is found in many medicines (both prescribed and over-the-counter medicines). Read all labels to be sure you don't take too much.    For children, check the Tylenol bottle for the right dose (based on their age or weight).      If you have other health problems (like cancer, heart failure, an organ transplant or severe kidney disease): Call your specialty clinic if you don't feel better in the next 2 days.      Know when to call 911: Emergency warning signs include:    Trouble breathing or shortness of breath    Pain or pressure in the chest that doesn't go away    Feeling confused like you haven't felt before, or not being able to wake up    Bluish-colored lips or face        If you were tested for an upcoming procedure, please contact your provider for next steps.     Patria Osuna RN    Reason for Disposition    HIGH RISK for severe COVID complications (e.g., weak immune system, age > 64 years, obesity with BMI > 25, pregnant, chronic lung disease or other chronic medical condition) (Exception: Already seen by PCP and no new or worsening symptoms.)    Additional Information    Negative: SEVERE difficulty breathing (e.g., struggling for each breath, speaks in single words)    Negative: Difficult to awaken or acting confused (e.g., disoriented, slurred speech)    Negative: Bluish (or gray) lips or face now    Negative: Shock suspected (e.g., cold/pale/clammy skin, too weak to stand, low BP, rapid pulse)    Negative: Sounds like a life-threatening emergency to the triager    Negative: [1] Diagnosed or suspected COVID-19 AND [2] symptoms lasting 3 or more weeks    Negative: [1] COVID-19 exposure AND [2] no symptoms    Negative: COVID-19 vaccine reaction suspected (e.g., fever, headache, muscle aches) occurring 1 to 3 days  after getting vaccine    Negative: COVID-19 vaccine, questions about    Negative: [1] Lives with someone known to have influenza (flu test positive) AND [2] flu-like symptoms (e.g., cough, runny nose, sore throat, SOB; with or without fever)    Negative: [1] Adult with possible COVID-19 symptoms AND [2] triager concerned about severity of symptoms or other causes    Negative: COVID-19 and breastfeeding, questions about    Negative: SEVERE or constant chest pain or pressure  (Exception: Mild central chest pain, present only when coughing.)    Negative: MODERATE difficulty breathing (e.g., speaks in phrases, SOB even at rest, pulse 100-120)    Negative: Headache and stiff neck (can't touch chin to chest)    Negative: Oxygen level (e.g., pulse oximetry) 90 percent or lower    Negative: Chest pain or pressure    Negative: Patient sounds very sick or weak to the triager    Negative: MILD difficulty breathing (e.g., minimal/no SOB at rest, SOB with walking, pulse <100)    Negative: Fever > 103 F (39.4 C)    Negative: [1] Fever > 101 F (38.3 C) AND [2] over 60 years of age    Negative: [1] Fever > 100.0 F (37.8 C) AND [2] bedridden (e.g., nursing home patient, CVA, chronic illness, recovering from surgery)    Protocols used: CORONAVIRUS (COVID-19) DIAGNOSED OR [id number] 1.18.2022       normal...

## 2023-01-10 NOTE — TELEPHONE ENCOUNTER
RN COVID TREATMENT VISIT  01/10/23     Glenda Bales  72 year old  Current weight? 212 lbs    Has the patient been seen by a primary care provider at an [facility] or [facility] within the past two years? Yes.   Have you been in close proximity to/do you have a known exposure to a person with a confirmed case of influenza? No.     Date of positive COVID test (PCR or at home)?  01/10/2023    Current COVID symptoms: cough and congestion or runny nose    Date COVID symptoms began: 01/09/2023    Do you have any of the following conditions that place you at risk of being very sick from COVID-19? 65 years or older, chronic kidney disease and diabetes    Is patient eligible to continue? Yes, established patient, 12 years or older weighing at least 88.2 lbs, who has COVID symptoms that started in the past 5 days and is at risk for being very sick from COVID-19.       Have you received monoclonal antibodies or oral antiviral medications since testing positive to COVID-19? No    Are you currently hospitalized for COVID-19? No    Do you have a history of hepatitis? No    Are you currently pregnant or nursing? No    Do you have a clinically significant hypersensitivity to nirmatrelvir, ritonavir, or molnupiravir? No    Do you have any history of severe renal impairment (eGFR < 30mL/min)? No    Do you have any history of hepatic impairment or abnormalities (e.g. hepatic panel, ALT, AST, ALK Phos, bilirubin)? No    Have you had a coronary stent placed in the previous 6 months? No    Is patient eligible to continue?   Yes, patient meets all eligibility requirements for the RN COVID treatment (as denoted by all no responses above).     Current Outpatient Medications   Medication Sig Dispense Refill     ACE/ARB/ARNI NOT PRESCRIBED (INTENTIONAL) Please choose reason not prescribed from choices below.       albuterol (PROAIR HFA/PROVENTIL HFA/VENTOLIN HFA) 108 (90 Base) MCG/ACT inhaler Inhale 2 puffs into  the lungs every 4 hours as needed for shortness of breath / dyspnea or wheezing (Patient not taking: Reported on 11/28/2022) 8 g 1     alcohol swab prep pads Use to swab area of injection/milena as directed. (Patient not taking: Reported on 11/28/2022) 100 each 3     aspirin (ASA) 81 MG EC tablet Take 81 mg by mouth daily (Patient not taking: Reported on 11/28/2022)       atenolol (TENORMIN) 50 MG tablet Take 1 tablet (50 mg) by mouth 2 times daily for 90 days 180 tablet 0     atorvastatin (LIPITOR) 10 MG tablet Take 1 tablet (10 mg) by mouth daily 90 tablet 3     cetirizine (ZYRTEC) 10 MG tablet Take 10 mg by mouth daily       fluticasone (FLONASE) 50 MCG/ACT spray Spray 1 spray into both nostrils daily as needed        gabapentin (NEURONTIN) 100 MG capsule Take 1 capsule (100 mg) by mouth 2 times daily 180 capsule 3     hydrochlorothiazide (HYDRODIURIL) 25 MG tablet Take 1 tablet (25 mg) by mouth daily 90 tablet 3     ketotifen (ZADITOR) 0.025 % ophthalmic solution 1 drop 2 times daily       levothyroxine (SYNTHROID/LEVOTHROID) 112 MCG tablet Take 1 tablet (112 mcg) by mouth daily 90 tablet 2     omeprazole (PRILOSEC) 20 MG DR capsule Take 1 capsule (20 mg) by mouth daily 90 capsule 1     Medications from List 1 of the standing order (on medications that exclude the use of Paxlovid) that patient is taking: NONE. Is patient taking Katia's Wort? No  Is patient taking Hector's Wort or any meds from List 1? No.   Medications from List 2 of the standing order (on meds that provider needs to adjust) that patient is taking: NONE. Is patient on any of the meds from List 2? No.   Medications from List 3 of standing order (on meds that a RN needs to adjust) that patient is taking: atorvastatin (Lipitor): Instructed patient to stop atorvastatin while taking Paxlovid and restart atorvastatin 1 day after the completion of Paxlovid.  Is patient on any meds from List 3? Yes. Patient is on meds from list 3. No meds require a  provider visit and at least one med required RN to adjust.   In order of efficacy, Paxlovid has an approximate 90% reduction in hospitalization. Paxlovid can possibly cause altered sense of taste, diarrhea (loose, watery stools), high blood pressure, muscle aches.  The other option is molnupiravir which has an approximate 30% reduction in hospitalization. Molnupirarivr can possibly cause diarrhea (loose, watery stools), nausea (feeling sick to your stomach), dizziness, headaches.    Which treatment option does the patient prefer?   Paxlovid.   Lab Results   Component Value Date    GFRESTIMATED 49 (L) 07/26/2022       Was last eGFR reduced? Yes, eGFR 30-59 and will require reduced renal function dose of Paxlovid. Prescription sent to [facility].     Temporary change to home medications: Instructed patient to stop atorvastatin while taking Paxlovid and restart atorvastatin 1 day after the completion of Paxlovid.    All medication adjustments (holds, etc) were discussed with the patient and patient was asked to repeat back (teachback) their med adjustment.  Did patient understand med adjustment? Yes, patient repeated back and understood correctly.        Reviewed the following instructions with the patient:    Paxlovid (nimatrelvir and ritonavir)    How it works  Two medicines (nirmatrelvir and ritonavir) are taken together. They stop the virus from growing. Less amount of virus is easier for your body to fight.    How to take    Medicine comes in a daily container with both medicine tablets. Take by mouth twice daily (once in the morning, once at night) for 5 days.    The number of tablets to take varies by patient.    Don't chew or break capsules. Swallow whole.    When to take  Take as soon as possible after positive COVID-19 test result, and within 5 days of your first symptoms.    Possible side effects  Can cause altered sense of taste, diarrhea (loose, watery stools), high blood pressure, muscle  ching.    Lisseth Cuellar RN

## 2023-01-14 ENCOUNTER — NURSE TRIAGE (OUTPATIENT)
Dept: NURSING | Facility: CLINIC | Age: 73
End: 2023-01-14

## 2023-01-17 ENCOUNTER — VIRTUAL VISIT (OUTPATIENT)
Dept: FAMILY MEDICINE | Facility: CLINIC | Age: 73
End: 2023-01-17
Payer: MEDICARE

## 2023-01-17 ENCOUNTER — NURSE TRIAGE (OUTPATIENT)
Dept: FAMILY MEDICINE | Facility: CLINIC | Age: 73
End: 2023-01-17

## 2023-01-17 DIAGNOSIS — R31.9 HEMATURIA, UNSPECIFIED TYPE: Primary | ICD-10-CM

## 2023-01-17 PROCEDURE — 99442 PR PHYSICIAN TELEPHONE EVALUATION 11-20 MIN: CPT | Mod: 95 | Performed by: PHYSICIAN ASSISTANT

## 2023-01-17 RX ORDER — GABAPENTIN 100 MG/1
100 CAPSULE ORAL AT BEDTIME
COMMUNITY
End: 2023-01-17

## 2023-01-17 RX ORDER — INSULIN GLARGINE 100 [IU]/ML
7 INJECTION, SOLUTION SUBCUTANEOUS DAILY
COMMUNITY
Start: 2023-01-16 | End: 2023-03-14

## 2023-01-17 RX ORDER — DEXAMETHASONE 6 MG/1
6 TABLET ORAL DAILY
COMMUNITY
Start: 2023-01-15 | End: 2023-03-14

## 2023-01-17 NOTE — TELEPHONE ENCOUNTER
"No in clinic openings at [place] [facility].  Patient does not want to go to .  Was recently discharged from ED with pneumonia from COVID19 infection  Will do virtual visit with [facility] this evening for hematuria      Reason for Disposition    Patient wants to be seen    Additional Information    Negative: Shock suspected (e.g., cold/pale/clammy skin, too weak to stand, low BP, rapid pulse)    Negative: Sounds like a life-threatening emergency to the triager    Negative: Recent back or abdominal injury    Negative: Recent genital injury    Negative: Unable to urinate (or only a few drops) > 4 hours and bladder feels very full (e.g., palpable bladder or strong urge to urinate)    Negative: Passing pure blood or large blood clots (i.e., larger than a dime or grape)    Negative: Fever > 100.4 F (38.0 C)    Negative: Patient sounds very sick or weak to the triager    Negative: Known sickle cell disease    Negative: Taking Coumadin (warfarin) or other strong blood thinner, or known bleeding disorder (e.g., thrombocytopenia)    Negative: Side (flank) or back pain present    Negative: Pain or burning with passing urine (urination)    Answer Assessment - Initial Assessment Questions  1. COLOR of URINE: \"Describe the color of the urine.\"  (e.g., tea-colored, pink, red, blood clots, bloody)      Red with wiping   2. ONSET: \"When did the bleeding start?\"       This morning   3. EPISODES: \"How many times has there been blood in the urine?\" or \"How many times today?\"      Once   4. PAIN with URINATION: \"Is there any pain with passing your urine?\" If Yes, ask: \"How bad is the pain?\"  (Scale 1-10; or mild, moderate, severe)     - MILD - complains slightly about urination hurting     - MODERATE - interferes with normal activities       - SEVERE - excruciating, unwilling or unable to urinate because of the pain       Denies pain  5. FEVER: \"Do you have a fever?\" If Yes, ask: \"What is your temperature, how " "was it measured, and when did it start?\"    Denies   6. ASSOCIATED SYMPTOMS: \"Are you passing urine more frequently than usual?\"      Denies any other symptoms   7. OTHER SYMPTOMS: \"Do you have any other symptoms?\" (e.g., back/flank pain, abdominal pain, vomiting)      Denies any other symptoms   8. PREGNANCY: \"Is there any chance you are pregnant?\" \"When was your last menstrual period?\"      n/a    Protocols used: URINE - BLOOD IN-A-OH    Todd Wolff RN  [facility]    "

## 2023-01-17 NOTE — PROGRESS NOTES
Glenda is a 72 year old who is being evaluated via a billable telephone visit.    5:30-5:42  What phone number would you like to be contacted at? [phone]   How would you like to obtain your AVS? Guillermo    [place] Location (provider location):  On-site    Assessment & Plan     Hematuria, unspecified type  Sounds more like blood is vaginal.  Monitor.  Push fluids.  If worsening symptoms call and will order a urine test. Otherwise address with PCP at office visit if still persisting.                     No follow-ups on file.    Nika Soto PA-C  [facility]    Kathrine Doshi is a 72 year old accompanied by her self , presenting for the following health issues:  No chief complaint on file.      History of Present Illness       Reason for visit:  Blood in urine  Symptom onset:  Today  Symptoms include:  The symptom that started was blood in urine  Symptom intensity:  Mild  Symptom progression:  Staying the same  Had these symptoms before:  No  What makes it worse:  Na  What makes it better:  Na    She eats 0-1 servings of fruits and vegetables daily.She consumes 0 sweetened beverage(s) daily.She exercises with enough effort to increase her heart rate 9 or less minutes per day.  She exercises with enough effort to increase her heart rate 3 or less days per week.   She is taking medications regularly.       Genitourinary - Female-  Onset/Duration: since this morning   Description:   Painful urination (Dysuria): No           Frequency: No  Blood in urine (Hematuria): YES this morning   On the TP only.  No blood on underwear.  No vaginal irritation and itching.    Delay in urine (Hesitency): No  Intensity: mild  Progression of Symptoms:  same  Accompanying Signs & Symptoms:  Fever/chills: No  Flank pain: No  Nausea and vomiting: No  Vaginal symptoms: none  Abdominal/Pelvic Pain: YES very mild   History:   History of frequent UTI s: YES many years ago   History of kidney stones:  No  Sexually Active: No  Possibility of pregnancy: No  Precipitating or alleviating factors: None  Therapies tried and outcome: Increase fluid intake       NEW MEDICATIONS:Dexamethasone 6 mg and insulin glargine   Has covid for a week.  Was recently in the hospital due to this.    Did have diarrhea with paxlovid.  Did not have a catheter during hospitalization        Review of Systems   As above      Objective           Vitals:  No vitals were obtained today due to virtual visit.    Physical Exam   healthy, alert and no distress  PSYCH: Alert and oriented times 3; coherent speech, normal   rate and volume, able to articulate logical thoughts, able   to abstract reason, no tangential thoughts, no hallucinations   or delusions  Her affect is normal  RESP: cough, slight wheezing, able to talk in full sentences  Remainder of exam unable to be completed due to telephone visits                Phone call duration: 12 minutes

## 2023-01-18 ENCOUNTER — MYC MEDICAL ADVICE (OUTPATIENT)
Dept: FAMILY MEDICINE | Facility: CLINIC | Age: 73
End: 2023-01-18
Payer: MEDICARE

## 2023-01-18 ENCOUNTER — NURSE TRIAGE (OUTPATIENT)
Dept: NURSING | Facility: CLINIC | Age: 73
End: 2023-01-18
Payer: MEDICARE

## 2023-01-18 DIAGNOSIS — N93.9 VAGINAL BLEEDING: Primary | ICD-10-CM

## 2023-01-18 NOTE — TELEPHONE ENCOUNTER
Triage call  Patient calling she has had more  Bleeding she states she had more on her panty shild and there was more in the toilet when she went to the bathroom.  She saw  EDER Soto PA-C yesterday with a virtual visit. She thinks it is worse and would like a UA to rule out a bladder infection.  She denies fever,burning,frequency,urgency.  Please call her if a UA is ordered.    Per protocol see PCP in 2 weeks.  Care advice given.  Verbalizes understanding and agrees with plan.  Routing to Provider she saw yesterday.    Susi Garcia RN   [facility] Nurse Advisor  11:03 AM 1/18/2023        Reason for Disposition    Postmenopausal vaginal bleeding    Additional Information    Negative: Shock suspected (e.g., cold/pale/clammy skin, too weak to stand, low BP, rapid pulse)    Negative: Difficult to awaken or acting confused (e.g., disoriented, slurred speech)    Negative: Passed out (i.e., lost consciousness, collapsed and was not responding)    Negative: Sounds like a life-threatening emergency to the triager    Negative: Followed a genital area injury (e.g., vagina, vulva)    Negative: [1] Vaginal discharge is main symptom AND [2] small amount of blood    Negative: SEVERE abdominal pain    Negative: SEVERE dizziness (e.g., unable to stand, requires support to walk, feels like passing out now)    Negative: Sexual assault or rape (sexual intercourse or activity occurs without freely given consent), known or suspected    Negative: SEVERE vaginal bleeding (e.g., soaking 2 pads or tampons per hour and present 2 or more hours; 1 menstrual cup every 2 hours)    Negative: Patient sounds very sick or weak to the triager    Negative: MODERATE vaginal bleeding (e.g., soaking 1 pad or tampon per hour and present > 6 hours; 1 menstrual cup every 6 hours)    Negative: Pale skin (pallor) of new-onset or worsening    Negative: [1] Constant abdominal pain AND [2] present > 2 hours    Negative: Taking Coumadin (warfarin) or  other strong blood thinner, or known bleeding disorder (e.g., thrombocytopenia)    Negative: Bleeding with > 6 soaked pads or tampons per day    Negative: Bleeding lasts for > 7 days    Negative: [1] Bleeding/spotting after procedure (e.g., biopsy) or pelvic examination (e.g., pap smear) AND [2] lasts > 7 days    Protocols used: VAGINAL BLEEDING - [id number]

## 2023-01-18 NOTE — TELEPHONE ENCOUNTER
Patient notified of provider message as written. Patient verbalized good understanding.     Melly BENNETTN, RN  [facility]

## 2023-01-18 NOTE — TELEPHONE ENCOUNTER
I don't think it is the medication.  I would have her still take it as the benefit to her breathing and lungs would outweigh the vaginal bleeding.  Unfortunately we might not have more information until she is seen next week.    Nika Soto PA-C

## 2023-01-18 NOTE — TELEPHONE ENCOUNTER
I have order the urine but this seems more vaginal bleeding.  Keep follow up in person with PCP.  If still bleeding will need a vaginal/pelvic exam.    Nika Soto PA-C

## 2023-01-18 NOTE — TELEPHONE ENCOUNTER
Patient notified of provider message as written. Patient verbalized good understanding.     She was scheduled for Friday, 1/20/23 to provide urine sample.     She states that she is taking Dexamethasone and read on the insert that came with the medication that it can cause vaginal bleeding. She seems very concerned regarding this, writer did offer to get a message back to Nika Soto PA-C for input. She does not note any red flag symptoms, was notified of what to watch for and that she can call back at any time with any changes.    Melly BENNETTN, RN  [facility]

## 2023-01-18 NOTE — TELEPHONE ENCOUNTER
See nurse triage encounter on 1/18. Patient was notified of provider's message relating to concerns addressed in MyChart message:    I don't think it is the medication.  I would have her still take it as the benefit to her breathing and lungs would outweigh the vaginal bleeding.  Unfortunately we might not have more information until she is seen next week.     Nika Soto PA-C     Closing encounter.    KENYA Suazo RN  [facility]

## 2023-01-18 NOTE — TELEPHONE ENCOUNTER
See nurse triage encounter on 1/18. Patient was notified of provider's message relating to concerns addressed in MyChart message:    I don't think it is the medication.  I would have her still take it as the benefit to her breathing and lungs would outweigh the vaginal bleeding.  Unfortunately we might not have more information until she is seen next week.     Nika Soto PA-C     Closing encounter.    KENYA Suazo RN  [facility]

## 2023-01-19 ENCOUNTER — NURSE TRIAGE (OUTPATIENT)
Dept: NURSING | Facility: CLINIC | Age: 73
End: 2023-01-19
Payer: MEDICARE

## 2023-01-19 NOTE — TELEPHONE ENCOUNTER
Patient is covid positive, released from the hospital on Monday.    Patient calling due to vaginal bleeding that started on Tuesday. Bleeding started as drops, but has increased, not more than a pad a day.    Care advice given for patient to be seen within 2 weeks. Patient has an appointment with her PCP scheduled for 1/23/23.    Patient states that she will call the number that was given to her in [facility] tomorrow during clinic hours, and she will go to her scheduled appointment.    Sarah Perez RN on 1/19/2023 at 5:11 PM    Reason for Disposition    Postmenopausal vaginal bleeding    Additional Information    Negative: Shock suspected (e.g., cold/pale/clammy skin, too weak to stand, low BP, rapid pulse)    Negative: Difficult to awaken or acting confused (e.g., disoriented, slurred speech)    Negative: Passed out (i.e., lost consciousness, collapsed and was not responding)    Negative: Sounds like a life-threatening emergency to the triager    Negative: Followed a genital area injury (e.g., vagina, vulva)    Negative: [1] Vaginal discharge is main symptom AND [2] small amount of blood    Negative: SEVERE abdominal pain    Negative: SEVERE dizziness (e.g., unable to stand, requires support to walk, feels like passing out now)    Negative: Sexual assault or rape (sexual intercourse or activity occurs without freely given consent), known or suspected    Negative: SEVERE vaginal bleeding (e.g., soaking 2 pads or tampons per hour and present 2 or more hours; 1 menstrual cup every 2 hours)    Negative: Patient sounds very sick or weak to the triager    Negative: MODERATE vaginal bleeding (e.g., soaking 1 pad or tampon per hour and present > 6 hours; 1 menstrual cup every 6 hours)    Negative: Pale skin (pallor) of new-onset or worsening    Negative: [1] Constant abdominal pain AND [2] present > 2 hours    Negative: Taking Coumadin (warfarin) or other strong blood thinner, or known bleeding disorder (e.g.,  thrombocytopenia)    Negative: Bleeding with > 6 soaked pads or tampons per day    Negative: Bleeding lasts for > 7 days    Negative: [1] Bleeding/spotting after procedure (e.g., biopsy) or pelvic examination (e.g., pap smear) AND [2] lasts > 7 days    Protocols used: VAGINAL BLEEDING - [id number]

## 2023-01-19 NOTE — TELEPHONE ENCOUNTER
Instructed patient to call clinic to speak to an RN about increased vaginal bleeding.     Manfred Soto RN

## 2023-01-19 NOTE — TELEPHONE ENCOUNTER
RN team,    Please call patient regarding recent MyChart messages regarding vaginal bleeding, as this RN is working remote.    Thank you,    Manfred Soto RN

## 2023-01-23 ENCOUNTER — OFFICE VISIT (OUTPATIENT)
Dept: FAMILY MEDICINE | Facility: CLINIC | Age: 73
End: 2023-01-23
Payer: MEDICARE

## 2023-01-23 VITALS
TEMPERATURE: 96.3 F | HEART RATE: 64 BPM | OXYGEN SATURATION: 96 % | DIASTOLIC BLOOD PRESSURE: 84 MMHG | SYSTOLIC BLOOD PRESSURE: 146 MMHG | WEIGHT: 196.4 LBS | BODY MASS INDEX: 34.79 KG/M2

## 2023-01-23 DIAGNOSIS — E87.6 HYPOKALEMIA: ICD-10-CM

## 2023-01-23 DIAGNOSIS — I12.9 BENIGN HYPERTENSION WITH CHRONIC KIDNEY DISEASE, STAGE III (H): ICD-10-CM

## 2023-01-23 DIAGNOSIS — R91.1 PULMONARY NODULE: ICD-10-CM

## 2023-01-23 DIAGNOSIS — E87.1 HYPONATREMIA: ICD-10-CM

## 2023-01-23 DIAGNOSIS — J96.02 ACUTE RESPIRATORY FAILURE WITH HYPERCAPNIA (H): ICD-10-CM

## 2023-01-23 DIAGNOSIS — Z12.11 SCREEN FOR COLON CANCER: ICD-10-CM

## 2023-01-23 DIAGNOSIS — N18.30 BENIGN HYPERTENSION WITH CHRONIC KIDNEY DISEASE, STAGE III (H): ICD-10-CM

## 2023-01-23 DIAGNOSIS — U07.1 INFECTION DUE TO 2019 NOVEL CORONAVIRUS: ICD-10-CM

## 2023-01-23 DIAGNOSIS — E11.22 TYPE 2 DIABETES MELLITUS WITH STAGE 3A CHRONIC KIDNEY DISEASE, WITHOUT LONG-TERM CURRENT USE OF INSULIN (H): ICD-10-CM

## 2023-01-23 DIAGNOSIS — N95.0 POSTMENOPAUSAL BLEEDING: ICD-10-CM

## 2023-01-23 DIAGNOSIS — N18.31 TYPE 2 DIABETES MELLITUS WITH STAGE 3A CHRONIC KIDNEY DISEASE, WITHOUT LONG-TERM CURRENT USE OF INSULIN (H): ICD-10-CM

## 2023-01-23 DIAGNOSIS — N18.31 STAGE 3A CHRONIC KIDNEY DISEASE (H): ICD-10-CM

## 2023-01-23 PROBLEM — E66.01 MORBID OBESITY (H): Status: RESOLVED | Noted: 2021-08-10 | Resolved: 2023-01-23

## 2023-01-23 PROCEDURE — 99214 OFFICE O/P EST MOD 30 MIN: CPT | Mod: CS | Performed by: FAMILY MEDICINE

## 2023-01-23 PROCEDURE — 88141 CYTOPATH C/V INTERPRET: CPT | Performed by: PATHOLOGY

## 2023-01-23 PROCEDURE — 87624 HPV HI-RISK TYP POOLED RSLT: CPT | Performed by: FAMILY MEDICINE

## 2023-01-23 PROCEDURE — 88175 CYTOPATH C/V AUTO FLUID REDO: CPT | Performed by: FAMILY MEDICINE

## 2023-01-23 RX ORDER — BENZONATATE 100 MG/1
100 CAPSULE ORAL 3 TIMES DAILY PRN
Qty: 30 CAPSULE | Refills: 0 | Status: SHIPPED | OUTPATIENT
Start: 2023-01-23 | End: 2023-03-14

## 2023-01-23 RX ORDER — ALBUTEROL SULFATE 90 UG/1
2 AEROSOL, METERED RESPIRATORY (INHALATION) EVERY 4 HOURS PRN
Qty: 8 G | Refills: 1 | Status: SHIPPED | OUTPATIENT
Start: 2023-01-23 | End: 2023-06-06

## 2023-01-23 ASSESSMENT — PAIN SCALES - GENERAL: PAINLEVEL: NO PAIN (0)

## 2023-01-23 NOTE — PATIENT INSTRUCTIONS
Please schedule Pelvic Ultrasound  Please make appointment with Gynecologist for further work up on Postmenopausal Bleeding as discussed   Repeat CT scan Lung 1 year for follow up Lung nodule-Please call to get a referral  You can start Hydrochlorothiazide in 1 week  Increase Lantus to 9 units at [facility] -Only as Long as you are on Dexamethasone  Follow up Dr Cisse 1 month  For scheduling at [facility] ([facility], [place]), call [phone] or [phone]     For scheduling in the [place] ([facility]) call  [phone] or  [phone]        For scheduling in [place] ([facility]) call [phone]  or   [phone]        Sincerely,  Marysol Cisse MD

## 2023-01-23 NOTE — PROGRESS NOTES
Assessment & Plan     Infection due to 2019 novel coronavirus  Getting better  - benzonatate (TESSALON) 100 MG capsule; Take 1 capsule (100 mg) by mouth 3 times daily as needed for cough  - albuterol (PROAIR HFA/PROVENTIL HFA/VENTOLIN HFA) 108 (90 Base) MCG/ACT inhaler; Inhale 2 puffs into the lungs every 4 hours as needed for shortness of breath or wheezing    Acute respiratory failure with hypercapnia (H)  On Dexamethasone and inhaler  o2 sats Good Today     Postmenopausal bleeding  Discussed needs a Pelvic Ultrasound  To see Gyn for endometrial Bopsy to rule Our endometrial ca-This was discussed with pt- US Pelvic Complete with Transvaginal; Future  - Ob/Gyn Referral  - Pap diagnostic with HPV    Stage 3a chronic kidney disease (H)  Labs reviewed     Benign hypertension with chronic kidney disease, stage III (H)  High  Add hydrochlorothiazide in 1 week    Type 2 diabetes mellitus with stage 3a chronic kidney disease, without long-term current use of insulin (H)  Advised increase Lantus to 9 units at HS till she is on Dexamethasone  - blood glucose (NO BRAND SPECIFIED) test strip; Use to test blood sugar 3 times daily or as directed. While on Insulin and then ones daily    Hypokalemia  Resolved     Pulmonary nodule  Advised CT scan 1 year-see PI     Hyponatremia  Resolved     Screen for colon cancer  Pt has been advised to do Cologuard    Return in about 1 month (around 2/23/2023) for recheck/ sooner if worse or New symptoms.    Marysol Cisse MD  [facility] JOSE Doshi is a 72 year old, presenting for the following health issues:  Vaginal Bleeding (Started one week ago. ) and Hospital F/U (Covid/)    Chief Complaint   Patient presents with     Vaginal Bleeding     Started one week ago.      Hospital F/U     Covid            HPI       Hospital Follow-up Visit:    Hospital/Nursing Home/IP Rehab Facility: [facility]  Date of Admission: January 14, 2023  Date of  Discharge: January 16, 2023  Reason(s) for Admission: Acute hypoxemic respiratory failure due to COVID-19 (HC) (Primary Dx);   Hyponatremia;   Hypokalemia;   Pulmonary nodule;   Type 2 diabetes mellitus with stage 3 chronic kidney disease, without long-term current use of insulin, unspecified whether stage 3a or 3b CKD (HC)    Was your hospitalization related to COVID-19? YES   How are you feeling today? Alittle better but weak and shaky  In the past 24 hours have you had shortness of breath when speaking, walking, or climbing stairs? Improving.   Do you have a cough? No change  When is the last time you had a fever greater than 100? none  Are you having any other symptoms? Loss of appetite, Fatigue, Pain or pressure in chest and shakiness  From chest congestion  Do you have any other stressors you would like to discuss with your provider? No  Problems taking medications regularly:  None  Medication changes since discharge: stopped hydrochlorothiazide.  Problems adhering to non-medication therapy:  None    Summary of hospitalization:  CareEverywhere information obtained and reviewed  Diagnostic Tests/Treatments reviewed.  Follow up needed: yes  Other Healthcare Providers Involved in Patient s Care:         None  Update since discharge: better   Plan of care communicated with patient     Pt says her Bolod sugars are from 120-210  She takes lantus till she is on Dexamethasone  No Hypoglycemia  Also has had Postmenopausal Bleeding last 2 days-spotting  No clots  No abdominal pain  hydrochlorothiazide was stopped due to Hypokalemia in the hospital  Pt says she had Diarrhea at that Time due to paxolovid which has since resolved      Review of Systems   CONSTITUTIONAL: NEGATIVE for fever, chills, change in weight  ENT/MOUTH: NEGATIVE for ear, mouth and throat problems  RESP:as above  CV: NEGATIVE for chest pain, palpitations or peripheral edema  GI: NEGATIVE for nausea, abdominal pain, heartburn, or change in bowel  habits  : as above  PSYCHIATRIC: NEGATIVE for changes in mood or affect      Objective    BP (!) 146/84   Pulse 64   Temp (!) 96.3  F (35.7  C) (Temporal)   Wt 89.1 kg (196 lb 6.4 oz)   SpO2 96%   BMI 34.79 kg/m    Body mass index is 34.79 kg/m .  Physical Exam   GENERAL: healthy, alert and no distress  EYES: Eyes grossly normal to inspection, PERRL and conjunctivae and sclerae normal  NECK: no adenopathy, no asymmetry, masses, or scars and thyroid normal to palpation  RESP:occasional wheezing,no rales or Rhonchi  CV: regular rate and rhythm, normal S1 S2, no S3 or S4, no murmur, click or rub, no peripheral edema and peripheral pulses strong  ABDOMEN: soft, nontender, no hepatosplenomegaly, no masses and bowel sounds normal   (female): normal female external genitalia, normal urethral meatus  and has some vaginal Bleeeding  No tenderness   MS: no gross musculoskeletal defects noted, no edema  NEURO: Normal strength and tone, mentation intact and speech normal  PSYCH: mentation appears normal    Pending

## 2023-01-27 LAB
BKR LAB AP GYN ADEQUACY: ABNORMAL
BKR LAB AP GYN INTERPRETATION: ABNORMAL
BKR LAB AP HPV REFLEX: ABNORMAL
BKR LAB AP PREVIOUS ABNORMAL: ABNORMAL
PATH REPORT.COMMENTS IMP SPEC: ABNORMAL
PATH REPORT.COMMENTS IMP SPEC: ABNORMAL
PATH REPORT.RELEVANT HX SPEC: ABNORMAL

## 2023-01-31 ENCOUNTER — PATIENT OUTREACH (OUTPATIENT)
Dept: FAMILY MEDICINE | Facility: CLINIC | Age: 73
End: 2023-01-31
Payer: MEDICARE

## 2023-01-31 DIAGNOSIS — R87.619 ATYPICAL GLANDULAR CELLS ON CERVICAL PAP SMEAR: ICD-10-CM

## 2023-01-31 LAB
HUMAN PAPILLOMA VIRUS 16 DNA: NEGATIVE
HUMAN PAPILLOMA VIRUS 18 DNA: NEGATIVE
HUMAN PAPILLOMA VIRUS FINAL DIAGNOSIS: NORMAL
HUMAN PAPILLOMA VIRUS OTHER HR: NEGATIVE

## 2023-02-06 ENCOUNTER — OFFICE VISIT (OUTPATIENT)
Dept: FAMILY MEDICINE | Facility: CLINIC | Age: 73
End: 2023-02-06
Payer: MEDICARE

## 2023-02-06 VITALS
HEART RATE: 66 BPM | DIASTOLIC BLOOD PRESSURE: 78 MMHG | TEMPERATURE: 96.9 F | HEIGHT: 63 IN | OXYGEN SATURATION: 96 % | BODY MASS INDEX: 34.02 KG/M2 | WEIGHT: 192 LBS | SYSTOLIC BLOOD PRESSURE: 130 MMHG

## 2023-02-06 DIAGNOSIS — K59.00 CONSTIPATION, UNSPECIFIED CONSTIPATION TYPE: ICD-10-CM

## 2023-02-06 DIAGNOSIS — E87.6 HYPOKALEMIA: Primary | ICD-10-CM

## 2023-02-06 DIAGNOSIS — R25.2 CRAMP OF LIMB: ICD-10-CM

## 2023-02-06 DIAGNOSIS — U07.1 INFECTION DUE TO 2019 NOVEL CORONAVIRUS: ICD-10-CM

## 2023-02-06 LAB
ANION GAP SERPL CALCULATED.3IONS-SCNC: 9 MMOL/L (ref 3–14)
BUN SERPL-MCNC: 20 MG/DL (ref 7–30)
CALCIUM SERPL-MCNC: 9.6 MG/DL (ref 8.5–10.1)
CHLORIDE BLD-SCNC: 97 MMOL/L (ref 94–109)
CO2 SERPL-SCNC: 33 MMOL/L (ref 20–32)
CREAT SERPL-MCNC: 1.22 MG/DL (ref 0.52–1.04)
GFR SERPL CREATININE-BSD FRML MDRD: 47 ML/MIN/1.73M2
GLUCOSE BLD-MCNC: 183 MG/DL (ref 70–99)
MAGNESIUM SERPL-MCNC: 1.8 MG/DL (ref 1.6–2.3)
POTASSIUM BLD-SCNC: 3.1 MMOL/L (ref 3.4–5.3)
SODIUM SERPL-SCNC: 139 MMOL/L (ref 133–144)

## 2023-02-06 PROCEDURE — 99214 OFFICE O/P EST MOD 30 MIN: CPT | Performed by: FAMILY MEDICINE

## 2023-02-06 PROCEDURE — 36415 COLL VENOUS BLD VENIPUNCTURE: CPT | Performed by: FAMILY MEDICINE

## 2023-02-06 PROCEDURE — 80048 BASIC METABOLIC PNL TOTAL CA: CPT | Performed by: FAMILY MEDICINE

## 2023-02-06 PROCEDURE — 83735 ASSAY OF MAGNESIUM: CPT | Performed by: FAMILY MEDICINE

## 2023-02-06 RX ORDER — POTASSIUM CHLORIDE 1500 MG/1
20 TABLET, EXTENDED RELEASE ORAL 2 TIMES DAILY WITH MEALS
Qty: 28 TABLET | Refills: 0 | Status: SHIPPED | OUTPATIENT
Start: 2023-02-06 | End: 2023-02-20

## 2023-02-06 NOTE — PROGRESS NOTES
"  Assessment & Plan     (E87.6) Hypokalemia  (primary encounter diagnosis)  Comment: recently low potassium during hospitalization.  With recent cramping  We will recheck her BMP  Plan: Basic metabolic panel  (Ca, Cl, CO2, Creat,         Gluc, K, Na, BUN), Magnesium            (R25.2) Cramp of limb  Comment: New problem  Plan: Basic metabolic panel  (Ca, Cl, CO2, Creat,         Gluc, K, Na, BUN), Magnesium          (K59.00) Constipation, unspecified constipation type    Plan: We discussed fiber supplementation    (U07.1) Infection due to 2019 novel coronavirus  Comment: Recovering, still symptomatic with hoarseness  Plan: No intervention medically at this time.  Expectant management        Return in about 2 weeks (around 2/20/2023) for with PCP.    PEYTON CANALES MD  [facility] JOSE Doshi is a 72 year old, presenting for the following health issues:  RECHECK and Musculoskeletal Problem (Cramping in feet and hands)      HPI     Patient is here for a follow up from her Covid Diagnosis on 01/10/2023. Patient was also hospitalized ([facility]) on 01/14/2023 for Acute Hypoxemic respiratory failure and her potasium levels were low at that time and they did an IV drip. She has been experiencing Cramping in her hands and feet.      She did hold hydrochlorothiazide for a week but is back on it, and off dexamethasone. She is off the insulin as well which she had been on for about 10 days.    Constipation, no blood in stool  Raspy voice, occasional cough    Review of Systems   Constitutional, HEENT, cardiovascular, pulmonary, gi and gu systems are negative, except as otherwise noted.      Objective    /78 (BP Location: Right arm, Patient Position: Chair, Cuff Size: Adult Regular)   Pulse 66   Temp 96.9  F (36.1  C) (Temporal)   Ht 1.6 m (5' 3\")   Wt 87.1 kg (192 lb)   SpO2 96%   BMI 34.01 kg/m    Body mass index is 34.01 kg/m .  Physical Exam   GENERAL: healthy, alert and no " distress  RESP: lungs clear to auscultation - no rales, rhonchi or wheezes  CV: regular rate and rhythm, normal S1 S2, no S3 or S4, no murmur, click or rub, no peripheral edema and peripheral pulses strong    Reviewed labs from past 2 weeks

## 2023-02-12 ENCOUNTER — NURSE TRIAGE (OUTPATIENT)
Dept: NURSING | Facility: CLINIC | Age: 73
End: 2023-02-12
Payer: MEDICARE

## 2023-02-12 ENCOUNTER — TELEPHONE (OUTPATIENT)
Dept: FAMILY MEDICINE | Facility: CLINIC | Age: 73
End: 2023-02-12
Payer: MEDICARE

## 2023-02-12 DIAGNOSIS — J40 BRONCHITIS: Primary | ICD-10-CM

## 2023-02-12 RX ORDER — GUAIFENESIN/DEXTROMETHORPHAN 100-10MG/5
10 SYRUP ORAL EVERY 4 HOURS PRN
Qty: 236 ML | Refills: 0 | Status: SHIPPED | OUTPATIENT
Start: 2023-02-12 | End: 2023-03-14

## 2023-02-12 RX ORDER — AZITHROMYCIN 250 MG/1
TABLET, FILM COATED ORAL
Qty: 6 TABLET | Refills: 0 | Status: SHIPPED | OUTPATIENT
Start: 2023-02-12 | End: 2023-02-17

## 2023-02-12 NOTE — TELEPHONE ENCOUNTER
Nurse Triage SBAR    Is this a 2nd Level Triage? YES, LICENSED PRACTITIONER REVIEW IS REQUIRED    Situation:   Pt calling about cough    Background:   History of Covid Pneumonia 1/14, new cough developed few days ago    Assessment:   Cough worsens at night, for about 2 nights now, keeping awaken from it  Tightness in chest and back hurts in between shoulder blades when coughing, discomfort and congestion  Feels like bronchitis  Tired and fatigue  Coughing up green phelgm, sometimes    Denies Fever, raspiness, wheezing, or running nose.    Protocol Recommended Disposition:   See HCP Within 4 Hours (Or PCP Triage)    Recommendation:   UC tonight, or can pt wait to be seen tomorrow in clinic     Paged to provider on call Donavon Baptiste, recommend Z-Pack, take Tessalon, will send Rx to prefer pharmacy.     Does the patient meet one of the following criteria for ADS visit consideration? 16+ years old, with an [facility] PCP     TIP  Providers, please consider if this condition is appropriate for management at one of our Acute and Diagnostic Services sites.     If patient is a good candidate, please use dotphrase <dot>triageresponse and select Refer to ADS to document.    Provider Recommendation Follow Up:   Conference pt into call with Provider. Patient verbalized understanding and agrees with the plan. Pt request to transfer to scheduling, want to schedule appointment follow up with PCP.        Elder Nunez, RN, BSN  2/12/2023 at 6:17 PM  [facility]      Reason for Disposition    [1] MILD difficulty breathing (e.g., minimal/no SOB at rest, SOB with walking, pulse <100) AND [2] still present when not coughing    Additional Information    Negative: SEVERE difficulty breathing (e.g., struggling for each breath, speaks in single words)    Negative: Bluish (or gray) lips or face now    Negative: [1] Rapid onset of cough AND [2] has hives    Negative: Coughing started suddenly after medicine, an allergic food or bee  sting    Negative: [1] Difficulty breathing AND [2] exposure to flames, smoke, or fumes    Negative: [1] Stridor AND [2] difficulty breathing    Negative: Sounds like a life-threatening emergency to the triager    Negative: [1] MODERATE difficulty breathing (e.g., speaks in phrases, SOB even at rest, pulse 100-120) AND [2] still present when not coughing    Negative: Chest pain  (Exception: MILD central chest pain, present only when coughing)    Negative: Patient sounds very sick or weak to the triager    Protocols used: COUGH - ACUTE NON-PRODUCTIVE-A-AH

## 2023-02-13 NOTE — TELEPHONE ENCOUNTER
Reason for Call:  Appointment Request    Patient requesting this type of appt:  Follow-up     Requested provider: Dr. Whipple    Reason patient unable to be scheduled: Not within requested timeframe    When does patient want to be seen/preferred time: 3-7 days    Comments: n/a    Could we send this information to you in [place] or would you prefer to receive a phone call?:   Patient would prefer a phone call   Okay to leave a detailed message?: Yes at Cell number on file:    Telephone Information:   Mobile [phone]       Call taken on 2/12/2023 at 6:21 PM by ZAIN WEAVER

## 2023-02-13 NOTE — TELEPHONE ENCOUNTER
Had COVID in January  Was admitted to [facility] on 14 January with COVID-pneumonia  At that time had hypoxemia  Saturations were around 91%  She was treated with dexamethasone and Paxlovid  Dexamethasone caused her sugars to spike up  Currently she is still having cough  Cough is worse at night  Some chest tightness  No increasing shortness of breath  The CT scan from [facility] reviewed  It showed bronchitis and bilateral groundglass opacities  She is able to sleep at night but wakes up with cough and whenever the cough happens she takes a cough pill  Her phlegm is sometimes green now  We will do a course of Z-Bobby  I will not do steroids now because her blood glucose was high with the steroids she used in the hospital

## 2023-02-14 ENCOUNTER — VIRTUAL VISIT (OUTPATIENT)
Dept: FAMILY MEDICINE | Facility: CLINIC | Age: 73
End: 2023-02-14
Payer: MEDICARE

## 2023-02-14 DIAGNOSIS — E87.6 HYPOKALEMIA: ICD-10-CM

## 2023-02-14 DIAGNOSIS — R05.9 COUGH, UNSPECIFIED TYPE: Primary | ICD-10-CM

## 2023-02-14 PROCEDURE — 99442 PR PHYSICIAN TELEPHONE EVALUATION 11-20 MIN: CPT | Mod: 95 | Performed by: FAMILY MEDICINE

## 2023-02-14 NOTE — PROGRESS NOTES
Glenda is a 72 year old who is being evaluated via a billable telephone visit.    5:35 PM -start visit  Total 12 minutes  What phone number would you like to be contacted at? [phone]  How would you like to obtain your AVS? Guillermo  Distant Location (provider location):  On-site    Assessment & Plan     Cough, unspecified type  Continue zithromax    Hypokalemia  Says Unable to take 40 meq Potassium as It makes her nauseous  Advised Take 20 meq daily  Eat Potassium rich foods  Follow up next week Potassium check    Return in about 2 weeks (around 2/28/2023), or if symptoms worsen or fail to improve, for recheck/ sooner if worse or New symptoms.    Marysol Cisse MD  [facility]   Glenda is a 72 year old, presenting for the following health issues:  Cough (And fatigue since diagnosed with covid. )      HPI       COVID-19 Symptom Review  How many days ago did these symptoms start? On January 9th or 10th     Are any of the following symptoms significant for you?    New or worsening difficulty breathing? unsure    Worsening cough? Yes, I am coughing up mucus.    Fever or chills? No    Headache: No    Sore throat: No    Chest pain: chest tightness    Diarrhea: YES- but started azithromycin (ZITHROMAX) 250 MG tablet yesterday for bronchitis     Body aches? No    What treatments has patient tried? zithromycin (ZITHROMAX) 250 MG tablet, cough medication and paxlovid    Does patient live in a nursing home, group home, or shelter? No  Does patient have a way to get food/medications during quarantined? Yes, I have a friend or family member who can help me.  Has had Diarrhea x 1   Cough mainly at night  Review of Systems   CONSTITUTIONAL: NEGATIVE for fever, chills, change in weight  ENT/MOUTH: NEGATIVE for ear, mouth and throat problems  RESP:as above  CV: NEGATIVE for chest pain, palpitations or peripheral edema  GI: diarrhea x 1 Today  PSYCHIATRIC: NEGATIVE for changes in mood or affect       Objective           Vitals:  No vitals were obtained today due to virtual visit.    Physical Exam   healthy, alert and no distress  PSYCH: Alert and oriented times 3; coherent speech, normal   rate and volume, able to articulate logical thoughts, able   to abstract reason, no tangential thoughts, no hallucinations   or delusions  Her affect is normal  RESP: No cough, no audible wheezing, able to talk in full sentences  Remainder of exam unable to be completed due to telephone visits  a  Phone call duration: as above minutes

## 2023-02-14 NOTE — PATIENT INSTRUCTIONS
Potassium rich foods are-  Apricots, Avocados, Bananas, Beets, Brussel sprouts, Cantaloupe, Dates and Figs, Kiwi, Lima beans, honeydew Melons, Milk, Nectarines, Oranges or Orange juice, pears,   peanuts, potatoes, prunes, raisins, spinach, tomatoes or tomato products, squash, or yogurt

## 2023-02-24 ENCOUNTER — OFFICE VISIT (OUTPATIENT)
Dept: FAMILY MEDICINE | Facility: CLINIC | Age: 73
End: 2023-02-24
Payer: MEDICARE

## 2023-02-24 ENCOUNTER — NURSE TRIAGE (OUTPATIENT)
Dept: FAMILY MEDICINE | Facility: CLINIC | Age: 73
End: 2023-02-24

## 2023-02-24 VITALS
DIASTOLIC BLOOD PRESSURE: 67 MMHG | OXYGEN SATURATION: 97 % | TEMPERATURE: 97.7 F | HEART RATE: 53 BPM | WEIGHT: 194 LBS | SYSTOLIC BLOOD PRESSURE: 177 MMHG | BODY MASS INDEX: 34.38 KG/M2 | HEIGHT: 63 IN

## 2023-02-24 DIAGNOSIS — K62.5 RECTAL BLEEDING: Primary | ICD-10-CM

## 2023-02-24 DIAGNOSIS — Z12.11 COLON CANCER SCREENING: ICD-10-CM

## 2023-02-24 DIAGNOSIS — I10 ESSENTIAL HYPERTENSION, BENIGN: ICD-10-CM

## 2023-02-24 DIAGNOSIS — K59.00 CONSTIPATION, UNSPECIFIED CONSTIPATION TYPE: ICD-10-CM

## 2023-02-24 PROCEDURE — 99214 OFFICE O/P EST MOD 30 MIN: CPT | Performed by: FAMILY MEDICINE

## 2023-02-24 ASSESSMENT — PAIN SCALES - GENERAL: PAINLEVEL: MILD PAIN (2)

## 2023-02-24 NOTE — TELEPHONE ENCOUNTER
Patient calling reporting large amount of blood in stool this AM.    Patient states the entire bowl had bright red blood; had to apply pressure to rectum to stop bleeding; reports several blood clots.    Does not take blood thinners    States she has been constipated and having hard stools recently; no current abdominal pain.     Patient reporting she was not able to tell what her stool looked like as there was too much blood to visualize.     Denies fevers, abdominal pain/discomfort, lightheadedness/dizziness.     Patient has never had this happen before.     Last hgb 1/14/23 - 14.4    Denies liver issues; last hepatic panel in September 2021 had slight ALT elevation at 52, otherwise WNL.     Per protocol patient should be seen in office today.     Scheduled with available [facility] provider for 11am arrival.     Patient verbalized understanding.     Reason for Disposition    MODERATE rectal bleeding (small blood clots, passing blood without stool, or toilet water turns red)    Additional Information    Negative: Passed out (i.e., fainted, collapsed and was not responding)    Negative: Shock suspected (e.g., cold/pale/clammy skin, too weak to stand, low BP, rapid pulse)    Negative: Vomiting red blood or black (coffee ground) material    Negative: Sounds like a life-threatening emergency to the triager    Negative: Diarrhea is main symptom    Negative: Rectal symptoms    Negative: SEVERE rectal bleeding (large blood clots; constant or on and off bleeding)    Negative: SEVERE dizziness (e.g., unable to stand, requires support to walk, feels like passing out now)    Negative: MODERATE rectal bleeding (small blood clots, passing blood without stool, or toilet water turns red) more than once a day    Negative: Bloody, black, or tarry bowel movements  (Exception: Chronic-unchanged black-grey bowel movements and is taking iron pills or Pepto-Bismol.)    Negative: High-risk adult (e.g., prior surgery on aorta, abdominal aortic  "aneurysm)    Negative: Rectal foreign body (inserted or swallowed)    Negative: SEVERE abdominal pain (e.g., excruciating)    Negative: Constant abdominal pain lasting > 2 hours    Negative: Pale skin (pallor) of new-onset or worsening    Negative: Patient sounds very sick or weak to the triager    Answer Assessment - Initial Assessment Questions  1. APPEARANCE of BLOOD: \"What color is it?\" \"Is it passed separately, on the surface of the stool, or mixed in with the stool?\"       Surface of stool   2. AMOUNT: \"How much blood was passed?\"       \"quite a bit\"   3. FREQUENCY: \"How many times has blood been passed with the stools?\"       Just once   4. ONSET: \"When was the blood first seen in the stools?\" (Days or weeks)       This AM   5. DIARRHEA: \"Is there also some diarrhea?\" If Yes, ask: \"How many diarrhea stools in the past 24 hours?\"       No  6. CONSTIPATION: \"Do you have constipation?\" If Yes, ask: \"How bad is it?\"      Yes; did have straining; states stools have been hard; LBM yesterday  7. RECURRENT SYMPTOMS: \"Have you had blood in your stools before?\" If Yes, ask: \"When was the last time?\" and \"What happened that time?\"       No   8. BLOOD THINNERS: \"Do you take any blood thinners?\" (e.g., Coumadin/warfarin, Pradaxa/dabigatran, aspirin)      No   9. OTHER SYMPTOMS: \"Do you have any other symptoms?\"  (e.g., abdomen pain, vomiting, dizziness, fever)      No  10. PREGNANCY: \"Is there any chance you are pregnant?\" \"When was your last menstrual period?\"        No    Protocols used: RECTAL BLEEDING-A-[place]    Feli Chapa RN  [facility]      "

## 2023-02-24 NOTE — PROGRESS NOTES
Assessment & Plan       ICD-10-CM    1. Rectal bleeding  K62.5 Adult GI  Referral - Procedure Only      2. Colon cancer screening  Z12.11 Adult GI  Referral - Procedure Only      3. Constipation, unspecified constipation type  K59.00       4. Essential hypertension, benign  I10         Her bright red blood per rectum was likely due to internal hemorrhoids  I recommended increased fluids and fiber and activity to help keep her bowel movements soft and regular and easily passed  I recommended a fiber supplement such as Citrucel or Metamucil    She is way overdue for colon cancer screening, so I advised a colonoscopy for both diagnostic and screening purposes and she was open to that, so a referral was made for it    Discussed having her go to the ER if the rectal bleeding recurs and does not stop on its own    Her blood pressure was noted to be elevated today, but she is quite anxious, so she will follow-up on that with her PCP in mid March as scheduled    Return in about 18 days (around 3/14/2023).    Meek Espinosa MD  [facility] JOSE Doshi is a 72 year old, presenting for the following health issues:  Rectal Problem      HPI     Patient is here today with the concern for rectal bleeding, she has been very constipated lately. This morning she was going to the bathroom and pushing hard and noticed she filled the bowl with bright red blood, spotted a little but she thinks it has stopped.    She has never had a colon exam in the past.  Her PCP, Dr. Cisse, has been pushing to have her get one done, however.  She has an appointment to see her PCP in a couple of weeks in mid March.  She has been having a hard time passing bowel movements for a number of weeks now.  She has to strain a lot.  She is not using any fiber supplement or stool softener or any other products to help her.  She is not very physically active.  She does not drink much fluid during the day.  She  has had no known history of hemorrhoids or rectal bleeding like this.    She does have type 2 diabetes, hypertension, and other conditions as per her chart.  She had a COVID infection in January and her hemoglobin was normal at 14.4 at that time.    Patient Active Problem List   Diagnosis     Essential hypertension, benign     Chronic rhinitis     Acquired hypothyroidism     Hyperlipidemia LDL goal <70     Gastroesophageal reflux disease without esophagitis     Nonrheumatic aortic valve insufficiency     Anxiety     Osteoarthritis of knee, unspecified laterality, unspecified osteoarthritis type     Other irritable bowel syndrome     Alcohol abuse, in remission     Fatty liver     Type 2 diabetes mellitus with stage 3 chronic kidney disease, without long-term current use of insulin (H)     Chronic kidney disease, stage 3 (moderate)     Advanced directives, counseling/discussion     Cough     Primary osteoarthritis of left hip     Aortic valve insufficiency, etiology of cardiac valve disease unspecified     Postmenopausal bleeding     Infection due to 2019 novel coronavirus     Pulmonary nodule     Atypical glandular cells on cervical Pap smear     Current Outpatient Medications   Medication     ACE/ARB/ARNI NOT PRESCRIBED (INTENTIONAL)     alcohol swab prep pads     atenolol (TENORMIN) 50 MG tablet     blood glucose (NO BRAND SPECIFIED) test strip     fluticasone (FLONASE) 50 MCG/ACT spray     gabapentin (NEURONTIN) 100 MG capsule     hydrochlorothiazide (HYDRODIURIL) 25 MG tablet     levothyroxine (SYNTHROID/LEVOTHROID) 112 MCG tablet     omeprazole (PRILOSEC) 20 MG DR capsule     albuterol (PROAIR HFA/PROVENTIL HFA/VENTOLIN HFA) 108 (90 Base) MCG/ACT inhaler     aspirin (ASA) 81 MG EC tablet     atorvastatin (LIPITOR) 10 MG tablet     benzonatate (TESSALON) 100 MG capsule     cetirizine (ZYRTEC) 10 MG tablet     dexamethasone (DECADRON) 6 MG tablet     guaiFENesin-dextromethorphan (ROBITUSSIN DM) 100-10 MG/5ML  "syrup     insulin glargine (LANTUS SOLOSTAR) 100 UNIT/ML pen     ketotifen (ZADITOR) 0.025 % ophthalmic solution     No current facility-administered medications for this visit.         Review of Systems   She has had some postmenopausal bleeding and will be having an ultrasound soon to evaluate that.      Objective    BP (!) 177/67 (BP Location: Left arm, Patient Position: Sitting, Cuff Size: Adult Regular)   Pulse 53   Temp 97.7  F (36.5  C) (Oral)   Ht 1.6 m (5' 3\")   Wt 88 kg (194 lb)   SpO2 97%   BMI 34.37 kg/m    Body mass index is 34.37 kg/m .  Physical Exam   GENERAL: Pleasant but anxious overweight adult white female in no acute distress  ABDOMEN: soft, nontender, no hepatosplenomegaly, no masses   RECTAL (female): No external hemorrhoids are identified.  No active rectal bleeding.    Past lab values and chart notes were reviewed.            "

## 2023-03-06 ENCOUNTER — ANCILLARY PROCEDURE (OUTPATIENT)
Dept: ULTRASOUND IMAGING | Facility: CLINIC | Age: 73
End: 2023-03-06
Attending: FAMILY MEDICINE
Payer: MEDICARE

## 2023-03-06 DIAGNOSIS — N95.0 POSTMENOPAUSAL BLEEDING: ICD-10-CM

## 2023-03-06 PROCEDURE — 76856 US EXAM PELVIC COMPLETE: CPT | Mod: TC | Performed by: RADIOLOGY

## 2023-03-06 PROCEDURE — 76830 TRANSVAGINAL US NON-OB: CPT | Mod: TC | Performed by: RADIOLOGY

## 2023-03-08 ENCOUNTER — OFFICE VISIT (OUTPATIENT)
Dept: OBGYN | Facility: CLINIC | Age: 73
End: 2023-03-08
Attending: FAMILY MEDICINE
Payer: MEDICARE

## 2023-03-08 ENCOUNTER — TELEPHONE (OUTPATIENT)
Dept: OBGYN | Facility: CLINIC | Age: 73
End: 2023-03-08

## 2023-03-08 VITALS
OXYGEN SATURATION: 99 % | SYSTOLIC BLOOD PRESSURE: 162 MMHG | DIASTOLIC BLOOD PRESSURE: 75 MMHG | WEIGHT: 195 LBS | BODY MASS INDEX: 34.54 KG/M2 | HEART RATE: 56 BPM

## 2023-03-08 DIAGNOSIS — N95.0 POSTMENOPAUSAL BLEEDING: Primary | ICD-10-CM

## 2023-03-08 DIAGNOSIS — R87.619 ATYPICAL GLANDULAR CELLS ON CERVICAL PAP SMEAR: ICD-10-CM

## 2023-03-08 DIAGNOSIS — N88.2 CERVICAL STENOSIS (UTERINE CERVIX): ICD-10-CM

## 2023-03-08 PROCEDURE — 99204 OFFICE O/P NEW MOD 45 MIN: CPT | Performed by: OBSTETRICS & GYNECOLOGY

## 2023-03-08 NOTE — TELEPHONE ENCOUNTER
[facility] SURGERY PLANNING/SCHEDULING WORKSHEET                                                     Glenda Bales                :  1950  MRN:  [id number]  Home Phone [phone]   Work Phone Not on file.   Mobile [phone]         Surgeon: Ruben Noguera MD    DIAGNOSIS:   Postmenopausal bleeding / atypical glandular cells endometrial origin / cervical stenosis     SURGICAL PROCEDURE:  Hysteroscopy and D&C with MyoSure     Surgery Location:  [facility]  Patient Surgery Class:  SDS  Length of Procedure:  30 minutes  Type of anesthesia:  MAC    Multi-surgeon case: no  OR Assistant needed:   No  Vendor needed: No  Positioning:  Lithotomy  Laterality:  NA  Date requested:  when able to be scheduled.     Special Equipment: Myosure   Special Instructions for patient:  Per the [facility] Nurse when they call the patient prior to the surgery date.   Precautions:  NONE  :  NOT NEEDED    Sterilization consent:  Not applicable to procedure being performed.    Preop: Pre-op options: PCP  Pre-surgery consult needed:  Before the surgery day please schedule a 30 minute phone call  to discuss details of  the surgery.  Postop evaluation needed:  2 weeks    ALLERGIES:   Allergies   Allergen Reactions     Losartan Other (See Comments)     Acute Kidney Injury     Seasonal      Seasonal Allergies       BMI:There is no height or weight on file to calculate BMI.      The proposed surgical procedure is considered INTERMEDIATE risk.      Ruben Noguera MD    3/8/2023

## 2023-03-08 NOTE — PROGRESS NOTES
Glenda is a 72 year old  referred here by Dr. Cisse with complaint of abnormal uterine bleeding. She reports she was diagnosed with COVID and she was hospitalized for 2 days.  She was given Dexamethasone and then she spotted for about 10 days, after the medication was used.  She did not fill a pad.  She also took the Paxlovid.    She had the ultrasound and the pap smear for evaluation of the spotting.    She had the ultrasound performed and we reviewed the report and the films, and the following results are noted:    US PELVIC TRANSABDOMINAL AND TRANSVAGINAL 3/6/2023 1:38 PM  CLINICAL HISTORY: Postmenopausal bleeding.  TECHNIQUE: Transabdominal scans were performed. Endovaginal ultrasound  was performed to better visualize the adnexa.  COMPARISON: None.  FINDINGS:  UTERUS: 7.4 x 4.0 x 5 cm. There is an intramural or submucosal fibroid  in the uterine body anteriorly measuring 2.6 cm. A subserosal fibroid  in the uterine body posteriorly measures 1.9 cm.  ENDOMETRIUM: 2 mm. Normal smooth endometrium.  RIGHT OVARY: Not visualized, possibly due to overlying bowel gas.   LEFT OVARY: Not visualized, possibly due to overlying bowel gas.  No significant free fluid.                                                              IMPRESSION:  1.  Two uterine fibroids are identified, with the largest an  intramural or submucosal lesion measuring 2.6 cm.  2.  Nonvisualization of the ovaries.       She had the pap smear performed and the following results are noted:    , ,  NIL paps per Care Everywhere  Gap in care  23 AGC-EM, neg HPV.       Past Medical History:   Diagnosis Date     Cataract      Diabetes (H) 2019     Essential hypertension, benign      Nonrheumatic aortic valve insufficiency      Osteoarthritis of knee, unspecified laterality, unspecified osteoarthritis type      Thyroid disease        Past Surgical History:   Procedure Laterality Date     CATARACT IOL, RT/LT Right 2018     Model ZCB00 SN:[id number] +11.5 Dioptor - Jermaine Akers MD ([facility])     CATARACT IOL, RT/LT Left 12/19/2018    Model ZCB00 SN:[id number] +12.5 Dioptor - Jermaine Akers MD ([facility])     DILATION AND CURETTAGE SUCTION      AB     DILATION AND CURETTAGE SUCTION      in 40's     NO HISTORY OF SURGERY         OB History   No obstetric history on file.       Gynecological History         No LMP recorded. Patient is postmenopausal.     NoSTD/No PID/No IUD      See above HPI         Allergies   Allergen Reactions     Losartan Other (See Comments)     Acute Kidney Injury     Seasonal      Seasonal Allergies        Current Outpatient Medications   Medication Sig Dispense Refill     ACE/ARB/ARNI NOT PRESCRIBED (INTENTIONAL) Please choose reason not prescribed from choices below.       atenolol (TENORMIN) 50 MG tablet Take 1 tablet (50 mg) by mouth 2 times daily for 90 days 180 tablet 0     fluticasone (FLONASE) 50 MCG/ACT spray Spray 1 spray into both nostrils daily as needed        gabapentin (NEURONTIN) 100 MG capsule Take 1 capsule (100 mg) by mouth 2 times daily 180 capsule 3     hydrochlorothiazide (HYDRODIURIL) 25 MG tablet Take 1 tablet (25 mg) by mouth daily 90 tablet 3     levothyroxine (SYNTHROID/LEVOTHROID) 112 MCG tablet Take 1 tablet (112 mcg) by mouth daily 90 tablet 2     omeprazole (PRILOSEC) 20 MG DR capsule Take 1 capsule (20 mg) by mouth daily 90 capsule 1     albuterol (PROAIR HFA/PROVENTIL HFA/VENTOLIN HFA) 108 (90 Base) MCG/ACT inhaler Inhale 2 puffs into the lungs every 4 hours as needed for shortness of breath or wheezing (Patient not taking: Reported on 2/24/2023) 8 g 1     alcohol swab prep pads Use to swab area of injection/milena as directed. (Patient not taking: Reported on 3/8/2023) 100 each 3     aspirin (ASA) 81 MG EC tablet Take 81 mg by mouth daily (Patient not taking: Reported on 11/28/2022)       atorvastatin (LIPITOR) 10 MG tablet Take 1 tablet (10 mg) by mouth daily (Patient not taking:  Reported on 2023) 90 tablet 3     benzonatate (TESSALON) 100 MG capsule Take 1 capsule (100 mg) by mouth 3 times daily as needed for cough (Patient not taking: Reported on 3/8/2023) 30 capsule 0     blood glucose (NO BRAND SPECIFIED) test strip Use to test blood sugar 3 times daily or as directed. While on Insulin and then ones daily (Patient not taking: Reported on 3/8/2023) 100 strip 11     cetirizine (ZYRTEC) 10 MG tablet Take 10 mg by mouth daily (Patient not taking: Reported on 2023)       dexamethasone (DECADRON) 6 MG tablet Take 6 mg by mouth daily (Patient not taking: Reported on 2023)       guaiFENesin-dextromethorphan (ROBITUSSIN DM) 100-10 MG/5ML syrup Take 10 mLs by mouth every 4 hours as needed for cough (Patient not taking: Reported on 2023) 236 mL 0     insulin glargine (LANTUS SOLOSTAR) 100 UNIT/ML pen Inject 7 Units Subcutaneous daily (Patient not taking: Reported on 2023)       ketotifen (ZADITOR) 0.025 % ophthalmic solution 1 drop 2 times daily (Patient not taking: Reported on 2023)         Social History     Socioeconomic History     Marital status: Single     Spouse name: Not on file     Number of children: 0     Years of education: Not on file     Highest education level: Not on file   Occupational History     Occupation:    Tobacco Use     Smoking status: Former     Packs/day: 0.00     Years: 1.00     Pack years: 0.00     Types: Cigarettes     Quit date: 6/15/1978     Years since quittin.7     Smokeless tobacco: Never     Tobacco comments:     quit in    Vaping Use     Vaping Use: Never used   Substance and Sexual Activity     Alcohol use: Yes     Comment: 1 red wine before bef     Drug use: No     Sexual activity: Not Currently     Partners: Male     Birth control/protection: Abstinence   Other Topics Concern     Parent/sibling w/ CABG, MI or angioplasty before 65F 55M? Yes   Social History Narrative     Not on file     Social Determinants of  Health     Financial Resource Strain: Not on file   Food Insecurity: Not on file   Transportation Needs: Not on file   Physical Activity: Not on file   Stress: Not on file   Social Connections: Not on file   Intimate Partner Violence: Not on file   Housing Stability: Not on file       Family History   Problem Relation Age of Onset     Diabetes Mother      Coronary Artery Disease Father          with a heart attack     Glaucoma Cousin      Colon Cancer No family hx of      Breast Cancer No family hx of      Macular Degeneration No family hx of          Review of Systems:  10 point ROS of systems including Constitutional, Eyes, Respiratory, Cardiovascular, Gastroenterology, Genitourinary, Integumentary, Muscularskeletal, Psychiatric were all negative except for pertinent positives noted in my HPI and in the PMH.          EXAM:  BP (!) 162/75 (BP Location: Right arm, Cuff Size: Adult Large)   Pulse 56   Wt 88.5 kg (195 lb)   SpO2 99%   BMI 34.54 kg/m    Body mass index is 34.54 kg/m .  General Appearance:  healthy, alert, active, no distress  Skin:  Normal skin turgor  Neuro:  Alert, cranial nerves grossly intact  HEENT: NCAT  Neck:  No masses or lesions carotids are +2/4. No bruits heard  Lungs:  Good respiratory effort   Abdomen: Soft, nontender.  Normal bowel sounds.  No masses  Vulva: No external lesions, normal hair distribution, no adenopathy  BUS:  Normal, no masses noted  Urethral meatus:  No masses noted  Urethra:  No hypermobility  Vagina: Atrophic, no lesions  Cervix: Smooth, pink, no visible lesions. Stenotic cervix, unable to penetrate through cervical os.   Uterus: Normal size, anteverted, non-tender, mobile  Ovaries: No mass, non-tender, mobile  Extremities:  No clubbing, cyanosis or edema.        ASSESSMENT:  Postmenopausal bleeding   Cervical stenosis   Atypical glandular cells (likely endometrial) on pap smear       PLAN:  We discussed the atypical glandular cells as well as the ultrasound  findings.  She needs further evaluation of the findings.  The patient and I discussed the options for evaluation.  The EMB, SIS and the D&C were reviewed with her.  The EMB will not remove a polyp, but will give a tissue sample.  The SIS will further evaluate the lining, but no tissue sample, and should she have a suspected polyp, it would not be removed.  The D&C is more expensive but is currently the gold standard.   Since the biopsy is not able to be performed in the clinic today, due to the cervical stenosis, I suggest to have the D&C.     The risks of the D&C include, but are not limited to, death, brain damage, paralysis of any or all limbs, loss of an organ, function of an organ, disfiguring scars, bleeding, infection, damage to the uterus, tubes, ovaries, bowel, bladder, cervix and vagina.  In addition, there is a possibility of other procedures that might be deemed necessary at the time of the surgery, depending upon findings and/or complications.  This may also include laparoscopy, laparotomy, and rarely colostomy (which often times can be reversible in the future).  Risks with blood include but are not limited to HIV/AIDS, hepatitis and transfusion reactions, with transfusion reactions being the greatest risk.  Questions seem to be answered.     Total time preparing to see patient with reviewing prior encounter and labs, face to face time,  and coordinating care on the same calendar date:  45 minutes.      Ruben Noguera MD

## 2023-03-14 ENCOUNTER — OFFICE VISIT (OUTPATIENT)
Dept: FAMILY MEDICINE | Facility: CLINIC | Age: 73
End: 2023-03-14
Payer: MEDICARE

## 2023-03-14 VITALS
OXYGEN SATURATION: 98 % | WEIGHT: 195.8 LBS | DIASTOLIC BLOOD PRESSURE: 75 MMHG | HEART RATE: 54 BPM | HEIGHT: 63 IN | SYSTOLIC BLOOD PRESSURE: 158 MMHG | BODY MASS INDEX: 34.69 KG/M2 | RESPIRATION RATE: 16 BRPM | TEMPERATURE: 97.1 F

## 2023-03-14 DIAGNOSIS — N18.32 TYPE 2 DIABETES MELLITUS WITH STAGE 3B CHRONIC KIDNEY DISEASE, WITHOUT LONG-TERM CURRENT USE OF INSULIN (H): Primary | ICD-10-CM

## 2023-03-14 DIAGNOSIS — G60.9 IDIOPATHIC PERIPHERAL NEUROPATHY: ICD-10-CM

## 2023-03-14 DIAGNOSIS — I10 HTN, GOAL BELOW 140/90: ICD-10-CM

## 2023-03-14 DIAGNOSIS — N18.31 STAGE 3A CHRONIC KIDNEY DISEASE (H): ICD-10-CM

## 2023-03-14 DIAGNOSIS — E78.5 HYPERLIPIDEMIA LDL GOAL <70: ICD-10-CM

## 2023-03-14 DIAGNOSIS — N18.30 BENIGN HYPERTENSION WITH CKD (CHRONIC KIDNEY DISEASE) STAGE III (H): ICD-10-CM

## 2023-03-14 DIAGNOSIS — E11.22 TYPE 2 DIABETES MELLITUS WITH STAGE 3B CHRONIC KIDNEY DISEASE, WITHOUT LONG-TERM CURRENT USE OF INSULIN (H): Primary | ICD-10-CM

## 2023-03-14 DIAGNOSIS — E87.6 HYPOKALEMIA: ICD-10-CM

## 2023-03-14 DIAGNOSIS — N93.9 VAGINAL BLEEDING: ICD-10-CM

## 2023-03-14 DIAGNOSIS — Z12.11 COLON CANCER SCREENING: ICD-10-CM

## 2023-03-14 DIAGNOSIS — I12.9 BENIGN HYPERTENSION WITH CKD (CHRONIC KIDNEY DISEASE) STAGE III (H): ICD-10-CM

## 2023-03-14 LAB
ALBUMIN UR-MCNC: NEGATIVE MG/DL
APPEARANCE UR: CLEAR
BACTERIA #/AREA URNS HPF: ABNORMAL /HPF
BILIRUB UR QL STRIP: NEGATIVE
COLOR UR AUTO: YELLOW
GLUCOSE UR STRIP-MCNC: NEGATIVE MG/DL
HBA1C MFR BLD: 6.9 % (ref 0–5.6)
HGB BLD-MCNC: 13.5 G/DL (ref 11.7–15.7)
HGB UR QL STRIP: NEGATIVE
KETONES UR STRIP-MCNC: NEGATIVE MG/DL
LEUKOCYTE ESTERASE UR QL STRIP: ABNORMAL
NITRATE UR QL: NEGATIVE
PH UR STRIP: 7.5 [PH] (ref 5–7)
RBC #/AREA URNS AUTO: ABNORMAL /HPF
SP GR UR STRIP: 1.01 (ref 1–1.03)
SQUAMOUS #/AREA URNS AUTO: ABNORMAL /LPF
UROBILINOGEN UR STRIP-ACNC: 0.2 E.U./DL
WBC #/AREA URNS AUTO: ABNORMAL /HPF

## 2023-03-14 PROCEDURE — 85018 HEMOGLOBIN: CPT | Performed by: FAMILY MEDICINE

## 2023-03-14 PROCEDURE — 99214 OFFICE O/P EST MOD 30 MIN: CPT | Performed by: FAMILY MEDICINE

## 2023-03-14 PROCEDURE — 83036 HEMOGLOBIN GLYCOSYLATED A1C: CPT | Performed by: FAMILY MEDICINE

## 2023-03-14 PROCEDURE — 82043 UR ALBUMIN QUANTITATIVE: CPT | Performed by: FAMILY MEDICINE

## 2023-03-14 PROCEDURE — 82570 ASSAY OF URINE CREATININE: CPT | Performed by: FAMILY MEDICINE

## 2023-03-14 PROCEDURE — 36415 COLL VENOUS BLD VENIPUNCTURE: CPT | Performed by: FAMILY MEDICINE

## 2023-03-14 PROCEDURE — 84132 ASSAY OF SERUM POTASSIUM: CPT | Performed by: FAMILY MEDICINE

## 2023-03-14 PROCEDURE — 81001 URINALYSIS AUTO W/SCOPE: CPT | Performed by: FAMILY MEDICINE

## 2023-03-14 RX ORDER — ATORVASTATIN CALCIUM 10 MG/1
10 TABLET, FILM COATED ORAL DAILY
Qty: 90 TABLET | Refills: 3 | Status: SHIPPED | OUTPATIENT
Start: 2023-03-14 | End: 2023-06-30

## 2023-03-14 RX ORDER — HYDROCHLOROTHIAZIDE 25 MG/1
25 TABLET ORAL DAILY
Qty: 90 TABLET | Refills: 3 | Status: SHIPPED | OUTPATIENT
Start: 2023-03-14 | End: 2024-02-06

## 2023-03-14 RX ORDER — GABAPENTIN 100 MG/1
100 CAPSULE ORAL 2 TIMES DAILY
Qty: 180 CAPSULE | Refills: 3 | Status: SHIPPED | OUTPATIENT
Start: 2023-03-14 | End: 2023-06-06

## 2023-03-14 RX ORDER — DILTIAZEM HYDROCHLORIDE 180 MG/1
180 CAPSULE, EXTENDED RELEASE ORAL DAILY
Qty: 30 CAPSULE | Refills: 0 | Status: SHIPPED | OUTPATIENT
Start: 2023-03-14 | End: 2023-03-28

## 2023-03-14 RX ORDER — ATENOLOL 50 MG/1
50 TABLET ORAL 2 TIMES DAILY
Qty: 180 TABLET | Refills: 3 | Status: SHIPPED | OUTPATIENT
Start: 2023-03-14 | End: 2024-03-17

## 2023-03-14 NOTE — PROGRESS NOTES
"  Assessment & Plan     Type 2 diabetes mellitus with stage 3b chronic kidney disease, without long-term current use of insulin (H)  Pending labs will review   - Hemoglobin A1c; Future  - Albumin Random Urine Quantitative with Creat Ratio; Future  - Hemoglobin A1c  - Albumin Random Urine Quantitative with Creat Ratio    Benign hypertension with CKD (chronic kidney disease) stage III (H)  High  Add Diltiazem   Follow up BP check 2-3 weks  - diltiazem ER (DILT-XR) 180 MG 24 hr capsule; Take 1 capsule (180 mg) by mouth daily    Stage 3 chronic kidney disease (H)  Pending   Good control off Blood Pressure will help  - Hemoglobin    Hyperlipidemia LDL goal <70  Stable   - atorvastatin (LIPITOR) 10 MG tablet; Take 1 tablet (10 mg) by mouth daily    HTN, goal below 140/90  As above  - atenolol (TENORMIN) 50 MG tablet; Take 1 tablet (50 mg) by mouth 2 times daily    Idiopathic peripheral neuropathy  Due to Diabetes  Doing well  - gabapentin (NEURONTIN) 100 MG capsule; Take 1 capsule (100 mg) by mouth 2 times daily    Hypokalemia  Pending   - Potassium; Future  - Potassium    Vaginal bleeding  GYN notes reviewed   Pt scheduling a D and C  - UA Macro with Reflex to Micro and Culture - lab collect    Colon cancer screening  She is scheduling a colonoscopy    Ordering of each unique test  Prescription drug management  56}     BMI:   Estimated body mass index is 34.68 kg/m  as calculated from the following:    Height as of this encounter: 1.6 m (5' 3\").    Weight as of this encounter: 88.8 kg (195 lb 12.8 oz).   Weight management plan: Discussed healthy diet and exercise guidelines        Return in about 2 weeks (around 3/28/2023) for BP Recheck.    Marysol Cisse MD  [facility] JOSE Doshi is a 72 year old, presenting for the following health issues:  Diabetes (Constipation concerns w/ Metamucil causes bloating and has questions about last a pap in January that was abnormal and unsure about a DNC. " Wants a refill on atenolol )      HPI     Diabetes Follow-up    How often are you checking your blood sugar? One time daily  What time of day are you checking your blood sugars (select all that apply)?  Before meals  Have you had any blood sugars above 200?  Yes, when taking medications for covid but nothing recently   Have you had any blood sugars below 70?  No    What symptoms do you notice when your blood sugar is low?  None    What concerns do you have today about your diabetes? None and Other: Concerned about high blood glucose      Do you have any of these symptoms? (Select all that apply)  No numbness or tingling in feet.  No redness, sores or blisters on feet.  No complaints of excessive thirst.  No reports of blurry vision.  No significant changes to weight.      BP Readings from Last 2 Encounters:   03/14/23 (!) 158/75   03/08/23 (!) 162/75     Hemoglobin A1C (%)   Date Value   10/26/2022 6.7 (H)   03/17/2022 6.0 (H)   06/15/2021 5.5   01/12/2021 5.6     LDL Cholesterol Calculated (mg/dL)   Date Value   06/08/2022 92   04/28/2022 113 (H)   08/20/2020 163 (H)   07/22/2020 125 (H)           How many servings of fruits and vegetables do you eat daily?  0-1    On average, how many sweetened beverages do you drink each day (Examples: soda, juice, sweet tea, etc.  Do NOT count diet or artificially sweetened beverages)?   0    How many days per week do you exercise enough to make your heart beat faster? 3 or less    How many minutes a day do you exercise enough to make your heart beat faster? 9 or less    How many days per week do you miss taking your medication? 0      Hyperlipidemia Follow-Up      Are you regularly taking any medication or supplement to lower your cholesterol?   Yes- statins    Are you having muscle aches or other side effects that you think could be caused by your cholesterol lowering medication?  No    Hypertension Follow-up      Do you check your blood pressure regularly outside of the  "clinic? Yes     Are you following a low salt diet? Yes    Are your blood pressures ever more than 140 on the top number (systolic) OR more   than 90 on the bottom number (diastolic), for example 140/90? Yes    Chronic Kidney Disease Follow-up      Do you take any over the counter pain medicine?: No     se  Review of Systems   CONSTITUTIONAL: NEGATIVE for fever, chills, change in weight  ENT/MOUTH: NEGATIVE for ear, mouth and throat problems  RESP: NEGATIVE for significant cough or SOB  CV: NEGATIVE for chest pain, palpitations or peripheral edema  GI: NEGATIVE for nausea, abdominal pain, heartburn, or change in bowel habits  ROS otherwise negative      Objective    BP (!) 158/75   Pulse 54   Temp 97.1  F (36.2  C) (Temporal)   Resp 16   Ht 1.6 m (5' 3\")   Wt 88.8 kg (195 lb 12.8 oz)   SpO2 98%   BMI 34.68 kg/m    Body mass index is 34.68 kg/m .  Physical Exam   GENERAL: healthy, alert and no distress  EYES: Eyes grossly normal to inspection  NECK: no adenopathy, no asymmetry, masses, or scars and thyroid normal to palpation  RESP: lungs clear to auscultation - no rales, rhonchi or wheezes  CV: regular rate and rhythm, normal S1 S2, no S3 or S4, no murmur, click or rub, no peripheral edema and peripheral pulses strong  ABDOMEN: soft, nontender, no hepatosplenomegaly, no masses and bowel sounds normal  MS: no gross musculoskeletal defects noted, no edema    Office Visit on 02/06/2023   Component Date Value Ref Range Status     Sodium 02/06/2023 139  133 - 144 mmol/L Final     Potassium 02/06/2023 3.1 (L)  3.4 - 5.3 mmol/L Final     Chloride 02/06/2023 97  94 - 109 mmol/L Final     Carbon Dioxide (CO2) 02/06/2023 33 (H)  20 - 32 mmol/L Final     Anion Gap 02/06/2023 9  3 - 14 mmol/L Final     Urea Nitrogen 02/06/2023 20  7 - 30 mg/dL Final     Creatinine 02/06/2023 1.22 (H)  0.52 - 1.04 mg/dL Final     Calcium 02/06/2023 9.6  8.5 - 10.1 mg/dL Final     Glucose 02/06/2023 183 (H)  70 - 99 mg/dL Final     GFR " Estimate 02/06/2023 47 (L)  >60 mL/min/1.73m2 Final    eGFR calculated using 2021 CKD-EPI equation.     Magnesium 02/06/2023 1.8  1.6 - 2.3 mg/dL Final     Pending

## 2023-03-15 LAB
CREAT UR-MCNC: 68 MG/DL
MICROALBUMIN UR-MCNC: 8 MG/L
MICROALBUMIN/CREAT UR: 11.76 MG/G CR (ref 0–25)
POTASSIUM BLD-SCNC: 3.9 MMOL/L (ref 3.4–5.3)

## 2023-03-17 ENCOUNTER — MEDICAL CORRESPONDENCE (OUTPATIENT)
Dept: HEALTH INFORMATION MANAGEMENT | Facility: CLINIC | Age: 73
End: 2023-03-17
Payer: MEDICARE

## 2023-03-20 DIAGNOSIS — K21.9 GASTROESOPHAGEAL REFLUX DISEASE, UNSPECIFIED WHETHER ESOPHAGITIS PRESENT: ICD-10-CM

## 2023-03-20 DIAGNOSIS — E03.9 ACQUIRED HYPOTHYROIDISM: ICD-10-CM

## 2023-03-20 RX ORDER — LEVOTHYROXINE SODIUM 112 UG/1
TABLET ORAL
Qty: 90 TABLET | Refills: 2 | Status: SHIPPED | OUTPATIENT
Start: 2023-03-20 | End: 2023-08-16

## 2023-03-23 ENCOUNTER — MEDICAL CORRESPONDENCE (OUTPATIENT)
Dept: HEALTH INFORMATION MANAGEMENT | Facility: CLINIC | Age: 73
End: 2023-03-23
Payer: MEDICARE

## 2023-03-27 DIAGNOSIS — I12.9 BENIGN HYPERTENSION WITH CKD (CHRONIC KIDNEY DISEASE) STAGE III (H): ICD-10-CM

## 2023-03-27 DIAGNOSIS — N18.30 BENIGN HYPERTENSION WITH CKD (CHRONIC KIDNEY DISEASE) STAGE III (H): ICD-10-CM

## 2023-03-28 ENCOUNTER — OFFICE VISIT (OUTPATIENT)
Dept: FAMILY MEDICINE | Facility: CLINIC | Age: 73
End: 2023-03-28
Payer: MEDICARE

## 2023-03-28 VITALS
WEIGHT: 200.8 LBS | OXYGEN SATURATION: 97 % | DIASTOLIC BLOOD PRESSURE: 81 MMHG | HEART RATE: 73 BPM | SYSTOLIC BLOOD PRESSURE: 125 MMHG | HEIGHT: 63 IN | BODY MASS INDEX: 35.58 KG/M2 | TEMPERATURE: 97.4 F

## 2023-03-28 DIAGNOSIS — N18.31 TYPE 2 DIABETES MELLITUS WITH STAGE 3A CHRONIC KIDNEY DISEASE, WITHOUT LONG-TERM CURRENT USE OF INSULIN (H): ICD-10-CM

## 2023-03-28 DIAGNOSIS — I12.9 BENIGN HYPERTENSION WITH CKD (CHRONIC KIDNEY DISEASE) STAGE III (H): ICD-10-CM

## 2023-03-28 DIAGNOSIS — E66.01 MORBID OBESITY (H): ICD-10-CM

## 2023-03-28 DIAGNOSIS — E11.22 TYPE 2 DIABETES MELLITUS WITH STAGE 3A CHRONIC KIDNEY DISEASE, WITHOUT LONG-TERM CURRENT USE OF INSULIN (H): ICD-10-CM

## 2023-03-28 DIAGNOSIS — Z12.31 VISIT FOR SCREENING MAMMOGRAM: ICD-10-CM

## 2023-03-28 DIAGNOSIS — Z12.11 COLON CANCER SCREENING: ICD-10-CM

## 2023-03-28 DIAGNOSIS — N18.30 BENIGN HYPERTENSION WITH CKD (CHRONIC KIDNEY DISEASE) STAGE III (H): ICD-10-CM

## 2023-03-28 PROCEDURE — 99213 OFFICE O/P EST LOW 20 MIN: CPT | Performed by: FAMILY MEDICINE

## 2023-03-28 RX ORDER — DILTIAZEM HYDROCHLORIDE 180 MG/1
180 CAPSULE, EXTENDED RELEASE ORAL DAILY
Qty: 30 CAPSULE | Refills: 3 | Status: SHIPPED | OUTPATIENT
Start: 2023-03-28 | End: 2023-04-03

## 2023-03-28 RX ORDER — ATENOLOL 25 MG/1
TABLET ORAL
Qty: 75 TABLET | Refills: 1 | OUTPATIENT
Start: 2023-03-28

## 2023-03-28 ASSESSMENT — PAIN SCALES - GENERAL: PAINLEVEL: NO PAIN (0)

## 2023-03-28 NOTE — PROGRESS NOTES
Assessment & Plan     BMI >35  Losing wt will help with BP     Benign hypertension with CKD (chronic kidney disease) stage III (H)  Stable   Discussed if continued swelling can consider switching meds   - diltiazem ER (DILT-XR) 180 MG 24 hr capsule; Take 1 capsule (180 mg) by mouth daily    Visit for screening mammogram  Advised   - MA Screening Digital Bilateral; Future    Colon cancer screening  She is scheduling    Follow up 3 months medicare Abraham Cisse MD  [facility] JOSE Doshi is a 72 year old, presenting for the following health issues:  Hypertension and Diabetes (Blood sugar had been in 110's now it is in the 160's)  No flowsheet data found.  HPI   Chief Complaint   Patient presents with     Hypertension     Diabetes     Blood sugar had been in 110's now it is in the 160's     Hypertension Follow-up      Do you check your blood pressure regularly outside of the clinic? Yes     Are you following a low salt diet? No    Are your blood pressures ever more than 140 on the top number (systolic) OR more   than 90 on the bottom number (diastolic), for example 140/90? No      How many servings of fruits and vegetables do you eat daily?  0-1    On average, how many sweetened beverages do you drink each day (Examples: soda, juice, sweet tea, etc.  Do NOT count diet or artificially sweetened beverages)?   0    How many days per week do you exercise enough to make your heart beat faster? none    How many minutes a day do you exercise enough to make your heart beat faster?     How many days per week do you miss taking your medication? 0    Has Trace amount of swelling in her feet  Overall Doing well  Last Diabetic test was Good      Review of Systems   CONSTITUTIONAL: NEGATIVE for fever, chills, change in weight  ENT/MOUTH: NEGATIVE for ear, mouth and throat problems  RESP: NEGATIVE for significant cough or SOB  CV: NEGATIVE for chest pain, palpitations or peripheral edema       Objective    Wt 91.1 kg (200 lb 12.8 oz)   BMI 35.57 kg/m    Body mass index is 35.57 kg/m .  Physical Exam   GENERAL: healthy, alert and no distress  NECK: no adenopathy, no asymmetry, masses, or scars and thyroid normal to palpation  RESP: lungs clear to auscultation - no rales, rhonchi or wheezes  CV: regular rates and rhythm, normal S1 S2, no S3 or S4, no murmur, click or rub, peripheral pulses strong and trace pedal Edema   ABDOMEN: soft, nontender, no hepatosplenomegaly, no masses and bowel sounds normal  MS: extremities normal- no gross deformities noted    Office Visit on 03/14/2023   Component Date Value Ref Range Status     Color Urine 03/14/2023 Yellow  Colorless, Straw, Light Yellow, Yellow Final     Appearance Urine 03/14/2023 Clear  Clear Final     Glucose Urine 03/14/2023 Negative  Negative mg/dL Final     Bilirubin Urine 03/14/2023 Negative  Negative Final     Ketones Urine 03/14/2023 Negative  Negative mg/dL Final     Specific Gravity Urine 03/14/2023 1.010  1.003 - 1.035 Final     Blood Urine 03/14/2023 Negative  Negative Final     pH Urine 03/14/2023 7.5 (H)  5.0 - 7.0 Final     Protein Albumin Urine 03/14/2023 Negative  Negative mg/dL Final     Urobilinogen Urine 03/14/2023 0.2  0.2, 1.0 E.U./dL Final     Nitrite Urine 03/14/2023 Negative  Negative Final     Leukocyte Esterase Urine 03/14/2023 Small (A)  Negative Final     Hemoglobin 03/14/2023 13.5  11.7 - 15.7 g/dL Final     Hemoglobin A1C 03/14/2023 6.9 (H)  0.0 - 5.6 % Final    Normal <5.7%   Prediabetes 5.7-6.4%    Diabetes 6.5% or higher     Note: Adopted from ADA consensus guidelines.     Creatinine Urine mg/dL 03/14/2023 68  mg/dL Final     Albumin Urine mg/L 03/14/2023 8  mg/L Final     Albumin Urine mg/g Cr 03/14/2023 11.76  0.00 - 25.00 mg/g Cr Final     Potassium 03/14/2023 3.9  3.4 - 5.3 mmol/L Final     Bacteria Urine 03/14/2023 Few (A)  None Seen /HPF Final     RBC Urine 03/14/2023 None Seen  0-2 /HPF /HPF Final     WBC Urine  03/14/2023 5-10 (A)  0-5 /HPF /HPF Final     Squamous Epithelials Urine 03/14/2023 Few (A)  None Seen /LPF Final

## 2023-03-29 ENCOUNTER — MYC MEDICAL ADVICE (OUTPATIENT)
Dept: FAMILY MEDICINE | Facility: CLINIC | Age: 73
End: 2023-03-29
Payer: MEDICARE

## 2023-03-29 DIAGNOSIS — I10 ESSENTIAL HYPERTENSION, BENIGN: Primary | ICD-10-CM

## 2023-03-29 DIAGNOSIS — N18.30 BENIGN HYPERTENSION WITH CKD (CHRONIC KIDNEY DISEASE) STAGE III (H): ICD-10-CM

## 2023-03-29 DIAGNOSIS — I12.9 BENIGN HYPERTENSION WITH CKD (CHRONIC KIDNEY DISEASE) STAGE III (H): ICD-10-CM

## 2023-03-30 NOTE — TELEPHONE ENCOUNTER
"Please see MyChart message with patient update.    Per OV note 3/28/23:    \"Benign hypertension with CKD (chronic kidney disease) stage III (H)  Stable   Discussed if continued swelling can consider switching meds   - diltiazem ER (DILT-XR) 180 MG 24 hr capsule; Take 1 capsule (180 mg) by mouth daily\"    KENYA Martinez RN  [facility] [place]  [facility]  "

## 2023-03-31 ENCOUNTER — MYC MEDICAL ADVICE (OUTPATIENT)
Dept: FAMILY MEDICINE | Facility: CLINIC | Age: 73
End: 2023-03-31
Payer: MEDICARE

## 2023-04-03 ENCOUNTER — TELEPHONE (OUTPATIENT)
Dept: FAMILY MEDICINE | Facility: CLINIC | Age: 73
End: 2023-04-03
Payer: MEDICARE

## 2023-04-03 RX ORDER — LOSARTAN POTASSIUM 50 MG/1
25 TABLET ORAL DAILY
Qty: 15 TABLET | Refills: 0 | Status: SHIPPED | OUTPATIENT
Start: 2023-04-03 | End: 2023-04-11

## 2023-04-03 RX ORDER — DILTIAZEM HYDROCHLORIDE 180 MG/1
CAPSULE, EXTENDED RELEASE ORAL
Qty: 1 CAPSULE | Refills: 3 | Status: SHIPPED | OUTPATIENT
Start: 2023-04-03 | End: 2023-04-03

## 2023-04-03 NOTE — TELEPHONE ENCOUNTER
Dr. Cisse,     Patient calling about mychart. Per pt stated her feet are very swollen and she would like to have her medications changed as you have spoken about this with her in the past. Per pt legs are hurting from the water retention.     Per pt if she doesn't answer phone ok to send mychart.     Please advise.     Thanks,  RUBY Cox  [facility]

## 2023-04-03 NOTE — TELEPHONE ENCOUNTER
See mychart 3/29/23. Closing encounter to reduce miscommunication.     Thanks,  RUBY Cox  [facility]

## 2023-04-03 NOTE — TELEPHONE ENCOUNTER
..  Patient Returning Call    Reason for call:  Need diabetes meds feet swollen left 2msg already    Information relayed to patient:  te    Patient has additional questions:  No      Could we send this information to you in [employer] or would you prefer to receive a phone call?:   Patient would prefer a phone call   Okay to leave a detailed message?: Yes at Cell number on file:    Telephone Information:   Mobile [phone]

## 2023-04-11 ENCOUNTER — OFFICE VISIT (OUTPATIENT)
Dept: FAMILY MEDICINE | Facility: CLINIC | Age: 73
End: 2023-04-11
Payer: MEDICARE

## 2023-04-11 VITALS
HEIGHT: 63 IN | HEART RATE: 66 BPM | BODY MASS INDEX: 36.14 KG/M2 | TEMPERATURE: 97.1 F | OXYGEN SATURATION: 97 % | DIASTOLIC BLOOD PRESSURE: 85 MMHG | WEIGHT: 204 LBS | SYSTOLIC BLOOD PRESSURE: 144 MMHG

## 2023-04-11 DIAGNOSIS — Z12.11 SCREEN FOR COLON CANCER: ICD-10-CM

## 2023-04-11 DIAGNOSIS — I10 ESSENTIAL HYPERTENSION, BENIGN: Primary | ICD-10-CM

## 2023-04-11 PROCEDURE — 99213 OFFICE O/P EST LOW 20 MIN: CPT | Performed by: FAMILY MEDICINE

## 2023-04-11 RX ORDER — LOSARTAN POTASSIUM 50 MG/1
50 TABLET ORAL DAILY
Qty: 30 TABLET | Refills: 0 | Status: SHIPPED | OUTPATIENT
Start: 2023-04-11 | End: 2023-04-26

## 2023-04-11 NOTE — PROGRESS NOTES
Assessment & Plan     Essential hypertension, benign  Advised increase cozaar  Follow up ancilly BP check and Potassium check  - losartan (COZAAR) 50 MG tablet; Take 1 tablet (50 mg) by mouth daily  - Potassium; Future  - Creatinine; Future    Screen for colon cancer  Again  remindede  importance of colon cancer screen and follow up with her GYN for her D and C as recommended by GYN    Marysol Cisse MD  [facility] JOSE Doshi is a 72 year old, presenting for the following health issues:  Recheck Medication (Recheck medication change. Currently taking losartan (COZAAR) 50 MG tablet and stopped taking diltiazem ER (DILT-XR) 180 MG 24 hr capsule about 10 days. ) and Other (Noticed tingling, burning on both legs down to the ankles )         View : No data to display.              HPI     Hypertension Follow-up      Do you check your blood pressure regularly outside of the clinic? Yes     Are you following a low salt diet? Yes    Are your blood pressures ever more than 140 on the top number (systolic) OR more   than 90 on the bottom number (diastolic), for example 140/90? No      How many servings of fruits and vegetables do you eat daily?  0-1    On average, how many sweetened beverages do you drink each day (Examples: soda, juice, sweet tea, etc.  Do NOT count diet or artificially sweetened beverages)?   0    How many days per week do you exercise enough to make your heart beat faster? none    How many minutes a day do you exercise enough to make your heart beat faster? none    How many days per week do you miss taking your medication? 0    Review of Systems   CONSTITUTIONAL: NEGATIVE for fever, chills, change in weight  ENT/MOUTH: NEGATIVE for ear, mouth and throat problems  RESP: NEGATIVE for significant cough or SOB  CV: NEGATIVE for chest pain, palpitations or peripheral edema  GI: NEGATIVE for nausea, abdominal pain, heartburn, or change in bowel habits  ROS otherwise negative     "  Objective    BP (!) 144/85   Pulse 66   Temp 97.1  F (36.2  C) (Temporal)   Ht 1.6 m (5' 2.99\")   Wt 92.5 kg (204 lb)   SpO2 97%   BMI 36.15 kg/m    Body mass index is 36.15 kg/m .  Physical Exam   GENERAL: healthy, alert and no distress  NECK: no adenopathy, no asymmetry, masses, or scars and thyroid normal to palpation  RESP: lungs clear to auscultation - no rales, rhonchi or wheezes  CV: regular rate and rhythm, normal S1 S2, no S3 or S4, no murmur, click or rub, no peripheral edema and peripheral pulses strong  ABDOMEN: soft, nontender, no hepatosplenomegaly, no masses and bowel sounds normal  MS: no gross musculoskeletal defects noted, no edema    Office Visit on 03/14/2023   Component Date Value Ref Range Status     Color Urine 03/14/2023 Yellow  Colorless, Straw, Light Yellow, Yellow Final     Appearance Urine 03/14/2023 Clear  Clear Final     Glucose Urine 03/14/2023 Negative  Negative mg/dL Final     Bilirubin Urine 03/14/2023 Negative  Negative Final     Ketones Urine 03/14/2023 Negative  Negative mg/dL Final     Specific Gravity Urine 03/14/2023 1.010  1.003 - 1.035 Final     Blood Urine 03/14/2023 Negative  Negative Final     pH Urine 03/14/2023 7.5 (H)  5.0 - 7.0 Final     Protein Albumin Urine 03/14/2023 Negative  Negative mg/dL Final     Urobilinogen Urine 03/14/2023 0.2  0.2, 1.0 E.U./dL Final     Nitrite Urine 03/14/2023 Negative  Negative Final     Leukocyte Esterase Urine 03/14/2023 Small (A)  Negative Final     Hemoglobin 03/14/2023 13.5  11.7 - 15.7 g/dL Final     Hemoglobin A1C 03/14/2023 6.9 (H)  0.0 - 5.6 % Final    Normal <5.7%   Prediabetes 5.7-6.4%    Diabetes 6.5% or higher     Note: Adopted from ADA consensus guidelines.     Creatinine Urine mg/dL 03/14/2023 68  mg/dL Final     Albumin Urine mg/L 03/14/2023 8  mg/L Final     Albumin Urine mg/g Cr 03/14/2023 11.76  0.00 - 25.00 mg/g Cr Final     Potassium 03/14/2023 3.9  3.4 - 5.3 mmol/L Final     Bacteria Urine 03/14/2023 Few (A) "  None Seen /HPF Final     RBC Urine 03/14/2023 None Seen  0-2 /HPF /HPF Final     WBC Urine 03/14/2023 5-10 (A)  0-5 /HPF /HPF Final     Squamous Epithelials Urine 03/14/2023 Few (A)  None Seen /LPF Final

## 2023-04-14 DIAGNOSIS — I10 ESSENTIAL HYPERTENSION, BENIGN: ICD-10-CM

## 2023-04-14 RX ORDER — LOSARTAN POTASSIUM 50 MG/1
TABLET ORAL
Qty: 90 TABLET | Refills: 0 | OUTPATIENT
Start: 2023-04-14

## 2023-04-20 ENCOUNTER — ANCILLARY PROCEDURE (OUTPATIENT)
Dept: MAMMOGRAPHY | Facility: CLINIC | Age: 73
End: 2023-04-20
Payer: MEDICARE

## 2023-04-20 DIAGNOSIS — Z12.31 VISIT FOR SCREENING MAMMOGRAM: ICD-10-CM

## 2023-04-20 PROCEDURE — 77063 BREAST TOMOSYNTHESIS BI: CPT | Mod: TC | Performed by: RADIOLOGY

## 2023-04-20 PROCEDURE — 77067 SCR MAMMO BI INCL CAD: CPT | Mod: TC | Performed by: RADIOLOGY

## 2023-04-25 ENCOUNTER — LAB (OUTPATIENT)
Dept: LAB | Facility: CLINIC | Age: 73
End: 2023-04-25
Payer: MEDICARE

## 2023-04-25 ENCOUNTER — ALLIED HEALTH/NURSE VISIT (OUTPATIENT)
Dept: FAMILY MEDICINE | Facility: CLINIC | Age: 73
End: 2023-04-25
Payer: MEDICARE

## 2023-04-25 VITALS — SYSTOLIC BLOOD PRESSURE: 150 MMHG | DIASTOLIC BLOOD PRESSURE: 60 MMHG

## 2023-04-25 DIAGNOSIS — I10 ESSENTIAL HYPERTENSION, BENIGN: Primary | ICD-10-CM

## 2023-04-25 DIAGNOSIS — I10 ESSENTIAL HYPERTENSION, BENIGN: ICD-10-CM

## 2023-04-25 LAB
CREAT SERPL-MCNC: 1.17 MG/DL (ref 0.52–1.04)
GFR SERPL CREATININE-BSD FRML MDRD: 49 ML/MIN/1.73M2
POTASSIUM BLD-SCNC: 4.6 MMOL/L (ref 3.4–5.3)

## 2023-04-25 PROCEDURE — 99207 PR NO CHARGE NURSE ONLY: CPT

## 2023-04-25 PROCEDURE — 82565 ASSAY OF CREATININE: CPT

## 2023-04-25 PROCEDURE — 36415 COLL VENOUS BLD VENIPUNCTURE: CPT

## 2023-04-25 PROCEDURE — 84132 ASSAY OF SERUM POTASSIUM: CPT

## 2023-04-25 NOTE — PROGRESS NOTES
Glenda Bales is a 72 year old patient who comes in today for a Blood Pressure check.  Initial BP:  BP (!) 150/60      Data Unavailable  Disposition: follow-up as previously indicated by provider and results routed to provider.    Tori Alanis CMA

## 2023-04-26 DIAGNOSIS — I10 ESSENTIAL HYPERTENSION, BENIGN: ICD-10-CM

## 2023-04-26 RX ORDER — LOSARTAN POTASSIUM 50 MG/1
50 TABLET ORAL DAILY
Qty: 30 TABLET | Refills: 0 | Status: SHIPPED | OUTPATIENT
Start: 2023-04-26 | End: 2023-05-08

## 2023-04-26 NOTE — TELEPHONE ENCOUNTER
Requested Prescriptions   Pending Prescriptions Disp Refills     losartan (COZAAR) 50 MG tablet 30 tablet 0     Sig: Take 1 tablet (50 mg) by mouth daily       There is no refill protocol information for this order        Marysol Rodriguez   [facility]

## 2023-05-06 ENCOUNTER — HEALTH MAINTENANCE LETTER (OUTPATIENT)
Age: 73
End: 2023-05-06

## 2023-05-07 DIAGNOSIS — I10 ESSENTIAL HYPERTENSION, BENIGN: ICD-10-CM

## 2023-05-08 ENCOUNTER — ALLIED HEALTH/NURSE VISIT (OUTPATIENT)
Dept: FAMILY MEDICINE | Facility: CLINIC | Age: 73
End: 2023-05-08
Payer: MEDICARE

## 2023-05-08 VITALS — DIASTOLIC BLOOD PRESSURE: 66 MMHG | HEART RATE: 52 BPM | SYSTOLIC BLOOD PRESSURE: 134 MMHG

## 2023-05-08 DIAGNOSIS — I10 ESSENTIAL HYPERTENSION, BENIGN: Primary | ICD-10-CM

## 2023-05-08 PROCEDURE — 99207 PR NO CHARGE NURSE ONLY: CPT

## 2023-05-08 RX ORDER — LOSARTAN POTASSIUM 50 MG/1
TABLET ORAL
Qty: 90 TABLET | Refills: 0 | Status: SHIPPED | OUTPATIENT
Start: 2023-05-08 | End: 2023-06-06

## 2023-05-08 NOTE — NURSING NOTE
/66 (BP Location: Left arm, Patient Position: Chair, Cuff Size: Adult Regular)   Pulse 52       Jazmyn LOPEZ MA

## 2023-05-25 ENCOUNTER — VIRTUAL VISIT (OUTPATIENT)
Dept: FAMILY MEDICINE | Facility: CLINIC | Age: 73
End: 2023-05-25
Payer: MEDICARE

## 2023-05-25 ENCOUNTER — NURSE TRIAGE (OUTPATIENT)
Dept: FAMILY MEDICINE | Facility: CLINIC | Age: 73
End: 2023-05-25

## 2023-05-25 DIAGNOSIS — J06.9 UPPER RESPIRATORY TRACT INFECTION, UNSPECIFIED TYPE: Primary | ICD-10-CM

## 2023-05-25 PROCEDURE — 99442 PR PHYSICIAN TELEPHONE EVALUATION 11-20 MIN: CPT | Mod: 95 | Performed by: FAMILY MEDICINE

## 2023-05-25 RX ORDER — BENZONATATE 200 MG/1
200 CAPSULE ORAL 3 TIMES DAILY PRN
Qty: 30 CAPSULE | Refills: 0 | Status: SHIPPED | OUTPATIENT
Start: 2023-05-25 | End: 2024-01-18

## 2023-05-25 ASSESSMENT — ENCOUNTER SYMPTOMS: COUGH: 1

## 2023-05-25 NOTE — PROGRESS NOTES
Glenda is a 72 year old who is being evaluated via a billable telephone visit.    12:10 PM -start visit  What phone number would you like to be contacted at? [phone]  How would you like to obtain your AVS? [place]  Distant Location (provider location):  Off-site    Assessment & Plan     Upper respiratory tract infection, unspecified type  Advised rest/fluids  Use albuterol inh every 4-6  Hours as need  Pt did 4 covid test was negative  - benzonatate (TESSALON) 200 MG capsule; Take 1 capsule (200 mg) by mouth 3 times daily as needed for cough  Has some wheezing-use albuterol inh  [employer] Inhaler [email or web address]      Marysol Cisse MD  [facility]   Glenda is a 72 year old, presenting for the following health issues:  Cough (X 6 day)        5/25/2023    11:33 AM   Additional Questions   Roomed by Allyson Garcia         Acute Illness  Acute illness concerns: cough and fatigue  Onset/Duration: 6 days  Symptoms:  Fever: No  Chills/Sweats: No  Headache (location?): no  Sinus Pressure: runny nose    Conjunctivitis:  No  Ear Pain: no but ears feel plugged  Rhinorrhea: YES  Congestion: YES  Sore Throat: No  Cough: YES-after cough really hard notices blood.   Wheeze: YES some  Decreased Appetite: YES  Nausea: No  Vomiting: No  Diarrhea No  Dysuria/Freq.: No  Dysuria or Hematuria: No  Fatigue/Achiness: YES  Sick/Strep Exposure: No  No fever or chills  Therapies tried and outcome: Robitussin helps         Review of Systems   Respiratory: Positive for cough.       Constitutional, HEENT, cardiovascular, pulmonary, GI, , musculoskeletal, neuro, skin, endocrine and psych systems are negative, except as otherwise noted.      Objective           Vitals:  No vitals were obtained today due to virtual visit.    Physical Exam   healthy, alert and no distress  PSYCH: Alert and oriented times 3; coherent speech, normal   rate and volume, able to articulate logical thoughts,  able   to abstract reason, no tangential thoughts, no hallucinations   or delusions  Her affect is normal  RESP: No cough, no audible wheezing, able to talk in full sentences  Remainder of exam unable to be completed due to telephone visits      Phone call duration: as above minutes  12:21 PM -visit complete

## 2023-05-25 NOTE — TELEPHONE ENCOUNTER
"No in clinic appointment available at [facility].  Offered another site or .  Patient stated that she will do a phone visit with Marysol Molina to discuss symptoms today and if provider feels she needs to be seen in person then she is willing to drive to   Virtual appointment scheduled for today with Marysol Molina   Patient not in any respiratory distress  Home COVID19 tests have been negative     Reason for Disposition    Wheezing is present    Additional Information    Negative: Bluish (or gray) lips or face    Negative: SEVERE difficulty breathing (e.g., struggling for each breath, speaks in single words)    Negative: Rapid onset of cough and has hives    Negative: Coughing started suddenly after medicine, an allergic food or bee sting    Negative: Difficulty breathing after exposure to flames, smoke, or fumes    Negative: Sounds like a life-threatening emergency to the triager    Negative: MODERATE difficulty breathing (e.g., speaks in phrases, SOB even at rest, pulse 100-120) and still present when not coughing    Negative: Chest pain present when not coughing    Negative: Passed out (i.e., fainted, collapsed and was not responding)    Negative: Patient sounds very sick or weak to the triager    Answer Assessment - Initial Assessment Questions  1. ONSET: \"When did the cough begin?\"       yesterday  2. SEVERITY: \"How bad is the cough today?\"       frequent  3. SPUTUM: \"Describe the color of your sputum\" (none, dry cough; clear, white, yellow, green)      Thinks she noted pinkish sputum at one point.   4. HEMOPTYSIS: \"Are you coughing up any blood?\" If so ask: \"How much?\" (flecks, streaks, tablespoons, etc.)      Thinks she noted pinkish tinged sputum at one point   5. DIFFICULTY BREATHING: \"Are you having difficulty breathing?\" If Yes, ask: \"How bad is it?\" (e.g., mild, moderate, severe)     - MILD: No SOB at rest, mild SOB with walking, speaks normally in sentences, can lie down, no retractions, " "pulse < 100.     - MODERATE: SOB at rest, SOB with minimal exertion and prefers to sit, cannot lie down flat, speaks in phrases, mild retractions, audible wheezing, pulse 100-120.     - SEVERE: Very SOB at rest, speaks in single words, struggling to breathe, sitting hunched forward, retractions, pulse > 120       Denies SOB but noted intermittent wheezing  6. FEVER: \"Do you have a fever?\" If Yes, ask: \"What is your temperature, how was it measured, and when did it start?\"      N/a  7. CARDIAC HISTORY: \"Do you have any history of heart disease?\" (e.g., heart attack, congestive heart failure)        8. LUNG HISTORY: \"Do you have any history of lung disease?\"  (e.g., pulmonary embolus, asthma, emphysema)        9. PE RISK FACTORS: \"Do you have a history of blood clots?\" (or: recent major surgery, recent prolonged travel, bedridden)        10. OTHER SYMPTOMS: \"Do you have any other symptoms?\" (e.g., runny nose, wheezing, chest pain)        Intermittent wheezing   11. PREGNANCY: \"Is there any chance you are pregnant?\" \"When was your last menstrual period?\"          12. TRAVEL: \"Have you traveled out of the country in the last month?\" (e.g., travel history, exposures)    Protocols used: MARLON-MARYBETH-SYEDA Wolff RN  [facility]    "

## 2023-06-06 ENCOUNTER — OFFICE VISIT (OUTPATIENT)
Dept: FAMILY MEDICINE | Facility: CLINIC | Age: 73
End: 2023-06-06
Payer: MEDICARE

## 2023-06-06 ENCOUNTER — LAB (OUTPATIENT)
Dept: FAMILY MEDICINE | Facility: CLINIC | Age: 73
End: 2023-06-06

## 2023-06-06 VITALS
DIASTOLIC BLOOD PRESSURE: 82 MMHG | HEART RATE: 60 BPM | OXYGEN SATURATION: 97 % | WEIGHT: 201.2 LBS | HEIGHT: 63 IN | BODY MASS INDEX: 35.65 KG/M2 | SYSTOLIC BLOOD PRESSURE: 136 MMHG | TEMPERATURE: 98.2 F

## 2023-06-06 DIAGNOSIS — I10 ESSENTIAL HYPERTENSION, BENIGN: ICD-10-CM

## 2023-06-06 DIAGNOSIS — E03.9 ACQUIRED HYPOTHYROIDISM: ICD-10-CM

## 2023-06-06 DIAGNOSIS — Z12.11 SCREEN FOR COLON CANCER: ICD-10-CM

## 2023-06-06 DIAGNOSIS — J06.9 UPPER RESPIRATORY TRACT INFECTION, UNSPECIFIED TYPE: Primary | ICD-10-CM

## 2023-06-06 DIAGNOSIS — G60.9 IDIOPATHIC PERIPHERAL NEUROPATHY: ICD-10-CM

## 2023-06-06 PROCEDURE — 99213 OFFICE O/P EST LOW 20 MIN: CPT | Performed by: FAMILY MEDICINE

## 2023-06-06 RX ORDER — LOSARTAN POTASSIUM 50 MG/1
50 TABLET ORAL DAILY
Qty: 90 TABLET | Refills: 0 | Status: SHIPPED | OUTPATIENT
Start: 2023-06-06 | End: 2023-06-29

## 2023-06-06 RX ORDER — ALBUTEROL SULFATE 90 UG/1
2 AEROSOL, METERED RESPIRATORY (INHALATION) EVERY 4 HOURS PRN
Qty: 8 G | Refills: 1 | Status: SHIPPED | OUTPATIENT
Start: 2023-06-06 | End: 2024-05-30

## 2023-06-06 RX ORDER — GABAPENTIN 100 MG/1
100 CAPSULE ORAL 2 TIMES DAILY
Qty: 180 CAPSULE | Refills: 3 | Status: SHIPPED | OUTPATIENT
Start: 2023-06-06 | End: 2024-05-30

## 2023-06-06 NOTE — PROGRESS NOTES
"  Assessment & Plan   URI is better  Type 2 diabetes mellitus with stage 3 chronic kidney disease, without long-term current use of insulin (H)  Controlled     Acquired hypothyroidism  Pending labs       Idiopathic peripheral neuropathy  Refilled   - gabapentin (NEURONTIN) 100 MG capsule; Take 1 capsule (100 mg) by mouth 2 times daily    Essential hypertension, benign  Stable   Pt needs labs   Says she will get This at her Physical  - losartan (COZAAR) 50 MG tablet; Take 1 tablet (50 mg) by mouth daily    Screen for colon cancer  Pt states she had Rectal bleeding x 1 when she was constipated  It was BRBPR  Was seen By Dr Espinosa  No family history of colon cancer  Refuses colonoscopy  Says that day she had sana Impaction and Now has had No further Problems since then  Wants stool card  Discussed This can miss colon ca    - COLOGUARD([employer]); Future  She also again has been advised To schedule her D and C with Dr Noguera as recommended By Him   Marysol Cisse MD  [facility] JOSE Doshi is a 72 year old, presenting for the following health issues:  RECHECK (Cough. Was seen for a virtual visit on 05/25/2023. Cough have improved. )        5/25/2023    11:33 AM   Additional Questions   Roomed by Allyson CHAVES     Patient presents with:  RECHECK: Cough. Was seen for a virtual visit on 05/25/2023. Cough have improved.     No sob  No wheezing         Review of Systems   CONSTITUTIONAL: NEGATIVE for fever, chills, change in weight  ENT/MOUTH: NEGATIVE for ear, mouth and throat problems  RESP: NEGATIVE for significant cough or SOB  CV: NEGATIVE for chest pain, palpitations or peripheral edema  GI: NEGATIVE for nausea, abdominal pain, heartburn, or change in bowel habits  PSYCHIATRIC: NEGATIVE for changes in mood or affect  ROS otherwise negative      Objective    BP (!) 142/83   Pulse 60   Temp 98.2  F (36.8  C) (Oral)   Ht 1.6 m (5' 2.99\")   Wt 91.3 kg (201 lb 3.2 oz)   SpO2 97%   " BMI 35.65 kg/m    Body mass index is 35.65 kg/m .  Physical Exam   GENERAL: healthy, alert and no distress  NECK: no adenopathy, no asymmetry, masses, or scars and thyroid normal to palpation  RESP: lungs clear to auscultation - no rales, rhonchi or wheezes  CV: regular rate and rhythm, normal S1 S2, no S3 or S4, no murmur, click or rub, no peripheral edema and peripheral pulses strong  ABDOMEN: soft, nontender, no hepatosplenomegaly, no masses and bowel sounds normal  MS: no gross musculoskeletal defects noted, no edema

## 2023-06-29 ENCOUNTER — OFFICE VISIT (OUTPATIENT)
Dept: FAMILY MEDICINE | Facility: CLINIC | Age: 73
End: 2023-06-29
Payer: MEDICARE

## 2023-06-29 VITALS
HEART RATE: 60 BPM | SYSTOLIC BLOOD PRESSURE: 138 MMHG | DIASTOLIC BLOOD PRESSURE: 82 MMHG | RESPIRATION RATE: 22 BRPM | BODY MASS INDEX: 36.14 KG/M2 | WEIGHT: 204 LBS | HEIGHT: 63 IN | OXYGEN SATURATION: 97 %

## 2023-06-29 DIAGNOSIS — I10 ESSENTIAL HYPERTENSION, BENIGN: ICD-10-CM

## 2023-06-29 DIAGNOSIS — E78.5 HYPERLIPIDEMIA LDL GOAL <70: ICD-10-CM

## 2023-06-29 DIAGNOSIS — E11.22 TYPE 2 DIABETES MELLITUS WITH STAGE 3A CHRONIC KIDNEY DISEASE, WITHOUT LONG-TERM CURRENT USE OF INSULIN (H): ICD-10-CM

## 2023-06-29 DIAGNOSIS — Z00.00 ENCOUNTER FOR MEDICARE ANNUAL WELLNESS EXAM: ICD-10-CM

## 2023-06-29 DIAGNOSIS — E03.9 ACQUIRED HYPOTHYROIDISM: ICD-10-CM

## 2023-06-29 DIAGNOSIS — N18.31 TYPE 2 DIABETES MELLITUS WITH STAGE 3A CHRONIC KIDNEY DISEASE, WITHOUT LONG-TERM CURRENT USE OF INSULIN (H): ICD-10-CM

## 2023-06-29 DIAGNOSIS — E66.01 MORBID OBESITY (H): ICD-10-CM

## 2023-06-29 LAB
CHOLEST SERPL-MCNC: 270 MG/DL
HDLC SERPL-MCNC: 84 MG/DL
LDLC SERPL CALC-MCNC: 154 MG/DL
NONHDLC SERPL-MCNC: 186 MG/DL
T4 FREE SERPL-MCNC: 1.46 NG/DL (ref 0.9–1.7)
TRIGL SERPL-MCNC: 162 MG/DL
TSH SERPL DL<=0.005 MIU/L-ACNC: 4.46 UIU/ML (ref 0.3–4.2)

## 2023-06-29 PROCEDURE — G0439 PPPS, SUBSEQ VISIT: HCPCS | Performed by: FAMILY MEDICINE

## 2023-06-29 PROCEDURE — 36415 COLL VENOUS BLD VENIPUNCTURE: CPT | Performed by: FAMILY MEDICINE

## 2023-06-29 PROCEDURE — 84439 ASSAY OF FREE THYROXINE: CPT | Performed by: FAMILY MEDICINE

## 2023-06-29 PROCEDURE — 80061 LIPID PANEL: CPT | Performed by: FAMILY MEDICINE

## 2023-06-29 PROCEDURE — 84443 ASSAY THYROID STIM HORMONE: CPT | Performed by: FAMILY MEDICINE

## 2023-06-29 PROCEDURE — 99213 OFFICE O/P EST LOW 20 MIN: CPT | Mod: 25 | Performed by: FAMILY MEDICINE

## 2023-06-29 RX ORDER — LOSARTAN POTASSIUM 50 MG/1
50 TABLET ORAL DAILY
Qty: 90 TABLET | Refills: 0 | Status: SHIPPED | OUTPATIENT
Start: 2023-06-29 | End: 2023-08-16

## 2023-06-29 RX ORDER — LOSARTAN POTASSIUM 25 MG/1
25 TABLET ORAL DAILY
Qty: 90 TABLET | Refills: 0 | Status: SHIPPED | OUTPATIENT
Start: 2023-06-29 | End: 2023-08-16

## 2023-06-29 ASSESSMENT — ACTIVITIES OF DAILY LIVING (ADL): CURRENT_FUNCTION: NO ASSISTANCE NEEDED

## 2023-06-29 NOTE — PATIENT INSTRUCTIONS
Patient Education   Personalized Prevention Plan  You are due for the preventive services outlined below.  Your care team is available to assist you in scheduling these services.  If you have already completed any of these items, please share that information with your care team to update in your medical record.  Health Maintenance Due   Topic Date Due     Colorectal Cancer Screening  Never done     COVID-19 Vaccine (6 - Moderna series) 02/26/2023     Annual Wellness Visit  03/17/2023     Endometrial Biopsy  Never done     Colposcopy  Never done     Cholesterol Lab  06/08/2023     Thyroid Function Lab  06/08/2023     Your Health Risk Assessment indicates you feel you are not in good health    A healthy lifestyle helps keep the body fit and the mind alert. It helps protect you from disease, helps you fight disease, and helps prevent chronic disease (disease that doesn't go away) from getting worse. This is important as you get older and begin to notice twinges in muscles and joints and a decline in the strength and stamina you once took for granted. A healthy lifestyle includes good healthcare, good nutrition, weight control, recreation, and regular exercise. Avoid harmful substances and do what you can to keep safe. Another part of a healthy lifestyle is stay mentally active and socially involved.    Good healthcare     Have a wellness visit every year.     If you have new symptoms, let us know right away. Don't wait until the next checkup.     Take medicines exactly as prescribed and keep your medicines in a safe place. Tell us if your medicine causes problems.   Healthy diet and weight control     Eat 3 or 4 small, nutritious, low-fat, high-fiber meals a day. Include a variety of fruits, vegetables, and whole-grain foods.     Make sure you get enough calcium in your diet. Calcium, vitamin D, and exercise help prevent osteoporosis (bone thinning).     If you live alone, try eating with others when you can. That  way you get a good meal and have company while you eat it.     Try to keep a healthy weight. If you eat more calories than your body uses for energy, it will be stored as fat and you will gain weight.     Recreation   Recreation is not limited to sports and team events. It includes any activity that provides relaxation, interest, enjoyment, and exercise. Recreation provides an outlet for physical, mental, and social energy. It can give a sense of worth and achievement. It can help you stay healthy.    Mental Exercise and Social Involvement  Mental and emotional health is as important as physical health. Keep in touch with friends and family. Stay as active as possible. Continue to learn and challenge yourself.   Things you can do to stay mentally active are:    Learn something new, like a foreign language or musical instrument.     Play SCRABBLE or do crossword puzzles. If you cannot find people to play these games with you at home, you can play them with others on your computer through the Internet.     Join a games club--anything from card games to chess or checkers or lawn bowling.     Start a new hobby.     Go back to school.     Volunteer.     Read.   Keep up with world events.    Exercise for a Healthier Heart  You may wonder how you can improve the health of your heart. If you re thinking about exercise, you re on the right track. You don t need to become an athlete. But you do need a certain amount of brisk exercise to help strengthen your heart. If you have been diagnosed with a heart condition, your healthcare provider may advise exercise to help your condition. To help make exercise a habit, choose safe, fun activities.      Exercise with a friend. When activity is fun, you're more likely to stick with it.     Before you start  Check with your healthcare provider before starting an exercise program. This is especially important if you haven't been active for a while. It's also important if you have a  long-term (chronic) health problem such as heart disease, diabetes, or obesity. Also check with your provider if you're at high risk for having these problems.   Why exercise?  Exercising regularly offers many healthy rewards. It can help you do all of these:     Improve your blood cholesterol level to help prevent further heart trouble.    Lower your blood pressure to help prevent a stroke or heart attack.    Control diabetes or reduce your risk of getting this disease.    Improve your heart and lung function.    Reach and stay at a healthy weight.    Make your muscles stronger so you can stay active.    Prevent falls and fractures by slowing the loss of bone mass (osteoporosis).    Manage stress better.    Improve your sense of self and your body image.  Exercise tips      Ease into your routine. Set small goals. Then build on them. Talk with your healthcare provider first before starting an exercise routine if you're not sure what your activity level should be.    Exercise on most days. Aim for a total of at least 150 minutes (2 hours and 30 minutes) or more of moderate-intensity aerobic activity each week. You could also do 75 minutes (1 hour and 15 minutes) or more of vigorous-intensity aerobic activity each week. Or try for a combination of both. Moderate activity means that you breathe heavier and your heart rate increases, but you can still talk. Think about doing at least 30 minutes of moderate exercise, 5 times a week. It's OK to work up to the 30-minute period over time. Examples of moderate-intensity activity are brisk walking, gardening, and water aerobics.    Step up your daily activity level.  Along with your exercise program, try being more active the whole day. Walk instead of drive. Or park further away so that you take more steps each day. Do more household tasks or yard work. You may not be able to meet the advised amount of physical activity. But doing some moderate- or vigorous-intensity  aerobic activity can help reduce your risk for heart disease. Your healthcare provider can help you figure out what is best for you.    Choose 1 or more activities you enjoy.  Walking is one of the easiest things you can do. You can also try swimming, riding a bike, dancing, or taking an exercise class.    Call 911  Call 911 right away if any of these occur:     Chest pain that doesn't go away quickly with rest    New burning, tightness, pressure, or heaviness in your chest, neck, shoulders, back, or arms    Abnormal or severe shortness of breath    A very fast or irregular heartbeat (palpitations)    Fainting  When to call your healthcare provider  Call your healthcare provider if you have any of these:     Dizziness or lightheadedness    Mild shortness of breath or chest pain    Increased or new joint or muscle pain    [employer] last reviewed this educational content on 7/1/2022 2000-2022 [employer]. All rights reserved. This information is not intended as a substitute for professional medical care. Always follow your healthcare professional's instructions.          Understanding USDA MyPlate  The USDA has guidelines to help you make healthy food choices. These are called MyPlate. MyPlate shows the food groups that make up healthy meals using the image of a place setting. Before you eat, think about the healthiest choices for what to put on your plate or in your cup or bowl. To learn more about building a healthy plate, visit www.choosemyplate.gov.     The food groups    Fruits. Any fruit or 100% fruit juice counts as part of the Fruit Group. Fruits may be fresh, canned, frozen, or dried, and may be whole, cut-up, or pureed. Make 1/2 of your plate fruits and vegetables.    Vegetables. Any vegetable or 100% vegetable juice counts as a member of the Vegetable Group. Vegetables may be fresh, frozen, canned, or dried. They can be served raw or cooked and may be whole, cut-up, or mashed. Make 1/2 of  your plate fruits and vegetables.    Grains. All foods made from grains are part of the Grains Group. These include wheat, rice, oats, cornmeal, and barley. Grains are often used to make foods such as bread, pasta, oatmeal, cereal, tortillas, and grits. Grains should be no more than 1/4 of your plate. At least half of your grains should be whole grains.    Protein. This group includes meat, poultry, seafood, beans and peas, eggs, processed soy products (such as tofu), nuts (including nut butters), and seeds. Make protein choices no more than 1/4 of your plate. Meat and poultry choices should be lean or low fat.    Dairy. The Dairy Group includes all fluid milk products and foods made from milk that contain calcium, such as yogurt and cheese. (Foods that have little calcium, such as cream, butter, and cream cheese, are not part of this group.) Most dairy choices should be low-fat or fat-free.    Oils. Oils aren't a food group, but they do contain essential nutrients. However it's important to watch your intake of oils. These are fats that are liquid at room temperature. They include canola, corn, olive, soybean, vegetable, and sunflower oil. Foods that are mainly oil include mayonnaise, certain salad dressings, and soft margarines. You likely already get your daily oil allowance from the foods you eat.  Things to limit  Eating healthy also means limiting these things in your diet:    Salt (sodium). Many processed foods have a lot of sodium. To keep sodium intake down, eat fresh vegetables, meats, poultry, and seafood when possible. Purchase low-sodium, reduced-sodium, or no-salt-added food products at the store. And don't add salt to your meals at home. Instead, season them with herbs and spices such as dill, oregano, cumin, and paprika. Or try adding flavor with lemon or lime zest and juice.    Saturated fat. Saturated fats are most often found in animal products such as beef, pork, and chicken. They are often solid  at room temperature, such as butter. To reduce your saturated fat intake, choose leaner cuts of meat and poultry. And try healthier cooking methods such as grilling, broiling, roasting, or baking. For a simple lower-fat swap, use plain nonfat yogurt instead of mayonnaise when making potato salad or macaroni salad.    Added sugars. These are sugars added to foods. They are in foods such as ice cream, candy, soda, fruit drinks, sports drinks, energy drinks, cookies, pastries, jams, and syrups. Cut down on added sugars by sharing sweet treats with a family member or friend. You can also choose fruit for dessert, and drink water or other unsweetened beverages.  Screen last reviewed this educational content on 6/1/2020 2000-2022 [employer]. All rights reserved. This information is not intended as a substitute for professional medical care. Always follow your healthcare professional's instructions.

## 2023-06-29 NOTE — PROGRESS NOTES
"SUBJECTIVE:   Glenda is a 72 year old who presents for Preventive Visit.      5/25/2023    11:33 AM   Additional Questions   Roomed by Allyson     Are you in the first 12 months of your Medicare coverage?  No    Healthy Habits:    In general, how would you rate your overall health?  Fair    Frequency of exercise:  1 day/week    Duration of exercise:  Less than 15 minutes    Do you usually eat at least 4 servings of fruit and vegetables a day, include whole grains    & fiber and avoid regularly eating high fat or \"junk\" foods?  No    Taking medications regularly:  Yes    Barriers to taking medications:  None    Medication side effects:  None    Ability to successfully perform activities of daily living:  No assistance needed    Home Safety:  No safety concerns identified    Hearing Impairment:  No hearing concerns    In the past 6 months, have you been bothered by leaking of urine?  No    In general, how would you rate your overall mental or emotional health?  Good      PHQ-2 Total Score:    Additional concerns today:  No        Have you ever done Advance Care Planning? (For example, a Health Directive, POLST, or a discussion with a medical provider or your loved ones about your wishes): No, advance care planning information given to patient to review.  Advanced care planning was discussed at today's visit.      Fall risk  Fallen 2 or more times in the past year?: No  Any fall with injury in the past year?: No    Cognitive Screening   1) Repeat 3 items (Leader, Season, Table)    2) Clock draw: ABNORMAL .  3) 3 item recall: Recalls 3 objects  Results: 3 items recalled: COGNITIVE IMPAIRMENT LESS LIKELY    Mini-CogTM Copyright LISETTE Palafox. Licensed by the author for use in [facility]; reprinted with permission (kevin@.[facility]). All rights reserved.      Do you have sleep apnea, excessive snoring or daytime drowsiness?: no    Reviewed and updated as needed this visit by clinical staff   Tobacco  Allergies  Meds  " Problems  Med Hx  Surg Hx  Fam Hx          Reviewed and updated as needed this visit by Provider   Tobacco  Allergies  Meds  Problems  Med Hx  Surg Hx  Fam Hx         Social History     Tobacco Use     Smoking status: Former     Packs/day: 0.00     Years: 1.00     Pack years: 0.00     Types: Cigarettes     Quit date: 6/15/1978     Years since quittin.0     Smokeless tobacco: Never     Tobacco comments:     quit in    Substance Use Topics     Alcohol use: Yes     Comment: 1 red wine before bef             10/17/2019     7:27 AM   Alcohol Use   Prescreen: >3 drinks/day or >7 drinks/week? No          No data to display              Do you have a current opioid prescription? No  Do you use any other controlled substances or medications that are not prescribed by a provider? None  Diabetes is doing well  Hypertension Follow-up      Do you check your blood pressure regularly outside of the clinic? Yes     Are you following a low salt diet? Yes    Are your blood pressures ever more than 140 on the top number (systolic) OR more   than 90 on the bottom number (diastolic), for example 140/90? Yes  Doing well On Thyroid meds    Current providers sharing in care for this patient include:   Patient Care Team:  Marysol Cisse MD as PCP - General (Family Practice)  Marysol Cisse MD as Assigned PCP  Stacie Reno, RN as Diabetes Educator (Diabetes Education)  Jama Carpenter MD as Assigned Surgical Provider  Ruben Noguera MD as Assigned OBGYN Provider    The following health maintenance items are reviewed in Epic and correct as of today:  Health Maintenance   Topic Date Due     COLORECTAL CANCER SCREENING  Never done     COVID-19 Vaccine (6 - Moderna series) 2023     ENDOMETRIAL BIOPSY  Never done     COLPOSCOPY  Never done     LIPID  2023     TSH W/FREE T4 REFLEX  2023     EYE EXAM  2023     A1C  2023     DIABETIC FOOT EXAM  10/26/2023     PAP FOLLOW-UP  2024      BMP  02/06/2024     MICROALBUMIN  03/14/2024     HEMOGLOBIN  03/14/2024     ANNUAL REVIEW OF HM ORDERS  06/06/2024     MEDICARE ANNUAL WELLNESS VISIT  06/29/2024     FALL RISK ASSESSMENT  06/29/2024     MAMMO SCREENING  04/20/2025     ADVANCE CARE PLANNING  03/17/2027     DTAP/TDAP/TD IMMUNIZATION (4 - Td or Tdap) 04/24/2028     DEXA  05/14/2033     HEPATITIS C SCREENING  Completed     PHQ-2 (once per calendar year)  Completed     INFLUENZA VACCINE  Completed     Pneumococcal Vaccine: 65+ Years  Completed     URINALYSIS  Completed     ZOSTER IMMUNIZATION  Completed     IPV IMMUNIZATION  Aged Out     MENINGITIS IMMUNIZATION  Aged Out     Lab work is in process  Labs reviewed in EPIC  BP Readings from Last 3 Encounters:   06/29/23 138/82   06/06/23 136/82   05/08/23 134/66    Wt Readings from Last 3 Encounters:   06/29/23 92.5 kg (204 lb)   06/06/23 91.3 kg (201 lb 3.2 oz)   04/11/23 92.5 kg (204 lb)                  Patient Active Problem List   Diagnosis     Essential hypertension, benign     Chronic rhinitis     Acquired hypothyroidism     Hyperlipidemia LDL goal <70     Gastroesophageal reflux disease without esophagitis     Nonrheumatic aortic valve insufficiency     Anxiety     Osteoarthritis of knee, unspecified laterality, unspecified osteoarthritis type     Other irritable bowel syndrome     Alcohol abuse, in remission     Fatty liver     Type 2 diabetes mellitus with stage 3 chronic kidney disease, without long-term current use of insulin (H)     Chronic kidney disease, stage 3 (moderate)     Advanced directives, counseling/discussion     Cough     Primary osteoarthritis of left hip     Aortic valve insufficiency, etiology of cardiac valve disease unspecified     Postmenopausal bleeding     Infection due to 2019 novel coronavirus     Pulmonary nodule     Atypical glandular cells on cervical Pap smear     Morbid obesity (H)     Past Surgical History:   Procedure Laterality Date     CATARACT IOL, RT/LT  Right 2018    Model ZCB00 SN:[id number] +11.5 Dioptor - Jermaine Akers MD ([facility])     CATARACT IOL, RT/LT Left 2018    Model ZCB00 SN:[id number] +12.5 Dioptor - Jermaine Akers MD ([facility])     DILATION AND CURETTAGE SUCTION      AB     DILATION AND CURETTAGE SUCTION      in 40's     NO HISTORY OF SURGERY         Social History     Tobacco Use     Smoking status: Former     Packs/day: 0.00     Years: 1.00     Pack years: 0.00     Types: Cigarettes     Quit date: 6/15/1978     Years since quittin.0     Smokeless tobacco: Never     Tobacco comments:     quit in    Substance Use Topics     Alcohol use: Yes     Comment: 1 red wine before bef     Family History   Problem Relation Age of Onset     Diabetes Mother      Coronary Artery Disease Father          with a heart attack     Glaucoma Cousin      Colon Cancer No family hx of      Breast Cancer No family hx of      Macular Degeneration No family hx of          Current Outpatient Medications   Medication Sig Dispense Refill     ACE/ARB/ARNI NOT PRESCRIBED (INTENTIONAL) Please choose reason not prescribed from choices below.       albuterol (PROAIR HFA/PROVENTIL HFA/VENTOLIN HFA) 108 (90 Base) MCG/ACT inhaler Inhale 2 puffs into the lungs every 4 hours as needed for shortness of breath or wheezing 8 g 1     alcohol swab prep pads Use to swab area of injection/milena as directed. 100 each 3     aspirin (ASA) 81 MG EC tablet Take 81 mg by mouth daily       atenolol (TENORMIN) 50 MG tablet Take 1 tablet (50 mg) by mouth 2 times daily 180 tablet 3     atorvastatin (LIPITOR) 10 MG tablet Take 1 tablet (10 mg) by mouth daily 90 tablet 3     benzonatate (TESSALON) 200 MG capsule Take 1 capsule (200 mg) by mouth 3 times daily as needed for cough 30 capsule 0     blood glucose (NO BRAND SPECIFIED) test strip Use to test blood sugar 1 times daily or as directed. 100 strip 11     cetirizine (ZYRTEC) 10 MG tablet Take 10 mg by mouth daily       fluticasone  (FLONASE) 50 MCG/ACT spray Spray 1 spray into both nostrils daily as needed        gabapentin (NEURONTIN) 100 MG capsule Take 1 capsule (100 mg) by mouth 2 times daily 180 capsule 3     hydrochlorothiazide (HYDRODIURIL) 25 MG tablet Take 1 tablet (25 mg) by mouth daily 90 tablet 3     levothyroxine (SYNTHROID/LEVOTHROID) 112 MCG tablet TAKE 1 TABLET(112 MCG) BY MOUTH DAILY 90 tablet 2     losartan (COZAAR) 25 MG tablet Take 1 tablet (25 mg) by mouth daily Add to 50 mg tablet -Total 75 mg daily 90 tablet 0     losartan (COZAAR) 50 MG tablet Take 1 tablet (50 mg) by mouth daily 90 tablet 0     omeprazole (PRILOSEC) 20 MG DR capsule TAKE 1 CAPSULE(20 MG) BY MOUTH DAILY 90 capsule 1     Allergies   Allergen Reactions     Diltiazem Hcl      Leg swelling     Norvasc [Amlodipine]      Leg swellin     Seasonal      Seasonal Allergies      Recent Labs   Lab Test 04/25/23  1102 03/14/23  1255 02/06/23  0918 10/26/22  1012 07/26/22  1305 06/08/22  0805 04/28/22  1111 03/17/22  0855 03/17/22  0844 12/01/21  1546 09/14/21  1339 07/14/21  1258 06/15/21  1217 05/19/21  1032 08/20/20  0733 07/22/20  0737 11/06/19  0935 10/17/19  0744 05/16/19  1050 04/19/19  0925   A1C  --  6.9*  --  6.7*  --   --   --   --  6.0*  --   --   --    < >  --    < > 5.4   < > 5.4   < > 7.7*   LDL  --   --   --   --   --  92 113* 176*  --   --   --  164*   < >  --    < > 125*   < >  --   --  112*   HDL  --   --   --   --   --  94 98 98  --   --   --  88   < >  --    < > 91   < >  --   --  74   TRIG  --   --   --   --   --  82 87 111  --   --   --  99   < >  --    < > 106   < >  --   --  79   ALT  --   --   --   --   --   --   --   --   --   --  52*  --   --   --   --   --   --  40  --  61*   CR 1.17*  --  1.22*  --    < >  --   --   --   --   --  1.02  --   --  1.02  --  0.98   < >  --    < >  --    GFRESTIMATED 49*  --  47*  --    < >  --   --   --   --   --  55*  --   --  55*  --  58*   < >  --    < >  --    GFRESTBLACK  --   --   --   --   --   --    "--   --   --   --   --   --   --  64  --  67   < >  --    < >  --    POTASSIUM 4.6 3.9 3.1* 3.9   < >  --   --   --   --    < > 3.7  --   --  3.6  --  3.6   < >  --    < >  --    TSH  --   --   --   --   --  6.44*  --   --   --   --   --  2.06  --   --    < >  --   --   --    < > 2.25    < > = values in this interval not displayed.      Pt is getting her mammograms        Review of Systems  CONSTITUTIONAL: NEGATIVE for fever, chills, change in weight  INTEGUMENTARY/SKIN: NEGATIVE for worrisome rashes, moles or lesions  EYES: NEGATIVE for vision changes or irritation  ENT/MOUTH: NEGATIVE for ear, mouth and throat problems  RESP: NEGATIVE for significant cough or SOB  BREAST: NEGATIVE for masses, tenderness or discharge  CV: NEGATIVE for chest pain, palpitations or peripheral edema  GI: NEGATIVE for nausea, abdominal pain, heartburn, or change in bowel habits  : NEGATIVE for frequency, dysuria, or hematuria  MUSCULOSKELETAL:arthralgias  NEURO: NEGATIVE for weakness, dizziness or paresthesias  ENDOCRINE: NEGATIVE for temperature intolerance, skin/hair changes  HEME: NEGATIVE for bleeding problems  PSYCHIATRIC: NEGATIVE for changes in mood or affect    OBJECTIVE:   /82   Pulse 60   Resp 22   Ht 1.594 m (5' 2.75\")   Wt 92.5 kg (204 lb)   SpO2 97%   BMI 36.43 kg/m   Estimated body mass index is 36.43 kg/m  as calculated from the following:    Height as of this encounter: 1.594 m (5' 2.75\").    Weight as of this encounter: 92.5 kg (204 lb).  Physical Exam  GENERAL APPEARANCE: healthy, alert and no distress  EYES: Eyes grossly normal to inspection, PERRL and conjunctivae and sclerae normal  HENT: ear canals and TM's normal, nose and mouth without ulcers or lesions, oropharynx clear and oral mucous membranes moist  NECK: no adenopathy, no asymmetry, masses, or scars and thyroid normal to palpation  RESP: lungs clear to auscultation - no rales, rhonchi or wheezes  BREAST: normal without masses, tenderness or " nipple discharge and no palpable axillary masses or adenopathy  CV: regular rate and rhythm, normal S1 S2, no S3 or S4, no murmur, click or rub, no peripheral edema and peripheral pulses strong  ABDOMEN: soft, nontender, no hepatosplenomegaly, no masses and bowel sounds normal  MS: no musculoskeletal defects are noted and gait is age appropriate without ataxia  SKIN: no suspicious lesions or rashes  NEURO: Normal strength and tone, sensory exam grossly normal, mentation intact and speech normal  PSYCH: mentation appears normal and affect normal/bright    Diagnostic Test Results:  Labs reviewed in Epic    ASSESSMENT / PLAN:   (Z00.00) Encounter for Medicare annual wellness exam  Comment:   Plan: PRIMARY CARE FOLLOW-UP SCHEDULING            (E11.22,  N18.31) Type 2 diabetes mellitus with stage 3a chronic kidney disease, without long-term current use of insulin (H)  Comment: stable   Plan: Lipid panel reflex to direct LDL Non-fasting            (E03.9) Acquired hypothyroidism  Comment: pending   Plan: TSH WITH FREE T4 REFLEX            (I10) Essential hypertension, benign  Comment: High  She will recheck ancillary 1 week  ig still High add additional 25 mg Losartan to a Total of 75 mg   Plan: losartan (COZAAR) 50 MG tablet, losartan         (COZAAR) 25 MG tablet        The potential side effects of this medication have been discussed with the patient.  Call if any significant problems with these are experienced.      (E66.01) Morbid obesity (H)  Comment: discussed Losing wt will help Diabetes , HTN    COUNSELING:  Reviewed preventive health counseling, as reflected in patient instructions       Regular exercise       Healthy diet/nutrition       Vision screening       Hearing screening       Dental care       Bladder control       Fall risk prevention       Osteoporosis prevention/bone health       Advanced Planning         She reports that she quit smoking about 45 years ago. Her smoking use included cigarettes. She  has never used smokeless tobacco.      Appropriate preventive services were discussed with this patient, including applicable screening as appropriate for cardiovascular disease, diabetes, osteopenia/osteoporosis, and glaucoma.  As appropriate for age/gender, discussed screening for colorectal cancer, prostate cancer, breast cancer, and cervical cancer. Checklist reviewing preventive services available has been given to the patient.    Reviewed patients plan of care and provided an AVS. The Intermediate Care Plan ( asthma action plan, low back pain action plan, and migraine action plan) for Glenda meets the Care Plan requirement. This Care Plan has been established and reviewed with the Patient.      Marysol Cisse MD  [facility]    Identified Health Risks:    I have reviewed Opioid Use Disorder and Substance Use Disorder risk factors and made any needed referrals.

## 2023-06-30 RX ORDER — ATORVASTATIN CALCIUM 10 MG/1
10 TABLET, FILM COATED ORAL DAILY
Qty: 90 TABLET | Refills: 3 | Status: ON HOLD | OUTPATIENT
Start: 2023-06-30 | End: 2024-08-28

## 2023-07-05 ENCOUNTER — TELEPHONE (OUTPATIENT)
Dept: FAMILY MEDICINE | Facility: CLINIC | Age: 73
End: 2023-07-05

## 2023-07-05 ENCOUNTER — ALLIED HEALTH/NURSE VISIT (OUTPATIENT)
Dept: FAMILY MEDICINE | Facility: CLINIC | Age: 73
End: 2023-07-05
Payer: MEDICARE

## 2023-07-05 VITALS — SYSTOLIC BLOOD PRESSURE: 160 MMHG | DIASTOLIC BLOOD PRESSURE: 70 MMHG

## 2023-07-05 DIAGNOSIS — N18.30 BENIGN HYPERTENSION WITH CKD (CHRONIC KIDNEY DISEASE) STAGE III (H): Primary | ICD-10-CM

## 2023-07-05 DIAGNOSIS — I12.9 BENIGN HYPERTENSION WITH CKD (CHRONIC KIDNEY DISEASE) STAGE III (H): Primary | ICD-10-CM

## 2023-07-05 DIAGNOSIS — I10 ESSENTIAL HYPERTENSION, BENIGN: ICD-10-CM

## 2023-07-05 NOTE — PROGRESS NOTES
Glenda Bales is a 72 year old patient who comes in today for a Blood Pressure check.  Initial BP:  BP (!) 160/70      Data Unavailable  Disposition: provider notified while patient in the clinic and results routed to provider    Wilberto Hernandez. MA

## 2023-07-05 NOTE — TELEPHONE ENCOUNTER
"Per OV notes from 6/29,    \"Essential hypertension, benign  Comment: High  She will recheck ancillary 1 week\"    Patient scheduled 7/5 at 11 am with RN, however no RN HTN Nurse Orders from PCP. Rescheduled patient to be on ancillary schedule 7/5 at 11 am instead, as instructed by PCP from note.    DONALD SuazoN RN  [facility]  "

## 2023-08-16 ENCOUNTER — OFFICE VISIT (OUTPATIENT)
Dept: FAMILY MEDICINE | Facility: CLINIC | Age: 73
End: 2023-08-16
Payer: MEDICARE

## 2023-08-16 VITALS
DIASTOLIC BLOOD PRESSURE: 76 MMHG | HEART RATE: 58 BPM | WEIGHT: 208.4 LBS | TEMPERATURE: 97.7 F | SYSTOLIC BLOOD PRESSURE: 138 MMHG | BODY MASS INDEX: 37.21 KG/M2 | OXYGEN SATURATION: 96 %

## 2023-08-16 DIAGNOSIS — N18.30 BENIGN HYPERTENSION WITH CKD (CHRONIC KIDNEY DISEASE) STAGE III (H): ICD-10-CM

## 2023-08-16 DIAGNOSIS — E03.9 ACQUIRED HYPOTHYROIDISM: ICD-10-CM

## 2023-08-16 DIAGNOSIS — L57.0 ACTINIC KERATOSIS: ICD-10-CM

## 2023-08-16 DIAGNOSIS — I12.9 BENIGN HYPERTENSION WITH CKD (CHRONIC KIDNEY DISEASE) STAGE III (H): ICD-10-CM

## 2023-08-16 DIAGNOSIS — K21.9 GASTROESOPHAGEAL REFLUX DISEASE, UNSPECIFIED WHETHER ESOPHAGITIS PRESENT: ICD-10-CM

## 2023-08-16 DIAGNOSIS — N18.31 TYPE 2 DIABETES MELLITUS WITH STAGE 3A CHRONIC KIDNEY DISEASE, WITHOUT LONG-TERM CURRENT USE OF INSULIN (H): Primary | ICD-10-CM

## 2023-08-16 DIAGNOSIS — E11.22 TYPE 2 DIABETES MELLITUS WITH STAGE 3A CHRONIC KIDNEY DISEASE, WITHOUT LONG-TERM CURRENT USE OF INSULIN (H): Primary | ICD-10-CM

## 2023-08-16 PROCEDURE — 99213 OFFICE O/P EST LOW 20 MIN: CPT | Performed by: FAMILY MEDICINE

## 2023-08-16 RX ORDER — LOSARTAN POTASSIUM 25 MG/1
25 TABLET ORAL DAILY
Qty: 90 TABLET | Refills: 3 | Status: SHIPPED | OUTPATIENT
Start: 2023-08-16 | End: 2024-03-21

## 2023-08-16 RX ORDER — LOSARTAN POTASSIUM 50 MG/1
50 TABLET ORAL DAILY
Qty: 90 TABLET | Refills: 3 | Status: SHIPPED | OUTPATIENT
Start: 2023-08-16 | End: 2024-05-30

## 2023-08-16 RX ORDER — LEVOTHYROXINE SODIUM 112 UG/1
112 TABLET ORAL DAILY
Qty: 90 TABLET | Refills: 3 | Status: SHIPPED | OUTPATIENT
Start: 2023-08-16 | End: 2024-05-30

## 2023-08-16 ASSESSMENT — PAIN SCALES - GENERAL: PAINLEVEL: NO PAIN (0)

## 2023-08-16 NOTE — PROGRESS NOTES
Assessment & Plan     Type 2 diabetes mellitus with stage 3a chronic kidney disease, without long-term current use of insulin (H)  Advised   - Adult Eye  Referral; Future    Acquired hypothyroidism  Refilled   - levothyroxine (SYNTHROID/LEVOTHROID) 112 MCG tablet; Take 1 tablet (112 mcg) by mouth daily    Benign hypertension with CKD (chronic kidney disease) stage III (H)  Stable   - losartan (COZAAR) 25 MG tablet; Take 1 tablet (25 mg) by mouth daily Add to 50 mg tablet -Total 75 mg daily  - losartan (COZAAR) 50 MG tablet; Take 1 tablet (50 mg) by mouth daily    Gastroesophageal reflux disease, unspecified whether esophagitis present  Stable     - omeprazole (PRILOSEC) 20 MG DR capsule; Take 1 capsule (20 mg) by mouth daily  Actinic keratosis  Liquid nitrogen was applied for 5-10 seconds x2  to the skin lesions and the expected blistering or scabbing reaction explained explained before Procedure. Do not pick at the areas. Patient reminded to expect hypopigmented scars from the procedure. Return if lesions fail to fully resolve.    Marysol Cisse MD  [facility] JOSE Doshi is a 72 year old, presenting for the following health issues:  Diabetes        8/16/2023     9:23 AM   Additional Questions   Roomed by Allyson CHAVES     Hyperlipidemia Follow-Up    Are you regularly taking any medication or supplement to lower your cholesterol?   No  Are you having muscle aches or other side effects that you think could be caused by your cholesterol lowering medication?  Yes- joint pain    Hypertension Follow-up    Do you check your blood pressure regularly outside of the clinic? Yes   Are you following a low salt diet? Yes  Are your blood pressures ever more than 140 on the top number (systolic) OR more   than 90 on the bottom number (diastolic), for example 140/90? Yes  How many servings of fruits and vegetables do you eat daily?  0-1  On average, how many sweetened beverages do you  drink each day (Examples: soda, juice, sweet tea, etc.  Do NOT count diet or artificially sweetened beverages)?   0  How many days per week do you exercise enough to make your heart beat faster? none  How many minutes a day do you exercise enough to make your heart beat faster?   How many days per week do you miss taking your medication? 0  Blood sugars are Good  Has a lesion Left arm     Review of Systems   CONSTITUTIONAL: NEGATIVE for fever, chills, change in weight  ENT/MOUTH: NEGATIVE for ear, mouth and throat problems  RESP: NEGATIVE for significant cough or SOB  CV: NEGATIVE for chest pain, palpitations or peripheral edema  ROS otherwise negative      Objective    /76   Pulse 58   Temp 97.7  F (36.5  C) (Temporal)   Wt 94.5 kg (208 lb 6.4 oz)   SpO2 96%   BMI 37.21 kg/m    Body mass index is 37.21 kg/m .  Physical Exam   GENERAL: healthy, alert and no distress  NECK: no adenopathy, no asymmetry, masses, or scars and thyroid normal to palpation  RESP: lungs clear to auscultation - no rales, rhonchi or wheezes  CV: regular rate and rhythm, normal S1 S2, no S3 or S4, no murmur, click or rub, no peripheral edema and peripheral pulses strong  ABDOMEN: soft, nontender, no hepatosplenomegaly, no masses and bowel sounds normal  MS: no gross musculoskeletal defects noted, no edema  Small actinic Keratosis Left Fore arm dorsal aspect

## 2023-09-01 ENCOUNTER — OFFICE VISIT (OUTPATIENT)
Dept: OPHTHALMOLOGY | Facility: CLINIC | Age: 73
End: 2023-09-01
Attending: FAMILY MEDICINE
Payer: MEDICARE

## 2023-09-01 DIAGNOSIS — Z96.1 PSEUDOPHAKIA: ICD-10-CM

## 2023-09-01 DIAGNOSIS — E11.22 TYPE 2 DIABETES MELLITUS WITH STAGE 3A CHRONIC KIDNEY DISEASE, WITHOUT LONG-TERM CURRENT USE OF INSULIN (H): Primary | ICD-10-CM

## 2023-09-01 DIAGNOSIS — H10.13 ALLERGIC CONJUNCTIVITIS OF BOTH EYES: ICD-10-CM

## 2023-09-01 DIAGNOSIS — H52.4 MYOPIA OF BOTH EYES WITH REGULAR ASTIGMATISM AND PRESBYOPIA: ICD-10-CM

## 2023-09-01 DIAGNOSIS — H52.223 MYOPIA OF BOTH EYES WITH REGULAR ASTIGMATISM AND PRESBYOPIA: ICD-10-CM

## 2023-09-01 DIAGNOSIS — H52.13 MYOPIA OF BOTH EYES WITH REGULAR ASTIGMATISM AND PRESBYOPIA: ICD-10-CM

## 2023-09-01 DIAGNOSIS — N18.31 TYPE 2 DIABETES MELLITUS WITH STAGE 3A CHRONIC KIDNEY DISEASE, WITHOUT LONG-TERM CURRENT USE OF INSULIN (H): Primary | ICD-10-CM

## 2023-09-01 PROCEDURE — 92015 DETERMINE REFRACTIVE STATE: CPT | Mod: GY | Performed by: STUDENT IN AN ORGANIZED HEALTH CARE EDUCATION/TRAINING PROGRAM

## 2023-09-01 PROCEDURE — 92014 COMPRE OPH EXAM EST PT 1/>: CPT | Performed by: STUDENT IN AN ORGANIZED HEALTH CARE EDUCATION/TRAINING PROGRAM

## 2023-09-01 ASSESSMENT — CONF VISUAL FIELD
OD_SUPERIOR_TEMPORAL_RESTRICTION: 0
OS_SUPERIOR_TEMPORAL_RESTRICTION: 0
OD_SUPERIOR_NASAL_RESTRICTION: 0
OD_NORMAL: 1
OS_NORMAL: 1
METHOD: COUNTING FINGERS
OD_INFERIOR_NASAL_RESTRICTION: 0
OS_INFERIOR_TEMPORAL_RESTRICTION: 0
OD_INFERIOR_TEMPORAL_RESTRICTION: 0
OS_INFERIOR_NASAL_RESTRICTION: 0
OS_SUPERIOR_NASAL_RESTRICTION: 0

## 2023-09-01 ASSESSMENT — REFRACTION_MANIFEST
OD_ADD: +2.50
OS_ADD: +2.50
OD_AXIS: 105
OD_CYLINDER: +0.50
OD_SPHERE: -0.25
OS_CYLINDER: +0.50
OS_SPHERE: -0.25
OS_AXIS: 055

## 2023-09-01 ASSESSMENT — CUP TO DISC RATIO
OD_RATIO: 0.25
OS_RATIO: 0.3

## 2023-09-01 ASSESSMENT — TONOMETRY
IOP_METHOD: APPLANATION
OS_IOP_MMHG: 11
OD_IOP_MMHG: 13

## 2023-09-01 ASSESSMENT — VISUAL ACUITY
METHOD: SNELLEN - LINEAR
OD_SC: 20/20
OD_SC+: -2
OS_SC+: -2
OS_SC: 20/20

## 2023-09-01 ASSESSMENT — SLIT LAMP EXAM - LIDS
COMMENTS: 1+ DERMATOCHALASIS
COMMENTS: 1+ DERMATOCHALASIS

## 2023-09-01 ASSESSMENT — EXTERNAL EXAM - RIGHT EYE: OD_EXAM: NORMAL

## 2023-09-01 ASSESSMENT — EXTERNAL EXAM - LEFT EYE: OS_EXAM: NORMAL

## 2023-09-01 NOTE — LETTER
9/1/2023         RE: Glenda Bales  [address]  [facility] [address]        Dear Colleague,    Thank you for referring your patient, Glenda Bales, to the [facility]. Please see a copy of my visit note below.     Current Eye Medications:  alaway - both eyes BID PRN      Subjective:  diabetic, dilated exam - vision is good at distance without correction. No problems with pain or discomfort. Uses alaway PRN itching.    Requesting DMV form today.     Type II DM - diet controlled.    Lab Results   Component Value Date    A1C 6.9 03/14/2023    A1C 6.7 10/26/2022    A1C 6.0 03/17/2022    A1C 5.5 06/15/2021    A1C 5.6 01/12/2021    A1C 5.4 07/22/2020    A1C 5.2 07/08/2020    A1C 5.4 10/17/2019        Objective:  See Ophthalmology Exam.      Assessment:  Glenda Bales is a 72 year old female who presents with:   Encounter Diagnoses   Name Primary?     Type 2 diabetes mellitus with stage 3a chronic kidney disease, without long-term current use of insulin (H) Negative diabetic retinopathy      Pseudophakia - Both Eyes      Allergic conjunctivitis of both eyes      Myopia of both eyes with regular astigmatism and presbyopia      Stable eye exam.  DMV form completed - no restrictions or glasses needed for driving.    Plan:  Continue Alaway twice a day as needed for eye itchiness    Continue over the counter readers or prescription glasses for reading as needed     Keep blood sugars and blood pressure under good control.    Jama Carpenter MD  [phone]    Patient Education  Diabetes weakens the blood vessels all over the body, including the eyes. Damage to the blood vessels in the eyes can cause swelling or bleeding into part of the eye (called the retina). This is called diabetic retinopathy (BRIAN-tin-AH-puh-thee). If not treated, this disease can cause vision loss or blindness.   Symptoms may include blurred or distorted vision, but many people have no symptoms. It's important to see  your eye doctor regularly to check for problems.   Early treatment and good control can help protect your vision. Here are the things you can do to help prevent vision loss:      1. Keep your blood sugar levels under tight control.      2. Bring high blood pressure under control.      3. No smoking.      4. Have yearly dilated eye exams.               Again, thank you for allowing me to participate in the care of your patient.        Sincerely,        Jama Carpenter MD

## 2023-09-01 NOTE — PROGRESS NOTES
Current Eye Medications:  alaway - both eyes BID PRN      Subjective:  diabetic, dilated exam - vision is good at distance without correction. No problems with pain or discomfort. Uses alaway PRN itching.    Requesting DMV form today.     Type II DM - diet controlled.    Lab Results   Component Value Date    A1C 6.9 03/14/2023    A1C 6.7 10/26/2022    A1C 6.0 03/17/2022    A1C 5.5 06/15/2021    A1C 5.6 01/12/2021    A1C 5.4 07/22/2020    A1C 5.2 07/08/2020    A1C 5.4 10/17/2019        Objective:  See Ophthalmology Exam.      Assessment:  Glenda Bales is a 72 year old female who presents with:   Encounter Diagnoses   Name Primary?    Type 2 diabetes mellitus with stage 3a chronic kidney disease, without long-term current use of insulin (H) Negative diabetic retinopathy     Pseudophakia - Both Eyes     Allergic conjunctivitis of both eyes     Myopia of both eyes with regular astigmatism and presbyopia      Stable eye exam.  DMV form completed - no restrictions or glasses needed for driving.    Plan:  Continue Alaway twice a day as needed for eye itchiness    Continue over the counter readers or prescription glasses for reading as needed     Keep blood sugars and blood pressure under good control.    Jama Carpenter MD  [phone]    Patient Education  Diabetes weakens the blood vessels all over the body, including the eyes. Damage to the blood vessels in the eyes can cause swelling or bleeding into part of the eye (called the retina). This is called diabetic retinopathy (BRIAN-tin--puh-thee). If not treated, this disease can cause vision loss or blindness.   Symptoms may include blurred or distorted vision, but many people have no symptoms. It's important to see your eye doctor regularly to check for problems.   Early treatment and good control can help protect your vision. Here are the things you can do to help prevent vision loss:      1. Keep your blood sugar levels under tight control.      2. Bring high  blood pressure under control.      3. No smoking.      4. Have yearly dilated eye exams.

## 2023-09-01 NOTE — PATIENT INSTRUCTIONS
Continue Alaway twice a day as needed for eye itchiness    Continue over the counter readers or prescription glasses for reading as needed     Keep blood sugars and blood pressure under good control.    Jama Carpenter MD  [phone]    Patient Education   Diabetes weakens the blood vessels all over the body, including the eyes. Damage to the blood vessels in the eyes can cause swelling or bleeding into part of the eye (called the retina). This is called diabetic retinopathy (BRIAN-tin--pu-thee). If not treated, this disease can cause vision loss or blindness.   Symptoms may include blurred or distorted vision, but many people have no symptoms. It's important to see your eye doctor regularly to check for problems.   Early treatment and good control can help protect your vision. Here are the things you can do to help prevent vision loss:      1. Keep your blood sugar levels under tight control.      2. Bring high blood pressure under control.      3. No smoking.      4. Have yearly dilated eye exams.

## 2023-09-18 DIAGNOSIS — I10 ESSENTIAL HYPERTENSION, BENIGN: ICD-10-CM

## 2023-09-18 RX ORDER — ATENOLOL 25 MG/1
TABLET ORAL
Qty: 270 TABLET | Refills: 3 | OUTPATIENT
Start: 2023-09-18

## 2023-10-08 ENCOUNTER — HEALTH MAINTENANCE LETTER (OUTPATIENT)
Age: 73
End: 2023-10-08

## 2024-01-02 ENCOUNTER — VIRTUAL VISIT (OUTPATIENT)
Dept: FAMILY MEDICINE | Facility: CLINIC | Age: 74
End: 2024-01-02
Payer: MEDICARE

## 2024-01-02 DIAGNOSIS — R05.1 ACUTE COUGH: ICD-10-CM

## 2024-01-02 DIAGNOSIS — R06.2 WHEEZING: Primary | ICD-10-CM

## 2024-01-02 PROCEDURE — 99214 OFFICE O/P EST MOD 30 MIN: CPT | Performed by: STUDENT IN AN ORGANIZED HEALTH CARE EDUCATION/TRAINING PROGRAM

## 2024-01-02 RX ORDER — PREDNISONE 50 MG/1
50 TABLET ORAL DAILY
Qty: 5 TABLET | Refills: 0 | Status: SHIPPED | OUTPATIENT
Start: 2024-01-02 | End: 2024-01-07

## 2024-01-02 NOTE — PROGRESS NOTES
Assessment & Plan   73-year-old female with relevant past medical history of asthma who presents via virtual visit to discuss symptoms of wheezing, cough, chest congestion, nasal congestion, fevers.  Going on since last Wednesday.  She has several sick contacts in her family.  She has done several COVID test which have been negative.  Were symptoms are her cough and congestion.  I recommended trialing her albuterol inhaler consistently as well as taking something like Sudafed.  I also gave her a course of prednisone to use if wheezing is worsening.  I asked if she could come in to do some testing including COVID/flu/RSV swab as well as a chest x-ray but patient unwilling to drive.  I discussed with her if her symptoms worsen she needs to be seen in person.    1. Wheezing  2. Acute cough  - predniSONE (DELTASONE) 50 MG tablet; Take 1 tablet (50 mg) by mouth daily for 5 days  Dispense: 5 tablet; Refill: 0    Subjective   Chief Complaint   Patient presents with    Flu Symptoms     Starting on alona gianna, cough, nasal and chest congestion. Lots of mucus. Fever of 99.9 and 100.1 couple of times      HPI   Patient tells me that since last Wednesday she developed cough and mucous in her nose and chest.  She feels very run down and fatigued. She had test negative for COVID 3 times. She does not feel SOB. She is wheezing.  She has a Hx of asthma and has an albuterol inhaler that she is not using. She had some fevers.     Objective       Vitals:  No vitals were obtained today due to virtual visit.      Video-Visit Details    Type of service:  Video Visit     Start time: 10:43 AM  End time: 10:55 AM    Originating Location (pt. Location): Home  Distant Location (provider location):  On-site  Platform used for Video Visit: [employer]

## 2024-01-04 ENCOUNTER — OFFICE VISIT (OUTPATIENT)
Dept: FAMILY MEDICINE | Facility: CLINIC | Age: 74
End: 2024-01-04
Payer: MEDICARE

## 2024-01-04 VITALS
WEIGHT: 209.2 LBS | BODY MASS INDEX: 37.35 KG/M2 | HEART RATE: 65 BPM | DIASTOLIC BLOOD PRESSURE: 82 MMHG | OXYGEN SATURATION: 96 % | RESPIRATION RATE: 16 BRPM | SYSTOLIC BLOOD PRESSURE: 136 MMHG | TEMPERATURE: 97.3 F

## 2024-01-04 DIAGNOSIS — J40 BRONCHITIS: ICD-10-CM

## 2024-01-04 DIAGNOSIS — N18.31 TYPE 2 DIABETES MELLITUS WITH STAGE 3A CHRONIC KIDNEY DISEASE, WITHOUT LONG-TERM CURRENT USE OF INSULIN (H): ICD-10-CM

## 2024-01-04 DIAGNOSIS — E11.22 TYPE 2 DIABETES MELLITUS WITH STAGE 3A CHRONIC KIDNEY DISEASE, WITHOUT LONG-TERM CURRENT USE OF INSULIN (H): ICD-10-CM

## 2024-01-04 DIAGNOSIS — Z87.09 HISTORY OF RESPIRATORY FAILURE: ICD-10-CM

## 2024-01-04 LAB — RSV AG SPEC QL: POSITIVE

## 2024-01-04 PROCEDURE — 87807 RSV ASSAY W/OPTIC: CPT | Performed by: FAMILY MEDICINE

## 2024-01-04 PROCEDURE — 99214 OFFICE O/P EST MOD 30 MIN: CPT | Performed by: FAMILY MEDICINE

## 2024-01-04 RX ORDER — AZITHROMYCIN 250 MG/1
TABLET, FILM COATED ORAL
Qty: 6 TABLET | Refills: 0 | Status: SHIPPED | OUTPATIENT
Start: 2024-01-04 | End: 2024-01-09

## 2024-01-04 RX ORDER — BENZONATATE 200 MG/1
200 CAPSULE ORAL 3 TIMES DAILY PRN
Qty: 30 CAPSULE | Refills: 0 | Status: SHIPPED | OUTPATIENT
Start: 2024-01-04 | End: 2024-01-18

## 2024-01-04 RX ORDER — PREDNISONE 10 MG/1
TABLET ORAL
Qty: 30 TABLET | Refills: 0 | Status: SHIPPED | OUTPATIENT
Start: 2024-01-04 | End: 2024-01-31

## 2024-01-04 RX ORDER — ALBUTEROL SULFATE 90 UG/1
2 AEROSOL, METERED RESPIRATORY (INHALATION) EVERY 6 HOURS PRN
Qty: 18 G | Refills: 0 | Status: ON HOLD | OUTPATIENT
Start: 2024-01-04 | End: 2024-08-28

## 2024-01-04 ASSESSMENT — PAIN SCALES - GENERAL: PAINLEVEL: NO PAIN (0)

## 2024-01-04 NOTE — PROGRESS NOTES
Assessment & Plan     Bronchitis  SEE EPIC care orders  The potential side effects of this medication have been discussed with the patient.  Call if any significant problems with these are experienced.  Prednisone in weaning dose  If worse go to ER   - RSV rapid antigen; Future  - azithromycin (ZITHROMAX) 250 MG tablet; Take 2 tablets (500 mg) by mouth daily for 1 day, THEN 1 tablet (250 mg) daily for 4 days.  - predniSONE (DELTASONE) 10 MG tablet; 40 mg daily x 3 days, 30 mg daily x 3 days, 20 mg daily x 3 days ,10 mg daily x 3 days  - RSV rapid antigen  - albuterol (PROAIR HFA/PROVENTIL HFA/VENTOLIN HFA) 108 (90 Base) MCG/ACT inhaler; Inhale 2 puffs into the lungs every 6 hours as needed for shortness of breath, wheezing or cough  - benzonatate (TESSALON) 200 MG capsule; Take 1 capsule (200 mg) by mouth 3 times daily as needed for cough  - XR Chest 2 Views; Future    Type 2 diabetes mellitus with stage 3a chronic kidney disease, without long-term current use of insulin (H)  Stable     History of respiratory failure    Follow up 10 days-sooner if any worse go to ER:[id number]}     Marysol Cisse MD  [facility] JOSE Doshi is a 73 year old, presenting for the following health issues:  Cough (X 9 days follow up virtual visit. )        8/16/2023     9:23 AM   Additional Questions   Roomed by Allyson CHAVES       Acute Illness  Acute illness concerns: Cough follow up virtual visit. Start prednisone yesterday feel a little better today.   Onset/Duration: 9 days  Symptoms:  Fever: No  Chills/Sweats: No  Headache (location?): YES  Sinus Pressure: YES  Conjunctivitis:  No  Ear Pain: no  Rhinorrhea: YES  Congestion: YES  Sore Throat: No  Cough: no  Wheeze: YES  Decreased Appetite: YES  Nausea: No  Vomiting: No  Diarrhea: No  Dysuria/Freq.: No  Dysuria or Hematuria: No  Fatigue/Achiness: No  Sick/Strep Exposure: YES- on alona gianna others where sick   Therapies tried and outcome: None  Pt  started some Left over Prednisone and feels better with this  Several family members have same symptoms  No one with covid  Pt with History of Respiratory failure  Diabetes is doing well  Accucheck averahe 117      Review of Systems   CONSTITUTIONAL: NEGATIVE for fever, chills, change in weight  INTEGUMENTARY/SKIN: NEGATIVE for worrisome rashes, moles or lesions  ENT/MOUTH: as above  RESP:as above  CV: NEGATIVE for chest pain, palpitations or peripheral edema  GI: NEGATIVE for nausea, abdominal pain, heartburn, or change in bowel habits  Rest of the ROS is Negative except see above and Problem list [stable]        Objective    /82   Pulse 65   Temp 97.3  F (36.3  C) (Temporal)   Resp 16   Wt 94.9 kg (209 lb 3.2 oz)   SpO2 96%   BMI 37.35 kg/m    Body mass index is 37.35 kg/m .  Physical Exam   GENERAL: healthy, alert and no distress  EYES: Eyes grossly normal to inspection  HENT: ear canals and TM's normal, nose and mouth without ulcers or lesions  NECK: no adenopathy, no asymmetry, masses, or scars and thyroid normal to palpation  RESP: expiratory wheezes some  Good air movement  CV: regular rate and rhythm, normal S1 S2, no S3 or S4, no murmur, click or rub, no peripheral edema and peripheral pulses strong  ABDOMEN: soft, nontender, no hepatosplenomegaly, no masses and bowel sounds normal  MS: no gross musculoskeletal defects noted, no edema  PSYCH: mentation appears normal    Pending

## 2024-01-05 ENCOUNTER — MYC MEDICAL ADVICE (OUTPATIENT)
Dept: FAMILY MEDICINE | Facility: CLINIC | Age: 74
End: 2024-01-05

## 2024-01-05 NOTE — TELEPHONE ENCOUNTER
"Please see patient mychart message regarding positive RSV result, this message is in response to result note below:    \"Please call PT  RSV is positive  Keep a Humidifier in Room  If worse follow up  Marysol Cisse MD\"    Thanks,  KENYA Hurtado RN  [facility]    "

## 2024-01-08 ENCOUNTER — ANCILLARY PROCEDURE (OUTPATIENT)
Dept: GENERAL RADIOLOGY | Facility: CLINIC | Age: 74
End: 2024-01-08
Attending: FAMILY MEDICINE
Payer: MEDICARE

## 2024-01-08 DIAGNOSIS — J40 BRONCHITIS: ICD-10-CM

## 2024-01-08 PROCEDURE — 71046 X-RAY EXAM CHEST 2 VIEWS: CPT | Mod: TC | Performed by: RADIOLOGY

## 2024-01-18 ENCOUNTER — OFFICE VISIT (OUTPATIENT)
Dept: FAMILY MEDICINE | Facility: CLINIC | Age: 74
End: 2024-01-18
Payer: MEDICARE

## 2024-01-18 VITALS
HEART RATE: 66 BPM | BODY MASS INDEX: 36.89 KG/M2 | TEMPERATURE: 98.1 F | DIASTOLIC BLOOD PRESSURE: 74 MMHG | OXYGEN SATURATION: 96 % | RESPIRATION RATE: 18 BRPM | SYSTOLIC BLOOD PRESSURE: 152 MMHG | WEIGHT: 206.6 LBS

## 2024-01-18 DIAGNOSIS — J21.0 RSV BRONCHIOLITIS: Primary | ICD-10-CM

## 2024-01-18 DIAGNOSIS — I12.9 BENIGN HYPERTENSION WITH CKD (CHRONIC KIDNEY DISEASE) STAGE III (H): ICD-10-CM

## 2024-01-18 DIAGNOSIS — N18.31 STAGE 3A CHRONIC KIDNEY DISEASE (H): ICD-10-CM

## 2024-01-18 DIAGNOSIS — N18.30 BENIGN HYPERTENSION WITH CKD (CHRONIC KIDNEY DISEASE) STAGE III (H): ICD-10-CM

## 2024-01-18 PROCEDURE — 84132 ASSAY OF SERUM POTASSIUM: CPT | Performed by: FAMILY MEDICINE

## 2024-01-18 PROCEDURE — 36415 COLL VENOUS BLD VENIPUNCTURE: CPT | Performed by: FAMILY MEDICINE

## 2024-01-18 PROCEDURE — 99213 OFFICE O/P EST LOW 20 MIN: CPT | Performed by: FAMILY MEDICINE

## 2024-01-18 RX ORDER — RESPIRATORY SYNCYTIAL VIRUS VACCINE 120MCG/0.5
0.5 KIT INTRAMUSCULAR ONCE
Qty: 1 EACH | Refills: 0 | Status: CANCELLED | OUTPATIENT
Start: 2024-01-18 | End: 2024-01-18

## 2024-01-18 ASSESSMENT — PAIN SCALES - GENERAL: PAINLEVEL: NO PAIN (0)

## 2024-01-18 NOTE — PROGRESS NOTES
"  Assessment & Plan     RSV bronchiolitis  better    Stage 3a chronic kidney disease (H)  Pending   - Potassium; Future  - Potassium    Benign hypertension with CKD (chronic kidney disease) stage III (H)  [place] High  Recheck next week  If still high -Please call       BMI  Estimated body mass index is 36.89 kg/m  as calculated from the following:    Height as of 6/29/23: 1.594 m (5' 2.75\").    Weight as of this encounter: 93.7 kg (206 lb 9.6 oz).   Weight management plan: Losing wt will help with Diabetes and Hypertension-Pt is on a Diabetic diet      Subjective   Glenda is a 73 year old, presenting for the following health issues:  RECHECK (RSV)        1/18/2024    12:07 PM   Additional Questions   Roomed by Allyson CHAVES       Concern - Recheck RSV  Onset: tested positive 2 weeks ago, Symptoms mostly have improved still having a runny nose and hoarse voice. On prednisone and has notice increase in urination, leg cramps and elevated blood pressure  Cough better  No wheezing over all better  Some Leg Cramps        Review of Systems  CONSTITUTIONAL: NEGATIVE for fever, chills, change in weight  ENT/MOUTH: NEGATIVE for ear, mouth and throat problems  RESP: NEGATIVE for significant cough or SOB  CV: NEGATIVE for chest pain, palpitations or peripheral edema  PSYCHIATRIC: NEGATIVE for changes in mood or affect  ROS otherwise negative      Objective    BP (!) 152/74   Pulse 66   Temp 98.1  F (36.7  C) (Temporal)   Resp 18   Wt 93.7 kg (206 lb 9.6 oz)   SpO2 96%   BMI 36.89 kg/m    Body mass index is 36.89 kg/m .  Physical Exam   GENERAL: alert and no distress  EYES: Eyes grossly normal to inspection, PERRL and conjunctivae and sclerae normal  NECK: no adenopathy, no asymmetry, masses, or scars  RESP: lungs clear to auscultation - no rales, rhonchi or wheezes  CV: regular rate and rhythm, normal S1 S2, no S3 or S4, no murmur, click or rub, no peripheral edema  ABDOMEN: soft, nontender, no hepatosplenomegaly, no " masses and bowel sounds normal  MS: no gross musculoskeletal defects noted, no edema  SKIN: no suspicious lesions or rashes    Office Visit on 01/04/2024   Component Date Value Ref Range Status    Respiratory Syncytial Virus antigen 01/04/2024 Positive (A)  Negative Final           Signed Electronically by: Marysol Cisse MD

## 2024-01-19 LAB — POTASSIUM SERPL-SCNC: 5 MMOL/L (ref 3.4–5.3)

## 2024-01-31 ENCOUNTER — OFFICE VISIT (OUTPATIENT)
Dept: FAMILY MEDICINE | Facility: CLINIC | Age: 74
End: 2024-01-31
Payer: MEDICARE

## 2024-01-31 VITALS
RESPIRATION RATE: 18 BRPM | BODY MASS INDEX: 37.07 KG/M2 | HEIGHT: 63 IN | SYSTOLIC BLOOD PRESSURE: 123 MMHG | HEART RATE: 73 BPM | TEMPERATURE: 97 F | WEIGHT: 209.25 LBS | OXYGEN SATURATION: 97 % | DIASTOLIC BLOOD PRESSURE: 71 MMHG

## 2024-01-31 DIAGNOSIS — R25.2 MUSCLE CRAMPS: ICD-10-CM

## 2024-01-31 DIAGNOSIS — N18.31 TYPE 2 DIABETES MELLITUS WITH STAGE 3A CHRONIC KIDNEY DISEASE, WITHOUT LONG-TERM CURRENT USE OF INSULIN (H): ICD-10-CM

## 2024-01-31 DIAGNOSIS — R53.83 FATIGUE, UNSPECIFIED TYPE: ICD-10-CM

## 2024-01-31 DIAGNOSIS — E11.22 TYPE 2 DIABETES MELLITUS WITH STAGE 3A CHRONIC KIDNEY DISEASE, WITHOUT LONG-TERM CURRENT USE OF INSULIN (H): ICD-10-CM

## 2024-01-31 DIAGNOSIS — E03.9 HYPOTHYROIDISM, UNSPECIFIED TYPE: ICD-10-CM

## 2024-01-31 DIAGNOSIS — R06.00 DYSPNEA, UNSPECIFIED TYPE: Primary | ICD-10-CM

## 2024-01-31 DIAGNOSIS — E78.5 HYPERLIPIDEMIA LDL GOAL <100: ICD-10-CM

## 2024-01-31 LAB
BASOPHILS # BLD AUTO: 0 10E3/UL (ref 0–0.2)
BASOPHILS NFR BLD AUTO: 1 %
EOSINOPHIL # BLD AUTO: 0.1 10E3/UL (ref 0–0.7)
EOSINOPHIL NFR BLD AUTO: 3 %
ERYTHROCYTE [DISTWIDTH] IN BLOOD BY AUTOMATED COUNT: 12.3 % (ref 10–15)
HBA1C MFR BLD: 7.8 % (ref 0–5.6)
HCT VFR BLD AUTO: 38.4 % (ref 35–47)
HGB BLD-MCNC: 12.9 G/DL (ref 11.7–15.7)
IMM GRANULOCYTES # BLD: 0 10E3/UL
IMM GRANULOCYTES NFR BLD: 0 %
LYMPHOCYTES # BLD AUTO: 1.4 10E3/UL (ref 0.8–5.3)
LYMPHOCYTES NFR BLD AUTO: 32 %
MCH RBC QN AUTO: 32.3 PG (ref 26.5–33)
MCHC RBC AUTO-ENTMCNC: 33.6 G/DL (ref 31.5–36.5)
MCV RBC AUTO: 96 FL (ref 78–100)
MONOCYTES # BLD AUTO: 0.4 10E3/UL (ref 0–1.3)
MONOCYTES NFR BLD AUTO: 9 %
NEUTROPHILS # BLD AUTO: 2.4 10E3/UL (ref 1.6–8.3)
NEUTROPHILS NFR BLD AUTO: 56 %
PLATELET # BLD AUTO: 158 10E3/UL (ref 150–450)
RBC # BLD AUTO: 3.99 10E6/UL (ref 3.8–5.2)
WBC # BLD AUTO: 4.3 10E3/UL (ref 4–11)

## 2024-01-31 PROCEDURE — 80053 COMPREHEN METABOLIC PANEL: CPT | Performed by: FAMILY MEDICINE

## 2024-01-31 PROCEDURE — 85025 COMPLETE CBC W/AUTO DIFF WBC: CPT | Performed by: FAMILY MEDICINE

## 2024-01-31 PROCEDURE — 83735 ASSAY OF MAGNESIUM: CPT | Performed by: FAMILY MEDICINE

## 2024-01-31 PROCEDURE — 82550 ASSAY OF CK (CPK): CPT | Performed by: FAMILY MEDICINE

## 2024-01-31 PROCEDURE — 83036 HEMOGLOBIN GLYCOSYLATED A1C: CPT | Performed by: FAMILY MEDICINE

## 2024-01-31 PROCEDURE — 84439 ASSAY OF FREE THYROXINE: CPT | Performed by: FAMILY MEDICINE

## 2024-01-31 PROCEDURE — 84443 ASSAY THYROID STIM HORMONE: CPT | Performed by: FAMILY MEDICINE

## 2024-01-31 PROCEDURE — 99214 OFFICE O/P EST MOD 30 MIN: CPT | Performed by: FAMILY MEDICINE

## 2024-01-31 PROCEDURE — 36415 COLL VENOUS BLD VENIPUNCTURE: CPT | Performed by: FAMILY MEDICINE

## 2024-01-31 RX ORDER — RESPIRATORY SYNCYTIAL VIRUS VACCINE 120MCG/0.5
0.5 KIT INTRAMUSCULAR ONCE
Qty: 1 EACH | Refills: 0 | Status: CANCELLED | OUTPATIENT
Start: 2024-01-31 | End: 2024-01-31

## 2024-01-31 ASSESSMENT — PAIN SCALES - GENERAL: PAINLEVEL: NO PAIN (0)

## 2024-01-31 NOTE — PROGRESS NOTES
"Kathrine Doshi is a 73 year old, presenting for the following health issues:  Patient Request (Bilateral ankle swelling and elevated blood sugars. Patient was on antibiotics and prednisone for RSV)        1/31/2024    11:06 AM   Additional Questions   Roomed by Christina Ayala     History of Present Illness       Reason for visit:  Bilateral ankle swelling and elevated blood sugars, was on prednisone for RSV    She eats 2-3 servings of fruits and vegetables daily.She consumes 0 sweetened beverage(s) daily.She exercises with enough effort to increase her heart rate 9 or less minutes per day.  She exercises with enough effort to increase her heart rate 3 or less days per week.   She is taking medications regularly.           Had rsv jan 4  Got prednisone    Last week heart pounding    Heart rate was high 135 last week    Showed irregular beat for a couple days    Glucose over 200 3 days ago    Watching carbs    Down to 118 today     Does not feel sick anymore  Just hoarse    Drinking lots of water     Some hand cramps, not in legs  Hands fine now            Objective    /71 (BP Location: Right arm, Patient Position: Chair, Cuff Size: Adult Regular)   Pulse 73   Temp 97  F (36.1  C) (Temporal)   Resp 18   Ht 1.594 m (5' 2.75\")   Wt 94.9 kg (209 lb 4 oz)   SpO2 97%   BMI 37.36 kg/m    Body mass index is 37.36 kg/m .  Physical Exam  Constitutional:       Appearance: She is well-developed.   HENT:      Head: Normocephalic and atraumatic.   Eyes:      Conjunctiva/sclera: Conjunctivae normal.   Neck:      Vascular: No carotid bruit.   Cardiovascular:      Rate and Rhythm: Normal rate and regular rhythm.      Heart sounds: Normal heart sounds.   Pulmonary:      Effort: Pulmonary effort is normal. No respiratory distress.      Breath sounds: Normal breath sounds.   Neurological:      Mental Status: She is alert and oriented to person, place, and time.      Radials symmetric, regular    Slight pretibial " edema distal lower legs bilat but not on feet    Varicose veins present but nontender    Mild redness of the lower legs, symmetric      ASSESSMENT / PLAN:  (R06.00) Dyspnea, unspecified type  (primary encounter diagnosis)  Comment: prudent to make sure heart is okay given swelling and dyspnea  Plan: Echocardiogram Complete        Patient to schedule     (E11.22,  N18.31) Type 2 diabetes mellitus with stage 3a chronic kidney disease, without long-term current use of insulin (H)  Comment: check ; may be up some due to recent prednisone   Plan: HEMOGLOBIN A1C             (R53.83) Fatigue, unspecified type  Comment: check   Plan: CBC with Platelets & Differential,         Comprehensive metabolic panel, CANCELED: TSH         with free T4 reflex             (R25.2) Muscle cramps  Comment: check   Plan: Magnesium             (E78.5) Hyperlipidemia LDL goal <100  Comment: on statin   Plan: CK total             (E03.9) Hypothyroidism, unspecified type  Comment: check lab, adjust dose if needed   Plan: TSH, T4 free               I reviewed the patient's medications, allergies, medical history, family history, and social history.    Melchor Granados MD              Signed Electronically by: Melchor Granados MD  pr

## 2024-01-31 NOTE — PATIENT INSTRUCTIONS
Increase walking as able    Call to schedule echocardiogram ( heart ultrasound )    Hydrocortisone cream and moisturizing lotion as needed    We will send you lab results    Be seen promptly if symptoms acutely worsen

## 2024-02-01 ENCOUNTER — MYC MEDICAL ADVICE (OUTPATIENT)
Dept: FAMILY MEDICINE | Facility: CLINIC | Age: 74
End: 2024-02-01
Payer: MEDICARE

## 2024-02-01 LAB
ALBUMIN SERPL BCG-MCNC: 3.9 G/DL (ref 3.5–5.2)
ALP SERPL-CCNC: 85 U/L (ref 40–150)
ALT SERPL W P-5'-P-CCNC: 91 U/L (ref 0–50)
ANION GAP SERPL CALCULATED.3IONS-SCNC: 11 MMOL/L (ref 7–15)
AST SERPL W P-5'-P-CCNC: 65 U/L (ref 0–45)
BILIRUB SERPL-MCNC: 0.5 MG/DL
BUN SERPL-MCNC: 20.4 MG/DL (ref 8–23)
CALCIUM SERPL-MCNC: 9 MG/DL (ref 8.8–10.2)
CHLORIDE SERPL-SCNC: 98 MMOL/L (ref 98–107)
CK SERPL-CCNC: 86 U/L (ref 26–192)
CREAT SERPL-MCNC: 1.17 MG/DL (ref 0.51–0.95)
DEPRECATED HCO3 PLAS-SCNC: 25 MMOL/L (ref 22–29)
EGFRCR SERPLBLD CKD-EPI 2021: 49 ML/MIN/1.73M2
GLUCOSE SERPL-MCNC: 163 MG/DL (ref 70–99)
MAGNESIUM SERPL-MCNC: 1.6 MG/DL (ref 1.7–2.3)
POTASSIUM SERPL-SCNC: 4.1 MMOL/L (ref 3.4–5.3)
PROT SERPL-MCNC: 6.6 G/DL (ref 6.4–8.3)
SODIUM SERPL-SCNC: 134 MMOL/L (ref 135–145)
T4 FREE SERPL-MCNC: 1.51 NG/DL (ref 0.9–1.7)
TSH SERPL DL<=0.005 MIU/L-ACNC: 4.36 UIU/ML (ref 0.3–4.2)

## 2024-02-01 NOTE — RESULT ENCOUNTER NOTE
Diabetes test ( hemoglobin a1c ) is higher this time but that may be partly due to recent prednisone.    TSH is slightly off but the free T4 is normal, so stay on same dose of thyroid medication.    Two liver tests ( AST and ALT ) are mildly elevated.  Hold the cholesterol pill atorvastatin for now.    Magnesium is low.  Start one over the counter magnesium pill daily.    Other labs okay/ stable.    Do echocardiogram as advised.    See Dr Cisse later this month as planned.    Be seen promptly if symptoms acutely worsen.    Melchor Granados MD

## 2024-02-05 ENCOUNTER — ANCILLARY PROCEDURE (OUTPATIENT)
Dept: CARDIOLOGY | Facility: CLINIC | Age: 74
End: 2024-02-05
Attending: FAMILY MEDICINE
Payer: MEDICARE

## 2024-02-05 DIAGNOSIS — R06.00 DYSPNEA, UNSPECIFIED TYPE: ICD-10-CM

## 2024-02-05 LAB — LVEF ECHO: NORMAL

## 2024-02-05 PROCEDURE — 93306 TTE W/DOPPLER COMPLETE: CPT | Performed by: STUDENT IN AN ORGANIZED HEALTH CARE EDUCATION/TRAINING PROGRAM

## 2024-02-06 ENCOUNTER — OFFICE VISIT (OUTPATIENT)
Dept: FAMILY MEDICINE | Facility: CLINIC | Age: 74
End: 2024-02-06
Payer: MEDICARE

## 2024-02-06 VITALS
HEART RATE: 65 BPM | OXYGEN SATURATION: 97 % | RESPIRATION RATE: 16 BRPM | BODY MASS INDEX: 37.45 KG/M2 | SYSTOLIC BLOOD PRESSURE: 132 MMHG | WEIGHT: 211.38 LBS | TEMPERATURE: 97.8 F | HEIGHT: 63 IN | DIASTOLIC BLOOD PRESSURE: 85 MMHG

## 2024-02-06 DIAGNOSIS — K76.0 FATTY LIVER: ICD-10-CM

## 2024-02-06 DIAGNOSIS — R60.0 LOWER EXTREMITY EDEMA: Primary | ICD-10-CM

## 2024-02-06 DIAGNOSIS — I87.2 VENOUS STASIS DERMATITIS OF BOTH LOWER EXTREMITIES: ICD-10-CM

## 2024-02-06 DIAGNOSIS — E11.22 TYPE 2 DIABETES MELLITUS WITH STAGE 3A CHRONIC KIDNEY DISEASE, WITHOUT LONG-TERM CURRENT USE OF INSULIN (H): ICD-10-CM

## 2024-02-06 DIAGNOSIS — N18.31 TYPE 2 DIABETES MELLITUS WITH STAGE 3A CHRONIC KIDNEY DISEASE, WITHOUT LONG-TERM CURRENT USE OF INSULIN (H): ICD-10-CM

## 2024-02-06 PROCEDURE — 99213 OFFICE O/P EST LOW 20 MIN: CPT | Performed by: FAMILY MEDICINE

## 2024-02-06 RX ORDER — HYDROCHLOROTHIAZIDE 25 MG/1
TABLET ORAL
Qty: 60 TABLET | Refills: 0 | Status: SHIPPED | OUTPATIENT
Start: 2024-02-06 | End: 2024-02-08

## 2024-02-06 RX ORDER — RESPIRATORY SYNCYTIAL VIRUS VACCINE 120MCG/0.5
0.5 KIT INTRAMUSCULAR ONCE
Qty: 1 EACH | Refills: 0 | Status: CANCELLED | OUTPATIENT
Start: 2024-02-06 | End: 2024-02-06

## 2024-02-06 ASSESSMENT — PAIN SCALES - GENERAL: PAINLEVEL: NO PAIN (0)

## 2024-02-06 NOTE — PROGRESS NOTES
"      Kathrine Doshi is a 73 year old, presenting for the following health issues:  Derm Problem (/)        2/6/2024     7:51 AM   Additional Questions   Roomed by Christina Ayala     History of Present Illness       Reason for visit:  Bilateral ankle swelling and elevated blood sugars, was on prednisone for RSV    She eats 2-3 servings of fruits and vegetables daily.She consumes 0 sweetened beverage(s) daily.She exercises with enough effort to increase her heart rate 9 or less minutes per day.  She exercises with enough effort to increase her heart rate 3 or less days per week.   She is taking medications regularly.           Discolored in lower legs for a long time but rash really started about 1 1/2 weeks ago    Sugars coming down    129 yest am     Using hydrocortisone cream and lotion on the legs     On prednisone for       Objective    /85 (BP Location: Right arm, Patient Position: Chair, Cuff Size: Adult Large)   Pulse 65   Temp 97.8  F (36.6  C) (Temporal)   Resp 16   Ht 1.594 m (5' 2.75\")   Wt 95.9 kg (211 lb 6 oz)   SpO2 97%   BMI 37.74 kg/m    Body mass index is 37.74 kg/m .  Physical Exam  Constitutional:       Appearance: She is well-developed.   HENT:      Head: Normocephalic and atraumatic.   Eyes:      Conjunctiva/sclera: Conjunctivae normal.   Neck:      Vascular: No carotid bruit.   Cardiovascular:      Rate and Rhythm: Normal rate and regular rhythm.      Heart sounds: Normal heart sounds.   Pulmonary:      Effort: Pulmonary effort is normal. No respiratory distress.      Breath sounds: Normal breath sounds.   Neurological:      Mental Status: She is alert and oriented to person, place, and time.      Patient hs mild pitting edema bilaterally     Some mild redness of both legs, symmetric, over part of lower legs    No excoriation    ASSESSMENT / PLAN:  (R60.0) Lower extremity edema  (primary encounter diagnosis)  Comment: echo reassuring, discussed in detail.  For now increase " hydrochlorothiazide to bid. Also increase water intake, decrease sodium intake, and increase walking.   Plan: hydrochlorothiazide (HYDRODIURIL) 25 MG tablet             (I87.2) Venous stasis dermatitis of both lower extremities  Comment: the rash/ redness is likely due to the swelling so if swelling resolves anticipate the rash resolving also.  Be generous with moisturizing lotion. Use hydrocortisone cream sparingly.   Plan: as above     (K76.0) Fatty liver  Comment: discussed in detail   Plan: long term advises wt loss     (E11.22,  N18.31) Type 2 diabetes mellitus with stage 3a chronic kidney disease, without long-term current use of insulin (H)  Comment: last hemoglobin a1c 7.8, even after she had been on prednisone   Plan: no change       I reviewed the patient's medications, allergies, medical history, family history, and social history.    Melchor Granados MD                      Signed Electronically by: Melchor Granados MD

## 2024-02-06 NOTE — PATIENT INSTRUCTIONS
Increase hydrochlorothiazide to 25 mg 2x daily for now    Increase water intake    Monitor sodium/ salt intake; look at food labels    Increase walking    Moisturizing lotion to skin on legs, be generous with this    Could use some hydrocortisone cream    Follow up with [facility] as planned    Be seen promptly if symptoms acutely worsen     Heart looks good on ultrasound    Long term for fatty liver advise weight loss

## 2024-02-13 ENCOUNTER — OFFICE VISIT (OUTPATIENT)
Dept: FAMILY MEDICINE | Facility: CLINIC | Age: 74
End: 2024-02-13
Payer: MEDICARE

## 2024-02-13 VITALS
TEMPERATURE: 97.4 F | RESPIRATION RATE: 18 BRPM | OXYGEN SATURATION: 97 % | DIASTOLIC BLOOD PRESSURE: 77 MMHG | WEIGHT: 218 LBS | BODY MASS INDEX: 38.93 KG/M2 | SYSTOLIC BLOOD PRESSURE: 162 MMHG | HEART RATE: 66 BPM

## 2024-02-13 DIAGNOSIS — R60.0 LOWER EXTREMITY EDEMA: ICD-10-CM

## 2024-02-13 DIAGNOSIS — N18.32 TYPE 2 DIABETES MELLITUS WITH STAGE 3B CHRONIC KIDNEY DISEASE, WITHOUT LONG-TERM CURRENT USE OF INSULIN (H): ICD-10-CM

## 2024-02-13 DIAGNOSIS — E66.01 MORBID OBESITY (H): ICD-10-CM

## 2024-02-13 DIAGNOSIS — E11.22 TYPE 2 DIABETES MELLITUS WITH STAGE 3B CHRONIC KIDNEY DISEASE, WITHOUT LONG-TERM CURRENT USE OF INSULIN (H): ICD-10-CM

## 2024-02-13 DIAGNOSIS — E83.42 HYPOMAGNESEMIA: ICD-10-CM

## 2024-02-13 DIAGNOSIS — E66.812 CLASS 2 SEVERE OBESITY DUE TO EXCESS CALORIES WITH SERIOUS COMORBIDITY AND BODY MASS INDEX (BMI) OF 39.0 TO 39.9 IN ADULT (H): ICD-10-CM

## 2024-02-13 DIAGNOSIS — E66.01 CLASS 2 SEVERE OBESITY DUE TO EXCESS CALORIES WITH SERIOUS COMORBIDITY AND BODY MASS INDEX (BMI) OF 39.0 TO 39.9 IN ADULT (H): ICD-10-CM

## 2024-02-13 DIAGNOSIS — I87.2 VENOUS STASIS DERMATITIS, UNSPECIFIED LATERALITY: Primary | ICD-10-CM

## 2024-02-13 PROCEDURE — 99214 OFFICE O/P EST MOD 30 MIN: CPT | Performed by: FAMILY MEDICINE

## 2024-02-13 RX ORDER — HYDROCHLOROTHIAZIDE 25 MG/1
TABLET ORAL
Qty: 90 TABLET | Refills: 3 | Status: SHIPPED | OUTPATIENT
Start: 2024-02-13 | End: 2024-02-29

## 2024-02-13 RX ORDER — FUROSEMIDE 20 MG
20 TABLET ORAL DAILY
Qty: 30 TABLET | Refills: 0 | Status: SHIPPED | OUTPATIENT
Start: 2024-02-13 | End: 2024-02-29

## 2024-02-13 ASSESSMENT — PAIN SCALES - GENERAL: PAINLEVEL: NO PAIN (0)

## 2024-02-13 NOTE — PROGRESS NOTES
Assessment & Plan     Venous stasis dermatitis, unspecified laterality  Advised elevate legs  Low salt diet  Discussed Diet and avoid eating Outside Food  - Miscellaneous DME Order  - furosemide (LASIX) 20 MG tablet; Take 1 tablet (20 mg) by mouth daily  - Comprehensive metabolic panel (BMP + Alb, Alk Phos, ALT, AST, Total. Bili, TP); Future  Take hydrochlorothiazide 25 mg daily  Add lasix   Elevate Legs   Follow up 1 month/sooner if worse or sob  Needs to lose wt   Diet discussed   Walk 5 minutes every waking hour  Type 2 diabetes mellitus with stage 3b chronic kidney disease, without long-term current use of insulin (H)  Stable   - Comprehensive metabolic panel (BMP + Alb, Alk Phos, ALT, AST, Total. Bili, TP); Future    Morbid obesity (H)  As above   Needs to increase activity and stop eating out  Make Low salt meals -Low fat/low carb    Class 2 severe obesity due to excess calories with serious comorbidity and body mass index (BMI) of 39.0 to 39.9 in adult (H)  As above  Advised sleep study  - Adult Sleep Eval & Management  Referral; Future    Lower extremity edema    - hydrochlorothiazide (HYDRODIURIL) 25 MG tablet; TAKE 1 TABLET BY MOUTH daily  - Comprehensive metabolic panel (BMP + Alb, Alk Phos, ALT, AST, Total. Bili, TP); Future    Hypomagnesemia    - Magnesium; Future  Pt will get labs done  2 weeks      Subjective   Glenda is a 73 year old, presenting for the following health issues:  Edema (Bilateral loot, legs and ankles worse in the right. )      2/13/2024     3:25 PM   Additional Questions   Roomed by Allyson     History of Present Illness       Reason for visit:  Bilateral ankle swelling and elevated blood sugars, was on prednisone for RSV  Prior treatment description:  Echo on 2/5/24, increased HCTZ, lab work complete 1/31/24.    She eats 2-3 servings of fruits and vegetables daily.She consumes 0 sweetened beverage(s) daily.She exercises with enough effort to increase her heart rate 9 or  less minutes per day.  She exercises with enough effort to increase her heart rate 3 or less days per week.   She is taking medications regularly.   Pt was seen by Dr Granados  Notes reviewed   No sob  No wheezing  No PND or orthopnea  Increasing hydrochlorothiazide has not helped  Pt eats Out daily  No abdominal issues  She has Gained weight  Blood sugars 130 average now   She is off Prednisone    Review of Systems  CONSTITUTIONAL: NEGATIVE for fever, chills, change in weight  ENT/MOUTH: NEGATIVE for ear, mouth and throat problems  RESP: NEGATIVE for significant cough or SOB  CV: NEGATIVE for chest pain, palpitations or peripheral edema  GI: NEGATIVE for nausea, abdominal pain, heartburn, or change in bowel habits  PSYCHIATRIC: NEGATIVE for changes in mood or affect      Objective    Pulse 66   Temp 97.4  F (36.3  C) (Temporal)   Resp 18   Wt 98.9 kg (218 lb)   SpO2 97%   BMI 38.93 kg/m    Body mass index is 38.93 kg/m .  Physical Exam   GENERAL: alert and no distress  EYES: Eyes grossly normal to inspection  NECK: no adenopathy, no asymmetry, masses, or scars  RESP: lungs clear to auscultation - no rales, rhonchi or wheezes  CV: regular rates and rhythm, normal S1 S2, no S3 or S4, no murmur, click or rub, and peripheral pulses strong  ABDOMEN: soft, nontender, no hepatosplenomegaly, no masses and bowel sounds normal  MS: extremities normal- no gross deformities noted  1 plus pedal edema  No erythema  Ancillary Procedure on 02/05/2024   Component Date Value Ref Range Status    LVEF  02/05/2024 55-60%   Final           Signed Electronically by: Marysol Cisse MD  Labs reviewed

## 2024-02-13 NOTE — PROGRESS NOTES
DME (Durable Medical Equipment) Orders and Documentation  Orders Placed This Encounter   Procedures    Miscellaneous DME Order        The patient was assessed and it was determined the patient is in need of the following listed DME Supplies/Equipment. Please complete supporting documentation below to demonstrate medical necessity.              Answers submitted by the patient for this visit:  General Questionnaire (Submitted on 1/31/2024)  Chief Complaint: Chronic problems general questions HPI Form  What is the reason for your visit today? : bilateral ankle swelling and elevated blood sugars, was on prednisone for RSV  How many servings of fruits and vegetables do you eat daily?: 2-3  On average, how many sweetened beverages do you drink each day (Examples: soda, juice, sweet tea, etc.  Do NOT count diet or artificially sweetened beverages)?: 0  How many minutes a day do you exercise enough to make your heart beat faster?: 9 or less  How many days a week do you exercise enough to make your heart beat faster?: 3 or less  How many days per week do you miss taking your medication?: 0

## 2024-02-26 ENCOUNTER — MEDICAL CORRESPONDENCE (OUTPATIENT)
Dept: HEALTH INFORMATION MANAGEMENT | Facility: CLINIC | Age: 74
End: 2024-02-26
Payer: MEDICARE

## 2024-02-27 ENCOUNTER — LAB (OUTPATIENT)
Dept: LAB | Facility: CLINIC | Age: 74
End: 2024-02-27
Payer: MEDICARE

## 2024-02-27 DIAGNOSIS — E83.42 HYPOMAGNESEMIA: ICD-10-CM

## 2024-02-27 DIAGNOSIS — I87.2 VENOUS STASIS DERMATITIS, UNSPECIFIED LATERALITY: ICD-10-CM

## 2024-02-27 DIAGNOSIS — R74.8 ABNORMAL LEVELS OF OTHER SERUM ENZYMES: ICD-10-CM

## 2024-02-27 DIAGNOSIS — R60.0 LOWER EXTREMITY EDEMA: ICD-10-CM

## 2024-02-27 DIAGNOSIS — E11.22 TYPE 2 DIABETES MELLITUS WITH STAGE 3B CHRONIC KIDNEY DISEASE, WITHOUT LONG-TERM CURRENT USE OF INSULIN (H): Primary | ICD-10-CM

## 2024-02-27 DIAGNOSIS — N18.32 TYPE 2 DIABETES MELLITUS WITH STAGE 3B CHRONIC KIDNEY DISEASE, WITHOUT LONG-TERM CURRENT USE OF INSULIN (H): Primary | ICD-10-CM

## 2024-02-27 PROCEDURE — 80053 COMPREHEN METABOLIC PANEL: CPT

## 2024-02-27 PROCEDURE — 36415 COLL VENOUS BLD VENIPUNCTURE: CPT

## 2024-02-27 PROCEDURE — 82977 ASSAY OF GGT: CPT

## 2024-02-27 PROCEDURE — 83735 ASSAY OF MAGNESIUM: CPT

## 2024-02-28 LAB
ALBUMIN SERPL BCG-MCNC: 4.1 G/DL (ref 3.5–5.2)
ALP SERPL-CCNC: 96 U/L (ref 40–150)
ALT SERPL W P-5'-P-CCNC: 197 U/L (ref 0–50)
ANION GAP SERPL CALCULATED.3IONS-SCNC: 13 MMOL/L (ref 7–15)
AST SERPL W P-5'-P-CCNC: 190 U/L (ref 0–45)
BILIRUB SERPL-MCNC: 0.4 MG/DL
BUN SERPL-MCNC: 37 MG/DL (ref 8–23)
CALCIUM SERPL-MCNC: 9.2 MG/DL (ref 8.8–10.2)
CHLORIDE SERPL-SCNC: 97 MMOL/L (ref 98–107)
CREAT SERPL-MCNC: 1.59 MG/DL (ref 0.51–0.95)
DEPRECATED HCO3 PLAS-SCNC: 25 MMOL/L (ref 22–29)
EGFRCR SERPLBLD CKD-EPI 2021: 34 ML/MIN/1.73M2
GLUCOSE SERPL-MCNC: 192 MG/DL (ref 70–99)
MAGNESIUM SERPL-MCNC: 1.8 MG/DL (ref 1.7–2.3)
POTASSIUM SERPL-SCNC: 4.2 MMOL/L (ref 3.4–5.3)
PROT SERPL-MCNC: 7.2 G/DL (ref 6.4–8.3)
SODIUM SERPL-SCNC: 135 MMOL/L (ref 135–145)

## 2024-02-29 ENCOUNTER — OFFICE VISIT (OUTPATIENT)
Dept: FAMILY MEDICINE | Facility: CLINIC | Age: 74
End: 2024-02-29
Payer: MEDICARE

## 2024-02-29 VITALS
BODY MASS INDEX: 37.03 KG/M2 | OXYGEN SATURATION: 97 % | HEIGHT: 63 IN | TEMPERATURE: 97.4 F | WEIGHT: 209 LBS | HEART RATE: 64 BPM | DIASTOLIC BLOOD PRESSURE: 79 MMHG | SYSTOLIC BLOOD PRESSURE: 123 MMHG | RESPIRATION RATE: 18 BRPM

## 2024-02-29 DIAGNOSIS — I87.2 VENOUS STASIS DERMATITIS, UNSPECIFIED LATERALITY: Primary | ICD-10-CM

## 2024-02-29 DIAGNOSIS — Z12.4 CERVICAL CANCER SCREENING: ICD-10-CM

## 2024-02-29 DIAGNOSIS — R74.8 ABNORMAL LEVELS OF OTHER SERUM ENZYMES: ICD-10-CM

## 2024-02-29 DIAGNOSIS — R79.89 ELEVATED LIVER FUNCTION TESTS: ICD-10-CM

## 2024-02-29 DIAGNOSIS — E11.22 TYPE 2 DIABETES MELLITUS WITH STAGE 3B CHRONIC KIDNEY DISEASE, WITHOUT LONG-TERM CURRENT USE OF INSULIN (H): ICD-10-CM

## 2024-02-29 DIAGNOSIS — F10.90 ALCOHOL USE DISORDER: ICD-10-CM

## 2024-02-29 DIAGNOSIS — N18.32 TYPE 2 DIABETES MELLITUS WITH STAGE 3B CHRONIC KIDNEY DISEASE, WITHOUT LONG-TERM CURRENT USE OF INSULIN (H): ICD-10-CM

## 2024-02-29 PROBLEM — Z78.9 ALCOHOL USE: Status: ACTIVE | Noted: 2024-02-29

## 2024-02-29 PROBLEM — F10.11 ALCOHOL ABUSE, IN REMISSION: Status: RESOLVED | Noted: 2018-05-02 | Resolved: 2024-02-29

## 2024-02-29 LAB — GGT SERPL-CCNC: 349 U/L (ref 5–36)

## 2024-02-29 PROCEDURE — 99214 OFFICE O/P EST MOD 30 MIN: CPT | Performed by: FAMILY MEDICINE

## 2024-02-29 RX ORDER — FUROSEMIDE 20 MG
20 TABLET ORAL DAILY
Qty: 30 TABLET | Refills: 3 | Status: SHIPPED | OUTPATIENT
Start: 2024-02-29 | End: 2024-04-04

## 2024-02-29 RX ORDER — RESPIRATORY SYNCYTIAL VIRUS VACCINE 120MCG/0.5
0.5 KIT INTRAMUSCULAR ONCE
Qty: 1 EACH | Refills: 0 | Status: CANCELLED | OUTPATIENT
Start: 2024-02-29 | End: 2024-02-29

## 2024-02-29 NOTE — PROGRESS NOTES
Assessment & Plan     Venous stasis dermatitis, unspecified laterality  Stop hydrochlorothiazide  Elevate legs  Johan Hose   - furosemide (LASIX) 20 MG tablet; Take 1 tablet (20 mg) by mouth daily  - GGT; Future  - Comprehensive metabolic panel (BMP + Alb, Alk Phos, ALT, AST, Total. Bili, TP); Future    Alcohol use disorder  Discussed needs to quit as Liver test are very High  Discussed chem dep Treatment  Follow up Liver test in 1 month  Pt declined referral for chem dep  Says she can do this on her own   - Comprehensive metabolic panel (BMP + Alb, Alk Phos, ALT, AST, Total. Bili, TP); Future    Abnormal levels of other serum enzymes    - GGT; Future    Type 2 diabetes mellitus with stage 3a chronic kidney disease, without long-term current use of insulin (H)  Watch diet  - FOOT EXAM    Elevated liver function tests  Due to alcohol use     Cervical cancer screening  Follow up with Dr Noguera    Kathrine Doshi is a 73 year old, presenting for the following health issues:  RECHECK (Water Retention ) and Hypertension        2/29/2024    11:07 AM   Additional Questions   Roomed by Rafaela CANNON CMA     History of Present Illness       Hypertension: She presents for follow up of hypertension.  She does check blood pressure  regularly outside of the clinic. Outpatient blood pressures have not been over 140/90. She follows a low salt diet.     Reason for visit:  Water Retention    She eats 0-1 servings of fruits and vegetables daily.She consumes 0 sweetened beverage(s) daily.She exercises with enough effort to increase her heart rate 9 or less minutes per day.  She exercises with enough effort to increase her heart rate 3 or less days per week.   She is taking medications regularly.   Venous stasis is better  Labs came back with elevated Liver test  Pt says she has been Drinking More alcohol  Says 3 glasses of wine daily    Rest of the ROS is Negative except see above and Problem list [stable]'    Objective    BP  "123/79 (BP Location: Left arm, Patient Position: Chair, Cuff Size: Adult Large)   Pulse 64   Temp 97.4  F (36.3  C) (Temporal)   Resp 18   Ht 1.594 m (5' 2.75\")   Wt 94.8 kg (209 lb)   SpO2 97%   BMI 37.32 kg/m    Body mass index is 37.32 kg/m .  Physical Exam   GENERAL: alert and no distress  NECK: no adenopathy, no asymmetry, masses, or scars  RESP: lungs clear to auscultation - no rales, rhonchi or wheezes  CV: regular rate and rhythm, normal S1 S2, no S3 or S4, no murmur, click or rub, no peripheral edema  ABDOMEN: soft, nontender, no hepatosplenomegaly, no masses and bowel sounds normal  MS: no gross musculoskeletal defects noted, no edema  PSYCH: mentation appears normal, affect normal/bright    Lab on 02/27/2024   Component Date Value Ref Range Status    Sodium 02/27/2024 135  135 - 145 mmol/L Final    Reference intervals for this test were updated on 09/26/2023 to more accurately reflect our healthy population. There may be differences in the flagging of prior results with similar values performed with this method. Interpretation of those prior results can be made in the context of the updated reference intervals.     Potassium 02/27/2024 4.2  3.4 - 5.3 mmol/L Final    Carbon Dioxide (CO2) 02/27/2024 25  22 - 29 mmol/L Final    Anion Gap 02/27/2024 13  7 - 15 mmol/L Final    Urea Nitrogen 02/27/2024 37.0 (H)  8.0 - 23.0 mg/dL Final    Creatinine 02/27/2024 1.59 (H)  0.51 - 0.95 mg/dL Final    GFR Estimate 02/27/2024 34 (L)  >60 mL/min/1.73m2 Final    Calcium 02/27/2024 9.2  8.8 - 10.2 mg/dL Final    Chloride 02/27/2024 97 (L)  98 - 107 mmol/L Final    Glucose 02/27/2024 192 (H)  70 - 99 mg/dL Final    Alkaline Phosphatase 02/27/2024 96  40 - 150 U/L Final    Reference intervals for this test were updated on 11/14/2023 to more accurately reflect our healthy population. There may be differences in the flagging of prior results with similar values performed with this method. Interpretation of those prior " results can be made in the context of the updated reference intervals.    AST 02/27/2024 190 (H)  0 - 45 U/L Final    Reference intervals for this test were updated on 6/12/2023 to more accurately reflect our healthy population. There may be differences in the flagging of prior results with similar values performed with this method. Interpretation of those prior results can be made in the context of the updated reference intervals.    ALT 02/27/2024 197 (H)  0 - 50 U/L Final    Reference intervals for this test were updated on 6/12/2023 to more accurately reflect our healthy population. There may be differences in the flagging of prior results with similar values performed with this method. Interpretation of those prior results can be made in the context of the updated reference intervals.      Protein Total 02/27/2024 7.2  6.4 - 8.3 g/dL Final    Albumin 02/27/2024 4.1  3.5 - 5.2 g/dL Final    Bilirubin Total 02/27/2024 0.4  <=1.2 mg/dL Final    Magnesium 02/27/2024 1.8  1.7 - 2.3 mg/dL Final           Signed Electronically by: Marysol Cisse MD

## 2024-03-17 ENCOUNTER — MYC REFILL (OUTPATIENT)
Dept: FAMILY MEDICINE | Facility: CLINIC | Age: 74
End: 2024-03-17
Payer: MEDICARE

## 2024-03-17 DIAGNOSIS — I10 HTN, GOAL BELOW 140/90: ICD-10-CM

## 2024-03-18 RX ORDER — ATENOLOL 50 MG/1
50 TABLET ORAL 2 TIMES DAILY
Qty: 180 TABLET | Refills: 0 | Status: SHIPPED | OUTPATIENT
Start: 2024-03-18 | End: 2024-05-30

## 2024-03-21 ENCOUNTER — OFFICE VISIT (OUTPATIENT)
Dept: FAMILY MEDICINE | Facility: CLINIC | Age: 74
End: 2024-03-21
Payer: MEDICARE

## 2024-03-21 VITALS
HEART RATE: 56 BPM | OXYGEN SATURATION: 97 % | SYSTOLIC BLOOD PRESSURE: 129 MMHG | BODY MASS INDEX: 35 KG/M2 | DIASTOLIC BLOOD PRESSURE: 78 MMHG | WEIGHT: 196 LBS

## 2024-03-21 DIAGNOSIS — N18.31 STAGE 3A CHRONIC KIDNEY DISEASE (H): Primary | ICD-10-CM

## 2024-03-21 DIAGNOSIS — I12.9 BENIGN HYPERTENSION WITH CKD (CHRONIC KIDNEY DISEASE) STAGE III (H): ICD-10-CM

## 2024-03-21 DIAGNOSIS — N18.30 BENIGN HYPERTENSION WITH CKD (CHRONIC KIDNEY DISEASE) STAGE III (H): ICD-10-CM

## 2024-03-21 DIAGNOSIS — F10.91 ALCOHOL USE DISORDER IN REMISSION: ICD-10-CM

## 2024-03-21 PROCEDURE — 99213 OFFICE O/P EST LOW 20 MIN: CPT | Performed by: FAMILY MEDICINE

## 2024-03-21 ASSESSMENT — PAIN SCALES - GENERAL: PAINLEVEL: NO PAIN (0)

## 2024-03-21 NOTE — PROGRESS NOTES
Assessment & Plan     Stage 3a chronic kidney disease (H)  Pending   She will follow up   - Albumin Random Urine Quantitative with Creat Ratio; Future    Benign hypertension with CKD (chronic kidney disease) stage III (H)  Stable     Alcohol use disorder in remission  In remission   Feels better  AA  Pt will get labs done next month as she had elevated LFT    Subjective   Glenda is a 73 year old, presenting for the following health issues:  Blood Pressure Check (Concerned for low blood sugar at home it was 98/62.) and RECHECK (Elevated liver test. Patient has stopped all alcohol )        3/21/2024     2:28 PM   Additional Questions   Roomed by Allyson CHAVES   Chief Complaint   Patient presents with    Blood Pressure Check     Concerned for low blood sugar at home it was 98/62.    RECHECK     Elevated liver test. Patient has stopped all alcohol      She is doing well since she stopped alcohol  She has Lost weight   Feels good     Hypertension Follow-up    Do you check your blood pressure regularly outside of the clinic? Yes   Are you following a low salt diet? Yes  Are your blood pressures ever more than 140 on the top number (systolic) OR more   than 90 on the bottom number (diastolic), for example 140/90? No    Review of Systems  CONSTITUTIONAL: NEGATIVE for fever, chills, change in weight  ENT/MOUTH: NEGATIVE for ear, mouth and throat problems  RESP: NEGATIVE for significant cough or SOB  CV: NEGATIVE for chest pain, palpitations or peripheral edema  PSYCHIATRIC: NEGATIVE for changes in mood or affect  ROS otherwise negative      Objective    /78   Pulse 56   Wt 88.9 kg (196 lb)   SpO2 97%   BMI 35.00 kg/m    Body mass index is 35 kg/m .  Physical Exam   GENERAL: alert and no distress  NECK: no adenopathy, no asymmetry, masses, or scars  RESP: lungs clear to auscultation - no rales, rhonchi or wheezes  CV: regular rate and rhythm, normal S1 S2, no S3 or S4, no murmur, click or rub, no peripheral  edema  ABDOMEN: soft, nontender, no hepatosplenomegaly, no masses and bowel sounds normal  MS: no gross musculoskeletal defects noted, no edema    Lab on 02/27/2024   Component Date Value Ref Range Status    Sodium 02/27/2024 135  135 - 145 mmol/L Final    Reference intervals for this test were updated on 09/26/2023 to more accurately reflect our healthy population. There may be differences in the flagging of prior results with similar values performed with this method. Interpretation of those prior results can be made in the context of the updated reference intervals.     Potassium 02/27/2024 4.2  3.4 - 5.3 mmol/L Final    Carbon Dioxide (CO2) 02/27/2024 25  22 - 29 mmol/L Final    Anion Gap 02/27/2024 13  7 - 15 mmol/L Final    Urea Nitrogen 02/27/2024 37.0 (H)  8.0 - 23.0 mg/dL Final    Creatinine 02/27/2024 1.59 (H)  0.51 - 0.95 mg/dL Final    GFR Estimate 02/27/2024 34 (L)  >60 mL/min/1.73m2 Final    Calcium 02/27/2024 9.2  8.8 - 10.2 mg/dL Final    Chloride 02/27/2024 97 (L)  98 - 107 mmol/L Final    Glucose 02/27/2024 192 (H)  70 - 99 mg/dL Final    Alkaline Phosphatase 02/27/2024 96  40 - 150 U/L Final    Reference intervals for this test were updated on 11/14/2023 to more accurately reflect our healthy population. There may be differences in the flagging of prior results with similar values performed with this method. Interpretation of those prior results can be made in the context of the updated reference intervals.    AST 02/27/2024 190 (H)  0 - 45 U/L Final    Reference intervals for this test were updated on 6/12/2023 to more accurately reflect our healthy population. There may be differences in the flagging of prior results with similar values performed with this method. Interpretation of those prior results can be made in the context of the updated reference intervals.    ALT 02/27/2024 197 (H)  0 - 50 U/L Final    Reference intervals for this test were updated on 6/12/2023 to more accurately reflect  our healthy population. There may be differences in the flagging of prior results with similar values performed with this method. Interpretation of those prior results can be made in the context of the updated reference intervals.      Protein Total 02/27/2024 7.2  6.4 - 8.3 g/dL Final    Albumin 02/27/2024 4.1  3.5 - 5.2 g/dL Final    Bilirubin Total 02/27/2024 0.4  <=1.2 mg/dL Final    Magnesium 02/27/2024 1.8  1.7 - 2.3 mg/dL Final    GGT 02/27/2024 349 (H)  5 - 36 U/L Final           Signed Electronically by: Marysol Cisse MD

## 2024-04-01 ENCOUNTER — LAB (OUTPATIENT)
Dept: LAB | Facility: CLINIC | Age: 74
End: 2024-04-01
Payer: MEDICARE

## 2024-04-01 DIAGNOSIS — I87.2 VENOUS STASIS DERMATITIS, UNSPECIFIED LATERALITY: ICD-10-CM

## 2024-04-01 DIAGNOSIS — E11.22 TYPE 2 DIABETES MELLITUS WITH STAGE 3B CHRONIC KIDNEY DISEASE, WITHOUT LONG-TERM CURRENT USE OF INSULIN (H): ICD-10-CM

## 2024-04-01 DIAGNOSIS — N18.32 TYPE 2 DIABETES MELLITUS WITH STAGE 3B CHRONIC KIDNEY DISEASE, WITHOUT LONG-TERM CURRENT USE OF INSULIN (H): ICD-10-CM

## 2024-04-01 DIAGNOSIS — F10.90 ALCOHOL USE DISORDER: ICD-10-CM

## 2024-04-01 LAB
ALBUMIN SERPL BCG-MCNC: 3.9 G/DL (ref 3.5–5.2)
ALP SERPL-CCNC: 96 U/L (ref 40–150)
ALT SERPL W P-5'-P-CCNC: 45 U/L (ref 0–50)
ANION GAP SERPL CALCULATED.3IONS-SCNC: 12 MMOL/L (ref 7–15)
AST SERPL W P-5'-P-CCNC: 42 U/L (ref 0–45)
BILIRUB SERPL-MCNC: 0.5 MG/DL
BUN SERPL-MCNC: 28.6 MG/DL (ref 8–23)
CALCIUM SERPL-MCNC: 9.8 MG/DL (ref 8.8–10.2)
CHLORIDE SERPL-SCNC: 100 MMOL/L (ref 98–107)
CREAT SERPL-MCNC: 1.43 MG/DL (ref 0.51–0.95)
CREAT UR-MCNC: 62 MG/DL
DEPRECATED HCO3 PLAS-SCNC: 25 MMOL/L (ref 22–29)
EGFRCR SERPLBLD CKD-EPI 2021: 38 ML/MIN/1.73M2
GLUCOSE SERPL-MCNC: 116 MG/DL (ref 70–99)
MICROALBUMIN UR-MCNC: <12 MG/L
MICROALBUMIN/CREAT UR: NORMAL MG/G{CREAT}
POTASSIUM SERPL-SCNC: 3.9 MMOL/L (ref 3.4–5.3)
PROT SERPL-MCNC: 7.3 G/DL (ref 6.4–8.3)
SODIUM SERPL-SCNC: 137 MMOL/L (ref 135–145)

## 2024-04-01 PROCEDURE — 80053 COMPREHEN METABOLIC PANEL: CPT

## 2024-04-01 PROCEDURE — 82043 UR ALBUMIN QUANTITATIVE: CPT

## 2024-04-01 PROCEDURE — 36415 COLL VENOUS BLD VENIPUNCTURE: CPT

## 2024-04-01 PROCEDURE — 82570 ASSAY OF URINE CREATININE: CPT

## 2024-04-04 ENCOUNTER — OFFICE VISIT (OUTPATIENT)
Dept: FAMILY MEDICINE | Facility: CLINIC | Age: 74
End: 2024-04-04
Payer: MEDICARE

## 2024-04-04 VITALS
WEIGHT: 193.6 LBS | HEART RATE: 60 BPM | BODY MASS INDEX: 34.57 KG/M2 | DIASTOLIC BLOOD PRESSURE: 72 MMHG | OXYGEN SATURATION: 96 % | SYSTOLIC BLOOD PRESSURE: 122 MMHG | TEMPERATURE: 97.3 F | RESPIRATION RATE: 16 BRPM

## 2024-04-04 DIAGNOSIS — N18.31 TYPE 2 DIABETES MELLITUS WITH STAGE 3A CHRONIC KIDNEY DISEASE, WITHOUT LONG-TERM CURRENT USE OF INSULIN (H): ICD-10-CM

## 2024-04-04 DIAGNOSIS — I87.2 VENOUS STASIS DERMATITIS, UNSPECIFIED LATERALITY: ICD-10-CM

## 2024-04-04 DIAGNOSIS — M16.0 PRIMARY OSTEOARTHRITIS OF BOTH HIPS: ICD-10-CM

## 2024-04-04 DIAGNOSIS — E11.22 TYPE 2 DIABETES MELLITUS WITH STAGE 3A CHRONIC KIDNEY DISEASE, WITHOUT LONG-TERM CURRENT USE OF INSULIN (H): ICD-10-CM

## 2024-04-04 DIAGNOSIS — M25.551 HIP PAIN, RIGHT: Primary | ICD-10-CM

## 2024-04-04 DIAGNOSIS — F10.91 ALCOHOL USE DISORDER IN REMISSION: ICD-10-CM

## 2024-04-04 PROCEDURE — 99214 OFFICE O/P EST MOD 30 MIN: CPT | Performed by: FAMILY MEDICINE

## 2024-04-04 PROCEDURE — G2211 COMPLEX E/M VISIT ADD ON: HCPCS | Performed by: FAMILY MEDICINE

## 2024-04-04 RX ORDER — FUROSEMIDE 20 MG
20 TABLET ORAL DAILY
Qty: 30 TABLET | Refills: 3 | Status: SHIPPED | OUTPATIENT
Start: 2024-04-04 | End: 2024-04-04

## 2024-04-04 RX ORDER — FUROSEMIDE 20 MG
20 TABLET ORAL DAILY
Qty: 90 TABLET | Refills: 3 | Status: SHIPPED | OUTPATIENT
Start: 2024-04-04

## 2024-04-04 ASSESSMENT — PAIN SCALES - GENERAL: PAINLEVEL: NO PAIN (0)

## 2024-04-04 NOTE — PROGRESS NOTES
Assessment & Plan     Hip pain, right  Pt says  she will come back for Xray   - XR Hip Right 2-3 Views; Future    Type 2 diabetes mellitus with stage 3a chronic kidney disease, without long-term current use of insulin (H)  Stable   - blood glucose (NO BRAND SPECIFIED) test strip; Use to test blood sugar 1 times daily or as directed.    Venous stasis dermatitis, unspecified laterality  Elevate legs   - furosemide (LASIX) 20 MG tablet; Take 1 tablet (20 mg) by mouth daily    Alcohol use disorder in remission  Doing well  LFT normal   The longitudinal plan of care for the diagnosis(es)/condition(s) as documented were addressed during this visit. Due to the added complexity in care, I will continue to support Glenda in the subsequent management and with ongoing continuity of care.      Subjective   Glenda is a 73 year old, presenting for the following health issues:  Results (Discuss liver test results)        4/4/2024    11:36 AM   Additional Questions   Roomed by Allyson CHAVES   Chief Complaint   Patient presents with    Results     Discuss liver test results     Pt ahs stopped alcohol and doing well  She is watching her Diet  Very Motivated to Improve her Health  C/o Right Hip pain when she walks   No trauma  Uses a walker   She has DJD left Hip          Rest of the ROS is Negative except see above and Problem list [stable]        Objective    /72   Pulse 60   Temp 97.3  F (36.3  C) (Temporal)   Resp 16   Wt 87.8 kg (193 lb 9.6 oz)   SpO2 96%   BMI 34.57 kg/m    Body mass index is 34.57 kg/m .  Physical Exam   GENERAL: alert and no distress, obese   NECK: no adenopathy, no asymmetry, masses, or scars  RESP: lungs clear to auscultation - no rales, rhonchi or wheezes  CV: regular rate and rhythm, normal S1 S2, no S3 or S4, no murmur, click or rub, no peripheral edema  ABDOMEN: soft, nontender, no hepatosplenomegaly, no masses and bowel sounds normal  Right hip pain on movement  No tenderness     Pending          Signed Electronically by: Marysol Cisse MD

## 2024-04-08 ENCOUNTER — ANCILLARY PROCEDURE (OUTPATIENT)
Dept: GENERAL RADIOLOGY | Facility: CLINIC | Age: 74
End: 2024-04-08
Attending: FAMILY MEDICINE
Payer: MEDICARE

## 2024-04-08 DIAGNOSIS — M25.551 HIP PAIN, RIGHT: ICD-10-CM

## 2024-04-08 PROCEDURE — 73502 X-RAY EXAM HIP UNI 2-3 VIEWS: CPT | Mod: TC | Performed by: RADIOLOGY

## 2024-04-22 ENCOUNTER — MYC MEDICAL ADVICE (OUTPATIENT)
Dept: FAMILY MEDICINE | Facility: CLINIC | Age: 74
End: 2024-04-22

## 2024-04-22 ENCOUNTER — OFFICE VISIT (OUTPATIENT)
Dept: ORTHOPEDICS | Facility: CLINIC | Age: 74
End: 2024-04-22
Payer: MEDICARE

## 2024-04-22 VITALS
WEIGHT: 190 LBS | HEART RATE: 95 BPM | BODY MASS INDEX: 33.66 KG/M2 | SYSTOLIC BLOOD PRESSURE: 116 MMHG | RESPIRATION RATE: 18 BRPM | HEIGHT: 63 IN | DIASTOLIC BLOOD PRESSURE: 70 MMHG

## 2024-04-22 DIAGNOSIS — M16.0 PRIMARY OSTEOARTHRITIS OF BOTH HIPS: Primary | ICD-10-CM

## 2024-04-22 PROCEDURE — 99203 OFFICE O/P NEW LOW 30 MIN: CPT | Performed by: ORTHOPAEDIC SURGERY

## 2024-04-22 NOTE — PROGRESS NOTES
Glenda Bales is a 73 year old female who is seen in consultation at the request of Dr. Marysol Cisse for bilateral hip pain, more recently on the right.  She has had some chronic left hip pain previously and received physical therapy for that.  The last 2 months she has had pain in the right hip.  She describes aching shooting pain rated 3 out of 10.  It is worse with walking, better with sitting.  She is a recovering alcoholic and stopped drinking abruptly in February.  Her liver functions have drastically improved since then.  She does have elevated creatinine chronically, so cannot take anti-inflammatories.  She does take 1 Advil at night periodically for pain.  She currently has a walker.  She is interested in having therapy again for the hips.    X-ray of the right hip from 2024 shows moderate to severe arthritis.  There is definite narrowing of the joint space, but is not bone-on-bone.  There is significant spurs on the femoral head and acetabulum.    X-ray of the left hip is from  and shows a bit more mild osteoarthritis.  Better joint space preservation there.    Past Medical History:   Diagnosis Date    Cataract     Diabetes (H) 2019    Essential hypertension, benign     Nonrheumatic aortic valve insufficiency     Osteoarthritis of knee, unspecified laterality, unspecified osteoarthritis type     Thyroid disease        Past Surgical History:   Procedure Laterality Date    CATARACT IOL, RT/LT Right 2018    Model ZCB00 SN:[id number] +11.5 Dioptor - Jermaine Akers MD ([facility])    CATARACT IOL, RT/LT Left 2018    Model ZCB00 SN:[id number] +12.5 Dioptor - Jermaine Akers MD ([facility])    DILATION AND CURETTAGE SUCTION      AB    DILATION AND CURETTAGE SUCTION      in 40's    NO HISTORY OF SURGERY         Family History   Problem Relation Age of Onset    Diabetes Mother     Coronary Artery Disease Father          with a heart attack    Glaucoma Cousin     Colon Cancer No family hx of      Breast Cancer No family hx of     Macular Degeneration No family hx of        Social History     Socioeconomic History    Marital status: Single     Spouse name: Not on file    Number of children: 0    Years of education: Not on file    Highest education level: Not on file   Occupational History    Occupation:    Tobacco Use    Smoking status: Former     Current packs/day: 0.00     Types: Cigarettes     Quit date: 6/15/1977     Years since quittin.8     Passive exposure: Past    Smokeless tobacco: Never    Tobacco comments:     quit in    Vaping Use    Vaping status: Never Used   Substance and Sexual Activity    Alcohol use: Yes     Comment: 1 red wine before bef    Drug use: No    Sexual activity: Not Currently     Partners: Male     Birth control/protection: Abstinence   Other Topics Concern    Parent/sibling w/ CABG, MI or angioplasty before 65F 55M? Yes   Social History Narrative    Not on file     Social Determinants of Health     Financial Resource Strain: Low Risk  (2024)    Financial Resource Strain     Within the past 12 months, have you or your family members you live with been unable to get utilities (heat, electricity) when it was really needed?: No   Food Insecurity: Low Risk  (2024)    Food Insecurity     Within the past 12 months, did you worry that your food would run out before you got money to buy more?: No     Within the past 12 months, did the food you bought just not last and you didn t have money to get more?: No   Transportation Needs: Low Risk  (2024)    Transportation Needs     Within the past 12 months, has lack of transportation kept you from medical appointments, getting your medicines, non-medical meetings or appointments, work, or from getting things that you need?: No   Physical Activity: Not on file   Stress: Not on file   Social Connections: Unknown (2023)    Received from [facility]    Social Connections      Frequency of Communication with Friends and Family: Not on file   Interpersonal Safety: Low Risk  (1/18/2024)    Interpersonal Safety     Do you feel physically and emotionally safe where you currently live?: Yes     Within the past 12 months, have you been hit, slapped, kicked or otherwise physically hurt by someone?: No     Within the past 12 months, have you been humiliated or emotionally abused in other ways by your partner or ex-partner?: No   Housing Stability: Low Risk  (1/1/2024)    Housing Stability     Do you have housing? : Yes     Are you worried about losing your housing?: No       Current Outpatient Medications   Medication Sig Dispense Refill    ACE/ARB/ARNI NOT PRESCRIBED (INTENTIONAL) Please choose reason not prescribed from choices below.      albuterol (PROAIR HFA/PROVENTIL HFA/VENTOLIN HFA) 108 (90 Base) MCG/ACT inhaler Inhale 2 puffs into the lungs every 6 hours as needed for shortness of breath, wheezing or cough (Patient not taking: Reported on 3/21/2024) 18 g 0    albuterol (PROAIR HFA/PROVENTIL HFA/VENTOLIN HFA) 108 (90 Base) MCG/ACT inhaler Inhale 2 puffs into the lungs every 4 hours as needed for shortness of breath or wheezing 8 g 1    alcohol swab prep pads Use to swab area of injection/milena as directed. 100 each 3    aspirin (ASA) 81 MG EC tablet Take 81 mg by mouth daily (Patient not taking: Reported on 2/13/2024)      atenolol (TENORMIN) 50 MG tablet Take 1 tablet (50 mg) by mouth 2 times daily 180 tablet 0    atorvastatin (LIPITOR) 10 MG tablet Take 1 tablet (10 mg) by mouth daily 90 tablet 3    blood glucose (NO BRAND SPECIFIED) test strip Use to test blood sugar 1 times daily or as directed. 100 strip 11    fluticasone (FLONASE) 50 MCG/ACT spray Spray 1 spray into both nostrils daily as needed       furosemide (LASIX) 20 MG tablet Take 1 tablet (20 mg) by mouth daily 90 tablet 3    gabapentin (NEURONTIN) 100 MG capsule Take 1 capsule (100 mg) by mouth 2 times daily 180 capsule 3  "   ketotifen (ZADITOR) 0.025 % ophthalmic solution Place 1 drop into both eyes 2 times daily as needed for itching      levothyroxine (SYNTHROID/LEVOTHROID) 112 MCG tablet Take 1 tablet (112 mcg) by mouth daily 90 tablet 3    losartan (COZAAR) 50 MG tablet Take 1 tablet (50 mg) by mouth daily 90 tablet 3    omeprazole (PRILOSEC) 20 MG DR capsule Take 1 capsule (20 mg) by mouth daily 90 capsule 3       Allergies   Allergen Reactions    Diltiazem Hcl      Leg swelling    Norvasc [Amlodipine]      Leg swellin    Seasonal     Seasonal Allergies        REVIEW OF SYSTEMS:  CONSTITUTIONAL:  NEGATIVE for fever, chills, change in weight, not feeling tired  SKIN:  NEGATIVE for worrisome rashes, no skin lumps, no skin ulcers and no non-healing wounds  EYES:  NEGATIVE for vision changes or irritation.  ENT/MOUTH:  NEGATIVE.  No hearing loss, no hoarseness, no difficulty swallowing.  RESP:  NEGATIVE. No cough or shortness of breath.  CV:  NEGATIVE for chest pain, palpitations or peripheral edema  GI:  NEGATIVE for nausea, abdominal pain, heartburn, or change in bowel habits  :  Negative. No dysuria, no hematuria  MUSCULOSKELETAL:  See HPI above  NEURO:  NEGATIVE . No headaches, no dizziness,  no numbness  ENDOCRINE:  NEGATIVE for temperature intolerance, skin/hair changes  HEME/ALLERGY/IMMUNE:  NEGATIVE for bleeding problems  PSYCHIATRIC:  NEGATIVE. no anxiety, no depression.     Exam:  Vitals: /70   Pulse 95   Resp 18   Ht 1.594 m (5' 2.75\")   Wt 86.2 kg (190 lb)   BMI 33.93 kg/m    BMI= Body mass index is 33.93 kg/m .  Constitutional:  healthy, alert and no distress  Neuro: Alert and Oriented x 3, no focal defects.  Psych: Affect normal   Respiratory: Breathing not labored.  Cardiovascular: normal peripheral pulses  Lymph: no adenopathy  Skin: No rashes,worrisome lesions or skin problems  She has very poor motion of both hips.  On the right she has at best 30 degrees external rotation and lacks at least 10 to 15 " degrees from neutral on the internal rotation.  On the left she has about 40 degrees external rotation and 0 degrees internal rotation.  She walks with a bit of abductor lurch on both sides with a waddling gait.  She is using a walker.  Sensation, motor and circulation are intact.    Assessment:  bilateral hip osteoarthritis, right > left.    Plan: She would like to try physical therapy, and we will order this.  We also discussed possibility of steroid injection in the hip, but she wished to hold off on that for now.  She would also be a candidate for hip replacement.  Prior to hip replacement we would need new low AP pelvis x-ray.

## 2024-04-22 NOTE — LETTER
4/22/2024         RE: Glenda Bales  [address]  [address]  [facility] [address]        Dear Colleague,    Thank you for referring your patient, Glenda Bales, to the [facility]. Please see a copy of my visit note below.    Glenda Bales is a 73 year old female who is seen in consultation at the request of Dr. Marysol Cisse for bilateral hip pain, more recently on the right.  She has had some chronic left hip pain previously and received physical therapy for that.  The last 2 months she has had pain in the right hip.  She describes aching shooting pain rated 3 out of 10.  It is worse with walking, better with sitting.  She is a recovering alcoholic and stopped drinking abruptly in February.  Her liver functions have drastically improved since then.  She does have elevated creatinine chronically, so cannot take anti-inflammatories.  She does take 1 Advil at night periodically for pain.  She currently has a walker.  She is interested in having therapy again for the hips.    X-ray of the right hip from 4/8/2024 shows moderate to severe arthritis.  There is definite narrowing of the joint space, but is not bone-on-bone.  There is significant spurs on the femoral head and acetabulum.    X-ray of the left hip is from 2020 and shows a bit more mild osteoarthritis.  Better joint space preservation there.    Past Medical History:   Diagnosis Date     Cataract      Diabetes (H) April 2019     Essential hypertension, benign      Nonrheumatic aortic valve insufficiency      Osteoarthritis of knee, unspecified laterality, unspecified osteoarthritis type      Thyroid disease        Past Surgical History:   Procedure Laterality Date     CATARACT IOL, RT/LT Right 12/05/2018    Model ZCB00 SN:[id number] +11.5 Dioptor - Jermaine Akers MD ([facility] [place])     CATARACT IOL, RT/LT Left 12/19/2018    Model ZCB00 SN:[id number] +12.5 Dioptor - Jermaine Akers MD ([facility] [place])     DILATION AND CURETTAGE SUCTION      AB     DILATION AND  CURETTAGE SUCTION      in 40's     NO HISTORY OF SURGERY         Family History   Problem Relation Age of Onset     Diabetes Mother      Coronary Artery Disease Father          with a heart attack     Glaucoma Cousin      Colon Cancer No family hx of      Breast Cancer No family hx of      Macular Degeneration No family hx of        Social History     Socioeconomic History     Marital status: Single     Spouse name: Not on file     Number of children: 0     Years of education: Not on file     Highest education level: Not on file   Occupational History     Occupation:    Tobacco Use     Smoking status: Former     Current packs/day: 0.00     Types: Cigarettes     Quit date: 6/15/1977     Years since quittin.8     Passive exposure: Past     Smokeless tobacco: Never     Tobacco comments:     quit in    Vaping Use     Vaping status: Never Used   Substance and Sexual Activity     Alcohol use: Yes     Comment: 1 red wine before bef     Drug use: No     Sexual activity: Not Currently     Partners: Male     Birth control/protection: Abstinence   Other Topics Concern     Parent/sibling w/ CABG, MI or angioplasty before 65F 55M? Yes   Social History Narrative     Not on file     Social Determinants of Health     Financial Resource Strain: Low Risk  (2024)    Financial Resource Strain      Within the past 12 months, have you or your family members you live with been unable to get utilities (heat, electricity) when it was really needed?: No   Food Insecurity: Low Risk  (2024)    Food Insecurity      Within the past 12 months, did you worry that your food would run out before you got money to buy more?: No      Within the past 12 months, did the food you bought just not last and you didn t have money to get more?: No   Transportation Needs: Low Risk  (2024)    Transportation Needs      Within the past 12 months, has lack of transportation kept you from medical appointments, getting your  medicines, non-medical meetings or appointments, work, or from getting things that you need?: No   Physical Activity: Not on file   Stress: Not on file   Social Connections: Unknown (4/11/2023)    Received from [facility]    Social Connections      Frequency of Communication with Friends and Family: Not on file   Interpersonal Safety: Low Risk  (1/18/2024)    Interpersonal Safety      Do you feel physically and emotionally safe where you currently live?: Yes      Within the past 12 months, have you been hit, slapped, kicked or otherwise physically hurt by someone?: No      Within the past 12 months, have you been humiliated or emotionally abused in other ways by your partner or ex-partner?: No   Housing Stability: Low Risk  (1/1/2024)    Housing Stability      Do you have housing? : Yes      Are you worried about losing your housing?: No       Current Outpatient Medications   Medication Sig Dispense Refill     ACE/ARB/ARNI NOT PRESCRIBED (INTENTIONAL) Please choose reason not prescribed from choices below.       albuterol (PROAIR HFA/PROVENTIL HFA/VENTOLIN HFA) 108 (90 Base) MCG/ACT inhaler Inhale 2 puffs into the lungs every 6 hours as needed for shortness of breath, wheezing or cough (Patient not taking: Reported on 3/21/2024) 18 g 0     albuterol (PROAIR HFA/PROVENTIL HFA/VENTOLIN HFA) 108 (90 Base) MCG/ACT inhaler Inhale 2 puffs into the lungs every 4 hours as needed for shortness of breath or wheezing 8 g 1     alcohol swab prep pads Use to swab area of injection/milena as directed. 100 each 3     aspirin (ASA) 81 MG EC tablet Take 81 mg by mouth daily (Patient not taking: Reported on 2/13/2024)       atenolol (TENORMIN) 50 MG tablet Take 1 tablet (50 mg) by mouth 2 times daily 180 tablet 0     atorvastatin (LIPITOR) 10 MG tablet Take 1 tablet (10 mg) by mouth daily 90 tablet 3     blood glucose (NO BRAND SPECIFIED) test strip Use to test blood sugar 1 times daily or as  "directed. 100 strip 11     fluticasone (FLONASE) 50 MCG/ACT spray Spray 1 spray into both nostrils daily as needed        furosemide (LASIX) 20 MG tablet Take 1 tablet (20 mg) by mouth daily 90 tablet 3     gabapentin (NEURONTIN) 100 MG capsule Take 1 capsule (100 mg) by mouth 2 times daily 180 capsule 3     ketotifen (ZADITOR) 0.025 % ophthalmic solution Place 1 drop into both eyes 2 times daily as needed for itching       levothyroxine (SYNTHROID/LEVOTHROID) 112 MCG tablet Take 1 tablet (112 mcg) by mouth daily 90 tablet 3     losartan (COZAAR) 50 MG tablet Take 1 tablet (50 mg) by mouth daily 90 tablet 3     omeprazole (PRILOSEC) 20 MG DR capsule Take 1 capsule (20 mg) by mouth daily 90 capsule 3       Allergies   Allergen Reactions     Diltiazem Hcl      Leg swelling     Norvasc [Amlodipine]      Leg swellin     Seasonal      Seasonal Allergies        REVIEW OF SYSTEMS:  CONSTITUTIONAL:  NEGATIVE for fever, chills, change in weight, not feeling tired  SKIN:  NEGATIVE for worrisome rashes, no skin lumps, no skin ulcers and no non-healing wounds  EYES:  NEGATIVE for vision changes or irritation.  ENT/MOUTH:  NEGATIVE.  No hearing loss, no hoarseness, no difficulty swallowing.  RESP:  NEGATIVE. No cough or shortness of breath.  CV:  NEGATIVE for chest pain, palpitations or peripheral edema  GI:  NEGATIVE for nausea, abdominal pain, heartburn, or change in bowel habits  :  Negative. No dysuria, no hematuria  MUSCULOSKELETAL:  See HPI above  NEURO:  NEGATIVE . No headaches, no dizziness,  no numbness  ENDOCRINE:  NEGATIVE for temperature intolerance, skin/hair changes  HEME/ALLERGY/IMMUNE:  NEGATIVE for bleeding problems  PSYCHIATRIC:  NEGATIVE. no anxiety, no depression.     Exam:  Vitals: /70   Pulse 95   Resp 18   Ht 1.594 m (5' 2.75\")   Wt 86.2 kg (190 lb)   BMI 33.93 kg/m    BMI= Body mass index is 33.93 kg/m .  Constitutional:  healthy, alert and no distress  Neuro: Alert and Oriented x 3, no focal " defects.  Psych: Affect normal   Respiratory: Breathing not labored.  Cardiovascular: normal peripheral pulses  Lymph: no adenopathy  Skin: No rashes,worrisome lesions or skin problems  She has very poor motion of both hips.  On the right she has at best 30 degrees external rotation and lacks at least 10 to 15 degrees from neutral on the internal rotation.  On the left she has about 40 degrees external rotation and 0 degrees internal rotation.  She walks with a bit of abductor lurch on both sides with a waddling gait.  She is using a walker.  Sensation, motor and circulation are intact.    Assessment:  bilateral hip osteoarthritis, right > left.    Plan: She would like to try physical therapy, and we will order this.  We also discussed possibility of steroid injection in the hip, but she wished to hold off on that for now.  She would also be a candidate for hip replacement.  Prior to hip replacement we would need new low AP pelvis x-ray.    Again, thank you for allowing me to participate in the care of your patient.        Sincerely,        Meek Cifuentes MD

## 2024-04-23 NOTE — TELEPHONE ENCOUNTER
Had elevated liver enzymes in the recent past.  Please advise if ok to take Tylenol    Todd Wolff RN  [facility]

## 2024-04-23 NOTE — TELEPHONE ENCOUNTER
Liver enzymes have improved. Would recommend a low dose of use so instead of 500 use 250 mg every 8 hours as needed    [facility]

## 2024-04-24 ENCOUNTER — THERAPY VISIT (OUTPATIENT)
Dept: PHYSICAL THERAPY | Facility: CLINIC | Age: 74
End: 2024-04-24
Payer: MEDICARE

## 2024-04-24 DIAGNOSIS — M16.0 PRIMARY OSTEOARTHRITIS OF BOTH HIPS: ICD-10-CM

## 2024-04-24 DIAGNOSIS — M25.551 CHRONIC HIP PAIN, BILATERAL: Primary | ICD-10-CM

## 2024-04-24 DIAGNOSIS — M25.552 CHRONIC HIP PAIN, BILATERAL: Primary | ICD-10-CM

## 2024-04-24 DIAGNOSIS — G89.29 CHRONIC HIP PAIN, BILATERAL: Primary | ICD-10-CM

## 2024-04-24 PROCEDURE — 97110 THERAPEUTIC EXERCISES: CPT | Mod: GP | Performed by: PHYSICAL THERAPIST

## 2024-04-24 PROCEDURE — 97161 PT EVAL LOW COMPLEX 20 MIN: CPT | Mod: GP | Performed by: PHYSICAL THERAPIST

## 2024-04-24 ASSESSMENT — ACTIVITIES OF DAILY LIVING (ADL)
HEAVY_WORK: SLIGHT DIFFICULTY
RECREATIONAL_ACTIVITIES: EXTREME DIFFICULTY
WALKING_UP_STEEP_HILLS: EXTREME DIFFICULTY
LIGHT_TO_MODERATE_WORK: SLIGHT DIFFICULTY
TWISTING/PIVOTING_ON_INVOLVED_LEG: EXTREME DIFFICULTY
WALKING_APPROXIMATELY_10_MINUTES: SLIGHT DIFFICULTY
GETTING_INTO_AND_OUT_OF_A_BATHTUB: MODERATE DIFFICULTY
SITTING_FOR_15_MINUTES: NO DIFFICULTY AT ALL
PUTTING_ON_SOCKS_AND_SHOES: EXTREME DIFFICULTY
HOS_ADL_COUNT: 17
ROLLING_OVER_IN_BED: SLIGHT DIFFICULTY
STANDING_FOR_15_MINUTES: MODERATE DIFFICULTY
DEEP_SQUATTING: EXTREME DIFFICULTY
WALKING_15_MINUTES_OR_GREATER: MODERATE DIFFICULTY
GOING_UP_1_FLIGHT_OF_STAIRS: EXTREME DIFFICULTY
HOW_WOULD_YOU_RATE_YOUR_CURRENT_LEVEL_OF_FUNCTION_DURING_YOUR_USUAL_ACTIVITIES_OF_DAILY_LIVING_FROM_0_TO_100_WITH_100_BEING_YOUR_LEVEL_OF_FUNCTION_PRIOR_TO_YOUR_HIP_PROBLEM_AND_0_BEING_THE_INABILITY_TO_PERFORM_ANY_OF_YOUR_USUAL_DAILY_ACTIVITIES?: 50
HOS_ADL_SCORE(%): 48.53
WALKING_DOWN_STEEP_HILLS: EXTREME DIFFICULTY
HOS_ADL_HIGHEST_POTENTIAL_SCORE: 68
GOING_DOWN_1_FLIGHT_OF_STAIRS: EXTREME DIFFICULTY
STEPPING_UP_AND_DOWN_CURBS: SLIGHT DIFFICULTY
HOS_ADL_ITEM_SCORE_TOTAL: 33
WALKING_INITIALLY: SLIGHT DIFFICULTY
GETTING_INTO_AND_OUT_OF_AN_AVERAGE_CAR: MODERATE DIFFICULTY

## 2024-04-24 NOTE — PROGRESS NOTES
PHYSICAL THERAPY EVALUATION  Type of Visit: Evaluation    See electronic medical record for Abuse and Falls Screening details.    Subjective Pain in both hips Right>Left. If turns will get a pain in the hip. Has had pain for about 2 months. Lifting leg into car is painful. Can stand for 5-10 minutes before having to sit due to pain. Hot water makes it feel better as does resting. Will take Advil as needed which helps with pain. Has been using a walker since pain started 2 months ago. Gets pain on front of both hips.       Presenting condition or subjective complaint: Hip arthritis  Date of onset: 04/22/24 (Date of order)    Relevant medical history: High blood pressure; Overweight   Dates & types of surgery: None    Prior diagnostic imaging/testing results: X-ray     Prior therapy history for the same diagnosis, illness or injury: Yes PT - Left hip    Prior Level of Function  Transfers: Assistive equipment  Ambulation: Assistive equipment  ADL: Assistive equipment    Living Environment  Social support: Alone   Type of home: Apartment/condo   Stairs to enter the home: Yes 1 Is there a railing: No   Ramp: No   Stairs inside the home: No       Help at home: None  Equipment owned: Walker     Employment: No Retired  Hobbies/Interests: None    Patient goals for therapy: Walk without pain     Objective   HIP EVALUATION  PAIN: Pain Level at Rest: 3/10  Pain Level with Use: 8/10  GAIT:   Weightbearing Status: WBAT  Assistive Device(s): Walker (front wheeled)  Gait Deviations: Antalgic  ROM:   (Degrees) Left AROM Left PROM  Right AROM Right PROM   Hip Flexion  90 90    Hip Extension       Hip Abduction       Hip Adduction       Hip Internal Rotation       Hip External Rotation       Knee Flexion 101  110    Knee Extension 13 degrees short of TKE  20 degrees short of TKE    Lumbar Side glide     Lumbar Flexion    Lumbar Extension      STRENGTH:   Pain: - none + mild ++ moderate +++ severe  Strength Scale: 0-5/5 Left Right    Hip Flexion 4+ 4+   Hip Extension 3+ 3+   Hip Abduction 3+ 3+   Hip Adduction     Hip Internal Rotation     Hip External Rotation     Knee Flexion 4 4   Knee Extension 4 4     LE FLEXIBILITY: Decreased piriformis L, Decreased hip flexors L, Decreased hamstrings L, Decreased piriformis R, Decreased hip flexors R, Decreased hamstrings R  SPECIAL TESTS:   SLR - Negative B   Left Right   NEHAL Positive Positive   FADIR/Labrum/BROOKE Positive Positive   Femoral Nerve     Lenore's     Piriformis     Quadrant Testing     SLR     Slump     Stork with Extension     Flash     Hip Scour Positive Positive     PALPATION:   + Tenderness At Location Left Right   Ischial Tuberosity     Greater Trochanter + +   IT Band + +   Hip Flexors + +   Piriformis     PSIS     ASIS     Adductors     Abductors + +   Iliac Crest     Glut Medius     Bursa     Pubis       JOINT MOBILITY: Hypomobility of hip joints B        Assessment & Plan   CLINICAL IMPRESSIONS  Medical Diagnosis: Primary osteoarthritis of both hips    Treatment Diagnosis: Chronic bilateral hip pain   Impression/Assessment: Patient is a 73 year old female with bilateral hip complaints.  The following significant findings have been identified: Pain, Decreased ROM/flexibility, Decreased joint mobility, Decreased strength, Impaired balance, Impaired gait, and Decreased activity tolerance. These impairments interfere with their ability to perform self care tasks, recreational activities, household chores, driving , household mobility, and community mobility as compared to previous level of function.     Clinical Decision Making (Complexity):  Clinical Presentation: Stable/Uncomplicated  Clinical Presentation Rationale: based on medical and personal factors listed in PT evaluation  Clinical Decision Making (Complexity): Low complexity    PLAN OF CARE  Treatment Interventions:  Interventions: Gait Training, Manual Therapy, Neuromuscular Re-education, Therapeutic Activity, Therapeutic  Exercise, Self-Care/Home Management    Long Term Goals     PT Goal 1  Goal Identifier: Ambulation  Goal Description: Pt will be able to ambulate for 10 minutes in order to run errands with a minimal increase in pain 1-2/10  Target Date: 06/19/24  PT Goal 2  Goal Identifier: Driving  Goal Description: Pt will be able to lift her leg into her vehicle with a minimal increase in pain 1-2/10.  Target Date: 06/19/24      Frequency of Treatment: 1 time per week  Duration of Treatment: 8 weeks    Recommended Referrals to Other Professionals:   Education Assessment:   Learner/Method: Patient;No Barriers to Learning    Risks and benefits of evaluation/treatment have been explained.   Patient/Family/caregiver agrees with Plan of Care.     Evaluation Time:     PT Eval, Low Complexity Minutes (87169): 20       Signing Clinician: Chevy Yates PT      [facility]                                                                                   OUTPATIENT PHYSICAL THERAPY      PLAN OF TREATMENT FOR OUTPATIENT REHABILITATION   Patient's Last Name, First Name, M.Glenda Moreira YOB: 1950   Provider's Name   [facility]   Medical Record No.  [id number]     Onset Date: 04/22/24 (Date of order)  Start of Care Date: 04/24/24     Medical Diagnosis:  Primary osteoarthritis of both hips      PT Treatment Diagnosis:  Chronic bilateral hip pain Plan of Treatment  Frequency/Duration: 1 time per week/ 8 weeks    Certification date from 04/24/24 to 06/19/24         See note for plan of treatment details and functional goals     Chevy Yates, PT                         I CERTIFY THE NEED FOR THESE SERVICES FURNISHED UNDER        THIS PLAN OF TREATMENT AND WHILE UNDER MY CARE     (Physician attestation of this document indicates review and certification of the therapy plan).              Referring Provider:  Meek Cifuentes    Initial Assessment  See Epic  Evaluation- Start of Care Date: 04/24/24

## 2024-05-01 ENCOUNTER — THERAPY VISIT (OUTPATIENT)
Dept: PHYSICAL THERAPY | Facility: CLINIC | Age: 74
End: 2024-05-01
Payer: MEDICARE

## 2024-05-01 DIAGNOSIS — M25.551 CHRONIC HIP PAIN, BILATERAL: Primary | ICD-10-CM

## 2024-05-01 DIAGNOSIS — M25.552 CHRONIC HIP PAIN, BILATERAL: Primary | ICD-10-CM

## 2024-05-01 DIAGNOSIS — G89.29 CHRONIC HIP PAIN, BILATERAL: Primary | ICD-10-CM

## 2024-05-01 PROCEDURE — 97140 MANUAL THERAPY 1/> REGIONS: CPT | Mod: GP | Performed by: PHYSICAL THERAPIST

## 2024-05-01 PROCEDURE — 97110 THERAPEUTIC EXERCISES: CPT | Mod: GP | Performed by: PHYSICAL THERAPIST

## 2024-05-09 ENCOUNTER — OFFICE VISIT (OUTPATIENT)
Dept: FAMILY MEDICINE | Facility: CLINIC | Age: 74
End: 2024-05-09
Payer: MEDICARE

## 2024-05-09 VITALS
OXYGEN SATURATION: 99 % | TEMPERATURE: 97 F | SYSTOLIC BLOOD PRESSURE: 136 MMHG | DIASTOLIC BLOOD PRESSURE: 88 MMHG | RESPIRATION RATE: 12 BRPM | HEART RATE: 50 BPM | BODY MASS INDEX: 33.45 KG/M2 | HEIGHT: 63 IN | WEIGHT: 188.8 LBS

## 2024-05-09 DIAGNOSIS — R26.9 GAIT ABNORMALITY: ICD-10-CM

## 2024-05-09 DIAGNOSIS — N18.30 BENIGN HYPERTENSION WITH CKD (CHRONIC KIDNEY DISEASE) STAGE III (H): ICD-10-CM

## 2024-05-09 DIAGNOSIS — M16.0 PRIMARY OSTEOARTHRITIS OF BOTH HIPS: ICD-10-CM

## 2024-05-09 DIAGNOSIS — N18.32 STAGE 3B CHRONIC KIDNEY DISEASE (H): Primary | ICD-10-CM

## 2024-05-09 DIAGNOSIS — Z29.11 NEED FOR VACCINATION AGAINST RESPIRATORY SYNCYTIAL VIRUS: ICD-10-CM

## 2024-05-09 DIAGNOSIS — I12.9 BENIGN HYPERTENSION WITH CKD (CHRONIC KIDNEY DISEASE) STAGE III (H): ICD-10-CM

## 2024-05-09 PROBLEM — I10 HTN, GOAL BELOW 140/90: Status: ACTIVE | Noted: 2024-05-09

## 2024-05-09 PROCEDURE — 99214 OFFICE O/P EST MOD 30 MIN: CPT | Performed by: FAMILY MEDICINE

## 2024-05-09 RX ORDER — RESPIRATORY SYNCYTIAL VIRUS VACCINE 120MCG/0.5
0.5 KIT INTRAMUSCULAR ONCE
Qty: 1 EACH | Refills: 0 | Status: SHIPPED | OUTPATIENT
Start: 2024-05-09 | End: 2024-05-09

## 2024-05-09 NOTE — PROGRESS NOTES
Assessment & Plan     Primary osteoarthritis of both hips  Advised steroid Injection  Discussed if this does not help she will follow up ortho-Hip replacement   Take Tylenol otc  Continue PT   - Raised Toilet Seat Order    HTN, goal below 140/90  Stable   - Albumin Random Urine Quantitative with Creat Ratio; Future    Need for vaccination against respiratory syncytial virus  Advised   - respiratory syncytial virus vaccine, bivalent (ABRYSVO) injection; Inject 0.5 mLs into the muscle once for 1 dose    Gait abnormality  Uses walker   - Raised Toilet Seat Order  Pt states she has  a hard Time sitting on Toilet seat as it is too Low  DME done  Follow up if this does not help  CKD 3 b  Advised consider Jardiance to help with further Kidney damage  Avoid NSAID's      The longitudinal plan of care for the diagnosis(es)/condition(s) as documented were addressed during this visit. Due to the added complexity in care, I will continue to support Glenda in the subsequent management and with ongoing continuity of care.        Subjective   Glenda is a 73 year old, presenting for the following health issues:  RECHECK (Right hip. Have an appointment to get cortisone injection this Sat, May 11,2024 and would like to consults before going to cortisone injections  )        5/9/2024     2:40 PM   Additional Questions   Roomed by An V.         5/9/2024     2:40 PM   Patient Reported Additional Medications   Patient reports taking the following new medications none     History of Present Illness       Reason for visit:  Follow up Right hip pain    She eats 2-3 servings of fruits and vegetables daily.She consumes 0 sweetened beverage(s) daily.She exercises with enough effort to increase her heart rate 9 or less minutes per day.  She exercises with enough effort to increase her heart rate 3 or less days per week.   She is taking medications regularly.     Pt  has  OA both Hips Right more than Left  She is in PT  She walks with a  "walker  Has pain in Both Hips  PT helps some  Has seen Ortho   Takes Tylenol    Hypertension Follow-up    Do you check your blood pressure regularly outside of the clinic? No   Are you following a low salt diet? Yes  Are your blood pressures ever more than 140 on the top number (systolic) OR more   than 90 on the bottom number (diastolic), for example 140/90? No          Review of Systems  Rest of the ROS is Negative except see above and Problem list [stable]        Objective    /88   Pulse 50   Temp 97  F (36.1  C) (Temporal)   Resp 12   Ht 1.594 m (5' 2.76\")   Wt 85.6 kg (188 lb 12.8 oz)   SpO2 99%   BMI 33.70 kg/m    Body mass index is 33.7 kg/m .  Physical Exam   GENERAL: alert and no distress  RESP: lungs clear to auscultation - no rales, rhonchi or wheezes  CV: regular rate and rhythm, normal S1 S2, no S3 or S4, no murmur, click or rub, no peripheral edema  MS: no gross musculoskeletal defects noted, no edema  Right Hip Mild tenderness             Signed Electronically by: Marysol Cisse MD    "

## 2024-05-09 NOTE — PROGRESS NOTES
DME (Durable Medical Equipment) Orders and Documentation  Orders Placed This Encounter   Procedures    Raised Toilet Seat Order        The patient was assessed and it was determined the patient is in need of the following listed DME Supplies/Equipment. Please complete supporting documentation below to demonstrate medical necessity.         Answers submitted by the patient for this visit:  General Questionnaire (Submitted on 5/9/2024)  Chief Complaint: Chronic problems general questions HPI Form  What is the reason for your visit today? : Follow up Right hip pain  How many servings of fruits and vegetables do you eat daily?: 2-3  On average, how many sweetened beverages do you drink each day (Examples: soda, juice, sweet tea, etc.  Do NOT count diet or artificially sweetened beverages)?: 0  How many minutes a day do you exercise enough to make your heart beat faster?: 9 or less  How many days a week do you exercise enough to make your heart beat faster?: 3 or less  How many days per week do you miss taking your medication?: 0

## 2024-05-09 NOTE — PATIENT INSTRUCTIONS
We use blood tests to check how well your kidneys are functioning. These tests measure creatinine. They also calculate eGFR (estimated glomerular filtration rate).    Your eGFR is less than 60. This means you have stage 3 chronic kidney disease.  This is quite common and not something you should worry about. We will do regular tests to monitor your kidney function. We will also help you control risk factors that might worsen your kidney function.     As the eGFR test is becoming more available, the medical community is renewing their focus on managing chronic kidney disease. You will likely hear more about your kidney disease during future medical appointments.

## 2024-05-11 ENCOUNTER — OFFICE VISIT (OUTPATIENT)
Dept: ORTHOPEDICS | Facility: CLINIC | Age: 74
End: 2024-05-11
Payer: MEDICARE

## 2024-05-11 DIAGNOSIS — M16.0 PRIMARY OSTEOARTHRITIS OF BOTH HIPS: ICD-10-CM

## 2024-05-11 PROCEDURE — 20611 DRAIN/INJ JOINT/BURSA W/US: CPT | Mod: RT | Performed by: FAMILY MEDICINE

## 2024-05-11 RX ORDER — BETAMETHASONE SODIUM PHOSPHATE AND BETAMETHASONE ACETATE 3; 3 MG/ML; MG/ML
6 INJECTION, SUSPENSION INTRA-ARTICULAR; INTRALESIONAL; INTRAMUSCULAR; SOFT TISSUE
Status: DISCONTINUED | OUTPATIENT
Start: 2024-05-11 | End: 2024-08-19

## 2024-05-11 RX ORDER — ROPIVACAINE HYDROCHLORIDE 5 MG/ML
1 INJECTION, SOLUTION EPIDURAL; INFILTRATION; PERINEURAL
Status: DISCONTINUED | OUTPATIENT
Start: 2024-05-11 | End: 2024-08-19

## 2024-05-11 RX ADMIN — ROPIVACAINE HYDROCHLORIDE 1 ML: 5 INJECTION, SOLUTION EPIDURAL; INFILTRATION; PERINEURAL at 09:26

## 2024-05-11 RX ADMIN — BETAMETHASONE SODIUM PHOSPHATE AND BETAMETHASONE ACETATE 6 MG: 3; 3 INJECTION, SUSPENSION INTRA-ARTICULAR; INTRALESIONAL; INTRAMUSCULAR; SOFT TISSUE at 09:26

## 2024-05-11 NOTE — PATIENT INSTRUCTIONS
[facility] Injection Discharge Instructions    Procedure: Right hip joint steroid injection    Follow-up: 3 mon can consider repeat steroid injection    You may shower, however avoid swimming, tub baths or hot tubs for 24 hours following your procedure  You may have a mild to moderate increase in pain for several days following the injection.  It may take up to 14 days for the steroid medication to start working although you may feel the effect as early as a few days after the procedure.  You may use ice packs for 10-15 minutes, 3 to 4 times a day at the injection site for comfort  You may use anti-inflammatory medications (such as Ibuprofen or Aleve or Advil) or Tylenol for pain control if necessary  If you were fasting, you may resume your normal diet and medications after the procedure  If you have diabetes, check your blood sugar more frequently than usual as your blood sugar may be higher than normal for 10-14 days following a steroid injection. Contact your doctor who manages your diabetes if your blood sugar is higher than usual    If you experience any of the following, call [facility] @ [phone] or [phone]  -Fever over 100 degree F  -Swelling, bleeding, redness, drainage, warmth at the injection site  - New or worsening pain    It was great seeing you today!    Jose Connell

## 2024-05-11 NOTE — LETTER
5/11/2024         RE: Glenda Bales  [address]  [address]  [facility] [address]        Dear Colleague,    Thank you for referring your patient, Glenda Bales, to the [facility]. Please see a copy of my visit note below.    Large Joint Injection/Arthocentesis: R hip joint    Date/Time: 5/11/2024 9:26 AM    Performed by: Jose Connell MD  Authorized by: Jose Connell MD    Indications:  Pain and osteoarthritis  Needle Size:  22 G  Guidance: ultrasound    Approach:  Anterior  Location:  Hip      Site:  R hip joint  Medications:  6 mg betamethasone acet & sod phos 6 (3-3) MG/ML; 1 mL ROPivacaine 5 MG/ML  Outcome:  Tolerated well, no immediate complications  Procedure discussed: discussed risks, benefits, and alternatives    Consent Given by:  Patient  Timeout: timeout called immediately prior to procedure    Prep: patient was prepped and draped in usual sterile fashion     Ultrasound images of procedure were permanently stored.      Patient reported some improvement of pain after the numbing portion right hip joint steroid injection.  Ultrasound guided images were permanently stored.  Aftercare instructions given to patient.  Plan to follow-up as previously discussed with referring provider.  Can follow-up with me 3+ mon for repeat steroid injections.     Jose Connell MD [facility]            Again, thank you for allowing me to participate in the care of your patient.        Sincerely,        Jose Connell MD

## 2024-05-11 NOTE — PROGRESS NOTES
Large Joint Injection/Arthocentesis: R hip joint    Date/Time: 5/11/2024 9:26 AM    Performed by: Jose Connell MD  Authorized by: Jose Connell MD    Indications:  Pain and osteoarthritis  Needle Size:  22 G  Guidance: ultrasound    Approach:  Anterior  Location:  Hip      Site:  R hip joint  Medications:  6 mg betamethasone acet & sod phos 6 (3-3) MG/ML; 1 mL ROPivacaine 5 MG/ML  Outcome:  Tolerated well, no immediate complications  Procedure discussed: discussed risks, benefits, and alternatives    Consent Given by:  Patient  Timeout: timeout called immediately prior to procedure    Prep: patient was prepped and draped in usual sterile fashion     Ultrasound images of procedure were permanently stored.      Patient reported some improvement of pain after the numbing portion right hip joint steroid injection.  Ultrasound guided images were permanently stored.  Aftercare instructions given to patient.  Plan to follow-up as previously discussed with referring provider.  Can follow-up with me 3+ mon for repeat steroid injections.     Jose Connell MD [facility]

## 2024-05-14 ENCOUNTER — THERAPY VISIT (OUTPATIENT)
Dept: PHYSICAL THERAPY | Facility: CLINIC | Age: 74
End: 2024-05-14
Payer: MEDICARE

## 2024-05-14 DIAGNOSIS — M25.552 CHRONIC HIP PAIN, BILATERAL: Primary | ICD-10-CM

## 2024-05-14 DIAGNOSIS — G89.29 CHRONIC HIP PAIN, BILATERAL: Primary | ICD-10-CM

## 2024-05-14 DIAGNOSIS — M25.551 CHRONIC HIP PAIN, BILATERAL: Primary | ICD-10-CM

## 2024-05-14 PROCEDURE — 97140 MANUAL THERAPY 1/> REGIONS: CPT | Mod: GP | Performed by: PHYSICAL THERAPIST

## 2024-05-14 PROCEDURE — 97110 THERAPEUTIC EXERCISES: CPT | Mod: GP | Performed by: PHYSICAL THERAPIST

## 2024-05-20 ENCOUNTER — THERAPY VISIT (OUTPATIENT)
Dept: PHYSICAL THERAPY | Facility: CLINIC | Age: 74
End: 2024-05-20
Payer: MEDICARE

## 2024-05-20 DIAGNOSIS — M25.551 CHRONIC HIP PAIN, BILATERAL: Primary | ICD-10-CM

## 2024-05-20 DIAGNOSIS — N18.31 TYPE 2 DIABETES MELLITUS WITH STAGE 3A CHRONIC KIDNEY DISEASE, WITHOUT LONG-TERM CURRENT USE OF INSULIN (H): ICD-10-CM

## 2024-05-20 DIAGNOSIS — M25.552 CHRONIC HIP PAIN, BILATERAL: Primary | ICD-10-CM

## 2024-05-20 DIAGNOSIS — E11.22 TYPE 2 DIABETES MELLITUS WITH STAGE 3A CHRONIC KIDNEY DISEASE, WITHOUT LONG-TERM CURRENT USE OF INSULIN (H): ICD-10-CM

## 2024-05-20 DIAGNOSIS — G89.29 CHRONIC HIP PAIN, BILATERAL: Primary | ICD-10-CM

## 2024-05-20 PROCEDURE — 97110 THERAPEUTIC EXERCISES: CPT | Mod: GP | Performed by: PHYSICAL THERAPIST

## 2024-05-20 PROCEDURE — 97140 MANUAL THERAPY 1/> REGIONS: CPT | Mod: GP | Performed by: PHYSICAL THERAPIST

## 2024-05-20 RX ORDER — BLOOD SUGAR DIAGNOSTIC
STRIP MISCELLANEOUS
Qty: 100 STRIP | Refills: 11 | OUTPATIENT
Start: 2024-05-20

## 2024-05-28 ENCOUNTER — THERAPY VISIT (OUTPATIENT)
Dept: PHYSICAL THERAPY | Facility: CLINIC | Age: 74
End: 2024-05-28
Payer: MEDICARE

## 2024-05-28 DIAGNOSIS — G89.29 CHRONIC HIP PAIN, BILATERAL: Primary | ICD-10-CM

## 2024-05-28 DIAGNOSIS — M25.551 CHRONIC HIP PAIN, BILATERAL: Primary | ICD-10-CM

## 2024-05-28 DIAGNOSIS — M25.552 CHRONIC HIP PAIN, BILATERAL: Primary | ICD-10-CM

## 2024-05-28 PROCEDURE — 97110 THERAPEUTIC EXERCISES: CPT | Mod: GP | Performed by: PHYSICAL THERAPIST

## 2024-05-28 NOTE — PROGRESS NOTES
PLAN  Continue therapy per current plan of care.    Beginning/End Dates of Progress Note Reporting Period:  04/24/24 to 05/28/2024    Referring Provider:  Meek Cifuentes       05/28/24 0500   Appointment Info   Signing clinician's name / credentials Sage Yates, PT, DPT   Total/Authorized Visits 8 (POC) (E&T)   Visits Used 5   Medical Diagnosis Primary osteoarthritis of both hips   PT Tx Diagnosis Chronic bilateral hip pain   Precautions/Limitations R>L   Other pertinent information NEXT: Hip and LE strengthening / Gentle ROM   Quick Adds Certification   Progress Note/Certification   Start of Care Date 04/24/24   Onset of illness/injury or Date of Surgery 04/22/24  (Date of order)   Therapy Frequency 1 time per week   Predicted Duration 8 weeks   Certification date from 04/24/24   Certification date to 06/19/24   Progress Note Due Date 06/19/24   Progress Note Completed Date 04/24/24   GOALS   PT Goals 2   PT Goal 1   Goal Identifier Ambulation   Goal Description Pt will be able to ambulate for 10 minutes in order to run errands with a minimal increase in pain 1-2/10   Goal Progress Not met   Target Date 06/19/24   PT Goal 2   Goal Identifier Driving   Goal Description Pt will be able to lift her leg into her vehicle with a minimal increase in pain 1-2/10.   Goal Progress Not met   Target Date 06/19/24   Subjective Report   Subjective Report Had the injection and it didn't help. Will be seeing Dr. Cifuentes next week. Sat a lot over the weekend. 6/10 pain right now. Having trouble sleeping the last 3-4 nights. Having to take Tylenol when wakes up at night. 9/10 marching with performacne of marching exercise. Things are about the same since started physical therapy.   Objective Measures   Objective Measures Objective Measure 1;Objective Measure 2;Objective Measure 3;Objective Measure 4   Objective Measure 1   Objective Measure Strength   Details Hip flexion - 4-/5 B / Hip abduction - 3+/5 B (pain reported in  groin) / Knee extension - 4/5 B / Knee flexion - 4+/5 B / Hip adduction - 4/5 B   Objective Measure 2   Objective Measure Palpation   Details Hypomobility of hip joint   Objective Measure 3   Objective Measure Gait   Details Small steps with minimal heel strike and toe off. Uses FWW. No loss of balance noted.   Objective Measure 4   Objective Measure Transfers   Details Several tries to stand from seated position in waiting room   Treatment Interventions (PT)   Interventions Therapeutic Procedure/Exercise;Manual Therapy   Therapeutic Procedure/Exercise   Therapeutic Procedures: strength, endurance, ROM, flexibility minutes (24329) 30   Therapeutic Procedures Ther Proc 2;Ther Proc 3;Ther Proc 4;Ther Proc 5;Ther Proc 6;Ther Proc 7   Ther Proc 1 Seated hamstring stretch 2x30 seconds   Ther Proc 2 Sit to stand x15 with UE assist and sitting on bolster   Ther Proc 3 Standing hip extension x15  B   Ther Proc 4 Standing hip abduction with extension x10 B   Ther Proc 5 SAQ x5 B with 5 second holds   Ther Proc 6 Seated hip abduction x15 with YTB   Skilled Intervention Strengthening and stretching exercise education to improve function   Patient Response/Progress Discomfort with several exercises   Ther Proc 4 - Details Stopped due to discomfort in right hip   Ther Proc 6 - Details pain in right hip on last rep   Education   Learner/Method Patient;No Barriers to Learning   Plan   Home program PTRx (handout)   Comments   Comments Pt states that she has not seen a difference in her hip pain since beginning physical therapy. Pt still has difficulty with getting in and out of her car and with stairs. Pt has limited ROM and is painful with turning movements. Pt also takes several attempts to trasfer from sitting to standing.   Total Session Time   Timed Code Treatment Minutes 30   Total Treatment Time (sum of timed and untimed services) 30

## 2024-05-30 ENCOUNTER — OFFICE VISIT (OUTPATIENT)
Dept: FAMILY MEDICINE | Facility: CLINIC | Age: 74
End: 2024-05-30
Payer: MEDICARE

## 2024-05-30 ENCOUNTER — PATIENT OUTREACH (OUTPATIENT)
Dept: CARE COORDINATION | Facility: CLINIC | Age: 74
End: 2024-05-30

## 2024-05-30 VITALS
BODY MASS INDEX: 32.42 KG/M2 | HEART RATE: 79 BPM | WEIGHT: 181.6 LBS | TEMPERATURE: 97.2 F | OXYGEN SATURATION: 98 % | SYSTOLIC BLOOD PRESSURE: 115 MMHG | RESPIRATION RATE: 16 BRPM | DIASTOLIC BLOOD PRESSURE: 74 MMHG

## 2024-05-30 DIAGNOSIS — E11.22 TYPE 2 DIABETES MELLITUS WITH STAGE 3B CHRONIC KIDNEY DISEASE, WITHOUT LONG-TERM CURRENT USE OF INSULIN (H): ICD-10-CM

## 2024-05-30 DIAGNOSIS — N18.32 TYPE 2 DIABETES MELLITUS WITH STAGE 3B CHRONIC KIDNEY DISEASE, WITHOUT LONG-TERM CURRENT USE OF INSULIN (H): ICD-10-CM

## 2024-05-30 DIAGNOSIS — E03.9 ACQUIRED HYPOTHYROIDISM: ICD-10-CM

## 2024-05-30 DIAGNOSIS — Z29.11 NEED FOR VACCINATION AGAINST RESPIRATORY SYNCYTIAL VIRUS: ICD-10-CM

## 2024-05-30 DIAGNOSIS — I12.9 BENIGN HYPERTENSION WITH CKD (CHRONIC KIDNEY DISEASE) STAGE III (H): ICD-10-CM

## 2024-05-30 DIAGNOSIS — K21.9 GASTROESOPHAGEAL REFLUX DISEASE, UNSPECIFIED WHETHER ESOPHAGITIS PRESENT: ICD-10-CM

## 2024-05-30 DIAGNOSIS — G60.9 IDIOPATHIC PERIPHERAL NEUROPATHY: ICD-10-CM

## 2024-05-30 DIAGNOSIS — I10 HTN, GOAL BELOW 140/90: Primary | ICD-10-CM

## 2024-05-30 DIAGNOSIS — N18.30 BENIGN HYPERTENSION WITH CKD (CHRONIC KIDNEY DISEASE) STAGE III (H): ICD-10-CM

## 2024-05-30 DIAGNOSIS — M16.11 PRIMARY OSTEOARTHRITIS OF RIGHT HIP: ICD-10-CM

## 2024-05-30 LAB — HBA1C MFR BLD: 5.8 % (ref 0–5.6)

## 2024-05-30 PROCEDURE — 91320 SARSCV2 VAC 30MCG TRS-SUC IM: CPT | Performed by: FAMILY MEDICINE

## 2024-05-30 PROCEDURE — 90480 ADMN SARSCOV2 VAC 1/ONLY CMP: CPT | Performed by: FAMILY MEDICINE

## 2024-05-30 PROCEDURE — 99214 OFFICE O/P EST MOD 30 MIN: CPT | Mod: 25 | Performed by: FAMILY MEDICINE

## 2024-05-30 PROCEDURE — 83036 HEMOGLOBIN GLYCOSYLATED A1C: CPT | Performed by: FAMILY MEDICINE

## 2024-05-30 PROCEDURE — 36415 COLL VENOUS BLD VENIPUNCTURE: CPT | Performed by: FAMILY MEDICINE

## 2024-05-30 RX ORDER — LEVOTHYROXINE SODIUM 112 UG/1
112 TABLET ORAL DAILY
Qty: 90 TABLET | Refills: 2 | Status: SHIPPED | OUTPATIENT
Start: 2024-05-30

## 2024-05-30 RX ORDER — RESPIRATORY SYNCYTIAL VIRUS VACCINE 120MCG/0.5
0.5 KIT INTRAMUSCULAR ONCE
Qty: 1 EACH | Refills: 0 | Status: CANCELLED | OUTPATIENT
Start: 2024-05-30 | End: 2024-05-30

## 2024-05-30 RX ORDER — LOSARTAN POTASSIUM 50 MG/1
50 TABLET ORAL DAILY
Qty: 90 TABLET | Refills: 3 | Status: ON HOLD | OUTPATIENT
Start: 2024-05-30 | End: 2024-08-30

## 2024-05-30 RX ORDER — GABAPENTIN 100 MG/1
100 CAPSULE ORAL 2 TIMES DAILY
Qty: 180 CAPSULE | Refills: 3 | Status: SHIPPED | OUTPATIENT
Start: 2024-05-30 | End: 2024-06-14

## 2024-05-30 RX ORDER — ATENOLOL 50 MG/1
50 TABLET ORAL 2 TIMES DAILY
Qty: 180 TABLET | Refills: 0 | Status: SHIPPED | OUTPATIENT
Start: 2024-05-30 | End: 2024-10-03

## 2024-05-30 ASSESSMENT — PAIN SCALES - GENERAL: PAINLEVEL: NO PAIN (0)

## 2024-05-30 NOTE — PROGRESS NOTES
Assessment & Plan     HTN, goal below 140/90  Stable   - atenolol (TENORMIN) 50 MG tablet; Take 1 tablet (50 mg) by mouth 2 times daily    Idiopathic peripheral neuropathy  Refilled   - gabapentin (NEURONTIN) 100 MG capsule; Take 1 capsule (100 mg) by mouth 2 times daily    Acquired hypothyroidism  Refilled  Last TSH stable   - levothyroxine (SYNTHROID/LEVOTHROID) 112 MCG tablet; Take 1 tablet (112 mcg) by mouth daily    Benign hypertension with CKD (chronic kidney disease) stage III (H)  Controlled   - losartan (COZAAR) 50 MG tablet; Take 1 tablet (50 mg) by mouth daily    Gastroesophageal reflux disease, unspecified whether esophagitis present  Refilled  Stable   - omeprazole (PRILOSEC) 20 MG DR capsule; Take 1 capsule (20 mg) by mouth daily    Need for vaccination against respiratory syncytial virus  Advised     Type 2 diabetes mellitus with stage 3b chronic kidney disease, without long-term current use of insulin (H)  Pending   Diabetic diet   - Hemoglobin A1c; Future    Primary osteoarthritis of right hip  Advised she can take  2 Tyenol-1000 mg every 6 hours as needed  She is not drinking alcohol now and LFT are normal     The longitudinal plan of care for the diagnosis(es)/condition(s) as documented were addressed during this visit. Due to the added complexity in care, I will continue to support Glenda in the subsequent management and with ongoing continuity of care.          Again recommended follow up GYN    Kathrine Doshi is a 73 year old, presenting for the following health issues:  Recheck Medication (Patient is not taking cholesterol medication. She has question about what else she can take for pain other than tylenol. ) and Derm Problem (Sore on the back of lower right leg )        5/30/2024    10:12 AM   Additional Questions   Roomed by Allyson CHAVES   Chief Complaint   Patient presents with    Recheck Medication     Patient is not taking cholesterol medication. She has question about what else  she can take for pain other than tylenol.     Derm Problem     Sore on the back of lower right leg        Pt has OA hip Sees Orthopedics  She is Thinking of Hip REplacement  She has appointment with Ortho  Wants to Know what she can take for pain  Right Now she is taking 625 mg Tylenol daily    Hyperlipidemia Follow-Up    Are you regularly taking any medication or supplement to lower your cholesterol?   No has Not been taking   Are you having muscle aches or other side effects that you think could be caused by your cholesterol lowering medication?  No      Diabetes Follow-up    How often are you checking your blood sugar? One time daily    Have you had any blood sugars above 200?  No  Have you had any blood sugars below 70?  No  What symptoms do you notice when your blood sugar is low?  None  What concerns do you have today about your diabetes? None   Do you have any of these symptoms? (Select all that apply)  she is doing well   Has a scrape behind left leg which she wants checked       BP Readings from Last 2 Encounters:   05/30/24 115/74   05/09/24 136/88     Hemoglobin A1C (%)   Date Value   05/30/2024 5.8 (H)   01/31/2024 7.8 (H)   06/15/2021 5.5   01/12/2021 5.6     LDL Cholesterol Calculated (mg/dL)   Date Value   06/29/2023 154 (H)   06/08/2022 92   08/20/2020 163 (H)   07/22/2020 125 (H)             Hypertension Follow-up    Do you check your blood pressure regularly outside of the clinic? No   Are you following a low salt diet? Yes  Are your blood pressures ever more than 140 on the top number (systolic) OR more   than 90 on the bottom number (diastolic), for example 140/90? No    Chronic Kidney Disease Follow-up    Do you take any over the counter pain medicine?: No except Tylenol  The longitudinal plan of care for the diagnosis(es)/condition(s) as documented were addressed during this visit. Due to the added complexity in care, I will continue to support Glenda in the subsequent management and with  ongoing continuity of care.      Review of Systems  Constitutional, neuro, ENT, endocrine, pulmonary, cardiac, gastrointestinal, genitourinary, musculoskeletal, integument and psychiatric systems are negative, except as otherwise noted.      Objective    /74   Pulse 79   Temp 97.2  F (36.2  C) (Temporal)   Resp 16   Wt 82.4 kg (181 lb 9.6 oz)   SpO2 98%   BMI 32.42 kg/m    Body mass index is 32.42 kg/m .  Physical Exam   GENERAL: alert and no distress  EYES: Eyes grossly normal to inspection  NECK: no adenopathy, no asymmetry, masses, or scars  RESP: lungs clear to auscultation - no rales, rhonchi or wheezes  CV: regular rate and rhythm, normal S1 S2, no S3 or S4, no murmur, click or rub, no peripheral edema  ABDOMEN: soft, nontender, no hepatosplenomegaly, no masses and bowel sounds normal  MS: no gross musculoskeletal defects noted, no edema  SKIN: small scrape behind Right lower leg   PSYCH: mentation appears normal  Diabetic foot exam: normal DP and PT pulses    Pending         Signed Electronically by: Marysol Cisse MD

## 2024-06-03 ENCOUNTER — OFFICE VISIT (OUTPATIENT)
Dept: ORTHOPEDICS | Facility: CLINIC | Age: 74
End: 2024-06-03
Payer: MEDICARE

## 2024-06-03 ENCOUNTER — MYC MEDICAL ADVICE (OUTPATIENT)
Dept: ORTHOPEDICS | Facility: CLINIC | Age: 74
End: 2024-06-03

## 2024-06-03 VITALS
BODY MASS INDEX: 31.8 KG/M2 | WEIGHT: 179.5 LBS | DIASTOLIC BLOOD PRESSURE: 69 MMHG | HEART RATE: 70 BPM | SYSTOLIC BLOOD PRESSURE: 109 MMHG | HEIGHT: 63 IN | RESPIRATION RATE: 18 BRPM

## 2024-06-03 DIAGNOSIS — M16.11 PRIMARY OSTEOARTHRITIS OF RIGHT HIP: Primary | ICD-10-CM

## 2024-06-03 DIAGNOSIS — M16.0 PRIMARY OSTEOARTHRITIS OF BOTH HIPS: Primary | ICD-10-CM

## 2024-06-03 PROCEDURE — 99214 OFFICE O/P EST MOD 30 MIN: CPT | Performed by: ORTHOPAEDIC SURGERY

## 2024-06-03 NOTE — LETTER
6/3/2024         RE: Glenda Bales  [address]  [address]  [place] [place] [address]        Dear Colleague,    Thank you for referring your patient, Glenda Bales, to the [facility]. Please see a copy of my visit note below.    Glenda Bales is a 73 year old female who is seen in follow up  for bilateral hip pain, more recently on the right.  She has had some chronic left hip pain previously and received physical therapy for that.  Since February 2024 she has had pain in the right hip.  She describes aching shooting pain rated 3 out of 10.  It is worse with walking, better with sitting.  She is a recovering alcoholic and stopped drinking abruptly in February.  Her liver functions have drastically improved since then.  She does have elevated creatinine chronically, so cannot take anti-inflammatories.  She does take 1 Advil at night periodically for pain.  She currently has a walker.  She had right hip steroid injection 5/11/24.  It helped that day, but not long term.  She had Physical Therapy without significant help.    X-ray of the right hip from 4/8/2024 shows moderate to severe arthritis.  There is definite narrowing of the joint space, but is not bone-on-bone.  There is significant spurs on the femoral head and acetabulum.    X-ray of the left hip is from 2020 and shows a bit more mild osteoarthritis.  Better joint space preservation there.    Past Medical History:   Diagnosis Date     Cataract      Diabetes (H) April 2019     Essential hypertension, benign      Nonrheumatic aortic valve insufficiency      Osteoarthritis of knee, unspecified laterality, unspecified osteoarthritis type      Thyroid disease        Past Surgical History:   Procedure Laterality Date     CATARACT IOL, RT/LT Right 12/05/2018    Model ZCB00 SN:[id number] +11.5 Dioptor - Jermaine Akers MD ([facility])     CATARACT IOL, RT/LT Left 12/19/2018    Model ZCB00 SN:[id number] +12.5 Dioptor - Jermaine Akers MD ([facility])     DILATION  AND CURETTAGE SUCTION      AB     DILATION AND CURETTAGE SUCTION      in 40's     NO HISTORY OF SURGERY         Family History   Problem Relation Age of Onset     Diabetes Mother      Coronary Artery Disease Father          with a heart attack     Glaucoma Cousin      Colon Cancer No family hx of      Breast Cancer No family hx of      Macular Degeneration No family hx of        Social History     Socioeconomic History     Marital status: Single     Spouse name: Not on file     Number of children: 0     Years of education: Not on file     Highest education level: Not on file   Occupational History     Occupation:    Tobacco Use     Smoking status: Former     Current packs/day: 0.00     Types: Cigarettes     Quit date: 6/15/1977     Years since quittin.0     Passive exposure: Past     Smokeless tobacco: Never     Tobacco comments:     quit in    Vaping Use     Vaping status: Never Used   Substance and Sexual Activity     Alcohol use: Yes     Comment: 1 red wine before bef     Drug use: No     Sexual activity: Not Currently     Partners: Male     Birth control/protection: Abstinence   Other Topics Concern     Parent/sibling w/ CABG, MI or angioplasty before 65F 55M? Yes   Social History Narrative     Not on file     Social Determinants of Health     Financial Resource Strain: Low Risk  (2024)    Financial Resource Strain      Within the past 12 months, have you or your family members you live with been unable to get utilities (heat, electricity) when it was really needed?: No   Food Insecurity: Low Risk  (2024)    Food Insecurity      Within the past 12 months, did you worry that your food would run out before you got money to buy more?: No      Within the past 12 months, did the food you bought just not last and you didn t have money to get more?: No   Transportation Needs: Low Risk  (2024)    Transportation Needs      Within the past 12 months, has lack of transportation kept you  from medical appointments, getting your medicines, non-medical meetings or appointments, work, or from getting things that you need?: No   Physical Activity: Not on file   Stress: Not on file   Social Connections: Unknown (4/11/2023)    [facility]    Social Connections      Frequency of Communication with Friends and Family: Not on file   Interpersonal Safety: Low Risk  (1/18/2024)    Interpersonal Safety      Do you feel physically and emotionally safe where you currently live?: Yes      Within the past 12 months, have you been hit, slapped, kicked or otherwise physically hurt by someone?: No      Within the past 12 months, have you been humiliated or emotionally abused in other ways by your partner or ex-partner?: No   Housing Stability: Low Risk  (1/1/2024)    Housing Stability      Do you have housing? : Yes      Are you worried about losing your housing?: No       Current Outpatient Medications   Medication Sig Dispense Refill     ACE/ARB/ARNI NOT PRESCRIBED (INTENTIONAL) Please choose reason not prescribed from choices below.       albuterol (PROAIR HFA/PROVENTIL HFA/VENTOLIN HFA) 108 (90 Base) MCG/ACT inhaler Inhale 2 puffs into the lungs every 6 hours as needed for shortness of breath, wheezing or cough 18 g 0     alcohol swab prep pads Use to swab area of injection/milena as directed. 100 each 3     aspirin (ASA) 81 MG EC tablet Take 81 mg by mouth daily       atenolol (TENORMIN) 50 MG tablet Take 1 tablet (50 mg) by mouth 2 times daily 180 tablet 0     atorvastatin (LIPITOR) 10 MG tablet Take 1 tablet (10 mg) by mouth daily 90 tablet 3     blood glucose (NO BRAND SPECIFIED) test strip Use to test blood sugar 1 times daily or as directed. 100 strip 11     fluticasone (FLONASE) 50 MCG/ACT spray Spray 1 spray into both nostrils daily as needed        furosemide (LASIX) 20 MG tablet Take 1 tablet (20 mg) by mouth daily  "90 tablet 3     gabapentin (NEURONTIN) 100 MG capsule Take 1 capsule (100 mg) by mouth 2 times daily 180 capsule 3     ketotifen (ZADITOR) 0.025 % ophthalmic solution Place 1 drop into both eyes 2 times daily as needed for itching       levothyroxine (SYNTHROID/LEVOTHROID) 112 MCG tablet Take 1 tablet (112 mcg) by mouth daily 90 tablet 2     losartan (COZAAR) 50 MG tablet Take 1 tablet (50 mg) by mouth daily 90 tablet 3     omeprazole (PRILOSEC) 20 MG DR capsule Take 1 capsule (20 mg) by mouth daily 90 capsule 3       Allergies   Allergen Reactions     Diltiazem Hcl      Leg swelling     Norvasc [Amlodipine]      Leg swellin     Seasonal      Seasonal Allergies        REVIEW OF SYSTEMS:  CONSTITUTIONAL:  NEGATIVE for fever, chills, change in weight, not feeling tired  SKIN:  NEGATIVE for worrisome rashes, no skin lumps, no skin ulcers and no non-healing wounds  EYES:  NEGATIVE for vision changes or irritation.  ENT/MOUTH:  NEGATIVE.  No hearing loss, no hoarseness, no difficulty swallowing.  RESP:  NEGATIVE. No cough or shortness of breath.  CV:  NEGATIVE for chest pain, palpitations or peripheral edema  GI:  NEGATIVE for nausea, abdominal pain, heartburn, or change in bowel habits  :  Negative. No dysuria, no hematuria  MUSCULOSKELETAL:  See HPI above  NEURO:  NEGATIVE . No headaches, no dizziness,  no numbness  ENDOCRINE:  NEGATIVE for temperature intolerance, skin/hair changes  HEME/ALLERGY/IMMUNE:  NEGATIVE for bleeding problems  PSYCHIATRIC:  NEGATIVE. no anxiety, no depression.     Exam:  Vitals: /69   Pulse 70   Resp 18   Ht 1.594 m (5' 2.75\")   Wt 81.4 kg (179 lb 8 oz)   BMI 32.05 kg/m    BMI= Body mass index is 32.05 kg/m .  Constitutional:  healthy, alert and no distress  Neuro: Alert and Oriented x 3, no focal defects.  Psych: Affect normal   Respiratory: Breathing not labored.  Cardiovascular: normal peripheral pulses  Lymph: no adenopathy  Skin: No rashes,worrisome lesions or skin " problems  She has very poor motion of both hips.  On the right she has at best 30 degrees external rotation and lacks at least 10 to 15 degrees from neutral on the internal rotation.  On the left she has about 40 degrees external rotation and 0 degrees internal rotation.  She walks with a bit of abductor lurch on both sides with a waddling gait.  She is using a walker.  Sensation, motor and circulation are intact.    Assessment:  bilateral hip osteoarthritis, right > left.  She was wondering about repeat injection or surgery.    Plan: It is too soon after the last steroid injection for total hip arthroplasty or repeat injection.  Will have her use Tylenol for now for pain.  Return to clinic in July with low AP pelvis and lateral x-ray on the right hip.  Have her work on weight loss to get her BMI down further below 32.  On return to clinic in July we will plan right total hip arthroplasty.    Again, thank you for allowing me to participate in the care of your patient.        Sincerely,        Meek Cifuentes MD

## 2024-06-03 NOTE — PROGRESS NOTES
Glenda Bales is a 73 year old female who is seen in follow up  for bilateral hip pain, more recently on the right.  She has had some chronic left hip pain previously and received physical therapy for that.  Since 2024 she has had pain in the right hip.  She describes aching shooting pain rated 3 out of 10.  It is worse with walking, better with sitting.  She is a recovering alcoholic and stopped drinking abruptly in February.  Her liver functions have drastically improved since then.  She does have elevated creatinine chronically, so cannot take anti-inflammatories.  She does take 1 Advil at night periodically for pain.  She currently has a walker.  She had right hip steroid injection 24.  It helped that day, but not long term.  She had Physical Therapy without significant help.    X-ray of the right hip from 2024 shows moderate to severe arthritis.  There is definite narrowing of the joint space, but is not bone-on-bone.  There is significant spurs on the femoral head and acetabulum.    X-ray of the left hip is from  and shows a bit more mild osteoarthritis.  Better joint space preservation there.    Past Medical History:   Diagnosis Date    Cataract     Diabetes (H) 2019    Essential hypertension, benign     Nonrheumatic aortic valve insufficiency     Osteoarthritis of knee, unspecified laterality, unspecified osteoarthritis type     Thyroid disease        Past Surgical History:   Procedure Laterality Date    CATARACT IOL, RT/LT Right 2018    Model ZCB00 SN:[id number] +11.5 Dioptor - Jermaine Akers MD ([facility])    CATARACT IOL, RT/LT Left 2018    Model ZCB00 SN:[id number] +12.5 Dioptor - Jermaine Akers MD ([facility])    DILATION AND CURETTAGE SUCTION      AB    DILATION AND CURETTAGE SUCTION      in 40's    NO HISTORY OF SURGERY         Family History   Problem Relation Age of Onset    Diabetes Mother     Coronary Artery Disease Father          with a heart attack    Glaucoma  Cousin     Colon Cancer No family hx of     Breast Cancer No family hx of     Macular Degeneration No family hx of        Social History     Socioeconomic History    Marital status: Single     Spouse name: Not on file    Number of children: 0    Years of education: Not on file    Highest education level: Not on file   Occupational History    Occupation:    Tobacco Use    Smoking status: Former     Current packs/day: 0.00     Types: Cigarettes     Quit date: 6/15/1977     Years since quittin.0     Passive exposure: Past    Smokeless tobacco: Never    Tobacco comments:     quit in    Vaping Use    Vaping status: Never Used   Substance and Sexual Activity    Alcohol use: Yes     Comment: 1 red wine before bef    Drug use: No    Sexual activity: Not Currently     Partners: Male     Birth control/protection: Abstinence   Other Topics Concern    Parent/sibling w/ CABG, MI or angioplasty before 65F 55M? Yes   Social History Narrative    Not on file     Social Determinants of Health     Financial Resource Strain: Low Risk  (2024)    Financial Resource Strain     Within the past 12 months, have you or your family members you live with been unable to get utilities (heat, electricity) when it was really needed?: No   Food Insecurity: Low Risk  (2024)    Food Insecurity     Within the past 12 months, did you worry that your food would run out before you got money to buy more?: No     Within the past 12 months, did the food you bought just not last and you didn t have money to get more?: No   Transportation Needs: Low Risk  (2024)    Transportation Needs     Within the past 12 months, has lack of transportation kept you from medical appointments, getting your medicines, non-medical meetings or appointments, work, or from getting things that you need?: No   Physical Activity: Not on file   Stress: Not on file   Social Connections: Unknown (2023)    Received from [employer] [facility]  [facility], [facility]    Social Connections     Frequency of Communication with Friends and Family: Not on file   Interpersonal Safety: Low Risk  (1/18/2024)    Interpersonal Safety     Do you feel physically and emotionally safe where you currently live?: Yes     Within the past 12 months, have you been hit, slapped, kicked or otherwise physically hurt by someone?: No     Within the past 12 months, have you been humiliated or emotionally abused in other ways by your partner or ex-partner?: No   Housing Stability: Low Risk  (1/1/2024)    Housing Stability     Do you have housing? : Yes     Are you worried about losing your housing?: No       Current Outpatient Medications   Medication Sig Dispense Refill    ACE/ARB/ARNI NOT PRESCRIBED (INTENTIONAL) Please choose reason not prescribed from choices below.      albuterol (PROAIR HFA/PROVENTIL HFA/VENTOLIN HFA) 108 (90 Base) MCG/ACT inhaler Inhale 2 puffs into the lungs every 6 hours as needed for shortness of breath, wheezing or cough 18 g 0    alcohol swab prep pads Use to swab area of injection/milena as directed. 100 each 3    aspirin (ASA) 81 MG EC tablet Take 81 mg by mouth daily      atenolol (TENORMIN) 50 MG tablet Take 1 tablet (50 mg) by mouth 2 times daily 180 tablet 0    atorvastatin (LIPITOR) 10 MG tablet Take 1 tablet (10 mg) by mouth daily 90 tablet 3    blood glucose (NO BRAND SPECIFIED) test strip Use to test blood sugar 1 times daily or as directed. 100 strip 11    fluticasone (FLONASE) 50 MCG/ACT spray Spray 1 spray into both nostrils daily as needed       furosemide (LASIX) 20 MG tablet Take 1 tablet (20 mg) by mouth daily 90 tablet 3    gabapentin (NEURONTIN) 100 MG capsule Take 1 capsule (100 mg) by mouth 2 times daily 180 capsule 3    ketotifen (ZADITOR) 0.025 % ophthalmic solution Place 1 drop into both eyes 2 times daily as needed for itching      levothyroxine (SYNTHROID/LEVOTHROID) 112 MCG tablet  "Take 1 tablet (112 mcg) by mouth daily 90 tablet 2    losartan (COZAAR) 50 MG tablet Take 1 tablet (50 mg) by mouth daily 90 tablet 3    omeprazole (PRILOSEC) 20 MG DR capsule Take 1 capsule (20 mg) by mouth daily 90 capsule 3       Allergies   Allergen Reactions    Diltiazem Hcl      Leg swelling    Norvasc [Amlodipine]      Leg swellin    Seasonal     Seasonal Allergies        REVIEW OF SYSTEMS:  CONSTITUTIONAL:  NEGATIVE for fever, chills, change in weight, not feeling tired  SKIN:  NEGATIVE for worrisome rashes, no skin lumps, no skin ulcers and no non-healing wounds  EYES:  NEGATIVE for vision changes or irritation.  ENT/MOUTH:  NEGATIVE.  No hearing loss, no hoarseness, no difficulty swallowing.  RESP:  NEGATIVE. No cough or shortness of breath.  CV:  NEGATIVE for chest pain, palpitations or peripheral edema  GI:  NEGATIVE for nausea, abdominal pain, heartburn, or change in bowel habits  :  Negative. No dysuria, no hematuria  MUSCULOSKELETAL:  See HPI above  NEURO:  NEGATIVE . No headaches, no dizziness,  no numbness  ENDOCRINE:  NEGATIVE for temperature intolerance, skin/hair changes  HEME/ALLERGY/IMMUNE:  NEGATIVE for bleeding problems  PSYCHIATRIC:  NEGATIVE. no anxiety, no depression.     Exam:  Vitals: /69   Pulse 70   Resp 18   Ht 1.594 m (5' 2.75\")   Wt 81.4 kg (179 lb 8 oz)   BMI 32.05 kg/m    BMI= Body mass index is 32.05 kg/m .  Constitutional:  healthy, alert and no distress  Neuro: Alert and Oriented x 3, no focal defects.  Psych: Affect normal   Respiratory: Breathing not labored.  Cardiovascular: normal peripheral pulses  Lymph: no adenopathy  Skin: No rashes,worrisome lesions or skin problems  She has very poor motion of both hips.  On the right she has at best 30 degrees external rotation and lacks at least 10 to 15 degrees from neutral on the internal rotation.  On the left she has about 40 degrees external rotation and 0 degrees internal rotation.  She walks with a bit of " abductor lurch on both sides with a waddling gait.  She is using a walker.  Sensation, motor and circulation are intact.    Assessment:  bilateral hip osteoarthritis, right > left.  She was wondering about repeat injection or surgery.    Plan: It is too soon after the last steroid injection for total hip arthroplasty or repeat injection.  Will have her use Tylenol for now for pain.  Return to clinic in July with low AP pelvis and lateral x-ray on the right hip.  Have her work on weight loss to get her BMI down further below 32.  On return to clinic in July we will plan right total hip arthroplasty.

## 2024-06-04 ENCOUNTER — THERAPY VISIT (OUTPATIENT)
Dept: PHYSICAL THERAPY | Facility: CLINIC | Age: 74
End: 2024-06-04
Payer: MEDICARE

## 2024-06-04 DIAGNOSIS — M25.552 CHRONIC HIP PAIN, BILATERAL: Primary | ICD-10-CM

## 2024-06-04 DIAGNOSIS — M25.551 CHRONIC HIP PAIN, BILATERAL: Primary | ICD-10-CM

## 2024-06-04 DIAGNOSIS — G89.29 CHRONIC HIP PAIN, BILATERAL: Primary | ICD-10-CM

## 2024-06-04 PROCEDURE — 97110 THERAPEUTIC EXERCISES: CPT | Mod: GP | Performed by: PHYSICAL THERAPIST

## 2024-06-04 PROCEDURE — 97140 MANUAL THERAPY 1/> REGIONS: CPT | Mod: GP | Performed by: PHYSICAL THERAPIST

## 2024-06-07 ENCOUNTER — MYC REFILL (OUTPATIENT)
Dept: FAMILY MEDICINE | Facility: CLINIC | Age: 74
End: 2024-06-07
Payer: MEDICARE

## 2024-06-07 DIAGNOSIS — G60.9 IDIOPATHIC PERIPHERAL NEUROPATHY: ICD-10-CM

## 2024-06-10 RX ORDER — GABAPENTIN 100 MG/1
100 CAPSULE ORAL 2 TIMES DAILY
Qty: 180 CAPSULE | Refills: 3 | OUTPATIENT
Start: 2024-06-10

## 2024-06-12 ENCOUNTER — TELEPHONE (OUTPATIENT)
Dept: FAMILY MEDICINE | Facility: CLINIC | Age: 74
End: 2024-06-12
Payer: MEDICARE

## 2024-06-12 NOTE — TELEPHONE ENCOUNTER
PA is needed for the medication, Accu Chek Guide test strips . Would you like to start PA or Rx alternative medication? If PA, please list all medications this patient has tried along with any contraindications that may have been experienced in the past. Thank you.    Pharmacy: [facility]  Phone: [phone]    Insurance Plan: Medicare  Phone: [id number]    Pt ID: [id number]    Group:     Forwarded to LILY LOPEZ  for review.

## 2024-06-13 ENCOUNTER — PATIENT OUTREACH (OUTPATIENT)
Dept: CARE COORDINATION | Facility: CLINIC | Age: 74
End: 2024-06-13
Payer: MEDICARE

## 2024-06-14 ENCOUNTER — MYC MEDICAL ADVICE (OUTPATIENT)
Dept: FAMILY MEDICINE | Facility: CLINIC | Age: 74
End: 2024-06-14
Payer: MEDICARE

## 2024-06-14 DIAGNOSIS — G60.9 IDIOPATHIC PERIPHERAL NEUROPATHY: ICD-10-CM

## 2024-06-14 RX ORDER — GABAPENTIN 100 MG/1
100 CAPSULE ORAL 2 TIMES DAILY
Qty: 180 CAPSULE | Refills: 3 | Status: SHIPPED | OUTPATIENT
Start: 2024-06-14

## 2024-06-14 NOTE — TELEPHONE ENCOUNTER
gabapentin (NEURONTIN) 100 MG capsule Take 1 capsule (100 mg) by mouth 2 times daily 180 capsule 3 ordered 05/30/2024 -- -- -- --         Writer notes that refills are on file. Attempted to call the pharmacy to check status and was on hold 20+ minutes, will need to recall at a later time.    Thanks,  DONALD HurtadoN RN  [facility]

## 2024-06-17 PROBLEM — Z71.89 ADVANCED DIRECTIVES, COUNSELING/DISCUSSION: Status: RESOLVED | Noted: 2019-10-17 | Resolved: 2024-06-17

## 2024-06-20 NOTE — TELEPHONE ENCOUNTER
NO PA STARTED - Per review diabetic supplies need to bill through as DME through Medicare Part B.     Called pharmacy to see how they were billing it. They stated that they are requiring the most recent Medicare compliant visit notes in order to fill . They said they also needed an LMN?     Sending back to clinic for review.     Thanks!     [facility]ail Pharmacy Prior Authorization Team   Phone: [phone]

## 2024-06-24 NOTE — TELEPHONE ENCOUNTER
I called [facility] ([phone]) to ask about a form, and was referred to [employer] (ph: [phone]).  I called them and they said that nothing further is needed from us.  They asked that we wait about 20 minutes and ask [facility] to rerun the prescription.    Haley Gutierrez,

## 2024-06-24 NOTE — TELEPHONE ENCOUNTER
[facility] called.  The prescription was reran and it went thru.  Nothing further needed from us.  Haley Gutierrez,

## 2024-07-08 ENCOUNTER — TELEPHONE (OUTPATIENT)
Dept: ORTHOPEDICS | Facility: CLINIC | Age: 74
End: 2024-07-08

## 2024-07-08 ENCOUNTER — OFFICE VISIT (OUTPATIENT)
Dept: ORTHOPEDICS | Facility: CLINIC | Age: 74
End: 2024-07-08
Payer: MEDICARE

## 2024-07-08 ENCOUNTER — ANCILLARY PROCEDURE (OUTPATIENT)
Dept: GENERAL RADIOLOGY | Facility: CLINIC | Age: 74
End: 2024-07-08
Attending: ORTHOPAEDIC SURGERY
Payer: MEDICARE

## 2024-07-08 VITALS
WEIGHT: 175 LBS | BODY MASS INDEX: 31.01 KG/M2 | DIASTOLIC BLOOD PRESSURE: 66 MMHG | RESPIRATION RATE: 18 BRPM | HEART RATE: 102 BPM | SYSTOLIC BLOOD PRESSURE: 101 MMHG | HEIGHT: 63 IN

## 2024-07-08 DIAGNOSIS — M16.11 PRIMARY OSTEOARTHRITIS OF RIGHT HIP: Primary | ICD-10-CM

## 2024-07-08 DIAGNOSIS — M16.11 PRIMARY OSTEOARTHRITIS OF RIGHT HIP: ICD-10-CM

## 2024-07-08 PROCEDURE — 73502 X-RAY EXAM HIP UNI 2-3 VIEWS: CPT | Mod: TC | Performed by: RADIOLOGY

## 2024-07-08 PROCEDURE — 99214 OFFICE O/P EST MOD 30 MIN: CPT | Performed by: ORTHOPAEDIC SURGERY

## 2024-07-08 NOTE — PROGRESS NOTES
Glenda Bales is a 73 year old female who is seen in follow up  for bilateral hip pain, more recently on the right.  She has had some chronic left hip pain previously and received physical therapy for that.  Since February 2024 she has had pain in the right hip.  She describes aching shooting pain rated 3 out of 10.  It is worse with walking, better with sitting.  She is a recovering alcoholic and stopped drinking abruptly in February.  Her liver functions have drastically improved since then.  She does have elevated creatinine chronically, so cannot take anti-inflammatories.  She does take 1 Advil at night periodically for pain.  She currently has a walker.  She had right hip steroid injection 5/11/24.  It helped that day, but not long term.  She had Physical Therapy without significant help.  Physical Therapy stopped, as they told her it was not helping.    X-ray of the pelvis today shows moderate to severe arthritis.  There is definite narrowing of the joint space with  bone-on-bone at periphery.  There is significant spurs on the femoral head and acetabulum.    X-ray of the left hip shows a bit more mild osteoarthritis.  Better joint space preservation there.  Right hip is only about 2 mms short.    Past Medical History:   Diagnosis Date    Cataract     Diabetes (H) April 2019    Essential hypertension, benign     Nonrheumatic aortic valve insufficiency     Osteoarthritis of knee, unspecified laterality, unspecified osteoarthritis type     Thyroid disease        Past Surgical History:   Procedure Laterality Date    CATARACT IOL, RT/LT Right 12/05/2018    Model ZCB00 SN:[id number] +11.5 Dioptor - Jermaine Akers MD ([facility])    CATARACT IOL, RT/LT Left 12/19/2018    Model ZCB00 SN:[id number] +12.5 Dioptor - Jermaine Akers MD ([facility])    DILATION AND CURETTAGE SUCTION      AB    DILATION AND CURETTAGE SUCTION      in 40's    NO HISTORY OF SURGERY         Family History   Problem Relation Age of Onset    Diabetes Mother      Coronary Artery Disease Father          with a heart attack    Glaucoma Cousin     Colon Cancer No family hx of     Breast Cancer No family hx of     Macular Degeneration No family hx of        Social History     Socioeconomic History    Marital status: Single     Spouse name: Not on file    Number of children: 0    Years of education: Not on file    Highest education level: Not on file   Occupational History    Occupation:    Tobacco Use    Smoking status: Former     Current packs/day: 0.00     Types: Cigarettes     Quit date: 6/15/1977     Years since quittin.0     Passive exposure: Past    Smokeless tobacco: Never    Tobacco comments:     quit in    Vaping Use    Vaping status: Never Used   Substance and Sexual Activity    Alcohol use: Yes     Comment: 1 red wine before bef    Drug use: No    Sexual activity: Not Currently     Partners: Male     Birth control/protection: Abstinence   Other Topics Concern    Parent/sibling w/ CABG, MI or angioplasty before 65F 55M? Yes   Social History Narrative    Not on file     Social Determinants of Health     Financial Resource Strain: Low Risk  (2024)    Financial Resource Strain     Within the past 12 months, have you or your family members you live with been unable to get utilities (heat, electricity) when it was really needed?: No   Food Insecurity: Low Risk  (2024)    Food Insecurity     Within the past 12 months, did you worry that your food would run out before you got money to buy more?: No     Within the past 12 months, did the food you bought just not last and you didn t have money to get more?: No   Transportation Needs: Low Risk  (2024)    Transportation Needs     Within the past 12 months, has lack of transportation kept you from medical appointments, getting your medicines, non-medical meetings or appointments, work, or from getting things that you need?: No   Physical Activity: Not on file   Stress: Not on file   Social  Connections: Unknown (4/11/2023)    [facility]    Social Connections     Frequency of Communication with Friends and Family: Not on file   Interpersonal Safety: Low Risk  (1/18/2024)    Interpersonal Safety     Do you feel physically and emotionally safe where you currently live?: Yes     Within the past 12 months, have you been hit, slapped, kicked or otherwise physically hurt by someone?: No     Within the past 12 months, have you been humiliated or emotionally abused in other ways by your partner or ex-partner?: No   Housing Stability: Low Risk  (1/1/2024)    Housing Stability     Do you have housing? : Yes     Are you worried about losing your housing?: No       Current Outpatient Medications   Medication Sig Dispense Refill    ACE/ARB/ARNI NOT PRESCRIBED (INTENTIONAL) Please choose reason not prescribed from choices below.      albuterol (PROAIR HFA/PROVENTIL HFA/VENTOLIN HFA) 108 (90 Base) MCG/ACT inhaler Inhale 2 puffs into the lungs every 6 hours as needed for shortness of breath, wheezing or cough 18 g 0    alcohol swab prep pads Use to swab area of injection/milena as directed. 100 each 3    aspirin (ASA) 81 MG EC tablet Take 81 mg by mouth daily      atenolol (TENORMIN) 50 MG tablet Take 1 tablet (50 mg) by mouth 2 times daily 180 tablet 0    atorvastatin (LIPITOR) 10 MG tablet Take 1 tablet (10 mg) by mouth daily 90 tablet 3    blood glucose (NO BRAND SPECIFIED) test strip Use to test blood sugar 1 times daily or as directed. 100 strip 11    fluticasone (FLONASE) 50 MCG/ACT spray Spray 1 spray into both nostrils daily as needed       furosemide (LASIX) 20 MG tablet Take 1 tablet (20 mg) by mouth daily 90 tablet 3    gabapentin (NEURONTIN) 100 MG capsule Take 1 capsule (100 mg) by mouth 2 times daily 180 capsule 3    ketotifen (ZADITOR) 0.025 % ophthalmic solution Place 1 drop into both eyes 2 times daily as  "needed for itching      levothyroxine (SYNTHROID/LEVOTHROID) 112 MCG tablet Take 1 tablet (112 mcg) by mouth daily 90 tablet 2    losartan (COZAAR) 50 MG tablet Take 1 tablet (50 mg) by mouth daily 90 tablet 3    omeprazole (PRILOSEC) 20 MG DR capsule Take 1 capsule (20 mg) by mouth daily 90 capsule 3       Allergies   Allergen Reactions    Diltiazem Hcl      Leg swelling    Norvasc [Amlodipine]      Leg swellin    Seasonal     Seasonal Allergies        REVIEW OF SYSTEMS:  CONSTITUTIONAL:  NEGATIVE for fever, chills, change in weight, not feeling tired  SKIN:  NEGATIVE for worrisome rashes, no skin lumps, no skin ulcers and no non-healing wounds  EYES:  NEGATIVE for vision changes or irritation.  ENT/MOUTH:  NEGATIVE.  No hearing loss, no hoarseness, no difficulty swallowing.  RESP:  NEGATIVE. No cough or shortness of breath.  CV:  NEGATIVE for chest pain, palpitations or peripheral edema  GI:  NEGATIVE for nausea, abdominal pain, heartburn, or change in bowel habits  :  Negative. No dysuria, no hematuria  MUSCULOSKELETAL:  See HPI above  NEURO:  NEGATIVE . No headaches, no dizziness,  no numbness  ENDOCRINE:  NEGATIVE for temperature intolerance, skin/hair changes  HEME/ALLERGY/IMMUNE:  NEGATIVE for bleeding problems  PSYCHIATRIC:  NEGATIVE. no anxiety, no depression.     Exam:  Vitals: /66   Pulse 102   Resp 18   Ht 1.594 m (5' 2.75\")   Wt 79.4 kg (175 lb)   BMI 31.25 kg/m    BMI= Body mass index is 31.25 kg/m .  Constitutional:  healthy, alert and no distress  Neuro: Alert and Oriented x 3, no focal defects.  Psych: Affect normal   Respiratory: Breathing not labored.  Cardiovascular: normal peripheral pulses  Lymph: no adenopathy  Skin: No rashes,worrisome lesions or skin problems  She has very poor motion of both hips.  On the right she has at best 30 degrees external rotation and lacks at least 15 degrees from neutral on the internal rotation.  On the left she has about 30-40 degrees external " rotation and -5 degrees internal rotation.  She walks with a bit of abductor lurch on both sides with a waddling gait.  She is using a walker.  Sensation, motor and circulation are intact.    Assessment:  bilateral hip osteoarthritis, right > left.  She is unable to take NSAID due to chronic kidney disease.  She did not respond to Physical Therapy.  Due to significant limitation of range of motion, we should proceed with total hip arthroplasty before she permanently loses range of motion.      Plan: We talked about the patient's condition and diagnosis.  Because of severe arthritis, and failure of conservative measures, I have suggested right total hip arthroplasty with direct anterior approach .  I've talked in depth about the procedure and the risks, which include, but are not limited to blood loss requiring transfusion, infection, neurovascular injury, deep vein thrombosis, pulmonary embolis, continued pain, numbness around the wound, dislocation, loosening, wear, leg length discrepancy, anesthetic risks, and the possibility of needing additional procedures.  We also talked about recovery time, which differs from patient to patient.  Preop xrays were ordered, and medical clearance will need to be obtained.      Special considerations: direct anterior approach.  Right 2 mms short.  Very limited range of motion.

## 2024-07-08 NOTE — LETTER
7/8/2024      Glenda Bales  [address]  [address]  [facility] [place] [address]      Dear Colleague,    Thank you for referring your patient, Glenda Bales, to the [facility]\"". Please see a copy of my visit note below.    Glenda Bales is a 73 year old female who is seen in follow up  for bilateral hip pain, more recently on the right.  She has had some chronic left hip pain previously and received physical therapy for that.  Since February 2024 she has had pain in the right hip.  She describes aching shooting pain rated 3 out of 10.  It is worse with walking, better with sitting.  She is a recovering alcoholic and stopped drinking abruptly in February.  Her liver functions have drastically improved since then.  She does have elevated creatinine chronically, so cannot take anti-inflammatories.  She does take 1 Advil at night periodically for pain.  She currently has a walker.  She had right hip steroid injection 5/11/24.  It helped that day, but not long term.  She had Physical Therapy without significant help.  Physical Therapy stopped, as they told her it was not helping.    X-ray of the pelvis today shows moderate to severe arthritis.  There is definite narrowing of the joint space with  bone-on-bone at periphery.  There is significant spurs on the femoral head and acetabulum.    X-ray of the left hip shows a bit more mild osteoarthritis.  Better joint space preservation there.  Right hip is only about 2 mms short.    Past Medical History:   Diagnosis Date     Cataract      Diabetes (H) April 2019     Essential hypertension, benign      Nonrheumatic aortic valve insufficiency      Osteoarthritis of knee, unspecified laterality, unspecified osteoarthritis type      Thyroid disease        Past Surgical History:   Procedure Laterality Date     CATARACT IOL, RT/LT Right 12/05/2018    Model ZCB00 SN:[id number] +11.5 Dioptor - Jermaine Akers MD ([facility])     CATARACT IOL, RT/LT Left 12/19/2018    Model ZCB00  SN:[id number] +12.5 Dioptor - Jermaine Akers MD ([facility])     DILATION AND CURETTAGE SUCTION      AB     DILATION AND CURETTAGE SUCTION      in 40's     NO HISTORY OF SURGERY         Family History   Problem Relation Age of Onset     Diabetes Mother      Coronary Artery Disease Father          with a heart attack     Glaucoma Cousin      Colon Cancer No family hx of      Breast Cancer No family hx of      Macular Degeneration No family hx of        Social History     Socioeconomic History     Marital status: Single     Spouse name: Not on file     Number of children: 0     Years of education: Not on file     Highest education level: Not on file   Occupational History     Occupation:    Tobacco Use     Smoking status: Former     Current packs/day: 0.00     Types: Cigarettes     Quit date: 6/15/1977     Years since quittin.0     Passive exposure: Past     Smokeless tobacco: Never     Tobacco comments:     quit in    Vaping Use     Vaping status: Never Used   Substance and Sexual Activity     Alcohol use: Yes     Comment: 1 red wine before bef     Drug use: No     Sexual activity: Not Currently     Partners: Male     Birth control/protection: Abstinence   Other Topics Concern     Parent/sibling w/ CABG, MI or angioplasty before 65F 55M? Yes   Social History Narrative     Not on file     Social Determinants of Health     Financial Resource Strain: Low Risk  (2024)    Financial Resource Strain      Within the past 12 months, have you or your family members you live with been unable to get utilities (heat, electricity) when it was really needed?: No   Food Insecurity: Low Risk  (2024)    Food Insecurity      Within the past 12 months, did you worry that your food would run out before you got money to buy more?: No      Within the past 12 months, did the food you bought just not last and you didn t have money to get more?: No   Transportation Needs: Low Risk  (2024)    Transportation Needs       Within the past 12 months, has lack of transportation kept you from medical appointments, getting your medicines, non-medical meetings or appointments, work, or from getting things that you need?: No   Physical Activity: Not on file   Stress: Not on file   Social Connections: Unknown (4/11/2023)    Received from [facility], [facility]    Social Connections      Frequency of Communication with Friends and Family: Not on file   Interpersonal Safety: Low Risk  (1/18/2024)    Interpersonal Safety      Do you feel physically and emotionally safe where you currently live?: Yes      Within the past 12 months, have you been hit, slapped, kicked or otherwise physically hurt by someone?: No      Within the past 12 months, have you been humiliated or emotionally abused in other ways by your partner or ex-partner?: No   Housing Stability: Low Risk  (1/1/2024)    Housing Stability      Do you have housing? : Yes      Are you worried about losing your housing?: No       Current Outpatient Medications   Medication Sig Dispense Refill     ACE/ARB/ARNI NOT PRESCRIBED (INTENTIONAL) Please choose reason not prescribed from choices below.       albuterol (PROAIR HFA/PROVENTIL HFA/VENTOLIN HFA) 108 (90 Base) MCG/ACT inhaler Inhale 2 puffs into the lungs every 6 hours as needed for shortness of breath, wheezing or cough 18 g 0     alcohol swab prep pads Use to swab area of injection/milena as directed. 100 each 3     aspirin (ASA) 81 MG EC tablet Take 81 mg by mouth daily       atenolol (TENORMIN) 50 MG tablet Take 1 tablet (50 mg) by mouth 2 times daily 180 tablet 0     atorvastatin (LIPITOR) 10 MG tablet Take 1 tablet (10 mg) by mouth daily 90 tablet 3     blood glucose (NO BRAND SPECIFIED) test strip Use to test blood sugar 1 times daily or as directed. 100 strip 11     fluticasone (FLONASE) 50 MCG/ACT spray Spray 1 spray into both nostrils daily as needed         "furosemide (LASIX) 20 MG tablet Take 1 tablet (20 mg) by mouth daily 90 tablet 3     gabapentin (NEURONTIN) 100 MG capsule Take 1 capsule (100 mg) by mouth 2 times daily 180 capsule 3     ketotifen (ZADITOR) 0.025 % ophthalmic solution Place 1 drop into both eyes 2 times daily as needed for itching       levothyroxine (SYNTHROID/LEVOTHROID) 112 MCG tablet Take 1 tablet (112 mcg) by mouth daily 90 tablet 2     losartan (COZAAR) 50 MG tablet Take 1 tablet (50 mg) by mouth daily 90 tablet 3     omeprazole (PRILOSEC) 20 MG DR capsule Take 1 capsule (20 mg) by mouth daily 90 capsule 3       Allergies   Allergen Reactions     Diltiazem Hcl      Leg swelling     Norvasc [Amlodipine]      Leg swellin     Seasonal      Seasonal Allergies        REVIEW OF SYSTEMS:  CONSTITUTIONAL:  NEGATIVE for fever, chills, change in weight, not feeling tired  SKIN:  NEGATIVE for worrisome rashes, no skin lumps, no skin ulcers and no non-healing wounds  EYES:  NEGATIVE for vision changes or irritation.  ENT/MOUTH:  NEGATIVE.  No hearing loss, no hoarseness, no difficulty swallowing.  RESP:  NEGATIVE. No cough or shortness of breath.  CV:  NEGATIVE for chest pain, palpitations or peripheral edema  GI:  NEGATIVE for nausea, abdominal pain, heartburn, or change in bowel habits  :  Negative. No dysuria, no hematuria  MUSCULOSKELETAL:  See HPI above  NEURO:  NEGATIVE . No headaches, no dizziness,  no numbness  ENDOCRINE:  NEGATIVE for temperature intolerance, skin/hair changes  HEME/ALLERGY/IMMUNE:  NEGATIVE for bleeding problems  PSYCHIATRIC:  NEGATIVE. no anxiety, no depression.     Exam:  Vitals: /66   Pulse 102   Resp 18   Ht 1.594 m (5' 2.75\")   Wt 79.4 kg (175 lb)   BMI 31.25 kg/m    BMI= Body mass index is 31.25 kg/m .  Constitutional:  healthy, alert and no distress  Neuro: Alert and Oriented x 3, no focal defects.  Psych: Affect normal   Respiratory: Breathing not labored.  Cardiovascular: normal peripheral pulses  Lymph: " no adenopathy  Skin: No rashes,worrisome lesions or skin problems  She has very poor motion of both hips.  On the right she has at best 30 degrees external rotation and lacks at least 15 degrees from neutral on the internal rotation.  On the left she has about 30-40 degrees external rotation and -5 degrees internal rotation.  She walks with a bit of abductor lurch on both sides with a waddling gait.  She is using a walker.  Sensation, motor and circulation are intact.    Assessment:  bilateral hip osteoarthritis, right > left.  She is unable to take NSAID due to chronic kidney disease.  She did not respond to Physical Therapy.  Due to significant limitation of range of motion, we should proceed with total hip arthroplasty before she permanently loses range of motion.      Plan: We talked about the patient's condition and diagnosis.  Because of severe arthritis, and failure of conservative measures, I have suggested right total hip arthroplasty with direct anterior approach .  I've talked in depth about the procedure and the risks, which include, but are not limited to blood loss requiring transfusion, infection, neurovascular injury, deep vein thrombosis, pulmonary embolis, continued pain, numbness around the wound, dislocation, loosening, wear, leg length discrepancy, anesthetic risks, and the possibility of needing additional procedures.  We also talked about recovery time, which differs from patient to patient.  Preop xrays were ordered, and medical clearance will need to be obtained.      Special considerations: direct anterior approach.  Right 2 mms short.  Very limited range of motion.           Again, thank you for allowing me to participate in the care of your patient.        Sincerely,        Meek Cifuentes MD

## 2024-07-08 NOTE — PATIENT INSTRUCTIONS
Ms. Bales and I talked about her condition and diagnosis.  Because of severe arthritis, and failure of conservative measures, we have decided to proceed with  right total hip arthroplasty.  This will be done with a direct anterior approach.    We have talked in depth about the procedure and the risks, which include, but are not limited to:  * blood loss possibly requiring transfusion,   * blood clots with possible pulmonary embolism  * risk of dislocation.  There will be no restrictions on bending after surgery.  * infection or wound healing problems  * leg length inequality  * nerve damage.  * vascular circulation problems  * continued pain  * Loosening or wear  * anesthetic risks  * The possibility of needing additional procedures.      We also talked about recovery time, which differs from patient to patient.    Average 1 night in the hospital.  Most people can return home at that point.  Usually Physical Therapy is not required after the hospital.   You will use a walker or cane for comfort postoperative.  You can stop these when you are comfortable.    Preop xrays have been ordered, and medical clearance will need to be obtained.    Preop physical with primary physician is needed within 30 days of the surgery.  Nothing to eat or drink for 8 hours before surgery.  Stop blood thinners 5 days before surgery (aspirin, warfarin, anti-inflammatories).      Surgery schedulers will call you to schedule surgery.     If you don't get a call in the next few days, then call [phone] to schedule for [place].    Nasal Turnover Hinge Flap Text: The defect edges were debeveled with a #15 scalpel blade.  Given the size, depth, location of the defect and the defect being full thickness a nasal turnover hinge flap was deemed most appropriate.  Using a sterile surgical marker, an appropriate hinge flap was drawn incorporating the defect. The area thus outlined was incised with a #15 scalpel blade. The flap was designed to recreate the nasal mucosal lining and the alar rim. The skin margins were undermined to an appropriate distance in all directions utilizing iris scissors.

## 2024-07-10 ENCOUNTER — TELEPHONE (OUTPATIENT)
Dept: ORTHOPEDICS | Facility: CLINIC | Age: 74
End: 2024-07-10
Payer: MEDICARE

## 2024-07-10 NOTE — TELEPHONE ENCOUNTER
Type of surgery:     ARTHROPLASTY, HIP, TOTAL, DIRECT ANTERIOR APPROACH (Right)     Location of surgery: [facility]  Date and time of surgery: 9/25  Surgeon: luigi   Pre-Op Appt Date: 9/9  Post-Op Appt Date: 10/14  Packet sent out: Yes  Pre-cert/Authorization completed:  Not Applicable  Date:

## 2024-07-10 NOTE — TELEPHONE ENCOUNTER
Please call patient regarding possible short term care facility     She does not have someone who can be with her after surgery

## 2024-07-11 ENCOUNTER — THERAPY VISIT (OUTPATIENT)
Dept: PHYSICAL THERAPY | Facility: CLINIC | Age: 74
End: 2024-07-11
Attending: ORTHOPAEDIC SURGERY
Payer: MEDICARE

## 2024-07-11 DIAGNOSIS — M16.11 PRIMARY OSTEOARTHRITIS OF RIGHT HIP: ICD-10-CM

## 2024-07-11 DIAGNOSIS — M25.551 CHRONIC HIP PAIN, BILATERAL: Primary | ICD-10-CM

## 2024-07-11 DIAGNOSIS — M25.552 CHRONIC HIP PAIN, BILATERAL: Primary | ICD-10-CM

## 2024-07-11 DIAGNOSIS — G89.29 CHRONIC HIP PAIN, BILATERAL: Primary | ICD-10-CM

## 2024-07-11 PROCEDURE — 97110 THERAPEUTIC EXERCISES: CPT | Mod: GP | Performed by: PHYSICAL THERAPIST

## 2024-07-11 NOTE — PROGRESS NOTES
[facility]                                                                                   OUTPATIENT PHYSICAL THERAPY    PLAN OF TREATMENT FOR OUTPATIENT REHABILITATION   Patient's Last Name, First Name, Glenda Cameron YOB: 1950   Provider's Name   [facility]   Medical Record No.  [id number]     Onset Date: 04/22/24 (Date of order)  Start of Care Date: 04/24/24     Medical Diagnosis:  Primary osteoarthritis of both hips      PT Treatment Diagnosis:  Chronic bilateral hip pain Plan of Treatment  Frequency/Duration: 1 time per week/ 8 weeks    Certification date from 06/20/24 to 08/15/24         See note for plan of treatment details and functional goals     Chevy Yates PT                         I CERTIFY THE NEED FOR THESE SERVICES FURNISHED UNDER        THIS PLAN OF TREATMENT AND WHILE UNDER MY CARE     (Physician attestation of this document indicates review and certification of the therapy plan).              Referring Provider:  Meek Cifuentes    Initial Assessment  See Epic Evaluation- Start of Care Date: 04/24/24            PLAN  1 time per week for 8 weeks    Beginning/End Dates of Progress Note Reporting Period:  05/28/24 to 07/11/2024    Referring Provider:  Meek Cifuentes       07/11/24 0500   Appointment Info   Signing clinician's name / credentials aSge Yates, PT, DPT   Total/Authorized Visits 8 (POC) (E&T)   Visits Used 7   Medical Diagnosis Primary osteoarthritis of both hips   PT Tx Diagnosis Chronic bilateral hip pain   Precautions/Limitations R>L   Other pertinent information NEXT: Hip and LE strengthening / Gentle ROM   Quick Adds Certification   Progress Note/Certification   Start of Care Date 04/24/24   Onset of illness/injury or Date of Surgery 04/22/24  (Date of order)   Therapy Frequency 1 time per week   Predicted Duration 8 weeks   Certification date from 06/20/24   Certification date  "to 08/15/24   Progress Note Due Date 08/15/24   Progress Note Completed Date 05/28/24   GOALS   PT Goals 2   PT Goal 1   Goal Identifier Ambulation   Goal Description Pt will be able to ambulate for 10 minutes in order to run errands with a minimal increase in pain 1-2/10   Goal Progress Not met   Target Date 06/19/24   PT Goal 2   Goal Identifier Driving   Goal Description Pt will be able to lift her leg into her vehicle with a minimal increase in pain 1-2/10.   Goal Progress Not met   Target Date 06/19/24   Subjective Report   Subjective Report Will be having surgery in September 25th. Having trouble getting in and out of chairs due to hip \"locking up\". Needs to work on more strengthening prior to surgery.   Objective Measures   Objective Measures Objective Measure 1;Objective Measure 2;Objective Measure 3;Objective Measure 4   Objective Measure 1   Objective Measure Strength   Details Hip flexion - 4/5, Hip abduction - 3/5 with pain, Hip adduction - 3/5, Knee flexion - 4/5, Knee extension - 4/5   Objective Measure 2   Objective Measure Palpation   Details Hypomobility of hip joint   Objective Measure 3   Objective Measure Gait   Details Reduced stride length with minimal heel strike and toe off. Uses FWW. No loss of balance noted.   Treatment Interventions (PT)   Interventions Therapeutic Procedure/Exercise;Manual Therapy   Therapeutic Procedure/Exercise   Therapeutic Procedures: strength, endurance, ROM, flexibility minutes (98669) 32   Therapeutic Procedures Ther Proc 2;Ther Proc 3;Ther Proc 4;Ther Proc 5;Ther Proc 6;Ther Proc 7;Ther Proc 8   Ther Proc 1 Seated hamstring stretch x3 mins   Ther Proc 2 Sit to stand x15 with UE assist and sitting on bolster   Ther Proc 3 Standing hip extension x15 B with YTB at knees   Ther Proc 3 - Details Cueing to keep knee straight   Ther Proc 4 LAQ x12 B with 5 second holds   Ther Proc 5 Standing marching x10 B   Ther Proc 6 Seated hip abduction x15 with YTB   Skilled " Intervention Strengthening and stretching exercise education to improve function   Patient Response/Progress Minimal increase in pain with performance of exercises   Ther Proc 7 Heel raises x15   Ther Proc 8 Step ups on 4 inch step x3 (stopped due to discomfort in right hip)   Education   Learner/Method Patient;No Barriers to Learning   Plan   Home program PTRx (handout)   Comments   Comments Pt's strength has seen mild improvement since beginning physical therapy. Pt still has difficulty transferring from sitting to standing, stair climbing and ambulating for longer distances. Pt could benefit from additional skilled physical therapy in order to build strength and imprvoe functional ability.   Total Session Time   Timed Code Treatment Minutes 32   Total Treatment Time (sum of timed and untimed services) 32

## 2024-07-15 NOTE — TELEPHONE ENCOUNTER
I called Glenda Bales on 7/15/2024 at 11:46 AM.  She is considering short term rehab versus staying with a relative.  Discussed considerations and coverage.  Staying with relative would be more straight forward, as insurance may not cover short term rehab with only overnight stay.

## 2024-07-23 ENCOUNTER — THERAPY VISIT (OUTPATIENT)
Dept: PHYSICAL THERAPY | Facility: CLINIC | Age: 74
End: 2024-07-23
Attending: ORTHOPAEDIC SURGERY
Payer: MEDICARE

## 2024-07-23 DIAGNOSIS — G89.29 CHRONIC HIP PAIN, BILATERAL: Primary | ICD-10-CM

## 2024-07-23 DIAGNOSIS — M25.551 CHRONIC HIP PAIN, BILATERAL: Primary | ICD-10-CM

## 2024-07-23 DIAGNOSIS — M25.552 CHRONIC HIP PAIN, BILATERAL: Primary | ICD-10-CM

## 2024-07-23 PROCEDURE — 97110 THERAPEUTIC EXERCISES: CPT | Mod: GP | Performed by: PHYSICAL THERAPIST

## 2024-08-08 ENCOUNTER — THERAPY VISIT (OUTPATIENT)
Dept: PHYSICAL THERAPY | Facility: CLINIC | Age: 74
End: 2024-08-08
Payer: MEDICARE

## 2024-08-08 DIAGNOSIS — G89.29 CHRONIC HIP PAIN, BILATERAL: Primary | ICD-10-CM

## 2024-08-08 DIAGNOSIS — M25.551 CHRONIC HIP PAIN, BILATERAL: Primary | ICD-10-CM

## 2024-08-08 DIAGNOSIS — M25.552 CHRONIC HIP PAIN, BILATERAL: Primary | ICD-10-CM

## 2024-08-08 PROCEDURE — 97110 THERAPEUTIC EXERCISES: CPT | Mod: GP | Performed by: PHYSICAL THERAPIST

## 2024-08-08 NOTE — PROGRESS NOTES
DISCHARGE  Reason for Discharge: No further expectation of progress.  Patient chooses to discontinue therapy.    Equipment Issued: None    Discharge Plan: Patient to continue home program.    Referring Provider:  Meek Cifuentes       08/08/24 0500   Appointment Info   Signing clinician's name / credentials Sage Yates, PT, DPT   Total/Authorized Visits 15 (POC) (E&T)   Visits Used 9   Medical Diagnosis Primary osteoarthritis of both hips   PT Tx Diagnosis Chronic bilateral hip pain   Precautions/Limitations R>L   Other pertinent information Discharged   Progress Note/Certification   Start of Care Date 04/24/24   Onset of illness/injury or Date of Surgery 04/22/24  (Date of order)   Therapy Frequency 1 time per week   Predicted Duration 8 weeks   Certification date from 06/20/24   Certification date to 08/15/24   Progress Note Due Date 08/15/24   Progress Note Completed Date 07/11/24   GOALS   PT Goals 2   PT Goal 1   Goal Identifier Ambulation   Goal Description Pt will be able to ambulate for 10 minutes in order to run errands with a minimal increase in pain 1-2/10   Goal Progress Not met   Target Date 06/19/24   PT Goal 2   Goal Identifier Driving   Goal Description Pt will be able to lift her leg into her vehicle with a minimal increase in pain 1-2/10.   Goal Progress Not met   Target Date 06/19/24   Subjective Report   Subjective Report Hip is feeling a bit better today. Has moved the surgery date up. Will be having surgery on August 28th.   Objective Measures   Objective Measures Objective Measure 1;Objective Measure 2;Objective Measure 3;Objective Measure 4   Objective Measure 1   Objective Measure Strength   Details Hip flexion - 4/5, Hip abduction - 3/5 with pain, Hip adduction - 3/5, Knee flexion - 4/5, Knee extension - 4/5   Objective Measure 2   Objective Measure Gait   Details Ambulates with FWW with reduced heel strike and toe off. Reduced stride length.   Objective Measure 3   Objective  "Measure Transfers   Details Difficulty with sit to stand due to pain and reduced hip extension   Treatment Interventions (PT)   Interventions Therapeutic Procedure/Exercise;Manual Therapy   Therapeutic Procedure/Exercise   Therapeutic Procedures: strength, endurance, ROM, flexibility minutes (55811) 39   Therapeutic Procedures Ther Proc 2;Ther Proc 3;Ther Proc 4;Ther Proc 5;Ther Proc 6;Ther Proc 7;Ther Proc 8;Ther Proc 9;Ther Proc 10   Ther Proc 2 Sit to stand x5 with UE assist and sitting on bolster   Ther Proc 2 - Details Stopped due to pain in right hip   Ther Proc 3 Standing hip extension 2x15 B   Ther Proc 4 LAQ x13 B with 5 second holds with 2#   Ther Proc 5 Standing hip abduction 2x15 B   Ther Proc 6 Seated hip abduction x20 with RTB   Ther Proc 7 Heel raises x15 and x10   Ther Proc 8 Mini forward lunges at handrail x10 B   Ther Proc 9 Seated marching x15 B   Skilled Intervention Strengthening and stretching exercise education to improve function   Patient Response/Progress Minimal increase in pain with performance of exercises   Ther Proc 10 Lateral lunges x10 B   Education   Learner/Method Patient;No Barriers to Learning   Plan   Home program PTRx (handout)   Comments   Comments Pt has seen minimal progress at this time. However pt did have less discomfort overall performing exercises as compared to her previous visit. Pt did have difficulty performing sit to stand due to pain and \"catching\" sensation in right hip and difficulty fully extending the hip for standing. Pt is scheduled for [facility]AS[facility] at the end of the month.   Total Session Time   Timed Code Treatment Minutes 39   Total Treatment Time (sum of timed and untimed services) 39         "

## 2024-08-12 ENCOUNTER — OFFICE VISIT (OUTPATIENT)
Dept: FAMILY MEDICINE | Facility: CLINIC | Age: 74
End: 2024-08-12
Payer: MEDICARE

## 2024-08-12 VITALS
BODY MASS INDEX: 30.48 KG/M2 | WEIGHT: 172 LBS | DIASTOLIC BLOOD PRESSURE: 72 MMHG | SYSTOLIC BLOOD PRESSURE: 124 MMHG | TEMPERATURE: 98.1 F | RESPIRATION RATE: 16 BRPM | HEIGHT: 63 IN | OXYGEN SATURATION: 96 % | HEART RATE: 59 BPM

## 2024-08-12 DIAGNOSIS — R87.619 ATYPICAL GLANDULAR CELLS ON CERVICAL PAP SMEAR: ICD-10-CM

## 2024-08-12 DIAGNOSIS — F10.91 ALCOHOL USE DISORDER IN REMISSION: ICD-10-CM

## 2024-08-12 DIAGNOSIS — N18.30 BENIGN HYPERTENSION WITH CKD (CHRONIC KIDNEY DISEASE) STAGE III (H): ICD-10-CM

## 2024-08-12 DIAGNOSIS — I12.9 BENIGN HYPERTENSION WITH CKD (CHRONIC KIDNEY DISEASE) STAGE III (H): ICD-10-CM

## 2024-08-12 DIAGNOSIS — Z01.818 PREOP GENERAL PHYSICAL EXAM: ICD-10-CM

## 2024-08-12 DIAGNOSIS — N18.32 TYPE 2 DIABETES MELLITUS WITH STAGE 3B CHRONIC KIDNEY DISEASE, WITHOUT LONG-TERM CURRENT USE OF INSULIN (H): ICD-10-CM

## 2024-08-12 DIAGNOSIS — E11.22 TYPE 2 DIABETES MELLITUS WITH STAGE 3B CHRONIC KIDNEY DISEASE, WITHOUT LONG-TERM CURRENT USE OF INSULIN (H): ICD-10-CM

## 2024-08-12 DIAGNOSIS — Z12.4 CERVICAL CANCER SCREENING: ICD-10-CM

## 2024-08-12 DIAGNOSIS — E78.5 HYPERLIPIDEMIA LDL GOAL <70: ICD-10-CM

## 2024-08-12 DIAGNOSIS — K21.9 GASTROESOPHAGEAL REFLUX DISEASE, UNSPECIFIED WHETHER ESOPHAGITIS PRESENT: ICD-10-CM

## 2024-08-12 DIAGNOSIS — Z12.11 SCREEN FOR COLON CANCER: ICD-10-CM

## 2024-08-12 DIAGNOSIS — E03.9 ACQUIRED HYPOTHYROIDISM: ICD-10-CM

## 2024-08-12 PROBLEM — F10.90 ALCOHOL USE: Status: RESOLVED | Noted: 2024-02-29 | Resolved: 2024-08-12

## 2024-08-12 PROBLEM — Z78.9 ALCOHOL USE: Status: RESOLVED | Noted: 2024-02-29 | Resolved: 2024-08-12

## 2024-08-12 PROBLEM — U07.1 INFECTION DUE TO 2019 NOVEL CORONAVIRUS: Status: RESOLVED | Noted: 2023-01-23 | Resolved: 2024-08-12

## 2024-08-12 LAB
HBA1C MFR BLD: 5.7 % (ref 0–5.6)
HGB BLD-MCNC: 13.6 G/DL (ref 11.7–15.7)

## 2024-08-12 PROCEDURE — 36415 COLL VENOUS BLD VENIPUNCTURE: CPT | Performed by: FAMILY MEDICINE

## 2024-08-12 PROCEDURE — 93000 ELECTROCARDIOGRAM COMPLETE: CPT | Performed by: FAMILY MEDICINE

## 2024-08-12 PROCEDURE — 99214 OFFICE O/P EST MOD 30 MIN: CPT | Performed by: FAMILY MEDICINE

## 2024-08-12 PROCEDURE — 84132 ASSAY OF SERUM POTASSIUM: CPT | Performed by: FAMILY MEDICINE

## 2024-08-12 PROCEDURE — 85018 HEMOGLOBIN: CPT | Performed by: FAMILY MEDICINE

## 2024-08-12 PROCEDURE — 80061 LIPID PANEL: CPT | Performed by: FAMILY MEDICINE

## 2024-08-12 PROCEDURE — 83036 HEMOGLOBIN GLYCOSYLATED A1C: CPT | Performed by: FAMILY MEDICINE

## 2024-08-12 PROCEDURE — 82947 ASSAY GLUCOSE BLOOD QUANT: CPT | Performed by: FAMILY MEDICINE

## 2024-08-12 RX ORDER — ACETAMINOPHEN 325 MG/1
650 TABLET ORAL EVERY 6 HOURS PRN
Status: ON HOLD | COMMUNITY
End: 2024-08-30

## 2024-08-12 ASSESSMENT — PAIN SCALES - GENERAL: PAINLEVEL: SEVERE PAIN (7)

## 2024-08-12 NOTE — PATIENT INSTRUCTIONS
Take all scheduled medications on the day of surgery EXCEPT for modifications listed below:   - ACE/ARB: DO NOT TAKE on day of surgery .-losartan   - Beta Blockers: Continue taking on the day of surgery.-atenolol   - Diuretics: Hold on the day of surgery .-lasix   - Statins: Continue taking on the day of surgery. -Lipitor   - Herbal medications and vitamins: DO NOT TAKE 14 days prior to surgery.   gabapentin: Continue without modification.  Stop aspirin 10 days before surgery  Sincerely,  Marysol Cisse MD

## 2024-08-12 NOTE — Clinical Note
Fax preop/labs/EKG if done to hospital Please have pt leave a Urine sample this week anytime -lab appointment

## 2024-08-12 NOTE — PROGRESS NOTES
Preoperative Evaluation  [facility]  [address]  [place] [address]  Phone: [phone]  Primary Provider: Marysol Cisse MD  Pre-op Performing Provider: Marysol Cisse MD  Aug 12, 2024             8/10/2024   Surgical Information   What procedure is being done? Hip   Facility or Hospital where procedure/surgery will be performed: [facility]   Who is doing the procedure / surgery? Dr meyers   Date of surgery / procedure: August 28   Time of surgery / procedure: Not sure   Where do you plan to recover after surgery? at home alone        Fax number for surgical facility: Note does not need to be faxed, will be available electronically in Epic.    Assessment & Plan     The proposed surgical procedure is considered INTERMEDIATE risk.    Preop general physical exam    - EKG 12-lead complete w/read - Clinics  - Potassium; Future  - Primary Care - Care Coordination Referral  - Hemoglobin; Future  - Potassium  - Hemoglobin    Type 2 diabetes mellitus with stage 3b chronic kidney disease, without long-term current use of insulin (H)  Pending labs   - Lipid panel reflex to direct LDL Non-fasting; Future  - Hemoglobin A1c; Future  - Glucose; Future  - Lipid panel reflex to direct LDL Non-fasting  - Hemoglobin A1c  - Glucose    Cervical cancer screening  Pt has been advised Multiple Times to follow up with GYN  Hyperlipidemia LDL goal <70  Stable       Gastroesophageal reflux disease, unspecified whether esophagitis present  Stable     Benign hypertension with CKD (chronic kidney disease) stage III (H)  controlled    Alcohol use disorder in remission      Acquired hypothyroidism  Stable     Screen for colon cancer  Pt  has been advised Multiple Times to do this    Atypical glandular cells on cervical Pap smear  Referral again GUN   - Ob/Gyn  Referral; Future    Pt lives alone and concerned about Post op help  I have given her the Phone no  and care coordination  referral done  She will also discuss with Orthopedic  She has a Niece who helps occasionally but pt states Calvin is very Busy  Risks and Recommendations  The patient has the following additional risks and recommendations for perioperative complications:  Diabetes:  - Patient is not on insulin therapy: regular NPO guidelines can be followed.     Antiplatelet or Anticoagulation Medication Instructions   - aspirin: Discontinue aspirin 7-10 days prior to procedure to reduce bleeding risk. It should be resumed postoperatively.     Additional Medication Instructions  Take all scheduled medications on the day of surgery EXCEPT for modifications listed below:   - ACE/ARB: DO NOT TAKE on day of surgery .-losartan   - Beta Blockers: Continue taking on the day of surgery.-atenolol   - Diuretics: Hold on the day of surgery .-lasix   - Statins: Continue taking on the day of surgery. -Lipitor   - Herbal medications and vitamins: DO NOT TAKE 14 days prior to surgery.   gabapentin: Continue without modification.  Stop aspirin 10 days before surgery  Sincerely,  Marysol Cisse MD      Recommendation  Approval given to proceed with proposed procedure, without further diagnostic evaluation.    Kathrine Doshi is a 73 year old, presenting for the following:  Pre-Op Exam          5/30/2024    10:12 AM   Additional Questions   Roomed by Allyson CHAVES related to upcoming procedure: Pt here for a Preop   She has OA hip-Bilateral-she is scheduled for Right Hip arthroplasty        8/10/2024   Pre-Op Questionnaire   Have you ever had a heart attack or stroke? No   Have you ever had surgery on your heart or blood vessels, such as a stent placement, a coronary artery bypass, or surgery on an artery in your head, neck, heart, or legs? No   Do you have chest pain with activity? No   Do you have a history of heart failure? No   Do you currently have a cold, bronchitis or symptoms of other infection? No   Do you have a cough, shortness of breath,  or wheezing? No   Do you or anyone in your family have previous history of blood clots? (!) UNKNOWN -None Known   Do you or does anyone in your family have a serious bleeding problem such as prolonged bleeding following surgeries or cuts? No   Have you ever had problems with anemia or been told to take iron pills? No   Have you had any abnormal blood loss such as black, tarry or bloody stools, or abnormal vaginal bleeding? No   Have you ever had a blood transfusion? No   Are you willing to have a blood transfusion if it is medically needed before, during, or after your surgery? Yes   Have you or any of your relatives ever had problems with anesthesia? (!) none   Do you have sleep apnea, excessive snoring or daytime drowsiness? (!) No   Do you have a CPAP machine? (!) NO    Do you have any artifical heart valves or other implanted medical devices like a pacemaker, defibrillator, or continuous glucose monitor? No   Do you have artificial joints? No   Are you allergic to latex? No        Health Care Directive  Patient does not have a Health Care Directive or Living Will: Discussed advance care planning with patient; information given to patient to review.    Preoperative Review of    reviewed - no record of controlled substances prescribed.      Status of Chronic Conditions:  DIABETES - Patient has a longstanding history of DiabetesType Type II . Patient is being treated with diet and denies significant side effects. Control has been good. Complicating factors include but are not limited to: hypertension and hyperlipidemia.     HYPERLIPIDEMIA - Patient has a long history of significant Hyperlipidemia requiring medication for treatment with recent good control. Patient reports no problems or side effects with the medication.     HYPERTENSION - Patient has longstanding history of HTN , currently denies any symptoms referable to elevated blood pressure. Specifically denies chest pain, palpitations, dyspnea,  orthopnea, PND or peripheral edema. Blood pressure readings have been in normal range. Current medication regimen is as listed below. Patient denies any side effects of medication.     HYPOTHYROIDISM - Patient has a longstanding history of chronic Hypothyroidism. Patient has been doing well, noting no tremor, insomnia, hair loss or changes in skin texture. Continues to take medications as directed, without adverse reactions or side effects. Last TSH   Lab Results   Component Value Date    TSH 4.36 (H) 01/31/2024   .      RENAL INSUFFICIENCY - Patient has a longstanding history of moderate-severe chronic renal insufficiency. Last Cr see labs .     Patient Active Problem List    Diagnosis Date Noted    Primary osteoarthritis of right hip 06/03/2024     Priority: Medium    HTN, goal below 140/90 05/09/2024     Priority: Medium    Primary osteoarthritis of both hips 04/22/2024     Priority: Medium    Alcohol use disorder in remission 04/04/2024     Priority: Medium    Elevated liver function tests 02/29/2024     Priority: Medium    Abnormal levels of other serum enzymes 02/29/2024     Priority: Medium    Type 2 diabetes mellitus with stage 3b chronic kidney disease, without long-term current use of insulin (H) 02/13/2024     Priority: Medium    Gastroesophageal reflux disease, unspecified whether esophagitis present 08/16/2023     Priority: Medium    Morbid obesity (H) 03/28/2023     Priority: Medium    Postmenopausal bleeding 01/23/2023     Priority: Medium    Pulmonary nodule 01/23/2023     Priority: Medium    Atypical glandular cells on cervical Pap smear 01/23/2023     Priority: Medium     2002, 2005, 2008 NIL paps per Care Everywhere  Gap in care  1/23/23 AGC-EM, neg HPV. PMB. Plan: colposcopy with ECC and EMB due before 4/23/23  3/8/23 Appt -- unable to penetrate cervical os. Plan: hysteroscopy D&C to sample endometrium  4/4/23 Reminder mychart  5/2/23 [place] not done. Tracking updated for 6 mo colp/pap due 7/23/23.           Aortic valve insufficiency, etiology of cardiac valve disease unspecified 11/01/2021     Priority: Medium    Primary osteoarthritis of left hip 01/12/2021     Priority: Medium    Cough 10/15/2020     Priority: Medium    Stage 3b chronic kidney disease (H) 09/23/2019     Priority: Medium     IMO Regulatory Load OCT 2020      Fatty liver 11/27/2018     Priority: Medium    Nonrheumatic aortic valve insufficiency 05/02/2018     Priority: Medium    Anxiety 05/02/2018     Priority: Medium    Osteoarthritis of knee, unspecified laterality, unspecified osteoarthritis type 05/02/2018     Priority: Medium    Other irritable bowel syndrome 05/02/2018     Priority: Medium    Benign hypertension with CKD (chronic kidney disease) stage III (H) 04/24/2018     Priority: Medium    Chronic rhinitis 04/24/2018     Priority: Medium    Acquired hypothyroidism 04/24/2018     Priority: Medium    Hyperlipidemia LDL goal <70 04/24/2018     Priority: Medium    Gastroesophageal reflux disease without esophagitis 04/24/2018     Priority: Medium      Past Medical History:   Diagnosis Date    Cataract     Diabetes (H) April 2019    Essential hypertension, benign     Nonrheumatic aortic valve insufficiency     Osteoarthritis of knee, unspecified laterality, unspecified osteoarthritis type     Thyroid disease      Past Surgical History:   Procedure Laterality Date    CATARACT IOL, RT/LT Right 12/05/2018    Model ZCB00 SN:[id number] +11.5 Dioptor - Jermaine Akers MD ([facility])    CATARACT IOL, RT/LT Left 12/19/2018    Model ZCB00 SN:[id number] +12.5 Dioptor - Jermaine Akers MD ([facility])    DILATION AND CURETTAGE SUCTION      AB    DILATION AND CURETTAGE SUCTION      in 40's    NO HISTORY OF SURGERY       Current Outpatient Medications   Medication Sig Dispense Refill    ACE/ARB/ARNI NOT PRESCRIBED (INTENTIONAL) Please choose reason not prescribed from choices below.      acetaminophen (TYLENOL) 325 MG tablet Take 650 mg by mouth every 6 hours as  needed for mild pain      albuterol (PROAIR HFA/PROVENTIL HFA/VENTOLIN HFA) 108 (90 Base) MCG/ACT inhaler Inhale 2 puffs into the lungs every 6 hours as needed for shortness of breath, wheezing or cough 18 g 0    alcohol swab prep pads Use to swab area of injection/milena as directed. 100 each 3    atenolol (TENORMIN) 50 MG tablet Take 1 tablet (50 mg) by mouth 2 times daily 180 tablet 0    atorvastatin (LIPITOR) 10 MG tablet Take 1 tablet (10 mg) by mouth daily 90 tablet 3    blood glucose (NO BRAND SPECIFIED) test strip Use to test blood sugar 1 times daily or as directed. 100 strip 11    fluticasone (FLONASE) 50 MCG/ACT spray Spray 1 spray into both nostrils daily as needed       furosemide (LASIX) 20 MG tablet Take 1 tablet (20 mg) by mouth daily 90 tablet 3    gabapentin (NEURONTIN) 100 MG capsule Take 1 capsule (100 mg) by mouth 2 times daily 180 capsule 3    ketotifen (ZADITOR) 0.025 % ophthalmic solution Place 1 drop into both eyes 2 times daily as needed for itching      levothyroxine (SYNTHROID/LEVOTHROID) 112 MCG tablet Take 1 tablet (112 mcg) by mouth daily 90 tablet 2    losartan (COZAAR) 50 MG tablet Take 1 tablet (50 mg) by mouth daily 90 tablet 3    omeprazole (PRILOSEC) 20 MG DR capsule Take 1 capsule (20 mg) by mouth daily 90 capsule 3    aspirin (ASA) 81 MG EC tablet Take 81 mg by mouth daily (Patient not taking: Reported on 2024)         Allergies   Allergen Reactions    Diltiazem Hcl      Leg swelling    Norvasc [Amlodipine]      Leg swellin    Seasonal     Seasonal Allergies         Social History     Tobacco Use    Smoking status: Former     Current packs/day: 0.00     Types: Cigarettes     Quit date: 6/15/1977     Years since quittin.1     Passive exposure: Past    Smokeless tobacco: Never    Tobacco comments:     quit in    Substance Use Topics    Alcohol use: Not Currently     Comment: 1 red wine before bef     Family History   Problem Relation Age of Onset    Diabetes Mother   "   Coronary Artery Disease Father          with a heart attack    Glaucoma Cousin     Colon Cancer No family hx of     Breast Cancer No family hx of     Macular Degeneration No family hx of      History   Drug Use No             Review of Systems  CONSTITUTIONAL: NEGATIVE for fever, chills, change in weight  INTEGUMENTARY/SKIN: NEGATIVE for worrisome rashes, moles or lesions  EYES: NEGATIVE for vision changes or irritation  ENT/MOUTH: NEGATIVE for ear, mouth and throat problems  RESP: NEGATIVE for significant cough or SOB  BREAST: NEGATIVE for masses, tenderness or discharge  CV: NEGATIVE for chest pain, palpitations or peripheral edema  GI: NEGATIVE for nausea, abdominal pain, heartburn, or change in bowel habits  : NEGATIVE for frequency, dysuria, or hematuria  MUSCULOSKELETAL:Bilateral Hip pain  NEURO: NEGATIVE for weakness, dizziness or paresthesias  ENDOCRINE: NEGATIVE for temperature intolerance, skin/hair changes  HEME: NEGATIVE for bleeding problems  PSYCHIATRIC: NEGATIVE for changes in mood or affect    Objective    /72   Pulse 59   Temp 98.1  F (36.7  C) (Temporal)   Resp 16   Ht 1.594 m (5' 2.76\")   Wt 78 kg (172 lb)   SpO2 96%   BMI 30.71 kg/m     Estimated body mass index is 30.71 kg/m  as calculated from the following:    Height as of this encounter: 1.594 m (5' 2.76\").    Weight as of this encounter: 78 kg (172 lb).  Physical Exam  GENERAL: alert and no distress  EYES: Eyes grossly normal to inspection, PERRL and conjunctivae and sclerae normal  HENT: ear canals and TM's normal, nose and mouth without ulcers or lesions  NECK: no adenopathy, no asymmetry, masses, or scars  RESP: lungs clear to auscultation - no rales, rhonchi or wheezes  CV: regular rate and rhythm, normal S1 S2, no S3 or S4, no murmur, click or rub, no peripheral edema  ABDOMEN: soft, nontender, no hepatosplenomegaly, no masses and bowel sounds normal  MS: no gross musculoskeletal defects noted, no edema  SKIN: no " suspicious lesions or rashes  NEURO: Normal strength and tone, mentation intact and speech normal  PSYCH: mentation appears normal, affect normal/bright    Recent Labs   Lab Test 05/30/24  1107 04/01/24  0945 02/27/24  1534 01/31/24  1223   HGB  --   --   --  12.9   PLT  --   --   --  158   NA  --  137 135 134*   POTASSIUM  --  3.9 4.2 4.1   CR  --  1.43* 1.59* 1.17*   A1C 5.8*  --   --  7.8*        Diagnostics  Labs pending at this time.  Results will be reviewed when available.   EKG: sinus bradycardia, normal axis, normal intervals, no acute ST/T changes c/w ischemia, no LVH by voltage criteria, unchanged from previous tracings    Revised Cardiac Risk Index (RCRI)  The patient has the following serious cardiovascular risks for perioperative complications:   - No serious cardiac risks = 0 points     RCRI Interpretation: 0 points: Class I (very low risk - 0.4% complication rate)         Signed Electronically by: Marysol Cises MD  A copy of this evaluation report is provided to the requesting physician.

## 2024-08-13 ENCOUNTER — MYC MEDICAL ADVICE (OUTPATIENT)
Dept: ORTHOPEDICS | Facility: CLINIC | Age: 74
End: 2024-08-13
Payer: MEDICARE

## 2024-08-13 ENCOUNTER — PATIENT OUTREACH (OUTPATIENT)
Dept: CARE COORDINATION | Facility: CLINIC | Age: 74
End: 2024-08-13
Payer: MEDICARE

## 2024-08-13 LAB
CHOLEST SERPL-MCNC: 274 MG/DL
FASTING STATUS PATIENT QL REPORTED: NO
FASTING STATUS PATIENT QL REPORTED: NO
GLUCOSE SERPL-MCNC: 106 MG/DL (ref 70–99)
HDLC SERPL-MCNC: 46 MG/DL
LDLC SERPL CALC-MCNC: 202 MG/DL
NONHDLC SERPL-MCNC: 228 MG/DL
POTASSIUM SERPL-SCNC: 4 MMOL/L (ref 3.4–5.3)
TRIGL SERPL-MCNC: 132 MG/DL

## 2024-08-13 NOTE — PROGRESS NOTES
Clinic Care Coordination Contact  [facility]/Voicemail    Clinical Data: Care Coordinator Outreach    Outreach Documentation Number of Outreach Attempt   8/13/2024  11:06 AM 1       Left message on patient's voicemail with call back information and requested return call.    Plan: Care Coordinator will try to reach patient again in 1-2 business days.    OLIVER De La Fuente  [phone]  [facility]

## 2024-08-14 PROBLEM — Z96.641 HISTORY OF TOTAL RIGHT HIP REPLACEMENT: Status: ACTIVE | Noted: 2024-08-14

## 2024-08-14 NOTE — PROGRESS NOTES
Clinic Care Coordination Contact  Community Health Worker Initial Outreach    CHW Initial Information Gathering:  Referral Source: PCP  Preferred Hospital: [facility]  [phone]  Preferred Urgent Care: Other ([facility])  Current living arrangement:: I live alone  Type of residence:: Apartment  Community Resources: None  Supplies Currently Used at Home: None  Equipment Currently Used at Home: none  Informal Support system:: None  No PCP office visit in Past Year: No  Transportation means:: Regular car       Patient accepts CC: Yes. Patient scheduled for assessment with the SW on 8/15/24 at 9:00 am. Patient noted desire to discuss possible in home supports after her surgery or possibly wanting to look into a TCU if needed.     Sudhakar CHW  [phone]  Connected Care [facility]

## 2024-08-15 ENCOUNTER — PATIENT OUTREACH (OUTPATIENT)
Dept: NURSING | Facility: CLINIC | Age: 74
End: 2024-08-15
Payer: MEDICARE

## 2024-08-15 NOTE — PROGRESS NOTES
Clinic Care Coordination Contact    ELENA initial phone assessment.  Patient reports that she has a planned JASMIN on 8/28/2024.  Patient reports her surgeon stated this surgery will take 23 hours and she will likely be staying overnight. She reported that she should be able to walk after the surgery and navigate steps.  Patient reported her ortho informed that a TCU stay requires a 3 day hospital stay.  Patient reports her niece will transport both ways and ensure she is cared for after surgery.  However, she cannot stay overnight.  Patient reports her ortho has informed will not require PT after the surgery therefore home care may not be feasible.  She currently walks with a walker for balance. We discussed private pay services.  Patient did not wish for any resources at this time.  She was accepting of ELENA's contact information.      SW will not intervene at this time, patient will discuss needed services after surgery with care team.  Patient will call ELENA if further resources as needed     Maryse Stone, IQRA, MSW   [facility]  Care Coordination  [facility]  [phone]  8/15/2024 9:24 AM

## 2024-08-19 NOTE — PROVIDER NOTIFICATION
Pt concerned regarding lack of resources to go home. Has niece but she is busy and may be unable to help.       08/19/24 1804   Discharge Planning   Patient/Family Anticipates Transition to home   Barriers to Discharge inadequate family support;no caregiver available after discharge   Concerns to be Addressed basic needs;discharge planning   Living Arrangements   People in Home alone   Type of Residence Private Residence   Is your private residence a single family home or apartment? Apartment   Number of Stairs, Within Home, Primary one   Stair Railings, Within Home, Primary none   Once home, are you able to live on one level? Yes   Bathroom Shower/Tub Tub/Shower unit   Equipment Currently Used at Home walker, rolling   Support System   Support Systems Family Members   Do you have someone available to stay with you one or two nights once you are home? No   Medical Clearance   Date of Physical 08/12/24   Clinic Name [facility]   It is recommended that you call and check with any specialty providers before surgery to see if you need surgical clearance.  Do you see any specialty providers outside of your primary care provider? Yes   Blood   Known Bleeding Disorder or Coagulopathy No   Does the patient have any Samaritan/cultural preferences related to blood products? No   Education   Has the patient scheduled or completed pre-op total joint education, either in class or online, in the last 12 months? No     Alix Watson RN on 8/19/2024 at 6:10 PM

## 2024-08-20 NOTE — PROGRESS NOTES
Ortho Navigator Note      Pre-op Date 8/12/24     Medical Clearance  CLEARED     Labs WNL     COVID Test Date No longer indicated      Skin  Intact      Activity: Ambulates independently with walker      Equipment Need Patient will NOT likely need a walker for discharge. Defer to PT/OT for recs.       Meds to Hold Held all supplements 14 days prior to surgery  - aspirin: Discontinue aspirin 7-10 days prior to procedure to reduce bleeding risk. It should be resumed postoperatively.      Additional Medication Instructions  Take all scheduled medications on the day of surgery EXCEPT for modifications listed below:   - ACE/ARB: DO NOT TAKE on day of surgery .-losartan   - Beta Blockers: Continue taking on the day of surgery.-atenolol   - Diuretics: Hold on the day of surgery .-lasix   - Statins: Continue taking on the day of surgery. -Lipitor   - Herbal medications and vitamins: DO NOT TAKE 14 days prior to surgery.   gabapentin: Continue without modification.  Stop aspirin 10 days before surgery  * Medication recommendations are not intended to be exhaustive; they are limited to common medications that are potentially dangerous if incorrectly managed   NPO Instructions  Instructed to stop solids 8 hr prior to arrival and clears 2 hr prior to arrival.       Pre-op Joint Education Complete? [employer] message sent with link to online education    Discharge Plan Patient has plan to discharge home on morning of POD 1.   Misael Reid will arrive at hospital at 0800 to participate in therapy and discharge education. They will then transport patient home    /Transportation UNKNOWN will be  and transportation.  is physically able to care for patient.      Barriers to Discharge No Caregivers after discharge      Additional Info/   Special Needs : Misael Reid able to bring patient to/from hospital but works full time and is unable to stay with patient. Patient encouraged to spend 1-2 nights at [facility]. -  message sent to Dr. Cifuentes due to inadequate support.   ADDENDUM: patient verbalized understanding TCU is not a likely option. She does have a couple of options - stay with niece, stay at hotel with niece for a couple days. Strongly encouraged one of these options but patient does not have a concrete plan at this time.              08/19/24 1804   Discharge Planning   Patient/Family Anticipates Transition to home   Barriers to Discharge inadequate family support;no caregiver available after discharge   Concerns to be Addressed basic needs;discharge planning   Living Arrangements   People in Home alone   Type of Residence Private Residence   Is your private residence a single family home or apartment? Apartment   Number of Stairs, Within Home, Primary one  (Living on 3rd floor, has elevator)   Stair Railings, Within Home, Primary none   Once home, are you able to live on one level? Yes   Bathroom Shower/Tub Tub/Shower unit   Equipment Currently Used at Home walker, rolling;raised toilet seat   Support System   Support Systems Family Members   Do you have someone available to stay with you one or two nights once you are home? No   Medical Clearance   Date of Physical 08/12/24   Clinic Name [facility]   It is recommended that you call and check with any specialty providers before surgery to see if you need surgical clearance.  Do you see any specialty providers outside of your primary care provider? Yes   Blood   Known Bleeding Disorder or Coagulopathy No   Does the patient have any Pentecostal/cultural preferences related to blood products? No   Education   Has the patient scheduled or completed pre-op total joint education, either in class or online, in the last 12 months? No   Patient attended total joint pre-op class/received pre-op teaching  online

## 2024-08-26 NOTE — PROGRESS NOTES
Clinic Care Coordination Contact    Patient left message for SW requesting return call.  Patient asked similar questions as on 8/15/2024.  SW reiterated information from 8/15/2024.  Patient reported having called her insurance and they require a 3 day hospital stay for TCU.  Patient cannot afford to pay privately, she reported also that her ortho stated she may not PT after surgery.  Patient stated her niece will transport to surgery and will discuss the process and aftercare with care team with patient.  She will transport after surgery but it is uncertain if he can stay with patient after.  Patient reported having updated her care team on above.      Patient stated nothing further was needed, she will discuss further needs with care team after surgery.      Maryse Stone, IQRA, MSW   [facility]  Care Coordination  [facility]  [phone]  8/26/2024 2:41 PM

## 2024-08-27 ENCOUNTER — ANESTHESIA EVENT (OUTPATIENT)
Dept: SURGERY | Facility: CLINIC | Age: 74
DRG: 470 | End: 2024-08-27
Payer: MEDICARE

## 2024-08-27 ASSESSMENT — LIFESTYLE VARIABLES: TOBACCO_USE: 1

## 2024-08-27 NOTE — ANESTHESIA PREPROCEDURE EVALUATION
Anesthesia Pre-Procedure Evaluation    Patient: Glenda Bales   MRN: [id number] : 1950        Procedure : Procedure(s):  ARTHROPLASTY, HIP, TOTAL, DIRECT ANTERIOR APPROACH          Past Medical History:   Diagnosis Date     Cataract      Diabetes (H) 2019     Essential hypertension, benign      Nonrheumatic aortic valve insufficiency      Osteoarthritis of knee, unspecified laterality, unspecified osteoarthritis type      Thyroid disease       Past Surgical History:   Procedure Laterality Date     CATARACT IOL, RT/LT Right 2018    Model ZCB00 SN:[id number] +11.5 Dioptor - Jermaine Akers MD ([facility])     CATARACT IOL, RT/LT Left 2018    Model ZCB00 SN:[id number] +12.5 Dioptor - Jermaine Akers MD ([facility])     DILATION AND CURETTAGE SUCTION      AB     DILATION AND CURETTAGE SUCTION      in 40's     NO HISTORY OF SURGERY        Allergies   Allergen Reactions     Diltiazem Hcl      Leg swelling     Norvasc [Amlodipine]      Leg swellin     Seasonal      Seasonal Allergies       Social History     Tobacco Use     Smoking status: Former     Current packs/day: 0.00     Types: Cigarettes     Quit date: 6/15/1977     Years since quittin.2     Passive exposure: Past     Smokeless tobacco: Never     Tobacco comments:     quit in    Substance Use Topics     Alcohol use: Not Currently     Comment: 1 red wine before bef      Wt Readings from Last 1 Encounters:   24 78 kg (172 lb)        Anesthesia Evaluation   Pt has had prior anesthetic. Type: MAC and General.    No history of anesthetic complications       ROS/MED HX  ENT/Pulmonary:     (+)                tobacco use, Past use,                       Neurologic:  - neg neurologic ROS     Cardiovascular:     (+) Dyslipidemia hypertension- -   -  - -                                 Previous cardiac testing   Echo: Date: 24 Results:  Global and regional left ventricular function is normal with an EF of 55-60%.  Diastolic function is  Supplemental O2 via nasal canula; titrate O2 saturation to >92%.   Start steroids   Pulmonary consultation.   Continue beta 2 agonist bronchodilator treatments.       Continue routine medications as before.             indeterminate.  Global right ventricular function is normal. The right ventricle is normal  size.  No significant valvular abnormalities present.  The estimated PA systolic pressure is 31 mmHg. The RA pressure is 3 mmHg.  No pericardial effusion is present.  This study was compared with the study from 12/06/2021. No significant changes  noted.  ______________________________________________________________________________  Left Ventricle  Global and regional left ventricular function is normal with an EF of 55-60%.  Left ventricular wall thickness is normal. Left ventricular size is normal.  Left ventricular diastolic function is indeterminate.     Right Ventricle  Global right ventricular function is normal. The right ventricle is normal  size.     Atria  Both atria appear normal.     Mitral Valve  Mild mitral annular calcification is present. Trace mitral insufficiency is  present.     Aortic Valve  Mild aortic valve calcification is present. The AV morphology is not clearly  visualized on this study, but is tricuspid on the 2021 study. Trace aortic  insufficiency is present.     Tricuspid Valve  Mild tricuspid insufficiency is present. The right ventricular systolic  pressure is approximated at 28.4 mmHg plus the right atrial pressure.     Pulmonic Valve  The valve leaflets are not well visualized. Trace pulmonic insufficiency is  present.     Vessels  The aorta root is normal. The thoracic aorta is normal. IVC diameter <2.1 cm  collapsing >50% with sniff suggests a normal RA pressure of 3 mmHg.     Pericardium  No pericardial effusion is present.     Miscellaneous  No significant valvular abnormalities present.     Compared to Previous Study  This study was compared with the study from 12/06/2021 . No significant  changes noted.    Stress Test:  Date: Results:    ECG Reviewed:  Date: 8/12/24 Results:  sinus bradycardia, normal axis, normal intervals, no acute ST/T changes c/w ischemia, no LVH by voltage  "criteria, unchanged from previous tracings  Cath:  Date: Results:      METS/Exercise Tolerance: >4 METS    Hematologic:  - neg hematologic  ROS     Musculoskeletal: Comment: Bilateral hip pain  (+)  arthritis,             GI/Hepatic:     (+) GERD, Asymptomatic on medication,           liver disease (fatty liver),       Renal/Genitourinary:     (+) renal disease, type: CRI,            Endo:     (+)  type II DM, Last HgA1c: 5.7, date: 8/12/24, Not using insulin, - not using insulin pump. Normal glucose range: 111 pre-op,   thyroid problem, hypothyroidism,    Obesity,       Psychiatric/Substance Use:     (+) psychiatric history anxiety alcohol abuse      Infectious Disease:  - neg infectious disease ROS     Malignancy:  - neg malignancy ROS     Other:  - neg other ROS          Physical Exam    Airway  airway exam normal      Mallampati: II   TM distance: > 3 FB   Neck ROM: limited   Mouth opening: > 3 cm    Respiratory Devices and Support         Dental  no notable dental history   Comment: [place]        Cardiovascular   cardiovascular exam normal       Rhythm and rate: regular and normal     Pulmonary   pulmonary exam normal        breath sounds clear to auscultation         OUTSIDE LABS:  CBC:   Lab Results   Component Value Date    WBC 4.3 01/31/2024    WBC 6.5 09/14/2021    HGB 13.6 08/12/2024    HGB 12.9 01/31/2024    HCT 38.4 01/31/2024    HCT 42.0 09/14/2021     01/31/2024     09/14/2021     BMP:   Lab Results   Component Value Date     04/01/2024     02/27/2024    POTASSIUM 4.0 08/12/2024    POTASSIUM 3.9 04/01/2024    CHLORIDE 100 04/01/2024    CHLORIDE 97 (L) 02/27/2024    CO2 25 04/01/2024    CO2 25 02/27/2024    BUN 28.6 (H) 04/01/2024    BUN 37.0 (H) 02/27/2024    CR 1.43 (H) 04/01/2024    CR 1.59 (H) 02/27/2024     (H) 08/12/2024     (H) 04/01/2024     COAGS: No results found for: \"PTT\", \"INR\", \"FIBR\"  POC: No results found for: \"BGM\", \"HCG\", " "\"HCGS\"  HEPATIC:   Lab Results   Component Value Date    ALBUMIN 3.9 04/01/2024    PROTTOTAL 7.3 04/01/2024    ALT 45 04/01/2024    AST 42 04/01/2024     (H) 02/27/2024    ALKPHOS 96 04/01/2024    BILITOTAL 0.5 04/01/2024     OTHER:   Lab Results   Component Value Date    A1C 5.7 (H) 08/12/2024    SUZAN 9.8 04/01/2024    PHOS 3.5 12/01/2021    MAG 1.8 02/27/2024    TSH 4.36 (H) 01/31/2024    T4 1.51 01/31/2024       Anesthesia Plan    ASA Status:  3    NPO Status:  NPO Appropriate    Anesthesia Type: General.     - Airway: ETT   Induction: Intravenous, Propofol.   Maintenance: Balanced.        Consents    Anesthesia Plan(s) and associated risks, benefits, and realistic alternatives discussed. Questions answered and patient/representative(s) expressed understanding.     - Discussed:     - Discussed with:  Patient      - Extended Intubation/Ventilatory Support Discussed: No.      - Patient is DNR/DNI Status: No     Use of blood products discussed: Yes.     - Discussed with: Patient.     - Consented: consented to blood products            Reason for refusal: other.     Postoperative Care    Pain management: IV analgesics, Multi-modal analgesia, Peripheral nerve block (Single Shot), Oral pain medications.     - Plan for long acting post-op opioid use   PONV prophylaxis: Ondansetron (or other 5HT-3), Background Propofol Infusion     Comments:    Other Comments: The risks and benefits of anesthesia, and the alternatives where applicable, have been discussed with the patient, and they wish to proceed.             EDILBERTO Schneider CRNA    I have reviewed the pertinent notes and labs in the chart from the past 30 days and (re)examined the patient.  Any updates or changes from those notes are reflected in this note.              # Obesity: Estimated body mass index is 30.71 kg/m  as calculated from the following:    Height as of 8/12/24: 1.594 m (5' 2.76\").    Weight as of 8/12/24: 78 kg (172 lb).      "

## 2024-08-28 ENCOUNTER — APPOINTMENT (OUTPATIENT)
Dept: GENERAL RADIOLOGY | Facility: CLINIC | Age: 74
DRG: 470 | End: 2024-08-28
Attending: ORTHOPAEDIC SURGERY
Payer: MEDICARE

## 2024-08-28 ENCOUNTER — APPOINTMENT (OUTPATIENT)
Dept: PHYSICAL THERAPY | Facility: CLINIC | Age: 74
DRG: 470 | End: 2024-08-28
Attending: ORTHOPAEDIC SURGERY
Payer: MEDICARE

## 2024-08-28 ENCOUNTER — HOSPITAL ENCOUNTER (INPATIENT)
Facility: CLINIC | Age: 74
LOS: 1 days | Discharge: NURSING, REHAB OR LONG-TERM CARE | DRG: 470 | End: 2024-08-30
Attending: ORTHOPAEDIC SURGERY | Admitting: ORTHOPAEDIC SURGERY
Payer: MEDICARE

## 2024-08-28 ENCOUNTER — MEDICAL CORRESPONDENCE (OUTPATIENT)
Dept: HEALTH INFORMATION MANAGEMENT | Facility: CLINIC | Age: 74
End: 2024-08-28

## 2024-08-28 ENCOUNTER — ANESTHESIA (OUTPATIENT)
Dept: SURGERY | Facility: CLINIC | Age: 74
DRG: 470 | End: 2024-08-28
Payer: MEDICARE

## 2024-08-28 DIAGNOSIS — I12.9 BENIGN HYPERTENSION WITH CKD (CHRONIC KIDNEY DISEASE) STAGE III (H): ICD-10-CM

## 2024-08-28 DIAGNOSIS — Z96.641 HISTORY OF TOTAL RIGHT HIP REPLACEMENT: Primary | ICD-10-CM

## 2024-08-28 DIAGNOSIS — N18.30 BENIGN HYPERTENSION WITH CKD (CHRONIC KIDNEY DISEASE) STAGE III (H): ICD-10-CM

## 2024-08-28 DIAGNOSIS — M16.11 PRIMARY OSTEOARTHRITIS OF RIGHT HIP: ICD-10-CM

## 2024-08-28 PROBLEM — Z96.649 S/P TOTAL HIP ARTHROPLASTY: Status: ACTIVE | Noted: 2024-08-28

## 2024-08-28 LAB
GLUCOSE BLDC GLUCOMTR-MCNC: 111 MG/DL (ref 70–99)
GLUCOSE BLDC GLUCOMTR-MCNC: 161 MG/DL (ref 70–99)

## 2024-08-28 PROCEDURE — 250N000025 HC SEVOFLURANE, PER MIN: Performed by: ORTHOPAEDIC SURGERY

## 2024-08-28 PROCEDURE — 710N000010 HC RECOVERY PHASE 1, LEVEL 2, PER MIN: Performed by: ORTHOPAEDIC SURGERY

## 2024-08-28 PROCEDURE — 999N000141 HC STATISTIC PRE-PROCEDURE NURSING ASSESSMENT: Performed by: ORTHOPAEDIC SURGERY

## 2024-08-28 PROCEDURE — 0QH604Z INSERTION OF INTERNAL FIXATION DEVICE INTO RIGHT UPPER FEMUR, OPEN APPROACH: ICD-10-PCS | Performed by: ORTHOPAEDIC SURGERY

## 2024-08-28 PROCEDURE — 258N000003 HC RX IP 258 OP 636: Performed by: NURSE ANESTHETIST, CERTIFIED REGISTERED

## 2024-08-28 PROCEDURE — 370N000017 HC ANESTHESIA TECHNICAL FEE, PER MIN: Performed by: ORTHOPAEDIC SURGERY

## 2024-08-28 PROCEDURE — 0SR902A REPLACEMENT OF RIGHT HIP JOINT WITH METAL ON POLYETHYLENE SYNTHETIC SUBSTITUTE, UNCEMENTED, OPEN APPROACH: ICD-10-PCS | Performed by: ORTHOPAEDIC SURGERY

## 2024-08-28 PROCEDURE — 250N000009 HC RX 250: Performed by: NURSE ANESTHETIST, CERTIFIED REGISTERED

## 2024-08-28 PROCEDURE — 27130 TOTAL HIP ARTHROPLASTY: CPT | Mod: AS | Performed by: PHYSICIAN ASSISTANT

## 2024-08-28 PROCEDURE — 82962 GLUCOSE BLOOD TEST: CPT

## 2024-08-28 PROCEDURE — 97161 PT EVAL LOW COMPLEX 20 MIN: CPT | Mod: GP | Performed by: PHYSICAL THERAPIST

## 2024-08-28 PROCEDURE — 250N000011 HC RX IP 250 OP 636: Performed by: ORTHOPAEDIC SURGERY

## 2024-08-28 PROCEDURE — 258N000001 HC RX 258: Performed by: ORTHOPAEDIC SURGERY

## 2024-08-28 PROCEDURE — 272N000001 HC OR GENERAL SUPPLY STERILE: Performed by: ORTHOPAEDIC SURGERY

## 2024-08-28 PROCEDURE — 250N000013 HC RX MED GY IP 250 OP 250 PS 637: Performed by: ORTHOPAEDIC SURGERY

## 2024-08-28 PROCEDURE — 258N000003 HC RX IP 258 OP 636: Performed by: ORTHOPAEDIC SURGERY

## 2024-08-28 PROCEDURE — 250N000011 HC RX IP 250 OP 636: Performed by: NURSE ANESTHETIST, CERTIFIED REGISTERED

## 2024-08-28 PROCEDURE — 250N000009 HC RX 250: Performed by: ORTHOPAEDIC SURGERY

## 2024-08-28 PROCEDURE — C1776 JOINT DEVICE (IMPLANTABLE): HCPCS | Performed by: ORTHOPAEDIC SURGERY

## 2024-08-28 PROCEDURE — 999N000179 XR SURGERY CARM FLUORO LESS THAN 5 MIN W STILLS: Mod: TC

## 2024-08-28 PROCEDURE — 999N000063 XR PELVIS PORT 1/2 VIEWS

## 2024-08-28 PROCEDURE — 27130 TOTAL HIP ARTHROPLASTY: CPT | Mod: RT | Performed by: ORTHOPAEDIC SURGERY

## 2024-08-28 PROCEDURE — C1713 ANCHOR/SCREW BN/BN,TIS/BN: HCPCS | Performed by: ORTHOPAEDIC SURGERY

## 2024-08-28 PROCEDURE — 360N000084 HC SURGERY LEVEL 4 W/ FLUORO, PER MIN: Performed by: ORTHOPAEDIC SURGERY

## 2024-08-28 DEVICE — IMPLANTABLE DEVICE: Type: IMPLANTABLE DEVICE | Site: HIP | Status: FUNCTIONAL

## 2024-08-28 DEVICE — IMP SHELL BIOM G7 ACETAB PPS LIM HOLE 54MM SZ F 010000664: Type: IMPLANTABLE DEVICE | Site: HIP | Status: FUNCTIONAL

## 2024-08-28 DEVICE — IMP SCR ZIM 6.5X40MM ACET CUP SELF TAP 00-6250-065-40: Type: IMPLANTABLE DEVICE | Site: HIP | Status: FUNCTIONAL

## 2024-08-28 DEVICE — IMP HEAD FEMORAL BIOM MOD 36MM -3  11-363661: Type: IMPLANTABLE DEVICE | Site: HIP | Status: FUNCTIONAL

## 2024-08-28 RX ORDER — ACETAMINOPHEN 325 MG/1
975 TABLET ORAL EVERY 8 HOURS
Status: DISCONTINUED | OUTPATIENT
Start: 2024-08-28 | End: 2024-08-30 | Stop reason: HOSPADM

## 2024-08-28 RX ORDER — HYDROXYZINE HYDROCHLORIDE 10 MG/1
10 TABLET, FILM COATED ORAL EVERY 6 HOURS PRN
Status: DISCONTINUED | OUTPATIENT
Start: 2024-08-28 | End: 2024-08-28 | Stop reason: HOSPADM

## 2024-08-28 RX ORDER — PROPOFOL 10 MG/ML
INJECTION, EMULSION INTRAVENOUS PRN
Status: DISCONTINUED | OUTPATIENT
Start: 2024-08-28 | End: 2024-08-28

## 2024-08-28 RX ORDER — FUROSEMIDE 20 MG
20 TABLET ORAL DAILY
Status: DISCONTINUED | OUTPATIENT
Start: 2024-08-28 | End: 2024-08-30 | Stop reason: HOSPADM

## 2024-08-28 RX ORDER — ACETAMINOPHEN 325 MG/1
650 TABLET ORAL EVERY 4 HOURS PRN
Status: DISCONTINUED | OUTPATIENT
Start: 2024-08-31 | End: 2024-08-30 | Stop reason: HOSPADM

## 2024-08-28 RX ORDER — ONDANSETRON 4 MG/1
4 TABLET, ORALLY DISINTEGRATING ORAL EVERY 6 HOURS PRN
Status: DISCONTINUED | OUTPATIENT
Start: 2024-08-28 | End: 2024-08-30 | Stop reason: HOSPADM

## 2024-08-28 RX ORDER — DIMENHYDRINATE 50 MG/ML
INJECTION, SOLUTION INTRAMUSCULAR; INTRAVENOUS PRN
Status: DISCONTINUED | OUTPATIENT
Start: 2024-08-28 | End: 2024-08-28

## 2024-08-28 RX ORDER — FENTANYL CITRATE 50 UG/ML
INJECTION, SOLUTION INTRAMUSCULAR; INTRAVENOUS PRN
Status: DISCONTINUED | OUTPATIENT
Start: 2024-08-28 | End: 2024-08-28

## 2024-08-28 RX ORDER — ATORVASTATIN CALCIUM 10 MG/1
10 TABLET, FILM COATED ORAL DAILY
Status: DISCONTINUED | OUTPATIENT
Start: 2024-08-28 | End: 2024-08-28

## 2024-08-28 RX ORDER — LIDOCAINE 40 MG/G
CREAM TOPICAL
Status: DISCONTINUED | OUTPATIENT
Start: 2024-08-28 | End: 2024-08-30 | Stop reason: HOSPADM

## 2024-08-28 RX ORDER — ONDANSETRON 2 MG/ML
INJECTION INTRAMUSCULAR; INTRAVENOUS PRN
Status: DISCONTINUED | OUTPATIENT
Start: 2024-08-28 | End: 2024-08-28

## 2024-08-28 RX ORDER — HYDROMORPHONE HCL IN WATER/PF 6 MG/30 ML
0.2 PATIENT CONTROLLED ANALGESIA SYRINGE INTRAVENOUS
Status: DISCONTINUED | OUTPATIENT
Start: 2024-08-28 | End: 2024-08-30 | Stop reason: HOSPADM

## 2024-08-28 RX ORDER — CEFAZOLIN SODIUM 1 G/3ML
1 INJECTION, POWDER, FOR SOLUTION INTRAMUSCULAR; INTRAVENOUS EVERY 8 HOURS
Status: COMPLETED | OUTPATIENT
Start: 2024-08-28 | End: 2024-08-29

## 2024-08-28 RX ORDER — AMOXICILLIN 250 MG
1 CAPSULE ORAL 2 TIMES DAILY
Status: DISCONTINUED | OUTPATIENT
Start: 2024-08-28 | End: 2024-08-30 | Stop reason: HOSPADM

## 2024-08-28 RX ORDER — HYDROMORPHONE HCL IN WATER/PF 6 MG/30 ML
0.4 PATIENT CONTROLLED ANALGESIA SYRINGE INTRAVENOUS
Status: DISCONTINUED | OUTPATIENT
Start: 2024-08-28 | End: 2024-08-30 | Stop reason: HOSPADM

## 2024-08-28 RX ORDER — HYDROMORPHONE HYDROCHLORIDE 1 MG/ML
0.4 INJECTION, SOLUTION INTRAMUSCULAR; INTRAVENOUS; SUBCUTANEOUS EVERY 5 MIN PRN
Status: DISCONTINUED | OUTPATIENT
Start: 2024-08-28 | End: 2024-08-28 | Stop reason: HOSPADM

## 2024-08-28 RX ORDER — OXYCODONE HYDROCHLORIDE 5 MG/1
5-10 TABLET ORAL EVERY 4 HOURS PRN
Qty: 26 TABLET | Refills: 0 | Status: SHIPPED | OUTPATIENT
Start: 2024-08-28 | End: 2024-08-30

## 2024-08-28 RX ORDER — ACETAMINOPHEN 325 MG/1
975 TABLET ORAL ONCE
Status: COMPLETED | OUTPATIENT
Start: 2024-08-28 | End: 2024-08-28

## 2024-08-28 RX ORDER — PANTOPRAZOLE SODIUM 40 MG/1
40 TABLET, DELAYED RELEASE ORAL DAILY
Status: DISCONTINUED | OUTPATIENT
Start: 2024-08-28 | End: 2024-08-30 | Stop reason: HOSPADM

## 2024-08-28 RX ORDER — GLYCOPYRROLATE 0.2 MG/ML
INJECTION, SOLUTION INTRAMUSCULAR; INTRAVENOUS PRN
Status: DISCONTINUED | OUTPATIENT
Start: 2024-08-28 | End: 2024-08-28

## 2024-08-28 RX ORDER — CEFAZOLIN SODIUM/WATER 2 G/20 ML
2 SYRINGE (ML) INTRAVENOUS SEE ADMIN INSTRUCTIONS
Status: DISCONTINUED | OUTPATIENT
Start: 2024-08-28 | End: 2024-08-28 | Stop reason: HOSPADM

## 2024-08-28 RX ORDER — MEPERIDINE HYDROCHLORIDE 25 MG/ML
12.5 INJECTION INTRAMUSCULAR; INTRAVENOUS; SUBCUTANEOUS EVERY 5 MIN PRN
Status: DISCONTINUED | OUTPATIENT
Start: 2024-08-28 | End: 2024-08-28 | Stop reason: HOSPADM

## 2024-08-28 RX ORDER — LIDOCAINE 40 MG/G
CREAM TOPICAL
Status: DISCONTINUED | OUTPATIENT
Start: 2024-08-28 | End: 2024-08-28 | Stop reason: HOSPADM

## 2024-08-28 RX ORDER — ASPIRIN 325 MG
325 TABLET, DELAYED RELEASE (ENTERIC COATED) ORAL 2 TIMES DAILY WITH MEALS
Status: DISCONTINUED | OUTPATIENT
Start: 2024-08-28 | End: 2024-08-30 | Stop reason: HOSPADM

## 2024-08-28 RX ORDER — TRANEXAMIC ACID 650 MG/1
1950 TABLET ORAL ONCE
Status: COMPLETED | OUTPATIENT
Start: 2024-08-28 | End: 2024-08-28

## 2024-08-28 RX ORDER — ONDANSETRON 4 MG/1
4 TABLET, ORALLY DISINTEGRATING ORAL EVERY 30 MIN PRN
Status: DISCONTINUED | OUTPATIENT
Start: 2024-08-28 | End: 2024-08-28 | Stop reason: HOSPADM

## 2024-08-28 RX ORDER — SODIUM CHLORIDE, SODIUM LACTATE, POTASSIUM CHLORIDE, CALCIUM CHLORIDE 600; 310; 30; 20 MG/100ML; MG/100ML; MG/100ML; MG/100ML
INJECTION, SOLUTION INTRAVENOUS CONTINUOUS
Status: DISCONTINUED | OUTPATIENT
Start: 2024-08-28 | End: 2024-08-30 | Stop reason: HOSPADM

## 2024-08-28 RX ORDER — FENTANYL CITRATE 50 UG/ML
50 INJECTION, SOLUTION INTRAMUSCULAR; INTRAVENOUS EVERY 5 MIN PRN
Status: DISCONTINUED | OUTPATIENT
Start: 2024-08-28 | End: 2024-08-28 | Stop reason: HOSPADM

## 2024-08-28 RX ORDER — METOPROLOL TARTRATE 1 MG/ML
1-2 INJECTION, SOLUTION INTRAVENOUS EVERY 5 MIN PRN
Status: DISCONTINUED | OUTPATIENT
Start: 2024-08-28 | End: 2024-08-28 | Stop reason: HOSPADM

## 2024-08-28 RX ORDER — DEXAMETHASONE SODIUM PHOSPHATE 10 MG/ML
4 INJECTION, SOLUTION INTRAMUSCULAR; INTRAVENOUS
Status: DISCONTINUED | OUTPATIENT
Start: 2024-08-28 | End: 2024-08-28 | Stop reason: HOSPADM

## 2024-08-28 RX ORDER — ATENOLOL 50 MG/1
50 TABLET ORAL 2 TIMES DAILY
Status: DISCONTINUED | OUTPATIENT
Start: 2024-08-28 | End: 2024-08-30 | Stop reason: HOSPADM

## 2024-08-28 RX ORDER — VANCOMYCIN HYDROCHLORIDE 1 G/20ML
INJECTION, POWDER, LYOPHILIZED, FOR SOLUTION INTRAVENOUS PRN
Status: DISCONTINUED | OUTPATIENT
Start: 2024-08-28 | End: 2024-08-28 | Stop reason: HOSPADM

## 2024-08-28 RX ORDER — OXYCODONE HYDROCHLORIDE 5 MG/1
5 TABLET ORAL EVERY 4 HOURS PRN
Status: DISCONTINUED | OUTPATIENT
Start: 2024-08-28 | End: 2024-08-30 | Stop reason: HOSPADM

## 2024-08-28 RX ORDER — GABAPENTIN 100 MG/1
100 CAPSULE ORAL 2 TIMES DAILY
Status: DISCONTINUED | OUTPATIENT
Start: 2024-08-28 | End: 2024-08-30 | Stop reason: HOSPADM

## 2024-08-28 RX ORDER — CEFAZOLIN SODIUM/WATER 2 G/20 ML
2 SYRINGE (ML) INTRAVENOUS
Status: COMPLETED | OUTPATIENT
Start: 2024-08-28 | End: 2024-08-28

## 2024-08-28 RX ORDER — ALBUTEROL SULFATE 0.83 MG/ML
2.5 SOLUTION RESPIRATORY (INHALATION) EVERY 4 HOURS PRN
Status: DISCONTINUED | OUTPATIENT
Start: 2024-08-28 | End: 2024-08-28 | Stop reason: HOSPADM

## 2024-08-28 RX ORDER — LIDOCAINE HYDROCHLORIDE 20 MG/ML
INJECTION, SOLUTION INFILTRATION; PERINEURAL PRN
Status: DISCONTINUED | OUTPATIENT
Start: 2024-08-28 | End: 2024-08-28

## 2024-08-28 RX ORDER — AMOXICILLIN 250 MG
1-2 CAPSULE ORAL 2 TIMES DAILY
Qty: 20 TABLET | Refills: 0 | Status: SHIPPED | OUTPATIENT
Start: 2024-08-28 | End: 2024-08-30

## 2024-08-28 RX ORDER — OXYCODONE HYDROCHLORIDE 5 MG/1
10 TABLET ORAL EVERY 4 HOURS PRN
Status: DISCONTINUED | OUTPATIENT
Start: 2024-08-28 | End: 2024-08-30 | Stop reason: HOSPADM

## 2024-08-28 RX ORDER — HYDRALAZINE HYDROCHLORIDE 20 MG/ML
2.5-5 INJECTION INTRAMUSCULAR; INTRAVENOUS EVERY 10 MIN PRN
Status: DISCONTINUED | OUTPATIENT
Start: 2024-08-28 | End: 2024-08-28 | Stop reason: HOSPADM

## 2024-08-28 RX ORDER — ACETAMINOPHEN 325 MG/1
975 TABLET ORAL EVERY 8 HOURS PRN
Qty: 100 TABLET | Refills: 0 | Status: SHIPPED | OUTPATIENT
Start: 2024-08-28 | End: 2024-08-30

## 2024-08-28 RX ORDER — ONDANSETRON 2 MG/ML
4 INJECTION INTRAMUSCULAR; INTRAVENOUS EVERY 30 MIN PRN
Status: DISCONTINUED | OUTPATIENT
Start: 2024-08-28 | End: 2024-08-28 | Stop reason: HOSPADM

## 2024-08-28 RX ORDER — NALOXONE HYDROCHLORIDE 0.4 MG/ML
0.1 INJECTION, SOLUTION INTRAMUSCULAR; INTRAVENOUS; SUBCUTANEOUS
Status: DISCONTINUED | OUTPATIENT
Start: 2024-08-28 | End: 2024-08-28 | Stop reason: HOSPADM

## 2024-08-28 RX ORDER — LEVOTHYROXINE SODIUM 112 UG/1
112 TABLET ORAL DAILY
Status: DISCONTINUED | OUTPATIENT
Start: 2024-08-29 | End: 2024-08-30 | Stop reason: HOSPADM

## 2024-08-28 RX ORDER — POLYETHYLENE GLYCOL 3350 17 G/17G
17 POWDER, FOR SOLUTION ORAL DAILY
Status: DISCONTINUED | OUTPATIENT
Start: 2024-08-29 | End: 2024-08-30 | Stop reason: HOSPADM

## 2024-08-28 RX ORDER — LOSARTAN POTASSIUM 50 MG/1
50 TABLET ORAL DAILY
Status: DISCONTINUED | OUTPATIENT
Start: 2024-08-28 | End: 2024-08-30 | Stop reason: HOSPADM

## 2024-08-28 RX ORDER — CETIRIZINE HYDROCHLORIDE 10 MG/1
10 TABLET ORAL DAILY PRN
COMMUNITY

## 2024-08-28 RX ORDER — CELECOXIB 100 MG/1
400 CAPSULE ORAL ONCE
Status: COMPLETED | OUTPATIENT
Start: 2024-08-28 | End: 2024-08-28

## 2024-08-28 RX ORDER — ONDANSETRON 2 MG/ML
4 INJECTION INTRAMUSCULAR; INTRAVENOUS EVERY 6 HOURS PRN
Status: DISCONTINUED | OUTPATIENT
Start: 2024-08-28 | End: 2024-08-30 | Stop reason: HOSPADM

## 2024-08-28 RX ORDER — SODIUM CHLORIDE, SODIUM LACTATE, POTASSIUM CHLORIDE, CALCIUM CHLORIDE 600; 310; 30; 20 MG/100ML; MG/100ML; MG/100ML; MG/100ML
INJECTION, SOLUTION INTRAVENOUS CONTINUOUS
Status: DISCONTINUED | OUTPATIENT
Start: 2024-08-28 | End: 2024-08-28 | Stop reason: HOSPADM

## 2024-08-28 RX ORDER — ASPIRIN 325 MG
325 TABLET, DELAYED RELEASE (ENTERIC COATED) ORAL 2 TIMES DAILY
Qty: 80 TABLET | Refills: 0 | Status: SHIPPED | OUTPATIENT
Start: 2024-08-28 | End: 2024-08-30

## 2024-08-28 RX ORDER — BISACODYL 10 MG
10 SUPPOSITORY, RECTAL RECTAL DAILY PRN
Status: DISCONTINUED | OUTPATIENT
Start: 2024-08-28 | End: 2024-08-30 | Stop reason: HOSPADM

## 2024-08-28 RX ORDER — OXYCODONE HCL 10 MG/1
10 TABLET, FILM COATED, EXTENDED RELEASE ORAL EVERY 8 HOURS
Status: COMPLETED | OUTPATIENT
Start: 2024-08-28 | End: 2024-08-28

## 2024-08-28 RX ORDER — PROPOFOL 10 MG/ML
INJECTION, EMULSION INTRAVENOUS CONTINUOUS PRN
Status: DISCONTINUED | OUTPATIENT
Start: 2024-08-28 | End: 2024-08-28

## 2024-08-28 RX ORDER — HYDROXYZINE HYDROCHLORIDE 10 MG/1
10 TABLET, FILM COATED ORAL EVERY 6 HOURS PRN
Status: DISCONTINUED | OUTPATIENT
Start: 2024-08-28 | End: 2024-08-30 | Stop reason: HOSPADM

## 2024-08-28 RX ORDER — PROCHLORPERAZINE MALEATE 5 MG
5 TABLET ORAL EVERY 6 HOURS PRN
Status: DISCONTINUED | OUTPATIENT
Start: 2024-08-28 | End: 2024-08-30 | Stop reason: HOSPADM

## 2024-08-28 RX ADMIN — ASPIRIN 325 MG: 325 TABLET, COATED ORAL at 18:23

## 2024-08-28 RX ADMIN — Medication 2 G: at 07:28

## 2024-08-28 RX ADMIN — LIDOCAINE HYDROCHLORIDE 50 MG: 20 INJECTION, SOLUTION INFILTRATION; PERINEURAL at 07:36

## 2024-08-28 RX ADMIN — SODIUM CHLORIDE, POTASSIUM CHLORIDE, SODIUM LACTATE AND CALCIUM CHLORIDE: 600; 310; 30; 20 INJECTION, SOLUTION INTRAVENOUS at 14:53

## 2024-08-28 RX ADMIN — PROPOFOL 100 MCG/KG/MIN: 10 INJECTION, EMULSION INTRAVENOUS at 07:40

## 2024-08-28 RX ADMIN — MIDAZOLAM 1 MG: 1 INJECTION INTRAMUSCULAR; INTRAVENOUS at 07:28

## 2024-08-28 RX ADMIN — ACETAMINOPHEN 975 MG: 325 TABLET ORAL at 14:52

## 2024-08-28 RX ADMIN — DIMENHYDRINATE 25 MG: 50 INJECTION, SOLUTION INTRAMUSCULAR; INTRAVENOUS at 07:44

## 2024-08-28 RX ADMIN — OXYCODONE HYDROCHLORIDE 10 MG: 10 TABLET, FILM COATED, EXTENDED RELEASE ORAL at 06:24

## 2024-08-28 RX ADMIN — PROPOFOL 160 MG: 10 INJECTION, EMULSION INTRAVENOUS at 07:37

## 2024-08-28 RX ADMIN — FENTANYL CITRATE 50 MCG: 50 INJECTION INTRAMUSCULAR; INTRAVENOUS at 08:34

## 2024-08-28 RX ADMIN — SODIUM CHLORIDE, POTASSIUM CHLORIDE, SODIUM LACTATE AND CALCIUM CHLORIDE 10 ML/HR: 600; 310; 30; 20 INJECTION, SOLUTION INTRAVENOUS at 07:13

## 2024-08-28 RX ADMIN — ACETAMINOPHEN 975 MG: 325 TABLET ORAL at 06:24

## 2024-08-28 RX ADMIN — SENNOSIDES AND DOCUSATE SODIUM 1 TABLET: 8.6; 5 TABLET ORAL at 21:26

## 2024-08-28 RX ADMIN — GLYCOPYRROLATE 0.2 MG: 0.2 INJECTION, SOLUTION INTRAMUSCULAR; INTRAVENOUS at 10:24

## 2024-08-28 RX ADMIN — HYDROMORPHONE HYDROCHLORIDE 0.5 MG: 1 INJECTION, SOLUTION INTRAMUSCULAR; INTRAVENOUS; SUBCUTANEOUS at 08:23

## 2024-08-28 RX ADMIN — FENTANYL CITRATE 50 MCG: 50 INJECTION INTRAMUSCULAR; INTRAVENOUS at 08:47

## 2024-08-28 RX ADMIN — ONDANSETRON 4 MG: 2 INJECTION INTRAMUSCULAR; INTRAVENOUS at 10:15

## 2024-08-28 RX ADMIN — SODIUM CHLORIDE, POTASSIUM CHLORIDE, SODIUM LACTATE AND CALCIUM CHLORIDE: 600; 310; 30; 20 INJECTION, SOLUTION INTRAVENOUS at 10:25

## 2024-08-28 RX ADMIN — CEFAZOLIN 1 G: 1 INJECTION, POWDER, FOR SOLUTION INTRAMUSCULAR; INTRAVENOUS at 18:23

## 2024-08-28 RX ADMIN — ACETAMINOPHEN 975 MG: 325 TABLET ORAL at 21:26

## 2024-08-28 RX ADMIN — CELECOXIB 400 MG: 100 CAPSULE ORAL at 06:24

## 2024-08-28 RX ADMIN — ROCURONIUM BROMIDE 50 MG: 50 INJECTION, SOLUTION INTRAVENOUS at 07:37

## 2024-08-28 RX ADMIN — GABAPENTIN 100 MG: 100 CAPSULE ORAL at 21:26

## 2024-08-28 RX ADMIN — HYDROMORPHONE HYDROCHLORIDE 0.5 MG: 1 INJECTION, SOLUTION INTRAMUSCULAR; INTRAVENOUS; SUBCUTANEOUS at 08:51

## 2024-08-28 RX ADMIN — FENTANYL CITRATE 50 MCG: 50 INJECTION INTRAMUSCULAR; INTRAVENOUS at 07:28

## 2024-08-28 RX ADMIN — TRANEXAMIC ACID 1950 MG: 650 TABLET ORAL at 06:25

## 2024-08-28 ASSESSMENT — ACTIVITIES OF DAILY LIVING (ADL)
ADLS_ACUITY_SCORE: 30
ADLS_ACUITY_SCORE: 28
ADLS_ACUITY_SCORE: 28
ADLS_ACUITY_SCORE: 30
ADLS_ACUITY_SCORE: 24
ADLS_ACUITY_SCORE: 30
ADLS_ACUITY_SCORE: 30
ADLS_ACUITY_SCORE: 28
ADLS_ACUITY_SCORE: 24
ADLS_ACUITY_SCORE: 28
ADLS_ACUITY_SCORE: 24
ADLS_ACUITY_SCORE: 22
ADLS_ACUITY_SCORE: 24
ADLS_ACUITY_SCORE: 20
ADLS_ACUITY_SCORE: 28
ADLS_ACUITY_SCORE: 24
ADLS_ACUITY_SCORE: 28
ADLS_ACUITY_SCORE: 24

## 2024-08-28 NOTE — MEDICATION SCRIBE - ADMISSION MEDICATION HISTORY
Medication Scribe Admission Medication History    Admission medication history is complete. The information provided in this note is only as accurate as the sources available at the time of the update.    Information Source(s): Patient and CareEverywhere/SureScripts via in-person    Pertinent Information: inpatient post op    Changes made to PTA medication list:  Added: zyrtec  Deleted: albuterol inh, aspirin 81mg, atorvastatin  Changed: None    Allergies reviewed with patient and updates made in EHR: yes    Medication History Completed By: LUCERO GILBERT 8/28/2024 4:39 PM    PTA Med List   Medication Sig Last Dose    ACE/ARB/ARNI NOT PRESCRIBED (INTENTIONAL) Please choose reason not prescribed from choices below.  at n/a    acetaminophen (TYLENOL) 325 MG tablet Take 3 tablets (975 mg) by mouth every 8 hours as needed for other (mild pain).     acetaminophen (TYLENOL) 325 MG tablet Take 650 mg by mouth every 6 hours as needed for mild pain Past Week at unkn    alcohol swab prep pads Use to swab area of injection/milena as directed. Past Week at am    aspirin (ASA) 325 MG EC tablet Take 1 tablet (325 mg) by mouth 2 times daily.     atenolol (TENORMIN) 50 MG tablet Take 1 tablet (50 mg) by mouth 2 times daily (Patient taking differently: Take 50 mg by mouth 2 times daily. Am and 1800) 8/28/2024 at am    blood glucose (NO BRAND SPECIFIED) test strip Use to test blood sugar 1 times daily or as directed. Past Week at am    cetirizine (ZYRTEC) 10 MG tablet Take 10 mg by mouth daily as needed for allergies. Past Week at am    fluticasone (FLONASE) 50 MCG/ACT spray Spray 1 spray into both nostrils daily as needed  8/27/2024 at pm    furosemide (LASIX) 20 MG tablet Take 1 tablet (20 mg) by mouth daily 8/27/2024 at am    gabapentin (NEURONTIN) 100 MG capsule Take 1 capsule (100 mg) by mouth 2 times daily (Patient taking differently: Take 100 mg by mouth 2 times daily. Am and 1800) 8/28/2024 at am    ketotifen (ZADITOR) 0.025 %  ophthalmic solution Place 1 drop into both eyes 2 times daily as needed for itching More than a month at hs    levothyroxine (SYNTHROID/LEVOTHROID) 112 MCG tablet Take 1 tablet (112 mcg) by mouth daily 8/28/2024 at am    losartan (COZAAR) 50 MG tablet Take 1 tablet (50 mg) by mouth daily 8/27/2024 at am    omeprazole (PRILOSEC) 20 MG DR capsule Take 1 capsule (20 mg) by mouth daily 8/28/2024 at am    oxyCODONE (ROXICODONE) 5 MG tablet Take 1-2 tablets (5-10 mg) by mouth every 4 hours as needed for moderate to severe pain.     senna-docusate (SENOKOT-S/PERICOLACE) 8.6-50 MG tablet Take 1-2 tablets by mouth 2 times daily. Take while on oral narcotics to prevent or treat constipation.

## 2024-08-28 NOTE — BRIEF OP NOTE
[facility]    Brief Operative Note    Pre-operative diagnosis: Primary osteoarthritis of right hip [M16.11]  Post-operative diagnosis Same as pre-operative diagnosis, and greater trochanteric intraoperative fracture.    Procedure: ARTHROPLASTY, RIGHT HIP, TOTAL, DIRECT ANTERIOR APPROACH, Right - Hip    -and placement of greater trochanteric wire.    Surgeon: Surgeons and Role:     * Meek Cifuentes MD - Primary     * Luis Carlson PA-C - Assisting  Anesthesia: General with Block   Estimated Blood Loss: 300 mL from 8/28/2024  7:29 AM to 8/28/2024 11:39 AM      Drains: None  Specimens:   ID Type Source Tests Collected by Time Destination   A :  Bone Resection Bone OR DOCUMENTATION ONLY Meek Cifuentes MD 8/28/2024  8:41 AM      Findings:   Right hip osteoarthritis and right greater trochanteric fracture .  Complications: greater trochanteric intraoperative fracture  Implants:   Implant Name Type Inv. Item Serial No.  Lot No. LRB No. Used Action   IMP SHELL BIOM G7 ACETAB PPS REED HOLE 54MM SZ F [id number] - [id number] Total Joint Component/Insert IMP SHELL BIOM G7 ACETAB PPS REED HOLE 54MM SZ F [id number]  [employer] [id number] Right 1 Implanted   IMP SCR ZIM 6.5X40MM ACET CUP SELF TAP -579-40 - [id number] Metallic Hardware/[employer] IMP SCR ZIM 6.5X40MM ACET CUP SELF TAP -087-40  [employer] [id number] Right 1 Implanted   LINER ACTB G7 F 36MM LNGVT HIP STRL LUM LF [id number] - [id number] Total Joint Component/Insert LINER ACTB G7 F 36MM LNGVT HIP STRL LUM LF [id number]  [employer] [id number] Right 1 Implanted   IMP STEM FEM BIOM TAPERLOC STD OFFSET TYPE 1 [facility] -110 - [id number] Total Joint Component/Insert IMP STEM FEM BIOM TAPERLOC STD OFFSET TYPE 1 11 -110  [employer] [id number] Right 1 Implanted   IMP CABLE ZIM CERCLAGE 1.8X25MM 2232-04-18 - [id number] Metallic Hardware/[employer] IMP CABLE ZIM CERCLAGE 1.8X25MM 2232-04-18  [employer] [id number] Right 1 Implanted   IMP HEAD FEMORAL BIOM MOD 36MM -3  [id number] - [id number] Total Joint Component/Insert IMP HEAD FEMORAL BIOM MOD 36MM -3  [id number]  Formerly [facility] [employer] [id number] Right 1 Implanted

## 2024-08-28 NOTE — ANESTHESIA CARE TRANSFER NOTE
Patient: Glenda Bales    Procedure: Procedure(s):  ARTHROPLASTY, RIGHT HIP, TOTAL, DIRECT ANTERIOR APPROACH       Diagnosis: Primary osteoarthritis of right hip [M16.11]  Diagnosis Additional Information: No value filed.    Anesthesia Type:   General     Note:    Oropharynx: oropharynx clear of all foreign objects and spontaneously breathing  Level of Consciousness: drowsy  Oxygen Supplementation: face mask    Independent Airway: airway patency satisfactory and stable  Dentition: dentition unchanged  Vital Signs Stable: post-procedure vital signs reviewed and stable  Report to RN Given: handoff report given  Patient transferred to: PACU    Handoff Report: Identifed the Patient, Identified the Reponsible Provider, Reviewed the pertinent medical history, Discussed the surgical course, Reviewed Intra-OP anesthesia mangement and issues during anesthesia, Set expectations for post-procedure period and Allowed opportunity for questions and acknowledgement of understanding      Vitals:  Vitals Value Taken Time   /60 08/28/24 1300   Temp 95.54  F (35.3  C) 08/28/24 1306   Pulse 49 08/28/24 1306   Resp 8 08/28/24 1306   SpO2 99 % 08/28/24 1306   Vitals shown include unfiled device data.    Electronically Signed By: EDILBERTO Mendoza CRNA  August 28, 2024  1:07 PM

## 2024-08-28 NOTE — PROGRESS NOTES
08/28/24 1450   Appointment Info   Signing Clinician's Name / Credentials (PT) Marcela Jones PT, DPT, ATC, LAT   Appointment Canceled Reason (PT) Medical status   Appointment Cancel Comments (PT) low BP, poor ability to attend to instruction   Rehab Comments (PT) PT eval and treat post op hip surgery. Activity order: ambulate with assist, up with assist, up in chair, IS, ice, no adduction past midline, WBAT       Present no   Living Environment   People in Home alone   Current Living Arrangements apartment   Home Accessibility no concerns   Transportation Anticipated agency;health plan transportation   Living Environment Comments elevator access but a long walk to the middle of the building to access. has no support available in her apartment and no one that she can stay with for support.   Self-Care   Usual Activity Tolerance moderate   Current Activity Tolerance fair   Regular Exercise No   Equipment Currently Used at Home walker, rolling;raised toilet seat   Fall history within last six months no   General Information   Onset of Illness/Injury or Date of Surgery 08/28/24   Referring Physician Dr. Meek Cifuentes   Patient/Family Therapy Goals Statement (PT) TCU   Pertinent History of Current Problem (include personal factors and/or comorbidities that impact the POC) Patient is a 73 year old female, day 0 status post right total hip arthroplasty, direct anterior approach by Dr. Cifuentes, 500mL EBL, general anesthesia. Patient with a previous medical history of DM type 2, HLD, CKD, GERD, HTB, ETOH use, anxiety, IBS.   Existing Precautions/Restrictions fall;no hip ADD past midline   Weight-Bearing Status - LUE full weight-bearing   Weight-Bearing Status - RUE full weight-bearing   Weight-Bearing Status - LLE full weight-bearing   Weight-Bearing Status - RLE weight-bearing as tolerated   General Observations slurring words, unable to find objects at hand, restating same information,  hypotensive   Cognition   Affect/Mental Status (Cognition) low arousal/lethargic;confused   Orientation Status (Cognition) oriented to;person   Pain Assessment   Patient Currently in Pain Yes, see Vital Sign flowsheet   Integumentary/Edema   Integumentary/Edema Comments post op dressing CDI   Posture    Posture Forward head position;Protracted shoulders   Range of Motion (ROM)   ROM Comment unable to actively hip flex   Strength (Manual Muscle Testing)   Strength Comments unable to move right foot or knee due to intact nerve block   Bed Mobility   Comment, (Bed Mobility) did not assess due to blood pressure   Sensory Examination   Sensory Perception Comments impaired RLE   Coordination   Coordination Comments impaired UE coordination   Clinical Impression   Criteria for Skilled Therapeutic Intervention Yes, treatment indicated   PT Diagnosis (PT) impaired mobility, muscle weakness, joint stiffness   Influenced by the following impairments acute medical status   Functional limitations due to impairments total assess for RLE management, unsafe to mobilize at this time   Clinical Presentation (PT Evaluation Complexity) evolving   Clinical Presentation Rationale post op acuity, medical status, clinical judgement   Clinical Decision Making (Complexity) moderate complexity   Planned Therapy Interventions (PT) balance training;bed mobility training;cryotherapy;gait training;home exercise program;ROM (range of motion);patient/family education;stretching;strengthening;transfer training;progressive activity/exercise;home program guidelines   Risk & Benefits of therapy have been explained evaluation/treatment results reviewed;participants voiced agreement with care plan;participants included;patient   Clinical Impression Comments Patient seen acutely post operatively, arriving to the floor recently with low BP and poor RLE function. At this time patient demonstrates no ability to retain teaching and training and is unsafe to  mobilize due to low BP and lethargy. Anticipate with medical management she will make progress towards improved post op rehab tolerance. Nursing to initiate mobility and PT will complete full functional mobility training and exercise teaching tomorrow morning. Given lack of support at home, post op status, medical history and anticipated functional impairments due to joint replacement patient would benefit from TCU placement in order to progress her to her baseline and safely return home.   PT Total Evaluation Time   PT Eval, Low Complexity Minutes (61128) 10   Physical Therapy Goals   PT Frequency Daily   PT Predicted Duration/Target Date for Goal Attainment 08/31/24   PT Goals Bed Mobility;Transfers;Gait   PT: Bed Mobility Independent;Supine to/from sit;Within precautions   PT: Transfers Modified independent;Sit to/from stand;Assistive device;Within precautions   PT: Gait Modified independent;Rolling walker;150 feet;Within precautions   PT Discharge Planning   PT Plan Daily. post op mobility training, HEP   PT Discharge Recommendation (DC Rec) Transitional Care Facility  (agency transport)   PT Rationale for DC Rec Patient from apartment alone, independent in mobility at baseline. Patient seen acutely post operatively, arriving to the floor recently with low BP and poor RLE function. At this time patient demonstrates no ability to retain teaching and training and is unsafe to mobilize due to low BP and lethargy. Anticipate with medical management she will make progress towards improved post op rehab tolerance. Nursing to initiate mobility and PT will complete full functional mobility training and exercise teaching tomorrow morning. Given lack of support at home, post op status, medical history and anticipated functional impairments due to joint replacement patient would benefit from TCU placement in order to progress her to her baseline and safely return home.   PT Brief overview of current status unable to move  RLE. low BP. did not provide intervention this date.   Total Session Time   Total Session Time (sum of timed and untimed services) 10       Thank you for your referral.  Marcela Jones, PT, DPT, ATC, LAT    [facility]  O: [phone]  E: [email or web address]

## 2024-08-28 NOTE — PROVIDER NOTIFICATION
Notified MD at 336 PM regarding  Medication .      Spoke with: Esau    Orders were obtained.    Comments: Hold Lasix and Cozaar today.  Okay to take both medications tomorrow

## 2024-08-28 NOTE — DISCHARGE INSTRUCTIONS
Total Hip Replacement, Direct Anterior Approach by Dr. Cifuentes   Discharge Instructions    [phone]  [facility] Service Line for issues or concerns    General Care:  After surgery you may feel tired/sleepy. This is normal. If you have any question along the way please contact the office. If you feel it is an issue cannot wait for normal office hours, contact the on-call physician. You should not drive or operate machines/equipment until released by your physician to do so.     Bandages:   It s normal to have some blood-tinged fluid on your bandages, this may occur for a few days after being sent home from the hospital. This is a water resistant dressing so you may shower over this. Leave the dressing placed until follow up.  If you would like, you can also take it off on the 10th day, and then do daily dressing changes with gauze and tape. You may also notice a few drops of blood from your incision as you begin to move more this is normal. Keep the area clean and dry.      Bathing:  Do not submerge your incision in water such as a bath or pool. It is ok to shower when you get home over the aquacell water resistant bandage.  You may find in comfortable to get a shower chair for the first month while you continue to heal and get stronger.     Follow up:  Your follow up appointment should already be scheduled. If it s not, please call the office to verify an appointment 10-14 days after surgery.    Diet:  You may not have a full appetite at discharge this should get better. Progress to bland foods such as crackers and bread and finally to your normal diet if you have no problems.  Avoid alcohol when taking narcotic pain medications.      Pain control:  Take your pain medications as prescribed. These medications may make you sleepy. Do not drive, operate equipment, or drink alcohol when taking these.  You may take Tylenol (Generic name is acetaminophen) as directed on the bottle for additional relief or in  place of the prescribed pain medications as your pain gets better.  If the medications cause a reaction such as nausea or skin rash, stop taking them and contact your doctor. Please plan accordingly, pain medications will not be re-filled on the weekends or at night. Call the office during the day if you need more medications.    Blood thinner:  It is very important to take the Aspirin 325 mg twice a day to help prevent blood clots. No medication is perfect, so if you notice a sudden onset of pain/swelling in your calf area call your doctor. If you notice a sudden onset of troubles breathing and/or chest pain call 911.     Icing:  It is common for some swelling, aching and stiffness to occur for awhile after a hip replacement. Apply ice for 20 minutes at a time. For the first 1-2 weeks apply ice 2-3 x a day or more after therapy/exercises.    Walker/crutches:  Use a walker/crutches when you go home. You will transition to the use of a cane and finally to no additional support.     Physical Therapy:  The success of your hip replacement is based on doing physical therapy. You will have some pain and discomfort along the way. If you feel your pain is limiting your progress make sure to take some pain medication prior to your therapy session. If your pain medications are not working talk to your surgeon.   For the first 1-2 weeks after going home you will have in-home physical therapy. The goal is to work on walking and feeling steady.  Usually after your first post-operative follow up you will move to out-patient physical therapy.   If you are going to transitional care, they will work on physical therapy there then you will have home physical therapy when discharged to home, then outpatient physical therapy after a few weeks as explained above.     Activity:  Unless otherwise instructed, you can weight bear as tolerated with a walker/cane.   For 6 weeks follow these listed precautions:   Do not cross the midline of  your body with the operated leg.    Normal findings after surgery:  Numbness and tenderness around the incisions.   You may have bruising around the incisions and down the thigh.   Low grade fevers less than 100.5 degrees Fahrenheit are normal.   You will have some increased pain after your therapy sessions.     When to call the Office:  Temperature greater than 101.5 degrees Fahreheit.  Fever, chills, and increasing pain in the hip.  Excessive drainage from the incisions that include bright red blood.  Drainage from the incisions sites that appear yellow, pus-like, or foul smelling.  Increasing pain the hip not relieved by the prescribed pain medications or ice.  Persistent nausea or vomiting not helped by the Zofran.  Increased pain or swelling in your calf area (in back above your ankle) that wasn t there when in the hospital.  Any other effects you feel are significant.  Call 911 if you experience any chest pain and/or shortness or breath.

## 2024-08-28 NOTE — PROGRESS NOTES
S-(situation): Patient registered outpatient. Patient arrived to room 273 via bed from PACU    B-(background):Total right hip with anterior apprach.     A-(assessment): A&Ox4. Arouses to speech, but falls asleep.  Aquacel in place to right hip. Ice to hip. CMS intact to lower legs. Complains of 3/10 pain to left shoulder, but denies hip pain. LR at 125ml/hr.  Soft BP. HR in 40s. 4L Oxymask for sats >95%.     R-(recommendations): Orders and observation goals reviewed with     Nursing Observation criteria listed below was met:    Skin issues/needs documented:Yes  Isolation needs addressed and Signage up: NA  Fall Prevention: Education given and documented: Yes  Education Assessment documented:Yes  Admission Education Documented: Yes  New medication patient education completed and documented (Possible Side Effects of Common Medications handout): Yes  OBS video/handout Reviewed & Documented: Yes  Allergies Reviewed: Yes  Medication Reconciliation Complete: Yes  Home medications if not able to send immediately home with family stored here: NA  Reminder note placed in discharge instructions of home meds: NA  Patient has discharge needs (If yes, please explain): Yes  Patient discharge preferences addressed and charted on white board:  Yes  Provider notified that patient has arrived to the unit: Yes    Patient vital signs are at baseline: No,  Reason:  soft BP, HR 40s, 4 O2.   Patient able to ambulate as they were prior to admission or with assist devices provided by therapies during their stay:  No,  Will get up tonight with staff.   Patient MUST void prior to discharge:  No,  Reason:  Yet to void  Patient able to tolerate oral intake:  Yes, water only  Pain has adequate pain control using Oral analgesics:  Yes  Does patient have an identified :  No,  Reason:    Has goal D/C date and time been discussed with patient:  Yes

## 2024-08-28 NOTE — OR NURSING
Transfer from PACU to Room 273  Transferred to bed via glyder mat (Glyder Mat,Transfer Boar,Slider Sheet)      S: 72 y/o female S/P anterior right hip arthroplasty  Anesthesia Type: general  Surgeon: Dr. Cifuentes  Allergies: See Medication Reconciliation Record    B: Pertinent Medical History:   Past Medical History:   Diagnosis Date    Cataract     Diabetes (H) April 2019    Essential hypertension, benign     Nonrheumatic aortic valve insufficiency     Osteoarthritis of knee, unspecified laterality, unspecified osteoarthritis type     Thyroid disease        Surgical History:   Past Surgical History:   Procedure Laterality Date    CATARACT IOL, RT/LT Right 12/05/2018    Model ZCB00 SN:[id number] +11.5 Dioptor - Jermaine Akers MD ([facility])    CATARACT IOL, RT/LT Left 12/19/2018    Model ZCB00 SN:[id number] +12.5 Dioptor - Jermaine Akers MD ([facility])    DILATION AND CURETTAGE SUCTION      AB    DILATION AND CURETTAGE SUCTION      in 40's    NO HISTORY OF SURGERY         DNR/DNI      A: EBL: 300  IVF: 1750  UOP: no void in PACU, no bladder scan as not a spinal anesthesia  NPO: ___Yes _x__No oral care swabs with water and small sips water tolerated  Vomiting: ___Yes __x_No   Drainage: none  Skin Integrity: new surgical incision to right hip, skin has red blotches especially on extremities, lots of superficial veins (Normal; Pressure Ulcer (Location)  Pain: mild  On word Scale  See PACU record for ongoing assessment, vital signs and pain assessment.  Pt is a mouth breather when sleeping.  Applied oxymask instead of nasal cannula prior to transport to Med/Surg outpt in a bed room    RFO (Retained Foreign Object) ___Yes__x_No (identify item if present)   Brace/sling/equipment: ___Yes__x_No (identify item if present) No HOLA stockings for anterior Hip    Report Given to:  Makayla BELTRAN    R: Post-Op vitals and assessments as ordered/indicated per patient's condition.  Follow Post-Op orders and notify Physician prn.  Continue to involve  patient/family in plan of care and discharge planning.  Reinforce Pre-Operative education.  Implement skin safety interventions as appropriate.

## 2024-08-28 NOTE — OP NOTE
DATE OF SERVICE: 8/28/2024     SURGEON: JAROCHO GARCIA MD     ASSISTANT:  Luis Carlson PA-C     Assistance of Luis Carlson PA-C was medically necessary given the technical complexity of the case; with assistance with patient positioning, retraction and hip joint exposure, limb manipulation, assistance with implant placement, trial and final arthroplasty hip implant reduction, and wound closure.     PREOPERATIVE DIAGNOSIS:  right hip primary osteoarthritis.     POSTOPERATIVE DIAGNOSIS:  right hip primary osteoarthritis.     PROCEDURE:  right total hip arthroplasty. Direct anterior approach     Complication:  Greater trochanter crack cabled    COMPONENTS IMPLANTED:         Biomet G7 cup size 54.     Biomet Taperloc stem size 11     Biomet 36 mms metal head with -3 neck length.     Yelena cable.    ANESTHESIA: General ; in the supine position on the [place] surgical table.    ESTIMATED BLOOD LOSS:  500 cc    ANTIBIOTICS:  cefazolin prior to incision.    Tranexamic Acid: 1950 mg oral 90 minutes preop    DRAINS:  None.    COMPLICATIONS:  None.     SPECIMENS: none      FINDINGS: advanced hip osteoarthritis, eburnated femoral head with marginal osteophytes. Acetabulum with impinging marginal osteophytes.    INDICATIONS FOR PROCEDURE: Glenda Bales is a pleasant 73 year old female who has had ongoing significant pain in the right hip. Xrays with advanced hip degenerative arthrosis with loss of joint space and marginal osteophytes. The risk/benefit analysis of the above right total hip arthroplasty was explained. Alternate bearing technology and the additional risks of a press fit hip were explained, including fracture. Risks discussed to include, but not be limited to: wear, loosening, infection, bleeding, pain, scar, thromboembolic disease, neurovascular damage with temporary or permanent nerve damage, limb length inequality, implant failure, implant dislocation, yovani-prosthetic fracture, need for further surgery, risks  of anesthesia and death.  Despite these risks, the patient elected to proceed and, with the patient's permission, was taken to the operating room.       DESCRIPTION OF PROCEDURE:  The patient was identified in the preoperative holding area.  After confirming with the patient the correct procedure and procedure site, the right hip was marked with an indelible marker by myself, the attending surgeon.  After again reviewing the risks and perceived benefits of surgery, questions were addressed, he elected to proceed and written informed consent was obtained and reviewed.     The patient was then taken to the operating room and placed supine on the operating surgical [facility] table.  After adequate anesthesia, a Quinones catheter was placed.  The lower extremities were placed in the boots and suspended.  The arms were well positioned and padded to be comfortable.  The right lower extremity was then prepped and draped in the usual sterile fashion.     A timeout was then performed confirming the correct patient, procedure, procedure site, availability of instruments and implants, the administration of prophylactic antibiotics as well as a review of the patient's allergies by all surgical staff.    An anterior based incision was made approximately two fingerbreadths lateral (3cm) and two fingerbreadths distal to the ASIS in an oblique fashion extending posterior-distally towards the anterior aspect of the femur approximately 10-12 cm in length, sharply through skin.  We then used electrocautery through the subcutaneous tissues, performing hemostasis.  Perforating vessels were cauterized. The fascia over the tensor was then incised longitudinally.  I then proceeded to dissect the tensor muscle off the  undersurface of the fascia anteriorly and medially, exposing the underlying tissues and capsule as well as the rectus femoris medially.  A retractor was placed laterally over the neck as well as inferiorly over the femoral neck and  "anteriorly over the acetabulum rim deep to the rectus.  We then proceeded to use a Darrian retractor laterally and a vascular bundle of the circumflex vessels was identified.  Using the Aquamantys, these vessels were then cauterized.  Good exposure of the anterior aspect  of the femoral neck capsule was obtained.  I then proceeded to make a capsulotomy an inverted \"T\" fashion, starting at the superolateral aspect of the acetabulum and femoral head, along the superior aspect of the femoral neck down to the intertrochanteric ridge and then distally in an oblique fashion along the intertrochanteric ridge towards the lesser trochanter.  This anterior capsule was then tagged. I also tagged the superolateral capsule at the saddle of the trochanter and gently released this as well.  This exposed the underlying femoral head and neck. There were prominent marginal osteophytes. The retractors were then placed intracapsular.  Further  capsular releases were performed inferiorly along the medial calcar to get to the base of the lesser trochanter as well as laterally into the trochanteric saddle.     I then used a C-arm to get positioning with making the femoral neck cut.  After determining adequate cut to the templated length, a first cut was made at the intended final cut.  Care was taken to avoid the posterior hanging greater trochanter.   Using a corkscrew into the femoral head, using a twisting motion,  the femoral head was then removed in line with the fibers of the tensor muscle with care taken to protect the tensor.  The wound was then irrigated.  I then proceeded to place a retractor anteriorly over the anterior/inferior acetabulum, inferiorly over the transverse acetabular ligament as well as posteriorly within the capsule, giving good exposure to the acetabulum.  I then proceeded using either a long-handled knife or electrocautery to remove labral tissue as well as the  pulvinar at the acetabular floor. Overhanging " osteophytes were removed.  There was significant medial acetabular osteophyte.  Once the soft tissue was cleared out, I then proceeded to ream.  We were using the [employer] system.  Using a 47 mm reamer and using a C-arm, the acetabulum was medialized to the teardrop, removing the medial osteophyte.  I then sequentially increased reamer sizes by 2 mm, up to a 53, maintaining correct abduction and version, which was confirmed with the C-arm.  We trialed a size 53 cup.  This  was a very good and tight fit.  I then proceeded to place the 54 mms G7 acetabular shell using the C-arm to guide the abduction angle as well as anteversion, which was approximately 20-25 degrees.  1 screw was added.  Once this was confirmed, the final neutral acetabular polyethylene liner was placed.  The wound was irrigated.     I then proceeded to the femur.  A hook retractor was placed along the lateral aspect of the trochanter just distal to the vastus tubercle. Using my hand, I pulled the femur laterally taking care that it was not caught under the acetabulum. We then proceeded to lower the limb down to the floor and slightly adduct, externally rotating the foot to 120 degrees (femur about 90 degrees), giving good femoral exposure.  A retractor was placed medially along the calcar.  I then proceeded to release the lateral capsule along the inner aspect of the greater trochanter, taking care of  the underlying rotators; these tendons were exposed and kept intact.  This gave better exposure of the femur. A shepards hook was placed over the tip of the greater trochanter.  I then proceeded to connect the femoral hook to the lift and using my hand to lift the femur, proceeded to elevate the lift until there was firm pressure against my hand.  This gave us adequate exposure of the femur for broaching.  A cookie cutter was placed laterally to remove some of the remnant of the lateral neck as well as a  rongeur.  A canal finding awl was placed down  the femur.  I then proceeded to broach for the Taperloc, first starting with the chili pepper broach followed by sequential broaching up to the 9 size.  This was calcar planed.  The wound was irrigated.  I then proceeded to do a trial reduction,  Starting off with a -3 mms femoral head, releasing the femoral hook and removing the retractors and bringing the leg back up to a neutral position,  the hip was easily reduced.  Using C-arm, we assess length using the overlay technique with printed images.  the hip appeared short, but there was no pistoning on exam, so I felt it could not go longer.  The template for the stem indicated size 6, but even 9 did not fill the canal.  I proceeded to broach up to 11.  Once the final length was determined trialing various head sizes using the overlay technique, we proceed to dislocate the trial and proceed with final femoral stem placement. The 11 Taperloc femoral stem was then placed. I noted a crack in the posterior lateral portion of greater trochanter.  I removed the stem and applied a cable to support the shaft.  I then reinserted the 11 stem.   This provided good fixation on the femoral component. The  final 36 mms  femoral head with -3 mms neck length was placed.  The hip was reduced.  Joint block was injected.  The wound was then copiously irrigated starting with a liter of dilute Betadine followed by three liters of antibiotic normal saline.  The  capsule was repaired side-to-side with #2 Ethibond and #2 vicryl.  One gram of vancomycin powder was placed deep in the wound.  The fascia over the tensor was closed side-to-side with #0 Vicryl.  The subcutaneous and dermal tissues were then approximated with 2-0 Vicryl and 3-0 Monocryl.  3-0 Monocryl subcuticular suture was used to maintain good skin eversion and apposition with Dermabond to seal the skin edges.  A sterile waterproof  dressing was applied over the incision.  The patient was then awakened and extubated and taken  to the recovery room without difficulty in stable condition.  There were no apparent complications.       A postoperative pelvis x-ray will be obtained in recovery.      The postoperative plan will be to weight bear as tolerated.  No hip precautions.  Twenty-three hours of IV antibiotics.  Anticoagulation will proceed for 6 weeks postoperative and will include twice daily 325 mg aspirin.     There were no apparent complications.    Dr. Meek Cifuentes

## 2024-08-29 ENCOUNTER — APPOINTMENT (OUTPATIENT)
Dept: OCCUPATIONAL THERAPY | Facility: CLINIC | Age: 74
DRG: 470 | End: 2024-08-29
Attending: ORTHOPAEDIC SURGERY
Payer: MEDICARE

## 2024-08-29 ENCOUNTER — APPOINTMENT (OUTPATIENT)
Dept: PHYSICAL THERAPY | Facility: CLINIC | Age: 74
DRG: 470 | End: 2024-08-29
Attending: ORTHOPAEDIC SURGERY
Payer: MEDICARE

## 2024-08-29 LAB
ANION GAP SERPL CALCULATED.3IONS-SCNC: 15 MMOL/L (ref 7–15)
BUN SERPL-MCNC: 26.2 MG/DL (ref 8–23)
CALCIUM SERPL-MCNC: 8.5 MG/DL (ref 8.8–10.4)
CHLORIDE SERPL-SCNC: 100 MMOL/L (ref 98–107)
CREAT SERPL-MCNC: 1.69 MG/DL (ref 0.51–0.95)
EGFRCR SERPLBLD CKD-EPI 2021: 31 ML/MIN/1.73M2
GLUCOSE SERPL-MCNC: 99 MG/DL (ref 70–99)
HCO3 SERPL-SCNC: 22 MMOL/L (ref 22–29)
HGB BLD-MCNC: 10.7 G/DL (ref 11.7–15.7)
POTASSIUM SERPL-SCNC: 3.9 MMOL/L (ref 3.4–5.3)
SODIUM SERPL-SCNC: 137 MMOL/L (ref 135–145)

## 2024-08-29 PROCEDURE — 258N000003 HC RX IP 258 OP 636: Performed by: ORTHOPAEDIC SURGERY

## 2024-08-29 PROCEDURE — 97530 THERAPEUTIC ACTIVITIES: CPT | Mod: GP | Performed by: PHYSICAL THERAPIST

## 2024-08-29 PROCEDURE — 120N000001 HC R&B MED SURG/OB

## 2024-08-29 PROCEDURE — 80048 BASIC METABOLIC PNL TOTAL CA: CPT | Performed by: ORTHOPAEDIC SURGERY

## 2024-08-29 PROCEDURE — 97116 GAIT TRAINING THERAPY: CPT | Mod: GP | Performed by: PHYSICAL THERAPIST

## 2024-08-29 PROCEDURE — 97110 THERAPEUTIC EXERCISES: CPT | Mod: GP | Performed by: PHYSICAL THERAPIST

## 2024-08-29 PROCEDURE — 85018 HEMOGLOBIN: CPT | Performed by: ORTHOPAEDIC SURGERY

## 2024-08-29 PROCEDURE — 36415 COLL VENOUS BLD VENIPUNCTURE: CPT | Performed by: ORTHOPAEDIC SURGERY

## 2024-08-29 PROCEDURE — 97165 OT EVAL LOW COMPLEX 30 MIN: CPT | Mod: GO

## 2024-08-29 PROCEDURE — 250N000013 HC RX MED GY IP 250 OP 250 PS 637: Performed by: ORTHOPAEDIC SURGERY

## 2024-08-29 PROCEDURE — 250N000011 HC RX IP 250 OP 636: Performed by: ORTHOPAEDIC SURGERY

## 2024-08-29 RX ADMIN — CEFAZOLIN 1 G: 1 INJECTION, POWDER, FOR SOLUTION INTRAMUSCULAR; INTRAVENOUS at 02:54

## 2024-08-29 RX ADMIN — ASPIRIN 325 MG: 325 TABLET, COATED ORAL at 08:16

## 2024-08-29 RX ADMIN — LEVOTHYROXINE SODIUM 112 MCG: 112 TABLET ORAL at 08:16

## 2024-08-29 RX ADMIN — POLYETHYLENE GLYCOL 3350 17 G: 17 POWDER, FOR SOLUTION ORAL at 08:15

## 2024-08-29 RX ADMIN — ACETAMINOPHEN 975 MG: 325 TABLET ORAL at 14:16

## 2024-08-29 RX ADMIN — SENNOSIDES AND DOCUSATE SODIUM 1 TABLET: 8.6; 5 TABLET ORAL at 20:56

## 2024-08-29 RX ADMIN — SENNOSIDES AND DOCUSATE SODIUM 1 TABLET: 8.6; 5 TABLET ORAL at 08:15

## 2024-08-29 RX ADMIN — PANTOPRAZOLE SODIUM 40 MG: 40 TABLET, DELAYED RELEASE ORAL at 08:15

## 2024-08-29 RX ADMIN — LOSARTAN POTASSIUM 50 MG: 50 TABLET, FILM COATED ORAL at 08:16

## 2024-08-29 RX ADMIN — FUROSEMIDE 20 MG: 20 TABLET ORAL at 08:15

## 2024-08-29 RX ADMIN — ASPIRIN 325 MG: 325 TABLET, COATED ORAL at 17:00

## 2024-08-29 RX ADMIN — ACETAMINOPHEN 975 MG: 325 TABLET ORAL at 21:00

## 2024-08-29 RX ADMIN — ACETAMINOPHEN 975 MG: 325 TABLET ORAL at 06:03

## 2024-08-29 RX ADMIN — GABAPENTIN 100 MG: 100 CAPSULE ORAL at 20:56

## 2024-08-29 RX ADMIN — OXYCODONE HYDROCHLORIDE 5 MG: 5 TABLET ORAL at 21:59

## 2024-08-29 RX ADMIN — GABAPENTIN 100 MG: 100 CAPSULE ORAL at 08:15

## 2024-08-29 RX ADMIN — SODIUM CHLORIDE, POTASSIUM CHLORIDE, SODIUM LACTATE AND CALCIUM CHLORIDE: 600; 310; 30; 20 INJECTION, SOLUTION INTRAVENOUS at 00:57

## 2024-08-29 ASSESSMENT — ACTIVITIES OF DAILY LIVING (ADL)
ADLS_ACUITY_SCORE: 28
ADLS_ACUITY_SCORE: 28
ADLS_ACUITY_SCORE: 33
ADLS_ACUITY_SCORE: 33
ADLS_ACUITY_SCORE: 30
ADLS_ACUITY_SCORE: 30
ADLS_ACUITY_SCORE: 32
DEPENDENT_IADLS:: INDEPENDENT
ADLS_ACUITY_SCORE: 28
ADLS_ACUITY_SCORE: 30
ADLS_ACUITY_SCORE: 32
ADLS_ACUITY_SCORE: 30
ADLS_ACUITY_SCORE: 28
IADL_COMMENTS: PLOF IND
ADLS_ACUITY_SCORE: 28
ADLS_ACUITY_SCORE: 28
ADLS_ACUITY_SCORE: 30
ADLS_ACUITY_SCORE: 33
ADLS_ACUITY_SCORE: 32
ADLS_ACUITY_SCORE: 33
ADLS_ACUITY_SCORE: 32
ADLS_ACUITY_SCORE: 28
ADLS_ACUITY_SCORE: 33
ADLS_ACUITY_SCORE: 28
ADLS_ACUITY_SCORE: 32

## 2024-08-29 NOTE — PROGRESS NOTES
08/29/24 1000   Appointment Info   Signing Clinician's Name / Credentials (OT) Celine Aneesh, OTR/L   Living Environment   People in Home alone   Current Living Arrangements apartment   Home Accessibility no concerns   Transportation Anticipated agency;health plan transportation   Living Environment Comments Elevator access but a long walk to the middle of the building for her room. Does not have someone to support her inside the home   Self-Care   Usual Activity Tolerance moderate   Current Activity Tolerance fair   Regular Exercise No   Equipment Currently Used at Home walker, rolling;raised toilet seat   Fall history within last six months no   Instrumental Activities of Daily Living (IADL)   IADL Comments PLOF IND   General Information   Onset of Illness/Injury or Date of Surgery 08/28/24   Referring Physician Dr. Cifuentes   Patient/Family Therapy Goal Statement (OT) Pt desires TCU stay   Existing Precautions/Restrictions fall;no hip ADD past midline   Left Upper Extremity (Weight-bearing Status) full weight-bearing (FWB)   Right Upper Extremity (Weight-bearing Status) full weight-bearing (FWB)   Left Lower Extremity (Weight-bearing Status) full weight-bearing (FWB)   Right Lower Extremity (Weight-bearing Status) weight-bearing as tolerated (WBAT)   General Observations and Info Patient is a 73 year old female, day 0 status post right total hip arthroplasty, direct anterior approach by Dr. Cifuentes, 500mL EBL, general anesthesia. Patient with a previous medical history of DM type 2, HLD, CKD, GERD, HTB, ETOH use, anxiety, IBS.   Cognitive Status Examination   Orientation Status orientation to person, place and time   Safety Deficit minimal deficit;at risk behavior observed;judgment;problem-solving   Cognitive Status Comments Pt hesistent with all movements and noted anxious features with all movement. Following directions well, verbal prompts required to follow precautions.   Sensory   Sensory Quick Adds  sensation intact   Pain Assessment   Patient Currently in Pain Yes, see Vital Sign flowsheet   Posture   Posture not impaired   Range of Motion Comprehensive   Comment, General Range of Motion UE ROM WFL required for UB dressing   Strength Comprehensive (MMT)   Comment, General Manual Muscle Testing (MMT) Assessment UB MMT 5/5 bilaterally, defer to PT note for MMTing with LB strength   Muscle Tone Assessment   Muscle Tone Quick Adds No deficits were identified   Coordination   Upper Extremity Coordination No deficits were identified   Gross Motor Coordination No deficits were identified   Fine Motor Coordination No deficits were identified   Bed Mobility   Bed Mobility scooting/bridging;supine-sit;sit-supine   Scooting/Bridging [place] (Bed Mobility) minimum assist (75% patient effort)   Supine-Sit [place] (Bed Mobility) minimum assist (75% patient effort)   Sit-Supine [place] (Bed Mobility) minimum assist (75% patient effort)   Comment (Bed Mobility) Pt requires ongoing verbal prompts for safe sequencing.   Transfers   Transfers sit-stand transfer   Sit-Stand Transfer   Sit-Stand [place] (Transfers) verbal cues;contact guard   Toilet Transfer   Type (Toilet Transfer) sit-stand;stand-sit   [place] Level (Toilet Transfer) verbal cues;minimum assist (75% patient effort)   Toilet Transfer Comments safety cues for steps and appropriate transition hand pal cement from walker to grab bars. Pt fearful to fall thus impacting engagement   Balance   Balance Comments Defer to PT note   Activities of Daily Living   BADL Assessment/Intervention lower body dressing;grooming;upper body dressing;toileting   Upper Body Dressing Assessment/Training   Comment, (Upper Body Dressing) Did not complete; anticipate could complete with set-up WFL   Lower Body Dressing Assessment/Training   Position (Lower Body Dressing) edge of bed sitting   Assistive Devices (Lower Body Dressing) reacher;sock-aid   Comment, (Lower  Body Dressing) encouragement d/t pain; AE education provided to support optimal engagement in LB dressing needs   [place] Level (Lower Body Dressing) lower body dressing skills;doff;don;pants/bottoms;socks;minimum assist (75% patient effort)   Grooming Assessment/Training   Comment, (Grooming) Washing hands with min verbal prompts for safe use of the walker in bathroom and to sink level. No LOB noted standing at sink.   Toileting   Comment, (Toileting) pull up over waist due to concerns of balance   [place] Level (Toileting) toileting skills;adjust/manage clothing;perform perineal hygiene;minimum assist (75% patient effort)   Clinical Impression   Criteria for Skilled Therapeutic Interventions Met (OT) Yes, treatment indicated   OT Diagnosis Deficits with ADLs   Influenced by the following impairments pain   OT Problem List-Impairments impacting ADL problems related to;activity tolerance impaired;fear & anxiety;post-surgical precautions;pain;strength   ADL comments/analysis Patient demonstrates deficits with ADL completion, bed mobility, and functional mobility. Reacher and AE demonstrated for engagement in LB dressing, STS CGA to min A for initial boost from bed and with verbal prompts. Requires ongoing safety cues. Anxiety/fear and pain are limiting factors for patient.   Identified Performance Deficits dressing, bed mobility, mobility, bathing, transfers   Planned Therapy Interventions (OT) ADL retraining;home program guidelines;progressive activity/exercise;risk factor education   Intervention Comments Would benefit from continued IP OT services to address the above related deficits.   Clinical Decision Making Complexity (OT) detailed assessment/moderate complexity   Risk & Benefits of therapy have been explained evaluation/treatment results reviewed;care plan/treatment goals reviewed;risks/benefits reviewed;current/potential barriers reviewed;participants voiced agreement with care plan;participants  included;patient   Clinical Impression Comments Patient s/p day 1 post-op R total hip. Pt previously from home alone; walker use at baseline otherwise IND with ADLs/IADL based tasks. Patient currently demonstrates symptoms of pain/fear with ambulation and is requiring increased assistance compared to baseline from ADL standpoint. Patient would benefit from supervision when up ambulating and completing cares for 24 hours when home d/t saftey concerns. Patient using AE to support LB dressing and did require min A For bed mobility. If patient is unable to receive supervision during ambulation in home setting then would recommend TCU to assist with safe progression towards baseline functioning prior to returning home.   OT Total Evaluation Time   OT Eval, Low Complexity Minutes (70024) 45   OT Goals   Therapy Frequency (OT) 5 times/week   OT Predicted Duration/Target Date for Goal Attainment 09/05/24   OT Goals Lower Body Dressing;Toilet Transfer/Toileting;Transfers;Bed Mobility   OT: Lower Body Dressing Modified independent   OT: Bed Mobility Modified independent   OT: Transfer Modified independent   OT: Toilet Transfer/Toileting Modified independent   OT Discharge Planning   OT Plan 1/5 Thursday; ADL progression, bed mobility   OT Discharge Recommendation (DC Rec) home with home care occupational therapy;home with assist;Transitional Care Facility   OT Rationale for DC Rec Patient s/p day 1 post-op R total hip. Pt previously from home alone; walker use at baseline otherwise IND with ADLs/IADL based tasks. Patient currently demonstrates symptoms of pain/fear with ambulation and is requiring increased assistance compared to baseline from ADL standpoint. Patient would benefit from supervision when up ambulating and completing cares for 24 hours when home d/t saftey concerns. Patient using AE to support LB dressing and did require min A For bed mobility. If patient is unable to receive supervision during ambulation in  home setting then would recommend TCU to assist with safe progression towards baseline functioning prior to returning home.   OT Brief overview of current status min A bed mobility, min A initial boost for standing, once standing CGA for ambulation into bathroom. Don/doffing LB dressing using reacher for AE. Verbal prompts and skilled cues required from saftey perspective in bathroom with walker. Fear of falling/pain impacting progression   Total Session Time   Total Session Time (sum of timed and untimed services) 45       Thank you for the referral.   BECKY Guidry/L   [facility]  Email: [email or web address]  Phone: [phone]

## 2024-08-29 NOTE — PROGRESS NOTES
Patient vital signs are at baseline: Yes    Patient able to ambulate as they were prior to admission or with assist devices provided by therapies during their stay:  No,  Reason: Patient was hesitant to move despite encouragement. Patient able to ambulate total of 60 ft within shift.     Patient MUST void prior to discharge:  Yes  Patient able to tolerate oral intake:  Yes  Pain has adequate pain control using Oral analgesics:  Yes, pain controlled with scheduled Tylenol. No PRNs given.    Does patient have an identified :  No,  Reason:  Patient states she prefers to be transferred to a TCU instead of going home as she is not confident enough that she will be able to do ADLs. She comes from home and has a niece that helps but lives in [place]. Patient lives in [place].    Has goal D/C date and time been discussed with patient:  Yes

## 2024-08-29 NOTE — PLAN OF CARE
Goal Outcome Evaluation:      Plan of Care Reviewed With: patient          Outcome Evaluation: TCU

## 2024-08-29 NOTE — PROGRESS NOTES
"ORTHO PROGRESS NOTE    POD # 1  Procedure(s):  Right total hip arthroplasty, Direct anterior approach - on 2024    Pain:  moderate    /48 (BP Location: Left arm, Cuff Size: Adult Regular)   Pulse (!) 47   Temp 97.9  F (36.6  C) (Oral)   Resp 10   Ht 1.594 m (5' 2.76\")   Wt 78 kg (172 lb)   SpO2 98%   BMI 30.70 kg/m      Temp (24hrs), Av.6  F (36.4  C), Min:97.1  F (36.2  C), Max:97.9  F (36.6  C)      Recent Labs   Lab Test 24  0601 24  1255 24  1223   HGB 10.7* 13.6 12.9               Circulation intact.   Sensation intact.   Calves soft and nontender.   Incision is covered      PT  walked 80 feet.  Did 2 steps.  She has not passed Physical Therapy yet, so they have not approved discharge to home.         ASSESSMENT:  right total hip arthroplasty progressing slowly.    PLAN:  continue Physical Therapy/ Occupational Therapy.  Delay discharge to tomorrow.    Meek Cifuentes M.D.  Department of Orthopaedic Surgery  [facility]  Pager: [phone]      "

## 2024-08-29 NOTE — PLAN OF CARE
"Goal Outcome Evaluation:      Plan of Care Reviewed With: patient    Overall Patient Progress: no changeOverall Patient Progress: no change    Outcome Evaluation: Alert and oriented x 4. Vitals stable on RA. Denies nausea and vomiting. Reports some pain in her right hip, scheduled tyelnol given. Assist of 1 to the bathroom and chair. Ambulated hallways. Aquacell CDI.    /48 (BP Location: Left arm, Cuff Size: Adult Regular)   Pulse (!) 47   Temp 97.9  F (36.6  C) (Oral)   Resp 10   Ht 1.594 m (5' 2.76\")   Wt 78 kg (172 lb)   SpO2 98%   BMI 30.70 kg/m      "

## 2024-08-29 NOTE — PROGRESS NOTES
Patient vital signs are at baseline: No,  Reason:  BP continue to be soft with HR in 40-50s.  She states this can be normal for her.  Losartan and Lasix held.   Patient able to ambulate as they were prior to admission or with assist devices provided by therapies during their stay:  No,  Reason:  Attempt x 2.  Feels some numbness in upper leg.  Right knee continues to buckle when standing. Pt feels hip is okay, but knee will not hold weight.  Requires assist of/2/w/GB to stand.  Unable to  pivot.   Patient MUST void prior to discharge:  Yes, Bladder scan 191 and void 100.  Patient able to tolerate oral intake:  Yes  Pain has adequate pain control using Oral analgesics:  Yes  Does patient have an identified :  Yes Pt states she would like to discharge to [facility] in [place] for TCU care.   Has goal D/C date and time been discussed with patient:  Yes

## 2024-08-29 NOTE — ANESTHESIA POSTPROCEDURE EVALUATION
Patient: Glenda Bales    Procedure: Procedure(s):  Right total hip arthroplasty, Direct anterior approach       Anesthesia Type:  General    Note:  Disposition: Inpatient   Postop Pain Control: Uneventful            Sign Out: Well controlled pain   PONV: No   Neuro/Psych: Uneventful            Sign Out: Acceptable/Baseline neuro status   Airway/Respiratory: Uneventful            Sign Out: Acceptable/Baseline resp. status   CV/Hemodynamics: Uneventful            Sign Out: Acceptable CV status   Other NRE: NONE   DID A NON-ROUTINE EVENT OCCUR? No    Event details/Postop Comments:  Pt was happy with anesthesia care.  No complications.  I will follow up with the pt if needed.           Last vitals:  Vitals Value Taken Time   BP 93/54 08/28/24 1315   Temp 95.9  F (35.5  C) 08/28/24 1325   Pulse 48 08/28/24 1324   Resp 29 08/28/24 1324   SpO2 98 % 08/28/24 1324   Vitals shown include unfiled device data.    Electronically Signed By: EDILBERTO Schneider CRNA  August 29, 2024  12:43 PM

## 2024-08-29 NOTE — CONSULTS
Care Management Initial Consult    General Information  Assessment completed with: Patient   Type of CM/SW Visit: Initial Assessment    Primary Care Provider verified and updated as needed: Yes   Readmission within the last 30 days: no previous admission in last 30 days      Reason for Consult: discharge planning  Advance Care Planning:          Communication Assessment  Patient's communication style: spoken language (English or Bilingual)    Hearing Difficulty or Deaf: no   Wear Glasses or Blind: yes    Cognitive  Cognitive/Neuro/Behavioral: WDL  Level of Consciousness: alert  Arousal Level: opens eyes spontaneously  Orientation: oriented x 4  Mood/Behavior: calm  Best Language: 0 - No aphasia  Speech: logical, clear    Living Environment:   People in home: alone     Current living Arrangements: apartment      Able to return to prior arrangements: yes     Family/Social Support:  Care provided by: self  Provides care for: no one  Marital Status: Single  Support system: Other (specify) (Niece)          Description of Support System: Supportive, Involved    Support Assessment: Adequate family and caregiver support, Adequate social supports    Current Resources:   Patient receiving home care services: No     Community Resources: None  Equipment currently used at home: walker, rolling, raised toilet seat  Supplies currently used at home: None    Employment/Financial:  Employment Status: retired        Financial Concerns: none      Does the patient's insurance plan have a 3 day qualifying hospital stay waiver?  Yes     Which insurance plan 3 day waiver is available? ACO REACH    Will the waiver be used for post-acute placement? Yes    Lifestyle & Psychosocial Needs:  Social Determinants of Health     Food Insecurity: Low Risk  (8/28/2024)    Food Insecurity     Within the past 12 months, did you worry that your food would run out before you got money to buy more?: No     Within the past 12 months, did the food you bought  just not last and you didn t have money to get more?: No   Depression: Not at risk (1/18/2024)    PHQ-2     PHQ-2 Score: 0   Housing Stability: Low Risk  (8/28/2024)    Housing Stability     Do you have housing? : Yes     Are you worried about losing your housing?: No   Tobacco Use: Medium Risk (8/28/2024)    Patient History     Smoking Tobacco Use: Former     Smokeless Tobacco Use: Never     Passive Exposure: Past   Financial Resource Strain: Low Risk  (8/28/2024)    Financial Resource Strain     Within the past 12 months, have you or your family members you live with been unable to get utilities (heat, electricity) when it was really needed?: No   Alcohol Use: Not At Risk (8/14/2024)    AUDIT-C     Frequency of Alcohol Consumption: Never     Average Number of Drinks: Patient does not drink     Frequency of Binge Drinking: Never   Transportation Needs: Low Risk  (8/28/2024)    Transportation Needs     Within the past 12 months, has lack of transportation kept you from medical appointments, getting your medicines, non-medical meetings or appointments, work, or from getting things that you need?: No   Physical Activity: Inactive (8/14/2024)    Exercise Vital Sign     Days of Exercise per Week: 0 days     Minutes of Exercise per Session: 0 min   Interpersonal Safety: Low Risk  (8/28/2024)    Interpersonal Safety     Do you feel physically and emotionally safe where you currently live?: Yes     Within the past 12 months, have you been hit, slapped, kicked or otherwise physically hurt by someone?: No     Within the past 12 months, have you been humiliated or emotionally abused in other ways by your partner or ex-partner?: No   Stress: Stress Concern Present (8/14/2024)    [employer] - Occupational Stress Questionnaire     Feeling of Stress : To some extent   Social Connections: Moderately Isolated (8/14/2024)    Social Connection and Isolation Panel [NHANES]     Frequency of Communication  with Friends and Family: More than three times a week     Frequency of Social Gatherings with Friends and Family: Once a week     Attends Pentecostal Services: 1 to 4 times per year     Active Member of Clubs or Organizations: No     Attends Club or Organization Meetings: Never     Marital Status: Never    Health Literacy: Not on file     Functional Status:  Prior to admission patient needed assistance:   Dependent ADLs:: Ambulation-walker  Dependent IADLs:: Independent       Mental Health Status:  Mental Health Status: Current Concern  Mental Health Management: Medication    Chemical Dependency Status:  Chemical Dependency Status: Past Concern           Values/Beliefs:  Spiritual, Cultural Beliefs, Pentecostal Practices, Values that affect care: no             Discussed  Partnership in Safe Discharge Planning  document with patient/family: Yes      Additional Information:  Care Management spoke with patient via phone due to CM working remotely at [facility]. CM introduced self and explained role.     Patient resides in apartment in [facility]. At baseline, she is independent with ADLs and IADLs using her walker. Patient does not use any services/HHC at home. Patient has limited family involvement - only niece that lives in [place].     PT recommending U at this time - patient would like to stay as close to [place] or [facility] as possible. Referrals sent to:    Destination    Service Provider Request Status Selected Services Address Phone Fax Patient Preferred   [facility] ([facility])  Pending - Request Sent N/A [address] [id number] [phone]    [facility] (SNF)  Pending - Request Sent N/A [address] [id number] [phone] --   Current Capacity last updated by Stevie Lamb RN on 9/1/2023  3:01 PM    Has secure memory unit            [facility] ([facility])   Declined  Cannot meet patient's needs N/A [address] [id number] [phone] --   Current Capacity last updated by Lisseth Maravilla on 4/29/2024  3:28 PM    01/26/24: Per admissions, they have 100 people on their LTC waitlist., 04/26/24: Out of Network with [employer].                Next Steps: TCU - refs pending. Need ACO reach facility.    Transport: Agency       IQRA Vargas  Inpatient Care Coordinator   [facility] [phone]  [facility] [phone]

## 2024-08-30 ENCOUNTER — APPOINTMENT (OUTPATIENT)
Dept: PHYSICAL THERAPY | Facility: CLINIC | Age: 74
DRG: 470 | End: 2024-08-30
Attending: ORTHOPAEDIC SURGERY
Payer: MEDICARE

## 2024-08-30 VITALS
HEIGHT: 63 IN | TEMPERATURE: 98.5 F | BODY MASS INDEX: 30.48 KG/M2 | WEIGHT: 172 LBS | HEART RATE: 56 BPM | RESPIRATION RATE: 18 BRPM | SYSTOLIC BLOOD PRESSURE: 104 MMHG | DIASTOLIC BLOOD PRESSURE: 50 MMHG | OXYGEN SATURATION: 96 %

## 2024-08-30 LAB
GLUCOSE SERPL-MCNC: 115 MG/DL (ref 70–99)
HGB BLD-MCNC: 8.8 G/DL (ref 11.7–15.7)

## 2024-08-30 PROCEDURE — 82947 ASSAY GLUCOSE BLOOD QUANT: CPT | Performed by: ORTHOPAEDIC SURGERY

## 2024-08-30 PROCEDURE — 36415 COLL VENOUS BLD VENIPUNCTURE: CPT | Performed by: ORTHOPAEDIC SURGERY

## 2024-08-30 PROCEDURE — 250N000013 HC RX MED GY IP 250 OP 250 PS 637: Performed by: ORTHOPAEDIC SURGERY

## 2024-08-30 PROCEDURE — 97530 THERAPEUTIC ACTIVITIES: CPT | Mod: GP | Performed by: PHYSICAL THERAPIST

## 2024-08-30 PROCEDURE — 85018 HEMOGLOBIN: CPT | Performed by: ORTHOPAEDIC SURGERY

## 2024-08-30 RX ORDER — ACETAMINOPHEN 325 MG/1
975 TABLET ORAL EVERY 8 HOURS PRN
Qty: 100 TABLET | Refills: 0 | DISCHARGE
Start: 2024-08-30 | End: 2024-08-30

## 2024-08-30 RX ORDER — LOSARTAN POTASSIUM 50 MG/1
50 TABLET ORAL DAILY
Qty: 90 TABLET | Refills: 3 | Status: SHIPPED | OUTPATIENT
Start: 2024-08-30 | End: 2024-09-17

## 2024-08-30 RX ORDER — ASPIRIN 325 MG
325 TABLET, DELAYED RELEASE (ENTERIC COATED) ORAL 2 TIMES DAILY
Qty: 80 TABLET | Refills: 0 | DISCHARGE
Start: 2024-08-30 | End: 2024-08-30

## 2024-08-30 RX ORDER — NALOXONE HYDROCHLORIDE 0.4 MG/ML
0.4 INJECTION, SOLUTION INTRAMUSCULAR; INTRAVENOUS; SUBCUTANEOUS
Status: DISCONTINUED | OUTPATIENT
Start: 2024-08-30 | End: 2024-08-30 | Stop reason: HOSPADM

## 2024-08-30 RX ORDER — AMOXICILLIN 250 MG
1-2 CAPSULE ORAL 2 TIMES DAILY
Qty: 30 TABLET | Refills: 0 | DISCHARGE
Start: 2024-08-30 | End: 2024-08-30

## 2024-08-30 RX ORDER — CETIRIZINE HYDROCHLORIDE 10 MG/1
10 TABLET ORAL DAILY PRN
Status: DISCONTINUED | OUTPATIENT
Start: 2024-08-30 | End: 2024-08-30 | Stop reason: HOSPADM

## 2024-08-30 RX ORDER — ASPIRIN 325 MG
325 TABLET, DELAYED RELEASE (ENTERIC COATED) ORAL 2 TIMES DAILY
Qty: 80 TABLET | Refills: 0 | Status: SHIPPED | OUTPATIENT
Start: 2024-08-30

## 2024-08-30 RX ORDER — OXYCODONE HYDROCHLORIDE 5 MG/1
5-10 TABLET ORAL EVERY 4 HOURS PRN
Qty: 26 TABLET | Refills: 0 | Status: SHIPPED | DISCHARGE
Start: 2024-08-30 | End: 2024-08-30

## 2024-08-30 RX ORDER — OXYCODONE HYDROCHLORIDE 5 MG/1
5-10 TABLET ORAL EVERY 4 HOURS PRN
Qty: 26 TABLET | Refills: 0 | Status: SHIPPED | OUTPATIENT
Start: 2024-08-30 | End: 2024-09-04

## 2024-08-30 RX ORDER — ACETAMINOPHEN 325 MG/1
975 TABLET ORAL EVERY 8 HOURS PRN
Qty: 100 TABLET | Refills: 0 | Status: SHIPPED | OUTPATIENT
Start: 2024-08-30

## 2024-08-30 RX ORDER — NALOXONE HYDROCHLORIDE 0.4 MG/ML
0.2 INJECTION, SOLUTION INTRAMUSCULAR; INTRAVENOUS; SUBCUTANEOUS
Status: DISCONTINUED | OUTPATIENT
Start: 2024-08-30 | End: 2024-08-30 | Stop reason: HOSPADM

## 2024-08-30 RX ORDER — ACETAMINOPHEN 325 MG/1
650 TABLET ORAL EVERY 6 HOURS PRN
COMMUNITY
Start: 2024-08-30

## 2024-08-30 RX ORDER — AMOXICILLIN 250 MG
1-2 CAPSULE ORAL 2 TIMES DAILY
Qty: 30 TABLET | Refills: 0 | Status: SHIPPED | OUTPATIENT
Start: 2024-08-30 | End: 2024-09-17

## 2024-08-30 RX ORDER — ACETAMINOPHEN 325 MG/1
650 TABLET ORAL EVERY 6 HOURS PRN
Status: CANCELLED | OUTPATIENT
Start: 2024-08-30

## 2024-08-30 RX ORDER — ACETAMINOPHEN 325 MG/1
650 TABLET ORAL EVERY 6 HOURS PRN
COMMUNITY
Start: 2024-08-30 | End: 2024-08-30

## 2024-08-30 RX ADMIN — SENNOSIDES AND DOCUSATE SODIUM 1 TABLET: 8.6; 5 TABLET ORAL at 08:22

## 2024-08-30 RX ADMIN — FUROSEMIDE 20 MG: 20 TABLET ORAL at 08:22

## 2024-08-30 RX ADMIN — LOSARTAN POTASSIUM 50 MG: 50 TABLET, FILM COATED ORAL at 08:22

## 2024-08-30 RX ADMIN — POLYETHYLENE GLYCOL 3350 17 G: 17 POWDER, FOR SOLUTION ORAL at 08:22

## 2024-08-30 RX ADMIN — LEVOTHYROXINE SODIUM 112 MCG: 112 TABLET ORAL at 08:22

## 2024-08-30 RX ADMIN — ASPIRIN 325 MG: 325 TABLET, COATED ORAL at 08:22

## 2024-08-30 RX ADMIN — OXYCODONE HYDROCHLORIDE 5 MG: 5 TABLET ORAL at 02:00

## 2024-08-30 RX ADMIN — PANTOPRAZOLE SODIUM 40 MG: 40 TABLET, DELAYED RELEASE ORAL at 08:22

## 2024-08-30 RX ADMIN — GABAPENTIN 100 MG: 100 CAPSULE ORAL at 08:22

## 2024-08-30 RX ADMIN — ACETAMINOPHEN 975 MG: 325 TABLET ORAL at 06:28

## 2024-08-30 ASSESSMENT — ACTIVITIES OF DAILY LIVING (ADL)
ADLS_ACUITY_SCORE: 30

## 2024-08-30 NOTE — PROGRESS NOTES
S-(situation): Patient discharged to [facility] via [place] with     B-(background): S/P Right total hip arthroplasty, Direct anterior approach - on 8/28/2024     A-(assessment): A and Ox4. Wound dressing CDI. SBA with GBW to bathroom. Seen by ALMA Sandoval.  Last bowel movement: 8/27/2024     R-(recommendations):Report called to [facility]. Listed belongings gathered and sent with patient.     Discharge Nursing Criteria:     Care Plan and Patient education resolved: Yes    Vaccines  Influenza status verified at discharge:  Yes    Intentionally Retained Items No  Plan made for removal: Not Applicable    MISC  Home medications returned to patient: NA  Medication Bin checked and emptied on discharge Yes  All paperwork sent with patient/Copy of AVS given to patient or family Yes.

## 2024-08-30 NOTE — DISCHARGE SUMMARY
[facility] Discharge Summary    Glenda Bales MRN# [id number]   Age: 73 year old YOB: 1950     Date of Admission:  8/28/2024  Date of Discharge::  8/30/2024  Admitting Physician:  Meek Cifuentes MD  Discharge Physician:  Luis Carlson PA-C     Home clinic: [facility]          Admission Diagnoses:   Primary osteoarthritis of right hip [M16.11]          Discharge Diagnosis:     Primary osteoarthritis of right hip [M16.11]          Procedures:     Procedure(s): Procedure(s):  Right total hip arthroplasty, Direct anterior approach       No other procedures performed during this admission           Medications Prior to Admission:     Medications Prior to Admission   Medication Sig Dispense Refill Last Dose    ACE/ARB/ARNI NOT PRESCRIBED (INTENTIONAL) Please choose reason not prescribed from choices below.   8/30/2024 at n/a    alcohol swab prep pads Use to swab area of injection/milena as directed. 100 each 3 Past Week at am    atenolol (TENORMIN) 50 MG tablet Take 1 tablet (50 mg) by mouth 2 times daily (Patient taking differently: Take 50 mg by mouth 2 times daily. Am and 1800) 180 tablet 0 8/28/2024 at am    blood glucose (NO BRAND SPECIFIED) test strip Use to test blood sugar 1 times daily or as directed. 100 strip 11 Past Week at am    cetirizine (ZYRTEC) 10 MG tablet Take 10 mg by mouth daily as needed for allergies.   Past Week    fluticasone (FLONASE) 50 MCG/ACT spray Spray 1 spray into both nostrils daily as needed    8/27/2024 at pm    furosemide (LASIX) 20 MG tablet Take 1 tablet (20 mg) by mouth daily 90 tablet 3 8/27/2024 at am    gabapentin (NEURONTIN) 100 MG capsule Take 1 capsule (100 mg) by mouth 2 times daily (Patient taking differently: Take 100 mg by mouth 2 times daily. Am and 1800) 180 capsule 3 8/28/2024 at am    ketotifen (ZADITOR) 0.025 % ophthalmic solution Place 1 drop into both eyes 2 times daily as needed for itching   More than a month  at hs    levothyroxine (SYNTHROID/LEVOTHROID) 112 MCG tablet Take 1 tablet (112 mcg) by mouth daily 90 tablet 2 8/28/2024 at am    omeprazole (PRILOSEC) 20 MG DR capsule Take 1 capsule (20 mg) by mouth daily 90 capsule 3 8/28/2024 at am    [DISCONTINUED] acetaminophen (TYLENOL) 325 MG tablet Take 650 mg by mouth every 6 hours as needed for mild pain   Past Week at unkn    [DISCONTINUED] losartan (COZAAR) 50 MG tablet Take 1 tablet (50 mg) by mouth daily 90 tablet 3 8/27/2024 at am             Discharge Medications:     Current Discharge Medication List        START taking these medications    Details   aspirin (ASA) 325 MG EC tablet Take 1 tablet (325 mg) by mouth 2 times daily.  Qty: 80 tablet, Refills: 0    Associated Diagnoses: History of total right hip replacement      oxyCODONE (ROXICODONE) 5 MG tablet Take 1-2 tablets (5-10 mg) by mouth every 4 hours as needed for moderate to severe pain.  Qty: 26 tablet, Refills: 0    Associated Diagnoses: History of total right hip replacement      senna-docusate (SENOKOT-S/PERICOLACE) 8.6-50 MG tablet Take 1-2 tablets by mouth 2 times daily. Take while on oral narcotics to prevent or treat constipation.  Qty: 30 tablet, Refills: 0    Associated Diagnoses: History of total right hip replacement           CONTINUE these medications which have CHANGED    Details   !! acetaminophen (TYLENOL) 325 MG tablet Take 2 tablets (650 mg) by mouth every 6 hours as needed for mild pain.    Comments: Start taking this dose of Tylenol when the higher dose of Tylenol is no longer needed because of increased pain.      !! acetaminophen (TYLENOL) 325 MG tablet Take 3 tablets (975 mg) by mouth every 8 hours as needed for other (mild pain).  Qty: 100 tablet, Refills: 0    Associated Diagnoses: History of total right hip replacement      losartan (COZAAR) 50 MG tablet Take 1 tablet (50 mg) by mouth daily.  Qty: 90 tablet, Refills: 3    Comments: Hold at transitional care until blood pressures  are a bit higher and then can add on in the next day or 2  Associated Diagnoses: Benign hypertension with CKD (chronic kidney disease) stage III (H)       !! - Potential duplicate medications found. Please discuss with provider.        CONTINUE these medications which have NOT CHANGED    Details   ACE/ARB/ARNI NOT PRESCRIBED (INTENTIONAL) Please choose reason not prescribed from choices below.  Qty:      Associated Diagnoses: Type 2 diabetes mellitus with stage 3a chronic kidney disease, without long-term current use of insulin (H)      alcohol swab prep pads Use to swab area of injection/milena as directed.  Qty: 100 each, Refills: 3    Associated Diagnoses: Type 2 diabetes mellitus with hyperglycemia, without long-term current use of insulin (H)      atenolol (TENORMIN) 50 MG tablet Take 1 tablet (50 mg) by mouth 2 times daily  Qty: 180 tablet, Refills: 0    Associated Diagnoses: HTN, goal below 140/90      blood glucose (NO BRAND SPECIFIED) test strip Use to test blood sugar 1 times daily or as directed.  Qty: 100 strip, Refills: 11    Associated Diagnoses: Type 2 diabetes mellitus with stage 3a chronic kidney disease, without long-term current use of insulin (H)      cetirizine (ZYRTEC) 10 MG tablet Take 10 mg by mouth daily as needed for allergies.      fluticasone (FLONASE) 50 MCG/ACT spray Spray 1 spray into both nostrils daily as needed       furosemide (LASIX) 20 MG tablet Take 1 tablet (20 mg) by mouth daily  Qty: 90 tablet, Refills: 3    Associated Diagnoses: Venous stasis dermatitis, unspecified laterality      gabapentin (NEURONTIN) 100 MG capsule Take 1 capsule (100 mg) by mouth 2 times daily  Qty: 180 capsule, Refills: 3    Associated Diagnoses: Idiopathic peripheral neuropathy      ketotifen (ZADITOR) 0.025 % ophthalmic solution Place 1 drop into both eyes 2 times daily as needed for itching      levothyroxine (SYNTHROID/LEVOTHROID) 112 MCG tablet Take 1 tablet (112 mcg) by mouth daily  Qty: 90  tablet, Refills: 2    Associated Diagnoses: Acquired hypothyroidism      omeprazole (PRILOSEC) 20 MG DR capsule Take 1 capsule (20 mg) by mouth daily  Qty: 90 capsule, Refills: 3    Associated Diagnoses: Gastroesophageal reflux disease, unspecified whether esophagitis present                   Consultations:   Physical Therapy/Occupational Therapy/Case Management            Brief History of Illness:   Reason for admission requiring a surgical or invasive procedure:   Primary osteoarthritis of right hip [M16.11]   The patient underwent the following procedure(s):   Procedure(s):  Right total hip arthroplasty, Direct anterior approach   There were no immediate complications during this procedure.    Please refer to the full operative summary for details.           Hospital Course:   This patient was admitted for the above diagnosis to under go the above procedure.  Postoperatively she did very well with no apparent complications, and she had an uneventful hospital stay. Antibiotics were given for 24 hours after surgery. She was given Asprin and leg pumps for DVT prophylaxis and her pain was well controlled with IV pain meds, then transitioned to oral pain medications during her hospital stay. The patient was followed by the Orthopedic team and on postoperative day 1 CMS intact other than some decrease sensation on the lateral hip/dressing were intact.  She progressed well with physical therapy and discharge to TCU was recommended secondary to her having no one to take care of her at her place and her niece having multiple stairs in a split-level.  Postop day 2 patient doing well CMS intact other than decreased sensation on lateral hip and dressing intact with no shadowing.  Patient stayed till postop day 2 secondary to dispo.  Case management was working diligently on facilitating transitional care placement.  They were able to secure [facility] as a transitional [facility].  Patient was cleared for discharge postop  day 2.  No new complaints presented during her hospital stay.        Discharge Instructions and Follow-Up:     Discharge diet: Pre-procedure diet or regular   Discharge activity: Activity as tolerated  Driving restricitIons: no driving while taking narcotic analgesics  Weight bearing status: Weight bearing as tolerated    Discharge follow-up: Follow up with Meek Cifuentes MD in 14 days, for wound check   Wound care: Keep the Aquacel (surgical) dressing on for 10 days after surgery, on the 10th day you can remove the dressing and apply gauze and tape.  Change that dressing once per day until your clinic follow up.   Anticoagulation:           Asprin 325mg BID x 6 weeks   Hip Precautions:              No Adduction past midline x 6 weeks.            Discharge Disposition:     Discharged to transitional care [facility]      Attestation:  Total time: 45 minutes    Luis Carlson PA-C

## 2024-08-30 NOTE — PLAN OF CARE
Occupational Therapy Discharge Summary    Reason for therapy discharge:    Discharged to transitional care facility.    Progress towards therapy goal(s). See goals on Care Plan in [facility] electronic health record for goal details.  Goals partially met.  Barriers to achieving goals:   discharge from facility.    Therapy recommendation(s):    Continued therapy is recommended.  Rationale/Recommendations:  Continue to progress IND with ADLs prior to returning home.    Thank you for the referral.   BECKY Guidry/L   [facility]  Email: [email or web address]  Phone: [phone]

## 2024-08-30 NOTE — PROGRESS NOTES
"[facility] Orthopedics    Progress note    73 year old female POD#2 s/p right total Hip Arthroplasty, Direct Anterior Approach by Dr. Cifuentes  S: Patient seen at bedside in no apparent distress, alert and pleasant.  I discussed surgery, rehabilitation and discharge at length with the patient.  She denies any major pain issues.  She has noticed on the lateral thigh some decrease sensation and tingling.  We discussed this could be from a superficial nerve, it has not changed or function.  Discussed at length discharge to TCU hopefully today.  I recommended talking to social work about transfer and what her insurance covers and if her niece will need transport her.  She understands.  O: CMS intact RLE, DF/PF intact and 5 out of 5 strength.       Aquacel dressing on, clean and dry with a good seal with no shadowing       Right hip with moderate swelling but no erythema or ecchymosis        Bilateral calves soft and non tender    Vital signs:  Temp: 97.8  F (36.6  C) Temp src: Oral BP: 111/53 Pulse: 53   Resp: 19 SpO2: 98 % O2 Device: None (Room air) Oxygen Delivery: 1 LPM Height: 159.4 cm (5' 2.76\") Weight: 78 kg (172 lb)  Estimated body mass index is 30.7 kg/m  as calculated from the following:    Height as of this encounter: 1.594 m (5' 2.76\").    Weight as of this encounter: 78 kg (172 lb).      Hemoglobin   Date Value Ref Range Status   08/30/2024 8.8 (L) 11.7 - 15.7 g/dL Final   05/19/2021 14.6 11.7 - 15.7 g/dL Final     No results found for: \"INR\"      A/P:  1. Pain-controlled with Tylenol, oxycodone 5 mg.  10 mg of oxycodone is available but not taken yet.  2. DVT Prophylaxis-Asprin 325mg BID x 6 weeks   3. Weight Bearing Status-WBAT right LE  4. Hip Precautions-Direct Anterior Hip Precautions x 6 weeks (no adduction past neutral).  5. Physical Therapy-recommending transitional care  6. Occupational Therapy-recommending transitional care since patient's home situation would not allow her to have " 24/7 supervision.  7. Case Management-transitional care placement either at [facility] versus [facility].  Message sent to social work team this morning.  8. Incision-no signs or symptoms of infection.  Aquacel on intact and clean.  9. Plan-anticipate discharge to transitional care today.  All discharge orders in and medications reconciled just waiting for final meds depending on which transitional care the patient will go to.  Follow-up with Dr. Cifuentes on 9/16/2024.  Discussed plan with charge RN, RN, therapy.  Update message sent to Dr. Cifuentes.    Luis Carlson PA-C  8/30/2024

## 2024-08-30 NOTE — PLAN OF CARE
Goal Outcome Evaluation:      Plan of Care Reviewed With: patient    Overall Patient Progress: improvingOverall Patient Progress: improving     VSS on RA. Pt ambulated in halls x3 SBA GBW. CMS intact but c/o numbness to R hip, scheduled gabapentin given. Oxycodone given x2. Pending placement.

## 2024-08-30 NOTE — PLAN OF CARE
Physical Therapy Discharge Summary    Reason for therapy discharge:    Discharged to transitional care facility.    Progress towards therapy goal(s). See goals on Care Plan in [facility] electronic health record for goal details.  Goals partially met.  Barriers to achieving goals:   discharge from facility.    Therapy recommendation(s):    Continued therapy is recommended.  Rationale/Recommendations:   .Patient would benefit from continued skilled therapeutic intervention in order to progress them towards a higher level of function in accordance with their surgeon's protocol.        Thank you for your referral.  Marcela Jones, PT, DPT, ATC, [facility]  O: [phone]  E: [email or web address]

## 2024-08-30 NOTE — PROGRESS NOTES
Care Management Discharge Note    Discharge Date: 08/31/2024     Discharge Disposition: Transitional Care--[facility]     Discharge Services: RN, PT/OT    Discharge DME: None    Discharge Transportation: agency:     Private pay costs discussed: transportation costs    Does the patient's insurance plan have a 3 day qualifying hospital stay waiver?  Yes     Which insurance plan 3 day waiver is available? ACO REACH    Will the waiver be used for post-acute placement? Yes    PAS Confirmation Code:    Patient/family educated on Medicare website which has current facility and service quality ratings: yes    Education Provided on the Discharge Plan: Yes  Persons Notified of Discharge Plans: Patient   Patient/Family in Agreement with the Plan: yes    Handoff Referral Completed: Yes, LANCE PCP: Internal handoff referral completed    Additional Information:  Update received update from Provider this am. Informed Pt is medically ready for discharge today once a bed has been secured.       CM assisted with placement by calling referrals, searching for an available bed today within the ACO program.     Call received from Richard Saenz's Director at [facility] -private room available today.     CM spoke with the Pt. Discussed acceptance and transport options.   Pt verbalized approval with plan.     Coordinated a private pay transport through [employer]   time at 1:15pm.     Pt reported no further questions or concerns        PLAN: Discharge to [facility]   Main Phone: [phone]   Admissions Phone: [phone]   Fax: [phone]    [employer] Transport via wheelchair at 1:15pm         IQRA Norman  [facility] [phone]   [facility]  [phone]

## 2024-09-01 ENCOUNTER — DOCUMENTATION ONLY (OUTPATIENT)
Dept: GERIATRICS | Facility: CLINIC | Age: 74
End: 2024-09-01
Payer: MEDICARE

## 2024-09-03 ENCOUNTER — PATIENT OUTREACH (OUTPATIENT)
Dept: CARE COORDINATION | Facility: CLINIC | Age: 74
End: 2024-09-03
Payer: MEDICARE

## 2024-09-03 ASSESSMENT — ACTIVITIES OF DAILY LIVING (ADL): DEPENDENT_IADLS:: INDEPENDENT

## 2024-09-03 NOTE — LETTER
[facility]   To:   [facility]          Please give to facility    From: Care Coordinator with [facility]     Patient Name:  Glenda Bales YOB: 1950   Admit date: 8/30/24      *Information Needed:  Please contact me when the patient will discharge (or if they will move to long term care)- include the discharge date, disposition, and main diagnosis. We work with patients after discharge from their primary care clinic setting.   If the patient is discharged with home care services, please provide the name of the agency    Ok to Phone or Email with information       Thank You,   Chapin Noyola MSN, RN, PHN, [facility]  Clinic Care Coordinator  [facility], and Critical access hospital  [email or web address]  [email or web address]   Office: [phone]

## 2024-09-04 ENCOUNTER — LAB REQUISITION (OUTPATIENT)
Dept: LAB | Facility: CLINIC | Age: 74
End: 2024-09-04
Payer: MEDICARE

## 2024-09-04 DIAGNOSIS — Z11.1 ENCOUNTER FOR SCREENING FOR RESPIRATORY TUBERCULOSIS: ICD-10-CM

## 2024-09-04 DIAGNOSIS — Z96.641 HISTORY OF TOTAL RIGHT HIP REPLACEMENT: ICD-10-CM

## 2024-09-04 PROCEDURE — 86481 TB AG RESPONSE T-CELL SUSP: CPT | Performed by: FAMILY MEDICINE

## 2024-09-04 PROCEDURE — 36415 COLL VENOUS BLD VENIPUNCTURE: CPT | Performed by: FAMILY MEDICINE

## 2024-09-04 PROCEDURE — P9604 ONE-WAY ALLOW PRORATED TRIP: HCPCS | Performed by: FAMILY MEDICINE

## 2024-09-04 RX ORDER — OXYCODONE HYDROCHLORIDE 5 MG/1
5-10 TABLET ORAL EVERY 4 HOURS PRN
Qty: 90 TABLET | Refills: 0 | Status: SHIPPED | OUTPATIENT
Start: 2024-09-04 | End: 2024-09-09

## 2024-09-05 ENCOUNTER — DOCUMENTATION ONLY (OUTPATIENT)
Dept: OTHER | Facility: CLINIC | Age: 74
End: 2024-09-05
Payer: MEDICARE

## 2024-09-05 LAB
QUANTIFERON MITOGEN: 3.61 IU/ML
QUANTIFERON NIL TUBE: 0 IU/ML
QUANTIFERON TB1 TUBE: 0 IU/ML
QUANTIFERON TB2 TUBE: 0

## 2024-09-06 ENCOUNTER — TRANSITIONAL CARE UNIT VISIT (OUTPATIENT)
Dept: GERIATRICS | Facility: CLINIC | Age: 74
End: 2024-09-06
Payer: MEDICARE

## 2024-09-06 VITALS
WEIGHT: 174.8 LBS | OXYGEN SATURATION: 96 % | SYSTOLIC BLOOD PRESSURE: 110 MMHG | BODY MASS INDEX: 31.2 KG/M2 | DIASTOLIC BLOOD PRESSURE: 60 MMHG | RESPIRATION RATE: 14 BRPM | TEMPERATURE: 97.2 F | HEART RATE: 88 BPM

## 2024-09-06 DIAGNOSIS — E11.22 TYPE 2 DIABETES MELLITUS WITH STAGE 3B CHRONIC KIDNEY DISEASE, WITHOUT LONG-TERM CURRENT USE OF INSULIN (H): ICD-10-CM

## 2024-09-06 DIAGNOSIS — Z47.89 AFTERCARE FOLLOWING SURGERY OF THE MUSCULOSKELETAL SYSTEM: ICD-10-CM

## 2024-09-06 DIAGNOSIS — E03.9 ACQUIRED HYPOTHYROIDISM: ICD-10-CM

## 2024-09-06 DIAGNOSIS — D62 ANEMIA DUE TO BLOOD LOSS, ACUTE: ICD-10-CM

## 2024-09-06 DIAGNOSIS — N18.32 TYPE 2 DIABETES MELLITUS WITH STAGE 3B CHRONIC KIDNEY DISEASE, WITHOUT LONG-TERM CURRENT USE OF INSULIN (H): ICD-10-CM

## 2024-09-06 DIAGNOSIS — R53.81 PHYSICAL DECONDITIONING: ICD-10-CM

## 2024-09-06 DIAGNOSIS — M16.11 PRIMARY OSTEOARTHRITIS OF RIGHT HIP: Primary | ICD-10-CM

## 2024-09-06 DIAGNOSIS — K21.9 GASTROESOPHAGEAL REFLUX DISEASE WITHOUT ESOPHAGITIS: ICD-10-CM

## 2024-09-06 DIAGNOSIS — Z96.641 HISTORY OF TOTAL RIGHT HIP REPLACEMENT: ICD-10-CM

## 2024-09-06 LAB
GAMMA INTERFERON BACKGROUND BLD IA-ACNC: 0 IU/ML
M TB IFN-G BLD-IMP: NEGATIVE
M TB IFN-G CD4+ BCKGRND COR BLD-ACNC: 3.61 IU/ML
MITOGEN IGNF BCKGRD COR BLD-ACNC: 0 IU/ML
MITOGEN IGNF BCKGRD COR BLD-ACNC: 0 IU/ML

## 2024-09-06 PROCEDURE — 99309 SBSQ NF CARE MODERATE MDM 30: CPT | Performed by: NURSE PRACTITIONER

## 2024-09-06 NOTE — LETTER
9/6/2024      Glenda Bales  [address]   [address]  [place] [address]        [facility]  PRIMARY CARE PROVIDER AND CLINIC:  Marysol Cisse MD, [address] [facility] / [place] [id number]  Chief Complaint   Patient presents with     Hospital F/U      [facility] Record Number:  [id number]  Place of Service where encounter took place:  [facility] (TCU) [[id number]]    Glenda Bales  is a 73 year old  (1950), admitted to the above facility from  [facility]. Hospital stay 8/28/2024 through 8/30/2024.    HPI:    This is 73-year-old female, with a past medical history significant for right hip osteoarthritis, type 2 diabetes mellitus, hyperlipidemia, hypertension, GERD, and acquired hypothyroidism, who was admitted to [facility] 8/28/24 through 8/30/24 for a right total hip arthroplasty. Hemoglobin 10.7 8/29/24 -> 8.8 8/30/24. A TCU stay was recommended for physical rehabilitation as patient lives home alone.    Today, Glenda is sitting in her recliner chair in her room. States she's feeling pins and needles in her right thigh. Otherwise not having much pain. Has been using pain medications more at night. Lives alone. Would like to discharge by the end of next week. Has an Ortho follow-up appointment for that she would prefer to drive herself to. Does not want to pay for medical transport. Having bowel movements. States her blood pressure was low in the hospital and Atenolol and Losartan were held. Questions if she is still receiving these in the TCU.     CODE STATUS/ADVANCE DIRECTIVES DISCUSSION:  Prior  DNR / DNI  ALLERGIES:   Allergies   Allergen Reactions     Diltiazem Hcl Swelling     Leg swelling     Norvasc [Amlodipine] Swelling     Leg swelling     Seasonal Allergies Other (See Comments)     Eye nose throat irritation      PAST MEDICAL HISTORY:   Past Medical History:   Diagnosis Date     Cataract       Diabetes (H) April 2019     Essential hypertension, benign      Nonrheumatic aortic valve insufficiency      Osteoarthritis of knee, unspecified laterality, unspecified osteoarthritis type      Thyroid disease       PAST SURGICAL HISTORY:   has a past surgical history that includes no history of surgery; cataract iol, rt/lt (Right, 12/05/2018); cataract iol, rt/lt (Left, 12/19/2018); Dilation and curettage suction; Dilation and curettage suction; and Arthroplasty Hip Anterior (Right, 8/28/2024).  FAMILY HISTORY: family history includes Coronary Artery Disease in her father; Diabetes in her mother; Glaucoma in her cousin.  SOCIAL HISTORY:   reports that she quit smoking about 47 years ago. Her smoking use included cigarettes. She has been exposed to tobacco smoke. She has never used smokeless tobacco. She reports that she does not currently use alcohol. She reports that she does not use drugs.  Patient's living condition: lives alone    Post Discharge Medication Reconciliation Status:   MED REC REQUIRED  Post Medication Reconciliation Status: discharge medications reconciled, continue medications without change       Current Outpatient Medications   Medication Sig Dispense Refill     ACE/ARB/ARNI NOT PRESCRIBED (INTENTIONAL) Please choose reason not prescribed from choices below.       acetaminophen (TYLENOL) 325 MG tablet Take 2 tablets (650 mg) by mouth every 6 hours as needed for mild pain.       acetaminophen (TYLENOL) 325 MG tablet Take 3 tablets (975 mg) by mouth every 8 hours as needed for other (mild pain). 100 tablet 0     alcohol swab prep pads Use to swab area of injection/milena as directed. 100 each 3     aspirin (ASA) 325 MG EC tablet Take 1 tablet (325 mg) by mouth 2 times daily. 80 tablet 0     atenolol (TENORMIN) 50 MG tablet Take 1 tablet (50 mg) by mouth 2 times daily (Patient taking differently: Take 50 mg by mouth 2 times daily. Am and 1800) 180 tablet 0     blood glucose (NO BRAND SPECIFIED) test  strip Use to test blood sugar 1 times daily or as directed. 100 strip 11     cetirizine (ZYRTEC) 10 MG tablet Take 10 mg by mouth daily as needed for allergies.       fluticasone (FLONASE) 50 MCG/ACT spray Spray 1 spray into both nostrils daily as needed        furosemide (LASIX) 20 MG tablet Take 1 tablet (20 mg) by mouth daily 90 tablet 3     gabapentin (NEURONTIN) 100 MG capsule Take 1 capsule (100 mg) by mouth 2 times daily (Patient taking differently: Take 100 mg by mouth 2 times daily. Am and 1800) 180 capsule 3     ketotifen (ZADITOR) 0.025 % ophthalmic solution Place 1 drop into both eyes 2 times daily as needed for itching       levothyroxine (SYNTHROID/LEVOTHROID) 112 MCG tablet Take 1 tablet (112 mcg) by mouth daily 90 tablet 2     losartan (COZAAR) 50 MG tablet Take 1 tablet (50 mg) by mouth daily. 90 tablet 3     omeprazole (PRILOSEC) 20 MG DR capsule Take 1 capsule (20 mg) by mouth daily 90 capsule 3     oxyCODONE (ROXICODONE) 5 MG tablet Take 1-2 tablets (5-10 mg) by mouth every 4 hours as needed for moderate to severe pain. 90 tablet 0     senna-docusate (SENOKOT-S/PERICOLACE) 8.6-50 MG tablet Take 1-2 tablets by mouth 2 times daily. Take while on oral narcotics to prevent or treat constipation. 30 tablet 0     No current facility-administered medications for this visit.     ROS:  4 point ROS including Respiratory, CV, GI and , other than that noted in the HPI,  is negative    Vitals:  /60   Pulse 88   Temp 97.2  F (36.2  C)   Resp 14   Wt 79.3 kg (174 lb 12.8 oz)   SpO2 96%   BMI 31.20 kg/m    Exam:  GENERAL APPEARANCE:  Alert, in no distress  ENT:  Mouth and posterior oropharynx normal, moist mucous membranes  EYES:  EOM, conjunctivae, lids, pupils and irises normal  RESP:  respiratory effort and palpation of chest normal, lungs clear to auscultation , no respiratory distress  CV:  regular rate and rhythm, no murmur, rub, or gallop  ABDOMEN:  normal bowel sounds, soft, nontender, no  hepatosplenomegaly or other masses  M/S:   Trace edema in RLE  SKIN:  Aquacel dressing in place on right hip  NEURO:   Cranial nerves 2-12 are normal tested and grossly at patient's baseline  PSYCH:  affect and mood normal    Lab/Diagnostic data:  Labs done in SNF are in [facility]. Please refer to them using [employer] Everywhere.    ASSESSMENT/PLAN:  Right Hip Osteoarthritis S/P Right Total Hip Arthroplasty 8/28/24. Follow-up with Ortho 9/16/24 as scheduled. No adduction past midline x 6 weeks. Aquacel dressing to remain in place for 10 days after surgery on 9/7/24. Aspirin 325 mg PO BID x 6 weeks for DVT prophylaxis. Acetaminophen and Oxycodone order for pain. Prefers not to have scheduled Acetaminophen at this time. Physical and Occupational Therapy ordered.    Acute Blood Loss Anemia. Secondary to above. Hemoglobin 10.7 8/29/24 -> 8.8 8/30/24.  Depending on length of stay, repeat CBC to ensure stability.    Type 2 Diabetes Mellitus. Last A1C 5.7 8/12/24. Diet-controlled. Monitor blood sugars daily for now. If stable, can discontinue blood glucose checks.     Chronic Kidney Disease Stage III. Recent baseline Creatinine ~ 1.5. Last Creatinine 1.69 8/29/24. Monitor periodically.     Acquired Hypothyroidism. Last TSH 4.36 1/31/24. Continue Levothyroxine as ordered.    Hypertension. With hypotension in the hospital. Continue PTA Atenolol and Losartan as ordered.    Neuropathy. Continue Gabapentin as ordered.    Allergic Rhinitis. Continue Cetirizine, Ketotifen, and Fluticasone as ordered.    Gastroesophageal Reflux Disease. Continue Pantoprazole as ordered.    Lower Extremity Edema with Venous Stasis Dermatitis. Continue Furosemide as ordered.    Constipation. Continue Senna-S as ordered.    Physical Deconditioning. Secondary to recent surgery and co-morbidities. Physical and Occupational Therapy ordered.    Orders:  None    Electronically signed by:  EDILBERTO Bran CNP                    Sincerely,        EDILBERTO Bran CNP

## 2024-09-06 NOTE — PROGRESS NOTES
[facility] GERIATRICS  PRIMARY CARE PROVIDER AND CLINIC:  Marysol Cisse MD, [address] / [place] [address]  Chief Complaint   Patient presents with    Hospital F/U      Rena Lara Medical Record Number:  [id number]  Place of Service where encounter took place:  [facility] (TCU) [[id number]]    Glenda Bales  is a 73 year old  (1950), admitted to the above facility from  [facility]. Hospital stay 8/28/2024 through 8/30/2024.    HPI:    This is 73-year-old female, with a past medical history significant for right hip osteoarthritis, type 2 diabetes mellitus, hyperlipidemia, hypertension, GERD, and acquired hypothyroidism, who was admitted to [facility] 8/28/24 through 8/30/24 for a right total hip arthroplasty. Hemoglobin 10.7 8/29/24 -> 8.8 8/30/24. A TCU stay was recommended for physical rehabilitation as patient lives home alone.    Today, Glenda is sitting in her recliner chair in her room. States she's feeling pins and needles in her right thigh. Otherwise not having much pain. Has been using pain medications more at night. Lives alone. Would like to discharge by the end of next week. Has an Ortho follow-up appointment for that she would prefer to drive herself to. Does not want to pay for medical transport. Having bowel movements. States her blood pressure was low in the hospital and Atenolol and Losartan were held. Questions if she is still receiving these in the TCU.     CODE STATUS/ADVANCE DIRECTIVES DISCUSSION:  Prior  DNR / DNI  ALLERGIES:   Allergies   Allergen Reactions    Diltiazem Hcl Swelling     Leg swelling    Norvasc [Amlodipine] Swelling     Leg swelling    Seasonal Allergies Other (See Comments)     Eye nose throat irritation      PAST MEDICAL HISTORY:   Past Medical History:   Diagnosis Date    Cataract     Diabetes (H) April 2019    Essential hypertension, benign     Nonrheumatic aortic valve  insufficiency     Osteoarthritis of knee, unspecified laterality, unspecified osteoarthritis type     Thyroid disease       PAST SURGICAL HISTORY:   has a past surgical history that includes no history of surgery; cataract iol, rt/lt (Right, 12/05/2018); cataract iol, rt/lt (Left, 12/19/2018); Dilation and curettage suction; Dilation and curettage suction; and Arthroplasty Hip Anterior (Right, 8/28/2024).  FAMILY HISTORY: family history includes Coronary Artery Disease in her father; Diabetes in her mother; Glaucoma in her cousin.  SOCIAL HISTORY:   reports that she quit smoking about 47 years ago. Her smoking use included cigarettes. She has been exposed to tobacco smoke. She has never used smokeless tobacco. She reports that she does not currently use alcohol. She reports that she does not use drugs.  Patient's living condition: lives alone    Post Discharge Medication Reconciliation Status:   MED REC REQUIRED  Post Medication Reconciliation Status: discharge medications reconciled, continue medications without change       Current Outpatient Medications   Medication Sig Dispense Refill    ACE/ARB/ARNI NOT PRESCRIBED (INTENTIONAL) Please choose reason not prescribed from choices below.      acetaminophen (TYLENOL) 325 MG tablet Take 2 tablets (650 mg) by mouth every 6 hours as needed for mild pain.      acetaminophen (TYLENOL) 325 MG tablet Take 3 tablets (975 mg) by mouth every 8 hours as needed for other (mild pain). 100 tablet 0    alcohol swab prep pads Use to swab area of injection/milena as directed. 100 each 3    aspirin (ASA) 325 MG EC tablet Take 1 tablet (325 mg) by mouth 2 times daily. 80 tablet 0    atenolol (TENORMIN) 50 MG tablet Take 1 tablet (50 mg) by mouth 2 times daily (Patient taking differently: Take 50 mg by mouth 2 times daily. Am and 1800) 180 tablet 0    blood glucose (NO BRAND SPECIFIED) test strip Use to test blood sugar 1 times daily or as directed. 100 strip 11    cetirizine (ZYRTEC) 10  MG tablet Take 10 mg by mouth daily as needed for allergies.      fluticasone (FLONASE) 50 MCG/ACT spray Spray 1 spray into both nostrils daily as needed       furosemide (LASIX) 20 MG tablet Take 1 tablet (20 mg) by mouth daily 90 tablet 3    gabapentin (NEURONTIN) 100 MG capsule Take 1 capsule (100 mg) by mouth 2 times daily (Patient taking differently: Take 100 mg by mouth 2 times daily. Am and 1800) 180 capsule 3    ketotifen (ZADITOR) 0.025 % ophthalmic solution Place 1 drop into both eyes 2 times daily as needed for itching      levothyroxine (SYNTHROID/LEVOTHROID) 112 MCG tablet Take 1 tablet (112 mcg) by mouth daily 90 tablet 2    losartan (COZAAR) 50 MG tablet Take 1 tablet (50 mg) by mouth daily. 90 tablet 3    omeprazole (PRILOSEC) 20 MG DR capsule Take 1 capsule (20 mg) by mouth daily 90 capsule 3    oxyCODONE (ROXICODONE) 5 MG tablet Take 1-2 tablets (5-10 mg) by mouth every 4 hours as needed for moderate to severe pain. 90 tablet 0    senna-docusate (SENOKOT-S/PERICOLACE) 8.6-50 MG tablet Take 1-2 tablets by mouth 2 times daily. Take while on oral narcotics to prevent or treat constipation. 30 tablet 0     No current facility-administered medications for this visit.     ROS:  4 point ROS including Respiratory, CV, GI and , other than that noted in the HPI,  is negative    Vitals:  /60   Pulse 88   Temp 97.2  F (36.2  C)   Resp 14   Wt 79.3 kg (174 lb 12.8 oz)   SpO2 96%   BMI 31.20 kg/m    Exam:  GENERAL APPEARANCE:  Alert, in no distress  ENT:  Mouth and posterior oropharynx normal, moist mucous membranes  EYES:  EOM, conjunctivae, lids, pupils and irises normal  RESP:  respiratory effort and palpation of chest normal, lungs clear to auscultation , no respiratory distress  CV:  regular rate and rhythm, no murmur, rub, or gallop  ABDOMEN:  normal bowel sounds, soft, nontender, no hepatosplenomegaly or other masses  M/S:   Trace edema in RLE  SKIN:  Aquacel dressing in place on right  hip  NEURO:   Cranial nerves 2-12 are normal tested and grossly at patient's baseline  PSYCH:  affect and mood normal    Lab/Diagnostic data:  Labs done in SNF are in [facility]. Please refer to them using [email or web address] Everywhere.    ASSESSMENT/PLAN:  Right Hip Osteoarthritis S/P Right Total Hip Arthroplasty 8/28/24. Follow-up with Ortho 9/16/24 as scheduled. No adduction past midline x 6 weeks. Aquacel dressing to remain in place for 10 days after surgery on 9/7/24. Aspirin 325 mg PO BID x 6 weeks for DVT prophylaxis. Acetaminophen and Oxycodone order for pain. Prefers not to have scheduled Acetaminophen at this time. Physical and Occupational Therapy ordered.    Acute Blood Loss Anemia. Secondary to above. Hemoglobin 10.7 8/29/24 -> 8.8 8/30/24.  Depending on length of stay, repeat CBC to ensure stability.    Type 2 Diabetes Mellitus. Last A1C 5.7 8/12/24. Diet-controlled. Monitor blood sugars daily for now. If stable, can discontinue blood glucose checks.     Chronic Kidney Disease Stage III. Recent baseline Creatinine ~ 1.5. Last Creatinine 1.69 8/29/24. Monitor periodically.     Acquired Hypothyroidism. Last TSH 4.36 1/31/24. Continue Levothyroxine as ordered.    Hypertension. With hypotension in the hospital. Continue PTA Atenolol and Losartan as ordered.    Neuropathy. Continue Gabapentin as ordered.    Allergic Rhinitis. Continue Cetirizine, Ketotifen, and Fluticasone as ordered.    Gastroesophageal Reflux Disease. Continue Pantoprazole as ordered.    Lower Extremity Edema with Venous Stasis Dermatitis. Continue Furosemide as ordered.    Constipation. Continue Senna-S as ordered.    Physical Deconditioning. Secondary to recent surgery and co-morbidities. Physical and Occupational Therapy ordered.    Orders:  None    Electronically signed by:  EDILBERTO Bran CNP

## 2024-09-09 RX ORDER — OXYCODONE HYDROCHLORIDE 5 MG/1
5-10 TABLET ORAL EVERY 4 HOURS PRN
Qty: 60 TABLET | Refills: 0 | Status: SHIPPED | OUTPATIENT
Start: 2024-09-09

## 2024-09-13 ENCOUNTER — TRANSITIONAL CARE UNIT VISIT (OUTPATIENT)
Dept: GERIATRICS | Facility: CLINIC | Age: 74
End: 2024-09-13
Payer: MEDICARE

## 2024-09-13 ENCOUNTER — DOCUMENTATION ONLY (OUTPATIENT)
Dept: OTHER | Facility: CLINIC | Age: 74
End: 2024-09-13

## 2024-09-13 VITALS
DIASTOLIC BLOOD PRESSURE: 46 MMHG | HEART RATE: 65 BPM | BODY MASS INDEX: 31.65 KG/M2 | SYSTOLIC BLOOD PRESSURE: 92 MMHG | OXYGEN SATURATION: 96 % | HEIGHT: 62 IN | WEIGHT: 172 LBS | TEMPERATURE: 97.2 F | RESPIRATION RATE: 16 BRPM

## 2024-09-13 DIAGNOSIS — Z47.89 AFTERCARE FOLLOWING SURGERY OF THE MUSCULOSKELETAL SYSTEM: Primary | ICD-10-CM

## 2024-09-13 DIAGNOSIS — E03.9 ACQUIRED HYPOTHYROIDISM: ICD-10-CM

## 2024-09-13 DIAGNOSIS — D62 ANEMIA DUE TO BLOOD LOSS, ACUTE: ICD-10-CM

## 2024-09-13 DIAGNOSIS — N18.32 TYPE 2 DIABETES MELLITUS WITH STAGE 3B CHRONIC KIDNEY DISEASE, WITHOUT LONG-TERM CURRENT USE OF INSULIN (H): ICD-10-CM

## 2024-09-13 DIAGNOSIS — R53.81 PHYSICAL DECONDITIONING: ICD-10-CM

## 2024-09-13 DIAGNOSIS — E11.22 TYPE 2 DIABETES MELLITUS WITH STAGE 3B CHRONIC KIDNEY DISEASE, WITHOUT LONG-TERM CURRENT USE OF INSULIN (H): ICD-10-CM

## 2024-09-13 DIAGNOSIS — Z96.641 HISTORY OF TOTAL RIGHT HIP REPLACEMENT: ICD-10-CM

## 2024-09-13 PROCEDURE — 99316 NF DSCHRG MGMT 30 MIN+: CPT | Performed by: NURSE PRACTITIONER

## 2024-09-13 NOTE — LETTER
9/13/2024      Glenda Bales  [address]   [address]  [facility] [place] [address]        [facility] GERIATRICS DISCHARGE SUMMARY  PATIENT'S NAME: Glenda Bales  YOB: 1950  MEDICAL RECORD NUMBER:  [id number]  Place of Service where encounter took place:  [facility] (TCU) [[id number]]    PRIMARY CARE PROVIDER AND CLINIC RESPONSIBLE AFTER TRANSFER:   Marysol Cisse MD, [address] / [place] [address]    [facility] Provider     Transferring providers: EDILBERTO Bran CNP, Suresh Alegria MD  Recent Hospitalization/ED:  Hospital  [facility] stay 8/28/24 to 8/30/24.  Date of SNF Admission:  8/30/24  Date of SNF (anticipated) Discharge:  9/14/24  Discharged to: previous independent home  Cognitive Scores: BIMS 15/15  Physical Function: Independent with mobility with FWW. Standing tolerance ~ 5 minutes.  DME: No new DME needed    CODE STATUS/ADVANCE DIRECTIVES DISCUSSION:  Prior   ALLERGIES: Diltiazem hcl, Norvasc [amlodipine], and Seasonal allergies    NURSING FACILITY COURSE   This is 73-year-old female, with a past medical history significant for right hip osteoarthritis, type 2 diabetes mellitus, hyperlipidemia, hypertension, GERD, and acquired hypothyroidism, who was admitted to [facility] 8/28/24 through 8/30/24 for a right total hip arthroplasty. Hemoglobin 10.7 8/29/24 -> 8.8 8/30/24. A TCU stay was recommended for physical rehabilitation as patient lives home alone.     Right Hip Osteoarthritis S/P Right Total Hip Arthroplasty 8/28/24. Follow-up with Ortho 9/16/24 as scheduled. No adduction past midline x 6 weeks. Discharge summary notes Aquacel dressing to be removed at day 10, but patient felt this may be optional based on material she had read. Would like to keep Aquacel dressing in place until Ortho follow-up. Aspirin 325 mg PO BID x 6 weeks for DVT prophylaxis. Acetaminophen and Oxycodone order for  pain. No home therapies will be ordered.     Acute Blood Loss Anemia. Secondary to above. Hemoglobin 10.7 8/29/24 -> 8.8 8/30/24.  Did not repeat Hemoglobin during TCU stay due to brief stay.     Type 2 Diabetes Mellitus. Last A1C 5.7 8/12/24. Diet-controlled. Blood sugars monitored daily while in TCU and consistently < 150.     Chronic Kidney Disease Stage III. Recent baseline Creatinine ~ 1.5. Last Creatinine 1.69 8/29/24. Monitor periodically.      Acquired Hypothyroidism. Last TSH 4.36 1/31/24. Continue Levothyroxine as ordered.     Hypertension. With hypotension in the hospital. During TCU stay, blood pressures soft. Upon review of blood pressures over the past 5 days, systolic range from . Diastolic range from 47-58. Discussed with patient decreasing versus discontinuing medications. Patient reports weight loss over the past few months. Questions if this is contributing. Educated on risks of hypotension. Patient preferred not to adjust medications at this time. Will monitor blood pressures daily at home and review readings with PCP at appointment on 9/17/24.      Neuropathy. Continue Gabapentin as ordered.     Allergic Rhinitis. Continue Cetirizine, Ketotifen, and Fluticasone as ordered.     Gastroesophageal Reflux Disease. Continue Pantoprazole as ordered.     Lower Extremity Edema with Venous Stasis Dermatitis. Continue Furosemide as ordered.     Constipation. Continue Senna-S as ordered.     Physical Deconditioning. Secondary to recent surgery and co-morbidities. Physical and Occupational Therapy ordered.    Discharge Medications:  Current Outpatient Medications   Medication Sig Dispense Refill     ACE/ARB/ARNI NOT PRESCRIBED (INTENTIONAL) Please choose reason not prescribed from choices below.       acetaminophen (TYLENOL) 325 MG tablet Take 2 tablets (650 mg) by mouth every 6 hours as needed for mild pain.       acetaminophen (TYLENOL) 325 MG tablet Take 3 tablets (975 mg) by mouth every 8 hours as  needed for other (mild pain). 100 tablet 0     alcohol swab prep pads Use to swab area of injection/milena as directed. 100 each 3     aspirin (ASA) 325 MG EC tablet Take 1 tablet (325 mg) by mouth 2 times daily. 80 tablet 0     atenolol (TENORMIN) 50 MG tablet Take 1 tablet (50 mg) by mouth 2 times daily (Patient taking differently: Take 50 mg by mouth 2 times daily. Am and 1800) 180 tablet 0     blood glucose (NO BRAND SPECIFIED) test strip Use to test blood sugar 1 times daily or as directed. 100 strip 11     cetirizine (ZYRTEC) 10 MG tablet Take 10 mg by mouth daily as needed for allergies.       fluticasone (FLONASE) 50 MCG/ACT spray Spray 1 spray into both nostrils daily as needed        furosemide (LASIX) 20 MG tablet Take 1 tablet (20 mg) by mouth daily 90 tablet 3     gabapentin (NEURONTIN) 100 MG capsule Take 1 capsule (100 mg) by mouth 2 times daily (Patient taking differently: Take 100 mg by mouth 2 times daily. Am and 1800) 180 capsule 3     ketotifen (ZADITOR) 0.025 % ophthalmic solution Place 1 drop into both eyes 2 times daily as needed for itching       levothyroxine (SYNTHROID/LEVOTHROID) 112 MCG tablet Take 1 tablet (112 mcg) by mouth daily 90 tablet 2     losartan (COZAAR) 50 MG tablet Take 1 tablet (50 mg) by mouth daily. 90 tablet 3     omeprazole (PRILOSEC) 20 MG DR capsule Take 1 capsule (20 mg) by mouth daily 90 capsule 3     oxyCODONE (ROXICODONE) 5 MG tablet Take 1-2 tablets (5-10 mg) by mouth every 4 hours as needed for moderate to severe pain. 60 tablet 0     senna-docusate (SENOKOT-S/PERICOLACE) 8.6-50 MG tablet Take 1-2 tablets by mouth 2 times daily. Take while on oral narcotics to prevent or treat constipation. 30 tablet 0      Controlled medications:   Ok to send remaining narcotics with patient. Max #30.     Past Medical History:   Past Medical History:   Diagnosis Date     Cataract      Diabetes (H) April 2019     Essential hypertension, benign      Nonrheumatic aortic valve  "insufficiency      Osteoarthritis of knee, unspecified laterality, unspecified osteoarthritis type      Thyroid disease      Physical Exam:   Vitals: BP 92/46   Pulse 65   Temp 97.2  F (36.2  C)   Resp 16   Ht 1.575 m (5' 2\")   Wt 78 kg (172 lb)   SpO2 96%   BMI 31.46 kg/m    BMI: Body mass index is 31.46 kg/m .  GENERAL APPEARANCE:  Alert, in no distress  ENT:  Mouth and posterior oropharynx normal, moist mucous membranes  EYES:  EOM, conjunctivae, lids, pupils and irises normal  RESP:  no respiratory distress  SKIN: Aquacel dressing in place on right hip. No drainage. Dressing in place on back of right lower leg.  PSYCH:  affect and mood normal     SNF labs: Labs done in SNF are in [place] [facility]. Please refer to them using [employer] Everywhere.    DISCHARGE PLAN:  Follow up labs: CBC due to ABLA  Medical Follow Up:          Current [place] scheduled appointments:  Appointments in Next Year      Sep 16, 2024 11:20 AM  (Arrive by 11:05 AM)  Post-Op Hip Replacement with Meek Cifuentes MD  [facility] ([facility] ) [phone]     Sep 16, 2024 11:35 AM  (Arrive by 11:20 AM)  Xray General with FZXR2  [facility] ([facility] ) [phone]     Sep 17, 2024 12:40 PM  (Arrive by 12:25 PM)  ED/Hospital Follow Up with Marysol Cisse MD  [facility] ([facility] ) [phone]     Sep 19, 2024 10:10 AM  (Arrive by 9:55 AM)  Extremity Evaluation with Chevy Yates, PT  [facility] [place] ([facility] ) [phone]     Oct 03, 2024 12:40 PM  (Arrive by 12:20 PM)  Annual Wellness Visit with Marysol Cisse MD  [facility] ([facility] ) [phone]     Oct 08, 2024 8:50 AM  (Arrive by 8:35 AM)  Extremity Evaluation with Chevy Yates PT  [facility] [place] ([facility] ) [phone]     Oct 14, 2024 10:00 AM  (Arrive by 9:45 AM)  Post-Op Hip Replacement with Meek Cifuentes MD  [facility] ([facility] ) [phone]     Oct 16, 2024 10:50 AM  Extremity Treatment with Chevy Yates, PT  [facility] [place] ([facility] ) [phone]     Oct 23, 2024 10:50 AM  Extremity Treatment with Chevy Yates, PT  [facility] [place] ([facility] ) [phone]     Oct 30, 2024 10:50 AM  Extremity Treatment with Chevy Yates, PT  [facility] [place] ([facility] ) [phone]           Discharge Services: No home care ordered - Follow-up with Ortho 9/16/24    TOTAL DISCHARGE TIME:   Greater than 30 minutes  Electronically signed by:  EDILBERTO Bran CNP                    Sincerely,        EDILBERTO Bran CNP

## 2024-09-13 NOTE — PROGRESS NOTES
[facility] GERIATRICS DISCHARGE SUMMARY  PATIENT'S NAME: Glenda Bales  YOB: 1950  MEDICAL RECORD NUMBER:  [id number]  Place of Service where encounter took place:  [facility] ([facility] [[id number]]    PRIMARY CARE PROVIDER AND CLINIC RESPONSIBLE AFTER TRANSFER:   Marysol Cisse MD, [address] / [place] [address]    [facility] [place]     Transferring providers: EDILBERTO Bran CNP, Suresh Alegria MD  Recent Hospitalization/ED:  Hospital  [facility] stay 8/28/24 to 8/30/24.  Date of SNF Admission:  8/30/24  Date of SNF (anticipated) Discharge:  9/14/24  Discharged to: previous independent home  Cognitive Scores: BIMS 15/15  Physical Function: Independent with mobility with FWW. Standing tolerance ~ 5 minutes.  DME: No new DME needed    CODE STATUS/ADVANCE DIRECTIVES DISCUSSION:  Prior   ALLERGIES: Diltiazem hcl, Norvasc [amlodipine], and Seasonal allergies    NURSING FACILITY COURSE   This is 73-year-old female, with a past medical history significant for right hip osteoarthritis, type 2 diabetes mellitus, hyperlipidemia, hypertension, GERD, and acquired hypothyroidism, who was admitted to [facility] 8/28/24 through 8/30/24 for a right total hip arthroplasty. Hemoglobin 10.7 8/29/24 -> 8.8 8/30/24. A TCU stay was recommended for physical rehabilitation as patient lives home alone.     Right Hip Osteoarthritis S/P Right Total Hip Arthroplasty 8/28/24. Follow-up with Ortho 9/16/24 as scheduled. No adduction past midline x 6 weeks. Discharge summary notes Aquacel dressing to be removed at day 10, but patient felt this may be optional based on material she had read. Would like to keep Aquacel dressing in place until Ortho follow-up. Aspirin 325 mg PO BID x 6 weeks for DVT prophylaxis. Acetaminophen and Oxycodone order for pain. No home therapies will be ordered.     Acute Blood Loss Anemia. Secondary to  above. Hemoglobin 10.7 8/29/24 -> 8.8 8/30/24.  Did not repeat Hemoglobin during TCU stay due to brief stay.     Type 2 Diabetes Mellitus. Last A1C 5.7 8/12/24. Diet-controlled. Blood sugars monitored daily while in TCU and consistently < 150.     Chronic Kidney Disease Stage III. Recent baseline Creatinine ~ 1.5. Last Creatinine 1.69 8/29/24. Monitor periodically.      Acquired Hypothyroidism. Last TSH 4.36 1/31/24. Continue Levothyroxine as ordered.     Hypertension. With hypotension in the hospital. During TCU stay, blood pressures soft. Upon review of blood pressures over the past 5 days, systolic range from . Diastolic range from 47-58. Discussed with patient decreasing versus discontinuing medications. Patient reports weight loss over the past few months. Questions if this is contributing. Educated on risks of hypotension. Patient preferred not to adjust medications at this time. Will monitor blood pressures daily at home and review readings with PCP at appointment on 9/17/24.      Neuropathy. Continue Gabapentin as ordered.     Allergic Rhinitis. Continue Cetirizine, Ketotifen, and Fluticasone as ordered.     Gastroesophageal Reflux Disease. Continue Pantoprazole as ordered.     Lower Extremity Edema with Venous Stasis Dermatitis. Continue Furosemide as ordered.     Constipation. Continue Senna-S as ordered.     Physical Deconditioning. Secondary to recent surgery and co-morbidities. Physical and Occupational Therapy ordered.    Discharge Medications:  Current Outpatient Medications   Medication Sig Dispense Refill    ACE/ARB/ARNI NOT PRESCRIBED (INTENTIONAL) Please choose reason not prescribed from choices below.      acetaminophen (TYLENOL) 325 MG tablet Take 2 tablets (650 mg) by mouth every 6 hours as needed for mild pain.      acetaminophen (TYLENOL) 325 MG tablet Take 3 tablets (975 mg) by mouth every 8 hours as needed for other (mild pain). 100 tablet 0    alcohol swab prep pads Use to swab area  of injection/milena as directed. 100 each 3    aspirin (ASA) 325 MG EC tablet Take 1 tablet (325 mg) by mouth 2 times daily. 80 tablet 0    atenolol (TENORMIN) 50 MG tablet Take 1 tablet (50 mg) by mouth 2 times daily (Patient taking differently: Take 50 mg by mouth 2 times daily. Am and 1800) 180 tablet 0    blood glucose (NO BRAND SPECIFIED) test strip Use to test blood sugar 1 times daily or as directed. 100 strip 11    cetirizine (ZYRTEC) 10 MG tablet Take 10 mg by mouth daily as needed for allergies.      fluticasone (FLONASE) 50 MCG/ACT spray Spray 1 spray into both nostrils daily as needed       furosemide (LASIX) 20 MG tablet Take 1 tablet (20 mg) by mouth daily 90 tablet 3    gabapentin (NEURONTIN) 100 MG capsule Take 1 capsule (100 mg) by mouth 2 times daily (Patient taking differently: Take 100 mg by mouth 2 times daily. Am and 1800) 180 capsule 3    ketotifen (ZADITOR) 0.025 % ophthalmic solution Place 1 drop into both eyes 2 times daily as needed for itching      levothyroxine (SYNTHROID/LEVOTHROID) 112 MCG tablet Take 1 tablet (112 mcg) by mouth daily 90 tablet 2    losartan (COZAAR) 50 MG tablet Take 1 tablet (50 mg) by mouth daily. 90 tablet 3    omeprazole (PRILOSEC) 20 MG DR capsule Take 1 capsule (20 mg) by mouth daily 90 capsule 3    oxyCODONE (ROXICODONE) 5 MG tablet Take 1-2 tablets (5-10 mg) by mouth every 4 hours as needed for moderate to severe pain. 60 tablet 0    senna-docusate (SENOKOT-S/PERICOLACE) 8.6-50 MG tablet Take 1-2 tablets by mouth 2 times daily. Take while on oral narcotics to prevent or treat constipation. 30 tablet 0      Controlled medications:   Ok to send remaining narcotics with patient. Max #30.     Past Medical History:   Past Medical History:   Diagnosis Date    Cataract     Diabetes (H) April 2019    Essential hypertension, benign     Nonrheumatic aortic valve insufficiency     Osteoarthritis of knee, unspecified laterality, unspecified osteoarthritis type     Thyroid  "disease      Physical Exam:   Vitals: BP 92/46   Pulse 65   Temp 97.2  F (36.2  C)   Resp 16   Ht 1.575 m (5' 2\")   Wt 78 kg (172 lb)   SpO2 96%   BMI 31.46 kg/m    BMI: Body mass index is 31.46 kg/m .  GENERAL APPEARANCE:  Alert, in no distress  ENT:  Mouth and posterior oropharynx normal, moist mucous membranes  EYES:  EOM, conjunctivae, lids, pupils and irises normal  RESP:  no respiratory distress  SKIN: Aquacel dressing in place on right hip. No drainage. Dressing in place on back of right lower leg.  PSYCH:  affect and mood normal     SNF labs: Labs done in SNF are in [facility] EPIC. Please refer to them using EPIC/Care Everywhere.    DISCHARGE PLAN:  Follow up labs: CBC due to ABLA  Medical Follow Up:          Current [facility] scheduled appointments:  Appointments in Next Year      Sep 16, 2024 11:20 AM  (Arrive by 11:05 AM)  Post-Op Hip Replacement with Meek Cifuentes MD  [facility] ([facility] ) [phone]     Sep 16, 2024 11:35 AM  (Arrive by 11:20 AM)  Xray General with FZXR2  [facility] ([facility] ) [phone]     Sep 17, 2024 12:40 PM  (Arrive by 12:25 PM)  ED/Hospital Follow Up with Marysol Cisse MD  [facility] ([facility] ) [phone]     Sep 19, 2024 10:10 AM  (Arrive by 9:55 AM)  Extremity Evaluation with Chevy Yates, LEON  [facility] ([facility] ) [phone]     Oct 03, 2024 12:40 PM  (Arrive by 12:20 PM)  Annual Wellness Visit with Marysol Cisse MD  [facility] ([facility] ) [phone]     Oct 08, 2024 8:50 AM  (Arrive by 8:35 AM)  Extremity Evaluation with Chevy Yates PT  [facility] ([facility] [phone]     Oct 14, 2024 " 10:00 AM  (Arrive by 9:45 AM)  Post-Op Hip Replacement with Meek Cifuentes MD  [facility] ([facility] ) [phone]     Oct 16, 2024 10:50 AM  Extremity Treatment with Chevy Yates, PT  [facility] ([facility] ) [phone]     Oct 23, 2024 10:50 AM  Extremity Treatment with Chevy Yates, PT  [facility] ([facility] ) [phone]     Oct 30, 2024 10:50 AM  Extremity Treatment with Chevy Yates, PT  [facility] ([facility] ) [phone]           Discharge Services: No home care ordered - Follow-up with Ortho 9/16/24    TOTAL DISCHARGE TIME:   Greater than 30 minutes  Electronically signed by:  EDILBERTO Bran CNP

## 2024-09-16 ENCOUNTER — ANCILLARY PROCEDURE (OUTPATIENT)
Dept: GENERAL RADIOLOGY | Facility: CLINIC | Age: 74
End: 2024-09-16
Attending: ORTHOPAEDIC SURGERY
Payer: MEDICARE

## 2024-09-16 ENCOUNTER — OFFICE VISIT (OUTPATIENT)
Dept: ORTHOPEDICS | Facility: CLINIC | Age: 74
End: 2024-09-16
Payer: MEDICARE

## 2024-09-16 VITALS
DIASTOLIC BLOOD PRESSURE: 66 MMHG | WEIGHT: 170 LBS | HEART RATE: 85 BPM | SYSTOLIC BLOOD PRESSURE: 111 MMHG | HEIGHT: 63 IN | BODY MASS INDEX: 30.12 KG/M2 | RESPIRATION RATE: 18 BRPM

## 2024-09-16 DIAGNOSIS — Z96.641 S/P TOTAL RIGHT HIP ARTHROPLASTY: ICD-10-CM

## 2024-09-16 DIAGNOSIS — Z96.641 S/P TOTAL RIGHT HIP ARTHROPLASTY: Primary | ICD-10-CM

## 2024-09-16 DIAGNOSIS — M16.11 PRIMARY OSTEOARTHRITIS OF RIGHT HIP: ICD-10-CM

## 2024-09-16 PROCEDURE — 72170 X-RAY EXAM OF PELVIS: CPT | Mod: TC | Performed by: INTERNAL MEDICINE

## 2024-09-16 PROCEDURE — 99024 POSTOP FOLLOW-UP VISIT: CPT | Performed by: ORTHOPAEDIC SURGERY

## 2024-09-16 NOTE — PROGRESS NOTES
Follow up right total hip arthroplasty on 8/28/24 with direct anterior approach .    She complains of tingling in right thigh.  Also has constipation..   Wound is healing well.  There is not a raw area at upper wound.  No warmth or erythema.  She is using walker to walk.    I obtained x-ray since we had a crack in calcar, which we wired in surgery.  X-ray shows good position of the prosthesis and cable.  Right hip measures about 4 mms short.      Assessment:  right total hip arthroplasty doing well.  Plan:  No flexion restrictions.  Start scar massage with vitamin-E cream.   Use aspirin for 4 more weeks.  Medication renewal:  None # .  Return to clinic 4 weeks with xray - low AP pelvis and lateral right hip.

## 2024-09-16 NOTE — PATIENT INSTRUCTIONS
right total hip arthroplasty doing well.  Plan:  No flexion restrictions.  Start scar massage with vitamin-E cream.   Use aspirin for 4 more weeks.  Medication renewal:  None # .  Return to clinic 4 weeks with xray - low AP pelvis and lateral right hip.

## 2024-09-16 NOTE — LETTER
9/16/2024      Glenda Bales  [address]   [address]  [place] [address]      Dear Colleague,    Thank you for referring your patient, Glenda Bales, to the [facility]. Please see a copy of my visit note below.    Follow up right total hip arthroplasty on 8/28/24 with direct anterior approach .    She complains of tingling in right thigh.  Also has constipation..   Wound is healing well.  There is not a raw area at upper wound.  No warmth or erythema.  She is using walker to walk.    I obtained x-ray since we had a crack in calcar, which we wired in surgery.  X-ray shows good position of the prosthesis and cable.  Right hip measures about 4 mms short.      Assessment:  right total hip arthroplasty doing well.  Plan:  No flexion restrictions.  Start scar massage with vitamin-E cream.   Use aspirin for 4 more weeks.  Medication renewal:  None # .  Return to clinic 4 weeks with xray - low AP pelvis and lateral right hip.       Again, thank you for allowing me to participate in the care of your patient.        Sincerely,        Meek Cifuentes MD

## 2024-09-17 ENCOUNTER — OFFICE VISIT (OUTPATIENT)
Dept: FAMILY MEDICINE | Facility: CLINIC | Age: 74
End: 2024-09-17
Payer: MEDICARE

## 2024-09-17 VITALS
RESPIRATION RATE: 16 BRPM | HEART RATE: 71 BPM | DIASTOLIC BLOOD PRESSURE: 63 MMHG | WEIGHT: 168 LBS | OXYGEN SATURATION: 97 % | HEIGHT: 63 IN | SYSTOLIC BLOOD PRESSURE: 107 MMHG | TEMPERATURE: 97 F | BODY MASS INDEX: 29.77 KG/M2

## 2024-09-17 DIAGNOSIS — K59.03 DRUG-INDUCED CONSTIPATION: ICD-10-CM

## 2024-09-17 DIAGNOSIS — N18.32 TYPE 2 DIABETES MELLITUS WITH STAGE 3B CHRONIC KIDNEY DISEASE, WITHOUT LONG-TERM CURRENT USE OF INSULIN (H): ICD-10-CM

## 2024-09-17 DIAGNOSIS — Z12.4 CERVICAL CANCER SCREENING: ICD-10-CM

## 2024-09-17 DIAGNOSIS — E11.22 TYPE 2 DIABETES MELLITUS WITH STAGE 3B CHRONIC KIDNEY DISEASE, WITHOUT LONG-TERM CURRENT USE OF INSULIN (H): ICD-10-CM

## 2024-09-17 DIAGNOSIS — M16.11 PRIMARY OSTEOARTHRITIS OF RIGHT HIP: ICD-10-CM

## 2024-09-17 DIAGNOSIS — Z09 HOSPITAL DISCHARGE FOLLOW-UP: ICD-10-CM

## 2024-09-17 DIAGNOSIS — I12.9 BENIGN HYPERTENSION WITH CKD (CHRONIC KIDNEY DISEASE) STAGE III (H): ICD-10-CM

## 2024-09-17 DIAGNOSIS — Z96.641 HISTORY OF TOTAL RIGHT HIP REPLACEMENT: ICD-10-CM

## 2024-09-17 DIAGNOSIS — Z96.641 STATUS POST TOTAL REPLACEMENT OF RIGHT HIP: ICD-10-CM

## 2024-09-17 DIAGNOSIS — Z29.11 NEED FOR VACCINATION AGAINST RESPIRATORY SYNCYTIAL VIRUS: ICD-10-CM

## 2024-09-17 DIAGNOSIS — N18.30 BENIGN HYPERTENSION WITH CKD (CHRONIC KIDNEY DISEASE) STAGE III (H): ICD-10-CM

## 2024-09-17 DIAGNOSIS — Z12.11 SCREEN FOR COLON CANCER: ICD-10-CM

## 2024-09-17 DIAGNOSIS — D62 ANEMIA DUE TO BLOOD LOSS, ACUTE: ICD-10-CM

## 2024-09-17 PROBLEM — R79.89 ELEVATED LIVER FUNCTION TESTS: Status: RESOLVED | Noted: 2024-02-29 | Resolved: 2024-09-17

## 2024-09-17 PROBLEM — N95.0 POSTMENOPAUSAL BLEEDING: Status: RESOLVED | Noted: 2023-01-23 | Resolved: 2024-09-17

## 2024-09-17 LAB
ERYTHROCYTE [DISTWIDTH] IN BLOOD BY AUTOMATED COUNT: 14.3 % (ref 10–15)
HCT VFR BLD AUTO: 30.8 % (ref 35–47)
HGB BLD-MCNC: 9.7 G/DL (ref 11.7–15.7)
MCH RBC QN AUTO: 29 PG (ref 26.5–33)
MCHC RBC AUTO-ENTMCNC: 31.5 G/DL (ref 31.5–36.5)
MCV RBC AUTO: 92 FL (ref 78–100)
PLATELET # BLD AUTO: 318 10E3/UL (ref 150–450)
RBC # BLD AUTO: 3.34 10E6/UL (ref 3.8–5.2)
WBC # BLD AUTO: 7.4 10E3/UL (ref 4–11)

## 2024-09-17 PROCEDURE — 85027 COMPLETE CBC AUTOMATED: CPT | Performed by: FAMILY MEDICINE

## 2024-09-17 PROCEDURE — 99495 TRANSJ CARE MGMT MOD F2F 14D: CPT | Performed by: FAMILY MEDICINE

## 2024-09-17 PROCEDURE — 36415 COLL VENOUS BLD VENIPUNCTURE: CPT | Performed by: FAMILY MEDICINE

## 2024-09-17 RX ORDER — LOSARTAN POTASSIUM 25 MG/1
25 TABLET ORAL DAILY
Qty: 90 TABLET | Refills: 0 | Status: SHIPPED | OUTPATIENT
Start: 2024-09-17 | End: 2024-10-03

## 2024-09-17 RX ORDER — AMOXICILLIN 250 MG
1-2 CAPSULE ORAL 2 TIMES DAILY
Qty: 30 TABLET | Refills: 0 | Status: SHIPPED | OUTPATIENT
Start: 2024-09-17

## 2024-09-17 RX ORDER — LOSARTAN POTASSIUM 50 MG/1
25 TABLET ORAL DAILY
Qty: 45 TABLET | Refills: 3 | Status: SHIPPED | OUTPATIENT
Start: 2024-09-17 | End: 2024-09-17

## 2024-09-17 ASSESSMENT — PAIN SCALES - GENERAL: PAINLEVEL: NO PAIN (0)

## 2024-09-17 NOTE — PROGRESS NOTES
"  Assessment & Plan     Status post total replacement of right hip  Doing well     Type 2 diabetes mellitus with stage 3b chronic kidney disease, without long-term current use of insulin (H)  Stable     Primary osteoarthritis of right hip  S/p total Right Hip arthroplasty  Stable   Postop Notes e     Benign hypertension with CKD (chronic kidney disease) stage III (H)  Advised decrease Losartan to 25 mg daily   - losartan (COZAAR) 50 MG tablet; Take 0.5 tablets (25 mg) by mouth daily.    Screen for colon cancer  Pt has been advised Multiple Times to do colon cancer screen     Cervical cancer screening  Follow up pap  and see gyn   She has declined to follow up     Need for vaccination against respiratory syncytial virus  Advised   - RSV vaccine, bivalent, ABRYSVO, injection; Inject 0.5 mLs into the muscle once for 1 dose. Pharmacist administered    Anemia due to blood loss, acute  Stable   - CBC with platelets; Future  - CBC with platelets    History of total right hip replacement  Constipation  Advised Senna  - senna-docusate (SENOKOT-S/PERICOLACE) 8.6-50 MG tablet; Take 1-2 tablets by mouth 2 times daily. Take while on oral narcotics to prevent or treat constipation.        MED REC REQUIRED  Post Medication Reconciliation Status: discharge medications reconciled and changed, per note/orders  BMI  Estimated body mass index is 29.99 kg/m  as calculated from the following:    Height as of this encounter: 1.594 m (5' 2.76\").    Weight as of this encounter: 76.2 kg (168 lb).         Continue PT/ OT    Kathrine Doshi is a 73 year old, presenting for the following health issues:  Follow Up      9/17/2024    12:34 PM   Additional Questions   Roomed by Allyson CHAVES         Hospital Follow-up Visit:    Hospital/Nursing Home/ Rehab Facility: [facility]  Date of Admission: August 30, 2024  Date of Discharge: September 14, 2024  Reason(s) for Admission: HIp replacement   Blood loss anemia post opWas " the patient in the ICU or did the patient experience delirium during hospitalization?  No  Do you have any other stressors you would like to discuss with your provider? No    Problems taking medications regularly:  None  Medication changes since discharge: None  Problems adhering to non-medication therapy:  None    Summary of hospitalization:  [facility] discharge summary reviewed  Diagnostic Tests/Treatments reviewed.  Follow up needed: PT and josen  Other Healthcare Providers Involved in Patient s Care:         Physical Therapy  Update since discharge: improved.         Plan of care communicated with patient           Diabetes Follow-up    How often are you checking your blood sugar? Stable   What concerns do you have today about your diabetes? None          BP Readings from Last 2 Encounters:   09/17/24 107/63   09/16/24 111/66     Hemoglobin A1C (%)   Date Value   08/12/2024 5.7 (H)   05/30/2024 5.8 (H)   06/15/2021 5.5   01/12/2021 5.6     LDL Cholesterol Calculated (mg/dL)   Date Value   08/12/2024 202 (H)   06/29/2023 154 (H)   08/20/2020 163 (H)   07/22/2020 125 (H)             Hypertension Follow-up    Do you check your blood pressure regularly outside of the clinic? No   Are you following a low salt diet? Yes  Are your blood pressures ever more than 140 on the top number (systolic) OR more   than 90 on the bottom number (diastolic), for example 140/90? No  Pt was anemic post op but getting better        Review of Systems  CONSTITUTIONAL: NEGATIVE for fever, chills, change in weight  ENT/MOUTH: NEGATIVE for ear, mouth and throat problems  RESP: NEGATIVE for significant cough or SOB  CV: NEGATIVE for chest pain, palpitations or peripheral edema  GI: has Mild constipation  MUSCULOSKELETAL: getting better-Trying not to take pain meds  PSYCHIATRIC: NEGATIVE for changes in mood or affect      Objective    /63   Pulse 71   Temp 97  F (36.1  C) (Temporal)   Resp 16   Ht 1.594 m (5'  "2.76\")   Wt 76.2 kg (168 lb)   SpO2 97%   BMI 29.99 kg/m    Body mass index is 29.99 kg/m .  Physical Exam   GENERAL: alert and no distress  EYES: Eyes grossly normal to inspection  NECK: no adenopathy, no asymmetry, masses, or scars  RESP: lungs clear to auscultation - no rales, rhonchi or wheezes  CV: regular rate and rhythm, normal S1 S2, no S3 or S4, no murmur, click or rub, no peripheral edema  ABDOMEN: soft, nontender, no hepatosplenomegaly, no masses and bowel sounds normal  MS: incision Looks Good  [place] slowly with assistive device  No swelling extremities or calf            Signed Electronically by: Marysol Cisse MD    "

## 2024-09-18 ENCOUNTER — PATIENT OUTREACH (OUTPATIENT)
Dept: CARE COORDINATION | Facility: CLINIC | Age: 74
End: 2024-09-18
Payer: MEDICARE

## 2024-09-18 NOTE — LETTER
[facility] CARE COORDINATION  [facility]   [address]  [place] [address]  [phone]    September 18, 2024    Glenda Bales  [address]     [place] [address]      Dear Glenda,    I am a clinic care coordinator who works with Marysol Cisse MD with the [facility]. I wanted to introduce myself and provide you with my contact information for you to be able to call me with any questions or concerns. I wanted to thank you for spending the time to talk with me.  Below is a description of clinic care coordination and how I can further assist you.       The clinic care coordination team is made up of a registered nurse, , financial resource worker and community health worker who understand the health care system. The goal of clinic care coordination is to help you manage your health and improve access to the health care system. Our team works alongside your provider to assist you in determining your health and social needs. We can help you obtain health care and community resources, providing you with necessary information and education. We can work with you through any barriers and develop a care plan that helps coordinate and strengthen the communication between you and your care team.  Our services are voluntary and are offered without charge to you personally.    Please feel free to contact me with any questions or concerns regarding care coordination and what we can offer.      We are focused on providing you with the highest-quality healthcare experience possible.    Sincerely,     Chapin Noyola MSN, RN, PHN, [facility]   Primary Care Clinical RN Care Coordinator  [facility]  9/18/2024   2:31 PM  [email or web address]  Office: [phone]

## 2024-09-18 NOTE — PROGRESS NOTES
Clinic Care Coordination Contact  Care Coordination Clinician Chart Review    Situation: Patient chart reviewed by care coordinator.    Background: Patient was discharged from the hospital to [facility], and RN/SW CC has been monitoring for TCU discharge to identify any outstanding Clinic Care Coordination needs/concerns. Refer to initial patient outreach encounter by this writer dated   for additional details.    Assessment: Upon chart review, patient is not a candidate for Primary Care Clinic Care Coordination enrollment due to reason stated below:  The patient declined care coordination at this time.    Plan/Recommendations: Clinic Care Coordination Referral/order cancelled. RN/SW CC will perform no further monitoring/outreaches at this time and will remain available as needed. If new needs arise, a new Care Coordination Referral may be placed.    Chapin Noyola MSN, RN, PHN, CCM   Primary Care Clinical RN Care Coordinator  [facility]  9/18/2024   2:33 PM  [email or web address]  Office: [phone]

## 2024-09-19 ENCOUNTER — THERAPY VISIT (OUTPATIENT)
Dept: PHYSICAL THERAPY | Facility: CLINIC | Age: 74
End: 2024-09-19
Payer: MEDICARE

## 2024-09-19 DIAGNOSIS — G89.29 CHRONIC RIGHT HIP PAIN: Primary | ICD-10-CM

## 2024-09-19 DIAGNOSIS — M25.551 CHRONIC RIGHT HIP PAIN: Primary | ICD-10-CM

## 2024-09-19 PROCEDURE — 97161 PT EVAL LOW COMPLEX 20 MIN: CPT | Mod: GP | Performed by: PHYSICAL THERAPIST

## 2024-09-19 PROCEDURE — 97116 GAIT TRAINING THERAPY: CPT | Mod: GP | Performed by: PHYSICAL THERAPIST

## 2024-09-19 PROCEDURE — 97110 THERAPEUTIC EXERCISES: CPT | Mod: GP | Performed by: PHYSICAL THERAPIST

## 2024-09-19 ASSESSMENT — ACTIVITIES OF DAILY LIVING (ADL)
SPORTS_TOTAL_ITEM_SCORE: INCOMPLETE
SPORTS_SCORE(%): INCOMPLETE
ADL_SCORE(%): INCOMPLETE
SPORTS_COUNT: 1
SPORTS_HIGHEST_POTENTIAL_SCORE: 4
ADL_HIGHEST_POTENTIAL_SCORE: 4
HOW_WOULD_YOU_RATE_YOUR_CURRENT_LEVEL_OF_FUNCTION_DURING_YOUR_USUAL_ACTIVITIES_OF_DAILY_LIVING_FROM_0_TO_100_WITH_100_BEING_YOUR_LEVEL_OF_FUNCTION_PRIOR_TO_YOUR_HIP_PROBLEM_AND_0_BEING_THE_INABILITY_TO_PERFORM_ANY_OF_YOUR_USUAL_DAILY_ACTIVITIES?: 75
ADL_COUNT: 1
HOS_ADL_HIGHEST_POTENTIAL_SCORE: 4
HOW_WOULD_YOU_RATE_YOUR_CURRENT_LEVEL_OF_FUNCTION?: ABNORMAL
ADL_TOTAL_ITEM_SCORE: INCOMPLETE
HOS_ADL_ITEM_SCORE_TOTAL: 3
STANDING_FOR_15_MINUTES: SLIGHT DIFFICULTY
PLEASE_INDICATE_YOR_PRIMARY_REASON_FOR_REFERRAL_TO_THERAPY:: HIP
RUNNING_ONE_MILE: UNABLE TO DO

## 2024-09-19 NOTE — PROGRESS NOTES
PHYSICAL THERAPY EVALUATION  Type of Visit: Evaluation        Fall Risk Screen:  Fall screen completed by: PT  Have you fallen 2 or more times in the past year?: No  Have you fallen and had an injury in the past year?: No  Is patient a fall risk?: No    Subjective Surgery on 8/28/2024. Right JASMIN. Was in TCU for 2 weeks. Has achiness right now. 3/10 pain now. Has been walking with FWW, has a cane. Has not used cane at home, used at TCU before left. Has 1 step in the living room. No stairs to get into the apartment.       Presenting condition or subjective complaint: hip rehab  Date of onset: 08/28/24 (Date of surgery)    Relevant medical history:     Dates & types of surgery: good    Prior diagnostic imaging/testing results: X-ray     Prior therapy history for the same diagnosis, illness or injury:        Prior Level of Function  Transfers: Independent, Assistive equipment  Ambulation: Independent, Assistive equipment  ADL: Independent, Assistive equipment    Living Environment  Social support: Alone   Type of home: Apartment/condo   Stairs to enter the home:         Ramp: No   Stairs inside the home: No       Help at home:    Equipment owned: Walker      Objective   HIP EVALUATION  PAIN: Pain Level at Rest: 0/10  Pain Level with Use: 4/10  INTEGUMENTARY (edema, incisions): Healing well with no signs of infection noted. Scabs over incision. Mild ecchymosis.  GAIT:   Weightbearing Status: WBAT  Assistive Device(s): Walker (front wheeled)  Gait Deviations: Step through gait pattern with reduced heel strike and toe off.  ROM: Hip flexion - 90 degrees  STRENGTH:   Pain: - none + mild ++ moderate +++ severe  Strength Scale: 0-5/5 Left Right   Hip Flexion 5 3+   Hip Extension     Hip Abduction 5 3+   Hip Adduction 5 4-   Hip Internal Rotation     Hip External Rotation     Knee Flexion     Knee Extension 5 4-       Assessment & Plan   CLINICAL IMPRESSIONS  Medical Diagnosis: S/P total right hip arthroplasty    Treatment  Diagnosis: Chronic right hip pain   Impression/Assessment: Patient is a 73 year old female with right hip complaints.  The following significant findings have been identified: Pain, Decreased ROM/flexibility, Decreased joint mobility, Decreased strength, Impaired balance, Impaired gait, and Decreased activity tolerance. These impairments interfere with their ability to perform self care tasks, recreational activities, household chores, driving , household mobility, and community mobility as compared to previous level of function.     Clinical Decision Making (Complexity):  Clinical Presentation: Stable/Uncomplicated  Clinical Presentation Rationale: based on medical and personal factors listed in PT evaluation  Clinical Decision Making (Complexity): Low complexity    PLAN OF CARE  Treatment Interventions:  Interventions: Gait Training, Manual Therapy, Neuromuscular Re-education, Therapeutic Activity, Therapeutic Exercise, Self-Care/Home Management    Long Term Goals     PT Goal 1  Goal Identifier: Ambulation  Goal Description: Pt will be able to ambulate for 15 minutes in order to run errands with a minimal increase in pain 1-2/10  Target Date: 11/14/24  PT Goal 2  Goal Identifier: Transfers  Goal Description: Pt will be able to get in and out of her car with a minimal increase in pain 1-2/10.  Target Date: 11/14/24      Frequency of Treatment: 1 time per week  Duration of Treatment: 8 weeks    Recommended Referrals to Other Professionals:   Education Assessment:   Learner/Method: Patient;No Barriers to Learning    Risks and benefits of evaluation/treatment have been explained.   Patient/Family/caregiver agrees with Plan of Care.     Evaluation Time:     PT Eval, Low Complexity Minutes (05993): 15       Signing Clinician: Chevy Yates PT        [facility]                                                                                   OUTPATIENT PHYSICAL THERAPY      PLAN OF  TREATMENT FOR OUTPATIENT REHABILITATION   Patient's Last Name, First Name, Glenda Cameron YOB: 1950   Provider's Name   [facility]   Medical Record No.  [id number]     Onset Date: 08/28/24 (Date of surgery)  Start of Care Date: 09/19/24     Medical Diagnosis:  S/P total right hip arthroplasty      PT Treatment Diagnosis:  Chronic right hip pain Plan of Treatment  Frequency/Duration: 1 time per week/ 8 weeks    Certification date from 09/19/24 to 11/14/24         See note for plan of treatment details and functional goals     Chevy Yates, PT                         I CERTIFY THE NEED FOR THESE SERVICES FURNISHED UNDER        THIS PLAN OF TREATMENT AND WHILE UNDER MY CARE     (Physician attestation of this document indicates review and certification of the therapy plan).              Referring Provider:  Meek Cifuentes    Initial Assessment  See Epic Evaluation- Start of Care Date: 09/19/24

## 2024-09-20 ENCOUNTER — TELEPHONE (OUTPATIENT)
Dept: FAMILY MEDICINE | Facility: CLINIC | Age: 74
End: 2024-09-20
Payer: MEDICARE

## 2024-09-20 NOTE — TELEPHONE ENCOUNTER
[facility] referral from: Transitions of Care (recent hospital discharge or ED visit)    [facility] referral outreach attempt #2 on September 20, 2024 at 11:54 AM      Outcome: Spoke with patient seeing PCP    Use phyllis for the carrier/Plan on the flowsheet          Shania Eller [place]   [phone]

## 2024-09-22 ENCOUNTER — HEALTH MAINTENANCE LETTER (OUTPATIENT)
Age: 74
End: 2024-09-22

## 2024-09-26 ENCOUNTER — THERAPY VISIT (OUTPATIENT)
Dept: PHYSICAL THERAPY | Facility: CLINIC | Age: 74
End: 2024-09-26
Payer: MEDICARE

## 2024-09-26 DIAGNOSIS — G89.29 CHRONIC RIGHT HIP PAIN: Primary | ICD-10-CM

## 2024-09-26 DIAGNOSIS — M25.551 CHRONIC RIGHT HIP PAIN: Primary | ICD-10-CM

## 2024-09-26 PROCEDURE — 97110 THERAPEUTIC EXERCISES: CPT | Mod: GP | Performed by: PHYSICAL THERAPIST

## 2024-09-27 SDOH — HEALTH STABILITY: PHYSICAL HEALTH: ON AVERAGE, HOW MANY DAYS PER WEEK DO YOU ENGAGE IN MODERATE TO STRENUOUS EXERCISE (LIKE A BRISK WALK)?: 1 DAY

## 2024-09-27 SDOH — HEALTH STABILITY: PHYSICAL HEALTH: ON AVERAGE, HOW MANY MINUTES DO YOU ENGAGE IN EXERCISE AT THIS LEVEL?: 10 MIN

## 2024-09-27 ASSESSMENT — SOCIAL DETERMINANTS OF HEALTH (SDOH): HOW OFTEN DO YOU GET TOGETHER WITH FRIENDS OR RELATIVES?: ONCE A WEEK

## 2024-10-02 ENCOUNTER — THERAPY VISIT (OUTPATIENT)
Dept: PHYSICAL THERAPY | Facility: CLINIC | Age: 74
End: 2024-10-02
Payer: MEDICARE

## 2024-10-02 DIAGNOSIS — M25.551 CHRONIC RIGHT HIP PAIN: Primary | ICD-10-CM

## 2024-10-02 DIAGNOSIS — G89.29 CHRONIC RIGHT HIP PAIN: Primary | ICD-10-CM

## 2024-10-02 PROCEDURE — 97116 GAIT TRAINING THERAPY: CPT | Mod: GP | Performed by: PHYSICAL THERAPIST

## 2024-10-02 PROCEDURE — 97110 THERAPEUTIC EXERCISES: CPT | Mod: GP | Performed by: PHYSICAL THERAPIST

## 2024-10-03 ENCOUNTER — OFFICE VISIT (OUTPATIENT)
Dept: FAMILY MEDICINE | Facility: CLINIC | Age: 74
End: 2024-10-03
Payer: MEDICARE

## 2024-10-03 VITALS
OXYGEN SATURATION: 98 % | TEMPERATURE: 97 F | SYSTOLIC BLOOD PRESSURE: 139 MMHG | HEART RATE: 52 BPM | HEIGHT: 63 IN | WEIGHT: 166.8 LBS | DIASTOLIC BLOOD PRESSURE: 74 MMHG | BODY MASS INDEX: 29.55 KG/M2

## 2024-10-03 DIAGNOSIS — I12.9 BENIGN HYPERTENSION WITH CKD (CHRONIC KIDNEY DISEASE) STAGE III (H): ICD-10-CM

## 2024-10-03 DIAGNOSIS — N18.30 BENIGN HYPERTENSION WITH CKD (CHRONIC KIDNEY DISEASE) STAGE III (H): ICD-10-CM

## 2024-10-03 DIAGNOSIS — N18.32 TYPE 2 DIABETES MELLITUS WITH STAGE 3B CHRONIC KIDNEY DISEASE, WITHOUT LONG-TERM CURRENT USE OF INSULIN (H): ICD-10-CM

## 2024-10-03 DIAGNOSIS — N18.32 STAGE 3B CHRONIC KIDNEY DISEASE (H): ICD-10-CM

## 2024-10-03 DIAGNOSIS — K21.9 GASTROESOPHAGEAL REFLUX DISEASE WITHOUT ESOPHAGITIS: ICD-10-CM

## 2024-10-03 DIAGNOSIS — Z29.11 NEED FOR VACCINATION AGAINST RESPIRATORY SYNCYTIAL VIRUS: ICD-10-CM

## 2024-10-03 DIAGNOSIS — K76.0 FATTY LIVER: ICD-10-CM

## 2024-10-03 DIAGNOSIS — E11.22 TYPE 2 DIABETES MELLITUS WITH STAGE 3B CHRONIC KIDNEY DISEASE, WITHOUT LONG-TERM CURRENT USE OF INSULIN (H): ICD-10-CM

## 2024-10-03 DIAGNOSIS — Z00.00 ENCOUNTER FOR MEDICARE ANNUAL WELLNESS EXAM: ICD-10-CM

## 2024-10-03 DIAGNOSIS — F10.91 ALCOHOL USE DISORDER IN REMISSION: ICD-10-CM

## 2024-10-03 DIAGNOSIS — Z12.11 SCREEN FOR COLON CANCER: ICD-10-CM

## 2024-10-03 DIAGNOSIS — E03.9 ACQUIRED HYPOTHYROIDISM: ICD-10-CM

## 2024-10-03 DIAGNOSIS — Z12.4 CERVICAL CANCER SCREENING: ICD-10-CM

## 2024-10-03 PROBLEM — I10 HTN, GOAL BELOW 140/90: Status: RESOLVED | Noted: 2024-05-09 | Resolved: 2024-10-03

## 2024-10-03 PROCEDURE — G0439 PPPS, SUBSEQ VISIT: HCPCS | Performed by: FAMILY MEDICINE

## 2024-10-03 PROCEDURE — 99214 OFFICE O/P EST MOD 30 MIN: CPT | Mod: 25 | Performed by: FAMILY MEDICINE

## 2024-10-03 RX ORDER — ATENOLOL 50 MG/1
50 TABLET ORAL 2 TIMES DAILY
Qty: 180 TABLET | Refills: 0 | Status: CANCELLED | OUTPATIENT
Start: 2024-10-03

## 2024-10-03 RX ORDER — METOPROLOL SUCCINATE 50 MG/1
50 TABLET, EXTENDED RELEASE ORAL DAILY
Qty: 30 TABLET | Refills: 1 | Status: SHIPPED | OUTPATIENT
Start: 2024-10-03 | End: 2024-11-07

## 2024-10-03 RX ORDER — LOSARTAN POTASSIUM 25 MG/1
25 TABLET ORAL DAILY
Qty: 90 TABLET | Refills: 0 | Status: SHIPPED | OUTPATIENT
Start: 2024-10-03 | End: 2024-11-07

## 2024-10-03 RX ORDER — FAMOTIDINE 40 MG/1
40 TABLET, FILM COATED ORAL DAILY
Qty: 30 TABLET | Refills: 1 | Status: SHIPPED | OUTPATIENT
Start: 2024-10-03

## 2024-10-03 ASSESSMENT — PAIN SCALES - GENERAL: PAINLEVEL: MILD PAIN (2)

## 2024-10-03 NOTE — PROGRESS NOTES
The longitudinal plan of care for the diagnosis(es)/condition(s) as documented were addressed during this visit. Due to the added complexity in care, I will continue to support Glenda in the subsequent management and with ongoing continuity of care.Preventive Care Visit  [facility] JOSE Cisse MD, Family Medicine  Oct 3, 2024      Assessment & Plan     Encounter for Medicare annual wellness exam      Benign hypertension with CKD (chronic kidney disease) stage III (H)  Advised stop atenolol   Change to Toprol 50 mg daily  Follow up 1 month  - losartan (COZAAR) 25 MG tablet; Take 1 tablet (25 mg) by mouth daily.  - metoprolol succinate ER (TOPROL XL) 50 MG 24 hr tablet; Take 1 tablet (50 mg) by mouth daily.    Type 2 diabetes mellitus with stage 3b chronic kidney disease, without long-term current use of insulin (H)  Stable   - OPTOMETRY REFERRAL; Future    Stage 3b chronic kidney disease (H)  Stable     Alcohol use disorder in remission      Acquired hypothyroidism  Has been doing well    Fatty liver  Losing wt and watching Diet helps  Consider GLP1    Gastroesophageal reflux disease without esophagitis  Advised when better wean off PPI and take pepcid  Can take PPI on PRN basis  - famotidine (PEPCID) 40 MG tablet; Take 1 tablet (40 mg) by mouth daily.    Need for vaccination against respiratory syncytial virus  Advised   - RSV vaccine, bivalent, ABRYSVO, injection; Inject 0.5 mLs into the muscle once for 1 dose. Pharmacist administered    Screen for colon cancer  Again discussed Importance to do this when able -better after her Hip surgery   - Colonoscopy Screening  Referral; Future    Cervical cancer screening  Discussed follow up GYN  Advised pap  Pt declined Today        Time spent reviewing chart, addressing her medical Problems as above, ordering labs, refilling meds and discussing her medical Problems, , documenting-Labs will be reviewed when back and will make further  recommendations based on her labs  32 minutes    Counseling  Appropriate preventive services were addressed with this patient via screening, questionnaire, or discussion as appropriate for fall prevention, nutrition, physical activity, Tobacco-use cessation, social engagement, weight loss and cognition.  Checklist reviewing preventive services available has been given to the patient.  Reviewed patient's diet, addressing concerns and/or questions.   She is at risk for lack of exercise and has been provided with information to increase physical activity for the benefit of her well-being.   She is at risk for psychosocial distress and has been provided with information to reduce risk.   Information on urinary incontinence and treatment options given to patient.   I have reviewed Opioid Use Disorder and Substance Use Disorder risk factors and made any needed referrals.       Follow up 1 month    Subjective   Glenda is a 73 year old, presenting for the following:  Physical        10/3/2024    12:38 PM   Additional Questions   Roomed by Allyson         [facility] Care Directive  Patient has a Health Care Directive on file  Advance care planning document is on file and is current.    HPI  Pt here for medicarewellness and check on medical conditions  She had her Hip surgery and in Rehab and doing better every lisandro  She is back home from rehab  Diabetes is doing well  Accucheks 100-110  Blood pressures are stable   Says she has lost a few Pounds   On PPI for GERD  Doing well             9/27/2024   General Health   How would you rate your overall physical health? (!) FAIR   Feel stress (tense, anxious, or unable to sleep) Only a little      (!) STRESS CONCERN      9/27/2024   Nutrition   Diet: Regular (no restrictions)            9/27/2024   Exercise   Days per week of moderate/strenous exercise 1 day   Average minutes spent exercising at this level 10 min      (!) EXERCISE CONCERN      9/27/2024   Social Factors   Frequency of  gathering with friends or relatives Once a week   Worry food won't last until get money to buy more No   Food not last or not have enough money for food? No   Do you have housing? (Housing is defined as stable permanent housing and does not include staying ouside in a car, in a tent, in an abandoned building, in an overnight shelter, or couch-surfing.) Yes   Are you worried about losing your housing? No   Lack of transportation? No   Unable to get utilities (heat,electricity)? No            10/3/2024   Fall Risk   Reason Gait Speed Test Not Completed Recent surgery             2024   Activities of Daily Living- Home Safety   Needs help with the following daily activites None of the above   Safety concerns in the home None of the above            2024   Dental   Dentist two times every year? Yes            2024   Hearing Screening   Hearing concerns? None of the above            2024   Driving Risk Screening   Patient/family members have concerns about driving No            2024   General Alertness/Fatigue Screening   Have you been more tired than usual lately? No            2024   Urinary Incontinence Screening   Bothered by leaking urine in past 6 months Yes                 Today's PHQ-2 Score:       2024    12:09 PM   PHQ-2 (  [employer])   Q1: Little interest or pleasure in doing things 0   Q2: Feeling down, depressed or hopeless 0   PHQ-2 Score 0         2024   Substance Use   Alcohol more than 3/day or more than 7/wk No   Do you have a current opioid prescription? (!) YES   How severe/bad is pain from 1 to 10? 0/10 (No Pain)   Do you use any other substances recreationally? No          Social History     Tobacco Use    Smoking status: Former     Current packs/day: 0.00     Types: Cigarettes     Quit date: 6/15/1977     Years since quittin.3     Passive exposure: Past    Smokeless tobacco: Never    Tobacco comments:     quit in    Vaping Use    Vaping status:  Never Used   Substance Use Topics    Alcohol use: Not Currently     Comment: 1 red wine before bef    Drug use: No           2023   LAST FHS-7 RESULTS   1st degree relative breast or ovarian cancer No   Any relative bilateral breast cancer No   Any male have breast cancer No   Any ONE woman have BOTH breast AND ovarian cancer No   Any woman with breast cancer before 50yrs No   2 or more relatives with breast AND/OR ovarian cancer No   2 or more relatives with breast AND/OR bowel cancer No           Mammogram Screening - Mammogram every 1-2 years updated in Health Maintenance based on mutual decision making    ASCVD Risk   The 10-year ASCVD risk score (Blanka ADAMS, et al., 2019) is: 39.7%    Values used to calculate the score:      Age: 73 years      Sex: Female      Is Non- : No      Diabetic: Yes      Tobacco smoker: No      Systolic Blood Pressure: 139 mmHg      Is BP treated: Yes      HDL Cholesterol: 46 mg/dL      Total Cholesterol: 274 mg/dL    Fracture Risk Assessment Tool  Link to Frax Calculator  Use the information below to complete the Frax calculator  : 1950  Sex: female  Weight (kg): 75.7 kg (actual weight)  Height (cm): 159.4 cm  Previous Fragility Fracture:  No  History of parent with fractured hip:  No  Current Smoking:  No  Patient has been on glucocorticoids for more than 3 months (5mg/day or more): No  Rheumatoid Arthritis on Problem List:  No  Secondary Osteoporosis on Problem List:  No  Consumes 3 or more units of alcohol per day: not now  Femoral Neck BMD (g/cm2)            Reviewed and updated as needed this visit by Provider                    Past Medical History:   Diagnosis Date    Cataract     Diabetes (H) 2019    Essential hypertension, benign     Nonrheumatic aortic valve insufficiency     Osteoarthritis of knee, unspecified laterality, unspecified osteoarthritis type     Thyroid disease      Past Surgical History:   Procedure Laterality  Date    ARTHROPLASTY HIP ANTERIOR Right 2024    Procedure: Right total hip arthroplasty, Direct anterior approach;  Surgeon: Meek Cifuentes MD;  Location: [facility] OR    CATARACT IOL, RT/LT Right 2018    Model ZCB00 SN:[id number] +11.5 Dioptor - Jermaine Akers MD ([facility])    CATARACT IOL, RT/LT Left 2018    Model ZCB00 SN:[id number] +12.5 Dioptor - Jermaine Akers MD ([facility])    DILATION AND CURETTAGE SUCTION      AB    DILATION AND CURETTAGE SUCTION      in 40's    NO HISTORY OF SURGERY       OB History    Para Term  AB Living   1 0 0 0 1 0   SAB IAB Ectopic Multiple Live Births   0 1 0 0 0      # Outcome Date GA Lbr Iggy/2nd Weight Sex Type Anes PTL Lv   1 IAB              Lab work is in process  Labs reviewed in EPIC  BP Readings from Last 3 Encounters:   10/03/24 139/74   24 107/63   24 111/66    Wt Readings from Last 3 Encounters:   10/03/24 75.7 kg (166 lb 12.8 oz)   24 76.2 kg (168 lb)   24 77.1 kg (170 lb)                  Patient Active Problem List   Diagnosis    Benign hypertension with CKD (chronic kidney disease) stage III (H)    Chronic rhinitis    Acquired hypothyroidism    Hyperlipidemia LDL goal <70    Gastroesophageal reflux disease without esophagitis    Nonrheumatic aortic valve insufficiency    Anxiety    Osteoarthritis of knee, unspecified laterality, unspecified osteoarthritis type    Other irritable bowel syndrome    Fatty liver    Stage 3b chronic kidney disease (H)    Cough    Primary osteoarthritis of left hip    Aortic valve insufficiency, etiology of cardiac valve disease unspecified    Pulmonary nodule    Atypical glandular cells on cervical Pap smear    Morbid obesity (H)    Gastroesophageal reflux disease, unspecified whether esophagitis present    Type 2 diabetes mellitus with stage 3b chronic kidney disease, without long-term current use of insulin (H)    Abnormal levels of other serum enzymes    Alcohol use disorder in remission     Primary osteoarthritis of both hips    Primary osteoarthritis of right hip    History of total right hip replacement    S/P total hip arthroplasty    Chronic right hip pain     Past Surgical History:   Procedure Laterality Date    ARTHROPLASTY HIP ANTERIOR Right 2024    Procedure: Right total hip arthroplasty, Direct anterior approach;  Surgeon: Meek Cifuentes MD;  Location: [facility] OR    CATARACT IOL, RT/LT Right 2018    Model ZCB00 SN:[id number] +11.5 Dioptor - Jermaine Akers MD ([facility])    CATARACT IOL, RT/LT Left 2018    Model ZCB00 SN:[id number] +12.5 Dioptor - Jermaine Akers MD ([facility])    DILATION AND CURETTAGE SUCTION      AB    DILATION AND CURETTAGE SUCTION      in 40's    NO HISTORY OF SURGERY         Social History     Tobacco Use    Smoking status: Former     Current packs/day: 0.00     Types: Cigarettes     Quit date: 6/15/1977     Years since quittin.3     Passive exposure: Past    Smokeless tobacco: Never    Tobacco comments:     quit in    Substance Use Topics    Alcohol use: Not Currently     Comment: 1 red wine before bef     Family History   Problem Relation Age of Onset    Diabetes Mother     Coronary Artery Disease Father          with a heart attack    Glaucoma Cousin     Colon Cancer No family hx of     Breast Cancer No family hx of     Macular Degeneration No family hx of          Current Outpatient Medications   Medication Sig Dispense Refill    acetaminophen (TYLENOL) 325 MG tablet Take 2 tablets (650 mg) by mouth every 6 hours as needed for mild pain.      acetaminophen (TYLENOL) 325 MG tablet Take 3 tablets (975 mg) by mouth every 8 hours as needed for other (mild pain). 100 tablet 0    alcohol swab prep pads Use to swab area of injection/milena as directed. 100 each 3    aspirin (ASA) 325 MG EC tablet Take 1 tablet (325 mg) by mouth 2 times daily. 80 tablet 0    blood glucose (NO BRAND SPECIFIED) test strip Use to test blood sugar 1 times daily or as directed.  100 strip 11    cetirizine (ZYRTEC) 10 MG tablet Take 10 mg by mouth daily as needed for allergies.      famotidine (PEPCID) 40 MG tablet Take 1 tablet (40 mg) by mouth daily. 30 tablet 1    fluticasone (FLONASE) 50 MCG/ACT spray Spray 1 spray into both nostrils daily as needed       furosemide (LASIX) 20 MG tablet Take 1 tablet (20 mg) by mouth daily 90 tablet 3    gabapentin (NEURONTIN) 100 MG capsule Take 1 capsule (100 mg) by mouth 2 times daily (Patient taking differently: Take 100 mg by mouth 2 times daily. Am and 1800) 180 capsule 3    ketotifen (ZADITOR) 0.025 % ophthalmic solution Place 1 drop into both eyes 2 times daily as needed for itching      levothyroxine (SYNTHROID/LEVOTHROID) 112 MCG tablet Take 1 tablet (112 mcg) by mouth daily 90 tablet 2    losartan (COZAAR) 25 MG tablet Take 1 tablet (25 mg) by mouth daily. 90 tablet 0    metoprolol succinate ER (TOPROL XL) 50 MG 24 hr tablet Take 1 tablet (50 mg) by mouth daily. 30 tablet 1    omeprazole (PRILOSEC) 20 MG DR capsule Take 1 capsule (20 mg) by mouth daily 90 capsule 3    oxyCODONE (ROXICODONE) 5 MG tablet Take 1-2 tablets (5-10 mg) by mouth every 4 hours as needed for moderate to severe pain. 60 tablet 0    RSV vaccine, bivalent, ABRYSVO, injection Inject 0.5 mLs into the muscle once for 1 dose. Pharmacist administered 0.5 mL 0    senna-docusate (SENOKOT-S/PERICOLACE) 8.6-50 MG tablet Take 1-2 tablets by mouth 2 times daily. Take while on oral narcotics to prevent or treat constipation. 30 tablet 0     Allergies   Allergen Reactions    Diltiazem Hcl Swelling     Leg swelling    Norvasc [Amlodipine] Swelling     Leg swelling    Seasonal Allergies Other (See Comments)     Eye nose throat irritation     Recent Labs   Lab Test 08/29/24  0601 08/12/24  1255 05/30/24  1107 04/01/24  0945 02/27/24  1534 01/31/24  1223 01/18/24  1310 06/29/23  0936 07/26/22  1305 06/08/22  0805 06/15/21  1217 05/19/21  1032 08/20/20  0733 07/22/20  0737   A1C  --  5.7*  5.8*  --   --  7.8*  --   --    < >  --    < >  --    < > 5.4   LDL  --  202*  --   --   --   --   --  154*  --  92   < >  --    < > 125*   HDL  --  46*  --   --   --   --   --  84  --  94   < >  --    < > 91   TRIG  --  132  --   --   --   --   --  162*  --  82   < >  --    < > 106   ALT  --   --   --  45 197* 91*  --   --   --   --    < >  --   --   --    CR 1.69*  --   --  1.43* 1.59* 1.17*  --   --    < >  --    < > 1.02  --  0.98   GFRESTIMATED 31*  --   --  38* 34* 49*  --   --    < >  --    < > 55*  --  58*   GFRESTBLACK  --   --   --   --   --   --   --   --   --   --   --  64  --  67   POTASSIUM 3.9 4.0  --  3.9 4.2 4.1   < >  --    < >  --    < > 3.6  --  3.6   TSH  --   --   --   --   --  4.36*  --  4.46*  --  6.44*   < >  --    < >  --     < > = values in this interval not displayed.      Current providers sharing in care for this patient include:  Patient Care Team:  Marysol Cisse MD as PCP - General (Family Practice)  Marysol Cisse MD as Assigned PCP  Stacie Reno RN as Diabetes Educator (Diabetes Education)  Jama Carpenter MD as Assigned Surgical Provider  Meek Cifuentes MD as Assigned Musculoskeletal Provider    The following health maintenance items are reviewed in Epic and correct as of today:  Health Maintenance   Topic Date Due    COLORECTAL CANCER SCREENING  Never done    RSV VACCINE (1 - Risk 60-74 years 1-dose series) Never done    PAP FOLLOW-UP  04/23/2023    ENDOMETRIAL BIOPSY  Never done    COLPOSCOPY  Never done    MEDICARE ANNUAL WELLNESS VISIT  06/29/2024    EYE EXAM  09/01/2024    INFLUENZA VACCINE (1) 09/01/2024    COVID-19 Vaccine (8 - 2024-25 season) 09/01/2024    TSH W/FREE T4 REFLEX  01/31/2025    A1C  02/12/2025    MICROALBUMIN  04/01/2025    MAMMO SCREENING  04/20/2025    ANNUAL REVIEW OF HM ORDERS  05/09/2025    DIABETIC FOOT EXAM  05/30/2025    LIPID  08/12/2025    BMP  08/29/2025    HEMOGLOBIN  09/17/2025    FALL RISK ASSESSMENT  10/03/2025    DTAP/TDAP/TD  "IMMUNIZATION (4 - Td or Tdap) 04/24/2028    ADVANCE CARE PLANNING  09/13/2029    DEXA  05/14/2033    PARATHYROID  Completed    PHOSPHORUS  Completed    HEPATITIS C SCREENING  Completed    PHQ-2 (once per calendar year)  Completed    Pneumococcal Vaccine: 65+ Years  Completed    URINALYSIS  Completed    ALK PHOS  Completed    ZOSTER IMMUNIZATION  Completed    HPV IMMUNIZATION  Aged Out    MENINGITIS IMMUNIZATION  Aged Out    RSV MONOCLONAL ANTIBODY  Aged Out         Review of Systems  CONSTITUTIONALst   INTEGUMENTARY/SKIN: NEGATIVE for worrisome rashes, moles or lesions  EYES: NEGATIVE for vision changes or irritation  ENT/MOUTH: NEGATIVE for ear, mouth and throat problems  RESP: NEGATIVE for significant cough or SOB  BREAST: NEGATIVE for masses, tenderness or discharge  CV: NEGATIVE for chest pain, palpitations or peripheral edema  GI: NEGATIVE for nausea, abdominal pain, heartburn, or change in bowel habits  : NEGATIVE for frequency, dysuria, or hematuria  MUSCULOSKELETAL:recent Hip surgery  NEURO: NEGATIVE for weakness, dizziness or paresthesias  ENDOCRINE: NEGATIVE for temperature intolerance, skin/hair changes  HEME: NEGATIVE for bleeding problems  PSYCHIATRIC: NEGATIVE for changes in mood or affect     Objective    Exam  /74   Pulse (!) 48   Temp 97  F (36.1  C) (Temporal)   Ht 1.594 m (5' 2.76\")   Wt 75.7 kg (166 lb 12.8 oz)   SpO2 98%   BMI 29.78 kg/m     Estimated body mass index is 29.78 kg/m  as calculated from the following:    Height as of this encounter: 1.594 m (5' 2.76\").    Weight as of this encounter: 75.7 kg (166 lb 12.8 oz).    Physical Exam  GENERAL: alert and no distress  GENERAL: alert, no distress, and using a walker  EYES: Eyes grossly normal to inspection, PERRL and conjunctivae and sclerae normal  HENT: ear canals and TM's normal, nose and mouth without ulcers or lesions  NECK: no adenopathy, no asymmetry, masses, or scars  RESP: lungs clear to auscultation - no rales, rhonchi " or wheezes  CV: regular rate and rhythm, normal S1 S2, no S3 or S4, no murmur, click or rub, no peripheral edema  ABDOMEN: soft, nontender, no hepatosplenomegaly, no masses and bowel sounds normal  MS: uses a walker  Incision healing well hip area  No erythema  SKIN: no suspicious lesions or rashes  NEURO: Normal strength and tone, mentation intact and speech normal  PSYCH: mentation appears normal, affect normal/bright        10/3/2024   Mini Cog   Clock Draw Score 0 Abnormal   3 Item Recall 3 objects recalled   Mini Cog Total Score 3                 Signed Electronically by: Marysol Cisse MD

## 2024-10-03 NOTE — PATIENT INSTRUCTIONS
Stop atenolol  Start Toprol 50 mg daily  Wean off Prilosec and take Pepcid instead as discussed   Sincerely,  Marysol Cisse MD          Patient Education   Preventive Care Advice   This is general advice given by our system to help you stay healthy. However, your care team may have specific advice just for you. Please talk to your care team about your preventive care needs.  Nutrition  Eat 5 or more servings of fruits and vegetables each day.  Try wheat bread, brown rice and whole grain pasta (instead of white bread, rice, and pasta).  Get enough calcium and vitamin D. Check the label on foods and aim for 100% of the RDA (recommended daily allowance).  Lifestyle  Exercise at least 150 minutes each week  (30 minutes a day, 5 days a week).  Do muscle strengthening activities 2 days a week. These help control your weight and prevent disease.  No smoking.  Wear sunscreen to prevent skin cancer.  Have a dental exam and cleaning every 6 months.  Yearly exams  See your health care team every year to talk about:  Any changes in your health.  Any medicines your care team has prescribed.  Preventive care, family planning, and ways to prevent chronic diseases.  Shots (vaccines)   HPV shots (up to age 26), if you've never had them before.  Hepatitis B shots (up to age 59), if you've never had them before.  COVID-19 shot: Get this shot when it's due.  Flu shot: Get a flu shot every year.  Tetanus shot: Get a tetanus shot every 10 years.  Pneumococcal, hepatitis A, and RSV shots: Ask your care team if you need these based on your risk.  Shingles shot (for age 50 and up)  General health tests  Diabetes screening:  Starting at age 35, Get screened for diabetes at least every 3 years.  If you are younger than age 35, ask your care team if you should be screened for diabetes.  Cholesterol test: At age 39, start having a cholesterol test every 5 years, or more often if advised.  Bone density scan (DEXA): At age 50, ask your care team  if you should have this scan for osteoporosis (brittle bones).  Hepatitis C: Get tested at least once in your life.  STIs (sexually transmitted infections)  Before age 24: Ask your care team if you should be screened for STIs.  After age 24: Get screened for STIs if you're at risk. You are at risk for STIs (including HIV) if:  You are sexually active with more than one person.  You don't use condoms every time.  You or a partner was diagnosed with a sexually transmitted infection.  If you are at risk for HIV, ask about PrEP medicine to prevent HIV.  Get tested for HIV at least once in your life, whether you are at risk for HIV or not.  Cancer screening tests  Cervical cancer screening: If you have a cervix, begin getting regular cervical cancer screening tests starting at age 21.  Breast cancer scan (mammogram): If you've ever had breasts, begin having regular mammograms starting at age 40. This is a scan to check for breast cancer.  Colon cancer screening: It is important to start screening for colon cancer at age 45.  Have a colonoscopy test every 10 years (or more often if you're at risk) Or, ask your provider about stool tests like a FIT test every year or Cologuard test every 3 years.  To learn more about your testing options, visit:   .  For help making a decision, visit:   https://bit.ly/ex15104.  Prostate cancer screening test: If you have a prostate, ask your care team if a prostate cancer screening test (PSA) at age 55 is right for you.  Lung cancer screening: If you are a current or former smoker ages 50 to 80, ask your care team if ongoing lung cancer screenings are right for you.  For informational purposes only. Not to replace the advice of your health care provider. Copyright   2023 [employer]. All rights reserved. Clinically reviewed by the [facility]. [employer] 811028 - REV 01/24.  Preventing Falls: Care Instructions  Injuries and health problems such as  trouble walking or poor eyesight can increase your risk of falling. So can some medicines. But there are things you can do to help prevent falls. You can exercise to get stronger. You can also arrange your home to make it safer.    Talk to your doctor about the medicines you take. Ask if any of them increase the risk of falls and whether they can be changed or stopped.   Try to exercise regularly. It can help improve your strength and balance. This can help lower your risk of falling.     Practice fall safety and prevention.    Wear low-heeled shoes that fit well and give your feet good support. Talk to your doctor if you have foot problems that make this hard.  Carry a cellphone or wear a medical alert device that you can use to call for help.  Use stepladders instead of chairs to reach high objects. Don't climb if you're at risk for falls. Ask for help, if needed.  Wear the correct eyeglasses, if you need them.    Make your home safer.    Remove rugs, cords, clutter, and furniture from walkways.  Keep your house well lit. Use night-lights in hallways and bathrooms.  Install and use sturdy handrails on stairways.  Wear nonskid footwear, even inside. Don't walk barefoot or in socks without shoes.    Be safe outside.    Use handrails, curb cuts, and ramps whenever possible.  Keep your hands free by using a shoulder bag or backpack.  Try to walk in well-lit areas. Watch out for uneven ground, changes in pavement, and debris.  Be careful in the winter. Walk on the grass or gravel when sidewalks are slippery. Use de-icer on steps and walkways. Add non-slip devices to shoes.    Put grab bars and nonskid mats in your shower or tub and near the toilet. Try to use a shower chair or bath bench when bathing.   Get into a tub or shower by putting in your weaker leg first. Get out with your strong side first. Have a phone or medical alert device in the bathroom with you.   Where can you learn more?  Go to  "https://www.[employer].net/patiented  Enter G117 in the search box to learn more about \"Preventing Falls: Care Instructions.\"  Current as of: July 17, 2023               Content Version: 14.0    8519-6145 [employer].   Care instructions adapted under license by your healthcare professional. If you have questions about a medical condition or this instruction, always ask your healthcare professional. [employer] disclaims any warranty or liability for your use of this information.      Bladder Training: Care Instructions  Your Care Instructions     Bladder training is used to treat urge incontinence and stress incontinence. Urge incontinence means that the need to urinate comes on so fast that you can't get to a toilet in time. Stress incontinence means that you leak urine because of pressure on your bladder. For example, it may happen when you laugh, cough, or lift something heavy.  Bladder training can increase how long you can wait before you have to urinate. It can also help your bladder hold more urine. And it can give you better control over the urge to urinate.  It is important to remember that bladder training takes a few weeks to a few months to make a difference. You may not see results right away, but don't give up.  Follow-up care is a key part of your treatment and safety. Be sure to make and go to all appointments, and call your doctor if you are having problems. It's also a good idea to know your test results and keep a list of the medicines you take.  How can you care for yourself at home?  Work with your doctor to come up with a bladder training program that is right for you. You may use one or more of the following methods.  Delayed urination  In the beginning, try to keep from urinating for 5 minutes after you first feel the need to go.  While you wait, take deep, slow breaths to relax. Kegel exercises can also help you delay the need to go to the bathroom.  After some " "practice, when you can easily wait 5 minutes to urinate, try to wait 10 minutes before you urinate.  Slowly increase the waiting period until you are able to control when you have to urinate.  Scheduled urination  Empty your bladder when you first wake up in the morning.  Schedule times throughout the day when you will urinate.  Start by going to the bathroom every hour, even if you don't need to go.  Slowly increase the time between trips to the bathroom.  When you have found a schedule that works well for you, keep doing it.  If you wake up during the night and have to urinate, do it. Apply your schedule to waking hours only.  Kegel exercises  These tighten and strengthen pelvic muscles, which can help you control the flow of urine. (If doing these exercises causes pain, stop doing them and talk with your doctor.) To do Kegel exercises:  Squeeze your muscles as if you were trying not to pass gas. Or squeeze your muscles as if you were stopping the flow of urine. Your belly, legs, and buttocks shouldn't move.  Hold the squeeze for 3 seconds, then relax for 5 to 10 seconds.  Start with 3 seconds, then add 1 second each week until you are able to squeeze for 10 seconds.  Repeat the exercise 10 times a session. Do 3 to 8 sessions a day.  When should you call for help?  Watch closely for changes in your health, and be sure to contact your doctor if:    Your incontinence is getting worse.     You do not get better as expected.   Where can you learn more?  Go to https://www.[employer].net/patiented  Enter V684 in the search box to learn more about \"Bladder Training: Care Instructions.\"  Current as of: November 15, 2023               Content Version: 14.0    2868-6257 [employer].   Care instructions adapted under license by your healthcare professional. If you have questions about a medical condition or this instruction, always ask your healthcare professional. [employer] disclaims any warranty " or liability for your use of this information.      Chronic Pain: Care Instructions  Your Care Instructions     Chronic pain is pain that lasts a long time (months or even years) and may or may not have a clear cause. It is different from acute pain, which usually does have a clear cause--like an injury or illness--and gets better over time. Chronic pain:  Lasts over time but may vary from day to day.  Does not go away despite efforts to end it.  May disrupt your sleep and lead to fatigue.  May cause depression or anxiety.  May make your muscles tense, causing more pain.  Can disrupt your work, hobbies, home life, and relationships with friends and family.  Chronic pain is a very real condition. It is not just in your head. Treatment can help and usually includes several methods used together, such as medicines, physical therapy, exercise, and other treatments. Learning how to relax and changing negative thought patterns can also help you cope.  Chronic pain is complex. Taking an active role in your treatment will help you better manage your pain. Tell your doctor if you have trouble dealing with your pain. You may have to try several things before you find what works best for you.  Follow-up care is a key part of your treatment and safety. Be sure to make and go to all appointments, and call your doctor if you are having problems. It's also a good idea to know your test results and keep a list of the medicines you take.  How can you care for yourself at home?  Pace yourself. Break up large jobs into smaller tasks. Save harder tasks for days when you have less pain, or go back and forth between hard tasks and easier ones. Take rest breaks.  Relax, and reduce stress. Relaxation techniques such as deep breathing or meditation can help.  Keep moving. Gentle, daily exercise can help reduce pain over the long run. Try low- or no-impact exercises such as walking, swimming, and stationary biking. Do stretches to stay  flexible.  Try heat, cold packs, and massage.  Get enough sleep. Chronic pain can make you tired and drain your energy. Talk with your doctor if you have trouble sleeping because of pain.  Think positive. Your thoughts can affect your pain level. Do things that you enjoy to distract yourself when you have pain instead of focusing on the pain. See a movie, read a book, listen to music, or spend time with a friend.  If you think you are depressed, talk to your doctor about treatment.  Keep a daily pain diary. Record how your moods, thoughts, sleep patterns, activities, and medicine affect your pain. You may find that your pain is worse during or after certain activities or when you are feeling a certain emotion. Having a record of your pain can help you and your doctor find the best ways to treat your pain.  Take pain medicines exactly as directed.  If the doctor gave you a prescription medicine for pain, take it as prescribed.  If you are not taking a prescription pain medicine, ask your doctor if you can take an over-the-counter medicine.  Reducing constipation caused by pain medicine  Talk to your doctor about a laxative. If a laxative doesn't work, your doctor may suggest a prescription medicine.  Include fruits, vegetables, beans, and whole grains in your diet each day. These foods are high in fiber.  If your doctor recommends it, get more exercise. Walking is a good choice. Bit by bit, increase the amount you walk every day. Try for at least 30 minutes on most days of the week.  Schedule time each day for a bowel movement. A daily routine may help. Take your time and do not strain when having a bowel movement.  When should you call for help?   Call your doctor now or seek immediate medical care if:    Your pain gets worse or is out of control.     You feel down or blue, or you do not enjoy things like you once did. You may be depressed, which is common in people with chronic pain. Depression can be treated.      "You have vomiting or cramps for more than 2 hours.   Watch closely for changes in your health, and be sure to contact your doctor if:    You cannot sleep because of pain.     You are very worried or anxious about your pain.     You have trouble taking your pain medicine.     You have any concerns about your pain medicine.     You have trouble with bowel movements, such as:  No bowel movement in 3 days.  Blood in the anal area, in your stool, or on the toilet paper.  Diarrhea for more than 24 hours.   Where can you learn more?  Go to https://www.[employer].net/patiented  Enter N004 in the search box to learn more about \"Chronic Pain: Care Instructions.\"  Current as of: July 10, 2023               Content Version: 14.0    3282-8681 [employer] [address].   Care instructions adapted under license by your healthcare professional. If you have questions about a medical condition or this instruction, always ask your healthcare professional. [employer] disclaims any warranty or liability for your use of this information.         "

## 2024-10-04 RX ORDER — METOPROLOL SUCCINATE 50 MG/1
50 TABLET, EXTENDED RELEASE ORAL DAILY
Qty: 90 TABLET | OUTPATIENT
Start: 2024-10-04

## 2024-10-04 RX ORDER — FAMOTIDINE 40 MG/1
40 TABLET, FILM COATED ORAL DAILY
Qty: 90 TABLET | OUTPATIENT
Start: 2024-10-04

## 2024-10-09 ENCOUNTER — THERAPY VISIT (OUTPATIENT)
Dept: PHYSICAL THERAPY | Facility: CLINIC | Age: 74
End: 2024-10-09
Payer: MEDICARE

## 2024-10-09 DIAGNOSIS — G89.29 CHRONIC RIGHT HIP PAIN: Primary | ICD-10-CM

## 2024-10-09 DIAGNOSIS — M25.551 CHRONIC RIGHT HIP PAIN: Primary | ICD-10-CM

## 2024-10-09 PROCEDURE — 97116 GAIT TRAINING THERAPY: CPT | Mod: GP | Performed by: PHYSICAL THERAPIST

## 2024-10-09 PROCEDURE — 97110 THERAPEUTIC EXERCISES: CPT | Mod: GP | Performed by: PHYSICAL THERAPIST

## 2024-10-09 NOTE — PROGRESS NOTES
PLAN  Continue therapy per current plan of care.    Beginning/End Dates of Progress Note Reporting Period:  09/19/24 to 10/09/2024    Referring Provider:  Meek Cifuentes       10/09/24 0500   Appointment Info   Signing clinician's name / credentials Sage Yates, PT, DPT   Total/Authorized Visits 8 (POC) (E&T)   Visits Used 4   Medical Diagnosis S/P total right hip arthroplasty   PT Tx Diagnosis Chronic right hip pain   Precautions/Limitations No hip precautions per op note   Other pertinent information NEXT: Ambulation with cane / NuStep / LE strengthening   Quick Adds Certification   Progress Note/Certification   Start of Care Date 09/19/24   Onset of illness/injury or Date of Surgery 08/28/24  (Date of surgery)   Therapy Frequency 1 time per week   Predicted Duration 8 weeks   Certification date from 09/19/24   Certification date to 11/14/24   Progress Note Due Date 11/14/24   Progress Note Completed Date 09/19/24   GOALS   PT Goals 2   PT Goal 1   Goal Identifier Ambulation   Goal Description Pt will be able to ambulate for 15 minutes in order to run errands with a minimal increase in pain 1-2/10   Goal Progress Can walk 1/2 block with 4WW   Target Date 11/14/24   PT Goal 2   Goal Identifier Transfers   Goal Description Pt will be able to get in and out of her car with a minimal increase in pain 1-2/10.   Goal Progress Partially met. Needs assist to get in and out of passenger side with assit. No issue getting in and out of  side.   Target Date 11/14/24   Subjective Report   Subjective Report Has been walking around a lot without the walker at home. Has not been using a cane currently.   Objective Measures   Objective Measures Objective Measure 1;Objective Measure 2;Objective Measure 3   Objective Measure 1   Objective Measure Palpation   Details Hypertonicity of quadriceps   Objective Measure 2   Objective Measure Ambulation   Details Ambulates with step through gait pattern with FWW. Ambulates  with SPC in PT gym with step through gait pattern and CGA. No loss of balance noted. Some difficulty with sequencing.   Objective Measure 3   Objective Measure Strength   Details Hip flexion - 4/5, Hip abduction - 4-/5, Hip adduction - 4/5, Knee extension - 4/5   Treatment Interventions (PT)   Interventions Therapeutic Procedure/Exercise;Gait Training;Manual Therapy   Therapeutic Procedure/Exercise   Therapeutic Procedures: strength, endurance, ROM, flexibility minutes (15690) 30   Therapeutic Procedures Ther Proc 2;Ther Proc 4;Ther Proc 3;Ther Proc 5;Ther Proc 6;Ther Proc 7;Ther Proc 8;Ther Proc 9;Ther Proc 10   Ther Proc 1 LAQ x10 B with 5 second holds   Ther Proc 2 Mini squats x17 at railing   Ther Proc 4 Marching at railing x20 B   Ther Proc 6 Side steps at handrail x6 lengths   Ther Proc 8 NuStep x6 mins at level 3   Ther Proc 9 Standing hip flexion x20 B   Ther Proc 9 - Details Difficulty with involved LE as stance leg   Skilled Intervention Stretching and strengthening exercise education to improve function   Patient Response/Progress No increase in discomfort noted   Ther Proc 6 - Details Cueing to keep feet facing forward   Gait Training   Gait Training Minutes, includes stair climbing (36186) 9   Gait 1 Fitting for proper height of single point cane. Education on adjusting cane to proper height at home. Education regarding proper sequencing during gait and using cane when bringing involved LE forward. Gait in gym x10 lengths at handrail with CGA. Education regarding increasing endurance at home while walking with walker and cane to improve ambulation in the community.   Patient Response/Progress Verbalized and demonstrated understanding   Skilled Intervention Gait training education   Education   Learner/Method Patient;No Barriers to Learning   Plan   Home program PTRx (handout)   Comments   Comments Overall patient is doing well. Gait has improved and she was able to use a SPC without a loss of balance at  today's visit. Pt's strength is improving but deficits still remain. Pt would benefit from continued skilled physical therapy in order to address these deficits and meet her functional goals.   Total Session Time   Timed Code Treatment Minutes 39   Total Treatment Time (sum of timed and untimed services) 39

## 2024-10-14 ENCOUNTER — ANCILLARY PROCEDURE (OUTPATIENT)
Dept: GENERAL RADIOLOGY | Facility: CLINIC | Age: 74
End: 2024-10-14
Attending: ORTHOPAEDIC SURGERY
Payer: MEDICARE

## 2024-10-14 ENCOUNTER — OFFICE VISIT (OUTPATIENT)
Dept: ORTHOPEDICS | Facility: CLINIC | Age: 74
End: 2024-10-14
Payer: MEDICARE

## 2024-10-14 VITALS — WEIGHT: 165 LBS | HEIGHT: 63 IN | BODY MASS INDEX: 29.23 KG/M2 | RESPIRATION RATE: 18 BRPM

## 2024-10-14 DIAGNOSIS — Z96.641 S/P TOTAL RIGHT HIP ARTHROPLASTY: Primary | ICD-10-CM

## 2024-10-14 DIAGNOSIS — Z96.641 S/P TOTAL RIGHT HIP ARTHROPLASTY: ICD-10-CM

## 2024-10-14 PROCEDURE — 73501 X-RAY EXAM HIP UNI 1 VIEW: CPT | Mod: TC | Performed by: INTERNAL MEDICINE

## 2024-10-14 PROCEDURE — 99024 POSTOP FOLLOW-UP VISIT: CPT | Performed by: ORTHOPAEDIC SURGERY

## 2024-10-14 NOTE — LETTER
10/14/2024      Glenda Bales  [address]  [facility] [address]      Dear Colleague,    Thank you for referring your patient, Glenda Bales, to the [facility]. Please see a copy of my visit note below.    6 week follow up right total hip arthroplasty on 8/28/24 with direct anterior approach.    We had a crack in the calcar, which we wired in surgery.     She complains of numbness and pain in anterior lateral thigh, likely from lateral femoral cutaneous nerve.  She  is using walker  to walk.    Wound is healed well.  She has mild abductor lurch.  Range of motion internal rotation 20 degrees, external rotation 30 degrees, rest of ROM normal.     Xray: Low AP pelvis and lateral of right hip was performed today.   The position of components is excellent.  It may have subsided about 1-2 mms    Leg length :not equal, right short by up to 6 mms..      Medication renewal:  None .    Assessment:  6 weeks status post right total hip arthroplasty.  Plan:  no flexion limits.  Start abductor strengthening.  Exercises demonstrated.  Use cane if there is any  limp.  Return to clinic 6 weeks with low AP pelvis x-ray and to check walking.      Again, thank you for allowing me to participate in the care of your patient.        Sincerely,        Meek Cifuentes MD

## 2024-10-14 NOTE — PROGRESS NOTES
6 week follow up right total hip arthroplasty on 8/28/24 with direct anterior approach.    We had a crack in the calcar, which we wired in surgery.     She complains of numbness and pain in anterior lateral thigh, likely from lateral femoral cutaneous nerve.  She  is using walker  to walk.    Wound is healed well.  She has mild abductor lurch.  Range of motion internal rotation 20 degrees, external rotation 30 degrees, rest of ROM normal.     Xray: Low AP pelvis and lateral of right hip was performed today.   The position of components is excellent.  It may have subsided about 1-2 mms    Leg length :not equal, right short by up to 6 mms..      Medication renewal:  None .    Assessment:  6 weeks status post right total hip arthroplasty.  Plan:  no flexion limits.  Start abductor strengthening.  Exercises demonstrated.  Use cane if there is any  limp.  Return to clinic 6 weeks with low AP pelvis x-ray and to check walking.

## 2024-10-16 ENCOUNTER — THERAPY VISIT (OUTPATIENT)
Dept: PHYSICAL THERAPY | Facility: CLINIC | Age: 74
End: 2024-10-16
Payer: MEDICARE

## 2024-10-16 DIAGNOSIS — M25.551 CHRONIC RIGHT HIP PAIN: Primary | ICD-10-CM

## 2024-10-16 DIAGNOSIS — G89.29 CHRONIC RIGHT HIP PAIN: Primary | ICD-10-CM

## 2024-10-16 PROCEDURE — 97116 GAIT TRAINING THERAPY: CPT | Mod: GP | Performed by: PHYSICAL THERAPIST

## 2024-10-16 PROCEDURE — 97110 THERAPEUTIC EXERCISES: CPT | Mod: GP | Performed by: PHYSICAL THERAPIST

## 2024-10-30 ENCOUNTER — THERAPY VISIT (OUTPATIENT)
Dept: PHYSICAL THERAPY | Facility: CLINIC | Age: 74
End: 2024-10-30
Payer: MEDICARE

## 2024-10-30 DIAGNOSIS — G89.29 CHRONIC RIGHT HIP PAIN: Primary | ICD-10-CM

## 2024-10-30 DIAGNOSIS — M25.551 CHRONIC RIGHT HIP PAIN: Primary | ICD-10-CM

## 2024-10-30 PROCEDURE — 97116 GAIT TRAINING THERAPY: CPT | Mod: GP | Performed by: PHYSICAL THERAPIST

## 2024-10-30 PROCEDURE — 97110 THERAPEUTIC EXERCISES: CPT | Mod: GP | Performed by: PHYSICAL THERAPIST

## 2024-11-06 ENCOUNTER — PATIENT OUTREACH (OUTPATIENT)
Dept: CARE COORDINATION | Facility: CLINIC | Age: 74
End: 2024-11-06
Payer: MEDICARE

## 2024-11-07 ENCOUNTER — OFFICE VISIT (OUTPATIENT)
Dept: FAMILY MEDICINE | Facility: CLINIC | Age: 74
End: 2024-11-07
Payer: MEDICARE

## 2024-11-07 VITALS
SYSTOLIC BLOOD PRESSURE: 96 MMHG | RESPIRATION RATE: 16 BRPM | BODY MASS INDEX: 28.93 KG/M2 | DIASTOLIC BLOOD PRESSURE: 60 MMHG | WEIGHT: 162 LBS | HEART RATE: 55 BPM | OXYGEN SATURATION: 97 % | TEMPERATURE: 96.4 F

## 2024-11-07 DIAGNOSIS — I12.9 BENIGN HYPERTENSION WITH CKD (CHRONIC KIDNEY DISEASE) STAGE III (H): Primary | ICD-10-CM

## 2024-11-07 DIAGNOSIS — N18.32 STAGE 3B CHRONIC KIDNEY DISEASE (H): ICD-10-CM

## 2024-11-07 DIAGNOSIS — N18.30 BENIGN HYPERTENSION WITH CKD (CHRONIC KIDNEY DISEASE) STAGE III (H): Primary | ICD-10-CM

## 2024-11-07 DIAGNOSIS — E78.5 HYPERLIPIDEMIA LDL GOAL <100: ICD-10-CM

## 2024-11-07 PROCEDURE — 90662 IIV NO PRSV INCREASED AG IM: CPT | Performed by: FAMILY MEDICINE

## 2024-11-07 PROCEDURE — G0008 ADMIN INFLUENZA VIRUS VAC: HCPCS | Performed by: FAMILY MEDICINE

## 2024-11-07 PROCEDURE — 90480 ADMN SARSCOV2 VAC 1/ONLY CMP: CPT | Performed by: FAMILY MEDICINE

## 2024-11-07 PROCEDURE — 99214 OFFICE O/P EST MOD 30 MIN: CPT | Performed by: FAMILY MEDICINE

## 2024-11-07 PROCEDURE — G2211 COMPLEX E/M VISIT ADD ON: HCPCS | Performed by: FAMILY MEDICINE

## 2024-11-07 PROCEDURE — 91320 SARSCV2 VAC 30MCG TRS-SUC IM: CPT | Performed by: FAMILY MEDICINE

## 2024-11-07 RX ORDER — LOSARTAN POTASSIUM 25 MG/1
25 TABLET ORAL DAILY
Qty: 90 TABLET | Refills: 3 | Status: SHIPPED | OUTPATIENT
Start: 2024-11-07

## 2024-11-07 RX ORDER — ATORVASTATIN CALCIUM 10 MG/1
10 TABLET, FILM COATED ORAL DAILY
Qty: 90 TABLET | Refills: 3 | Status: SHIPPED | OUTPATIENT
Start: 2024-11-07

## 2024-11-07 RX ORDER — METOPROLOL SUCCINATE 25 MG/1
25 TABLET, EXTENDED RELEASE ORAL DAILY
Qty: 90 TABLET | Refills: 3 | Status: SHIPPED | OUTPATIENT
Start: 2024-11-07

## 2024-11-07 RX ORDER — METOPROLOL SUCCINATE 50 MG/1
50 TABLET, EXTENDED RELEASE ORAL DAILY
Qty: 30 TABLET | Refills: 1 | Status: CANCELLED | OUTPATIENT
Start: 2024-11-07

## 2024-11-07 ASSESSMENT — PAIN SCALES - GENERAL: PAINLEVEL_OUTOF10: MILD PAIN (2)

## 2024-11-07 NOTE — PROGRESS NOTES
Assessment & Plan     Benign hypertension with CKD (chronic kidney disease) stage III (H)  Reduce toprol to 25 mg daily  Follow up ancillary BP check 1 month   - losartan (COZAAR) 25 MG tablet; Take 1 tablet (25 mg) by mouth daily.  - metoprolol succinate ER (TOPROL XL) 25 MG 24 hr tablet; Take 1 tablet (25 mg) by mouth daily.    Hyperlipidemia LDL goal <100  Advised take Lipitor as recommended   Follow up Lipid check 6 weeks -lab appointment   - atorvastatin (LIPITOR) 10 MG tablet; Take 1 tablet (10 mg) by mouth daily.    Stage 3b chronic kidney disease (H)  Stable   The longitudinal plan of care for the diagnosis(es)/condition(s) as documented were addressed during this visit. Due to the added complexity in care, I will continue to support Glenda in the subsequent management and with ongoing continuity of care.            Kathrine Doshi is a 73 year old, presenting for the following health issues:  Hypertension      11/7/2024    11:09 AM   Additional Questions   Roomed by Allyson CHAVES       Hypertension Follow-up    Do you check your blood pressure regularly outside of the clinic? No   Are you following a low salt diet? No added salt  Are your blood pressures ever more than 140 on the top number (systolic) OR more   than 90 on the bottom number (diastolic), for example 140/90? No blood pressure has been low.           Review of Systems  Constitutional, neuro, ENT, endocrine, pulmonary, cardiac, gastrointestinal, genitourinary, musculoskeletal, integument and psychiatric systems are negative, except as otherwise noted.      Objective    BP 96/60   Pulse 55   Temp (!) 96.4  F (35.8  C) (Temporal)   Resp 16   Wt 73.5 kg (162 lb)   SpO2 97%   BMI 28.93 kg/m    Body mass index is 28.93 kg/m .  Physical Exam   GENERAL: alert and no distress  NECK: no adenopathy, no asymmetry, masses, or scars  RESP: lungs clear to auscultation - no rales, rhonchi or wheezes  CV: regular rate and rhythm, normal S1 S2, no S3  or S4, no murmur, click or rub, no peripheral edema  ABDOMEN: soft, nontender, no hepatosplenomegaly, no masses and bowel sounds normal  MS: no gross musculoskeletal defects noted, no edema    Office Visit on 09/17/2024   Component Date Value Ref Range Status    WBC Count 09/17/2024 7.4  4.0 - 11.0 10e3/uL Final    RBC Count 09/17/2024 3.34 (L)  3.80 - 5.20 10e6/uL Final    Hemoglobin 09/17/2024 9.7 (L)  11.7 - 15.7 g/dL Final    Hematocrit 09/17/2024 30.8 (L)  35.0 - 47.0 % Final    MCV 09/17/2024 92  78 - 100 fL Final    MCH 09/17/2024 29.0  26.5 - 33.0 pg Final    MCHC 09/17/2024 31.5  31.5 - 36.5 g/dL Final    RDW 09/17/2024 14.3  10.0 - 15.0 % Final    Platelet Count 09/17/2024 318  150 - 450 10e3/uL Final           Signed Electronically by: Marysol Cisse MD

## 2024-11-11 ENCOUNTER — THERAPY VISIT (OUTPATIENT)
Dept: PHYSICAL THERAPY | Facility: CLINIC | Age: 74
End: 2024-11-11
Payer: MEDICARE

## 2024-11-11 DIAGNOSIS — M25.551 CHRONIC RIGHT HIP PAIN: Primary | ICD-10-CM

## 2024-11-11 DIAGNOSIS — G89.29 CHRONIC RIGHT HIP PAIN: Primary | ICD-10-CM

## 2024-11-11 PROCEDURE — 97110 THERAPEUTIC EXERCISES: CPT | Mod: GP | Performed by: PHYSICAL THERAPIST

## 2024-11-18 ENCOUNTER — THERAPY VISIT (OUTPATIENT)
Dept: PHYSICAL THERAPY | Facility: CLINIC | Age: 74
End: 2024-11-18
Payer: MEDICARE

## 2024-11-18 DIAGNOSIS — M25.551 CHRONIC RIGHT HIP PAIN: Primary | ICD-10-CM

## 2024-11-18 DIAGNOSIS — G89.29 CHRONIC RIGHT HIP PAIN: Primary | ICD-10-CM

## 2024-11-18 PROCEDURE — 97110 THERAPEUTIC EXERCISES: CPT | Mod: GP | Performed by: PHYSICAL THERAPIST

## 2024-11-18 NOTE — PROGRESS NOTES
[facility]                                                                                   OUTPATIENT PHYSICAL THERAPY    PLAN OF TREATMENT FOR OUTPATIENT REHABILITATION   Patient's Last Name, First Name, lGenda Cameron YOB: 1950   Provider's Name   [facility]   Medical Record No.  [id number]     Onset Date: 08/28/24 (Date of surgery)  Start of Care Date: 09/19/24     Medical Diagnosis:  S/P total right hip arthroplasty      PT Treatment Diagnosis:  Chronic right hip pain Plan of Treatment  Frequency/Duration: 1 time per week/ 8 weeks    Certification date from 11/15/24 to 01/10/25         See note for plan of treatment details and functional goals     Chevy Yates PT                         I CERTIFY THE NEED FOR THESE SERVICES FURNISHED UNDER        THIS PLAN OF TREATMENT AND WHILE UNDER MY CARE     (Physician attestation of this document indicates review and certification of the therapy plan).              Referring Provider:  Meek Cifuentes    Initial Assessment  See Epic Evaluation- Start of Care Date: 09/19/24            PLAN  1 time per week for 8 weeks    Beginning/End Dates of Progress Note Reporting Period:  10/09/24 to 11/18/2024    Referring Provider:  Meek Cifuentes       11/18/24 0500   Appointment Info   Signing clinician's name / credentials Sage Yates PT, DPT   Total/Authorized Visits 16 (POC) (E&T)   Visits Used 8   Medical Diagnosis S/P total right hip arthroplasty   PT Tx Diagnosis Chronic right hip pain   Precautions/Limitations No hip precautions per op note   Other pertinent information NEXT: Check step ups and progress / Ambulation with cane / NuStep / LE strengthening   Progress Note/Certification   Start of Care Date 09/19/24   Onset of illness/injury or Date of Surgery 08/28/24  (Date of surgery)   Therapy Frequency 1 time per week   Predicted Duration 8 weeks   Certification date  from 11/15/24   Certification date to 01/10/25   Progress Note Due Date 01/10/25   Progress Note Completed Date 10/09/24   GOALS   PT Goals 2   PT Goal 1   Goal Identifier Ambulation   Goal Description Pt will be able to ambulate for 15 minutes in order to run errands with a minimal increase in pain 1-2/10   Goal Progress Partially met   Target Date 01/10/25   PT Goal 2   Goal Identifier Transfers   Goal Description Pt will be able to get in and out of her car with a minimal increase in pain 1-2/10.   Goal Progress Mostly met. Able to get in  side without issue. Has not attempted from passenger side   Target Date 01/10/25   Subjective Report   Subjective Report Things are feeling good. Still tight in the thigh where it's numb (anterior thigh). Hasn't been using the cane at home. Sometimes will go without the walker at home and will reach for furniture, counters. HEP going well at home.   Objective Measures   Objective Measures Objective Measure 1;Objective Measure 2;Objective Measure 3   Objective Measure 1   Objective Measure Ambulation   Details Mild hip abductor lurch without AD. No loss of balance using cane/walker.   Objective Measure 2   Objective Measure Strength   Details Hip abduction - 4/5, Hip adduction - 4/5, Knee extension - 4-/5, Knee flexion - 4/5   Treatment Interventions (PT)   Interventions Therapeutic Procedure/Exercise;Gait Training;Manual Therapy;Neuromuscular Re-education   Therapeutic Procedure/Exercise   Therapeutic Procedures: strength, endurance, ROM, flexibility minutes (93664) 39   Therapeutic Procedures Ther Proc 2;Ther Proc 4;Ther Proc 3;Ther Proc 5;Ther Proc 6;Ther Proc 7;Ther Proc 8;Ther Proc 9;Ther Proc 10   Ther Proc 2 Step ups/Step downs x15 on 4 inch step and x10 on 5 inch step   Ther Proc 3 Sit to stand x5   Ther Proc 3 - Details UE assist   Ther Proc 4 Marching at railing x25 B   Ther Proc 4 - Details Cuing to bring right knee up higher   Ther Proc 5 Standing hip abduction  x15 B   Ther Proc 5 - Details Cuing to keep feet forward   Ther Proc 6 Side steps at handrail x8 lengths   Ther Proc 6 - Details Cuing to keep feet forward   Ther Proc 7 Standing hip abduction with extension x15 B   Ther Proc 8 NuStep x7 mins at level 3-5   Ther Proc 9 Standing hip flexion x20 B   Skilled Intervention Stretching and strengthening exercise education to improve function   Patient Response/Progress No increase in discomfort noted   Education   Learner/Method Patient;No Barriers to Learning   Plan   Home program PTRx (handout)   Comments   Comments Overall patient is doing well and has seen progress in knee and hip strengthening. Pt is able to ambulate with a cane with minimal limp noted. Pt would benefit from continued skilled physical therapy in order to build on these gains and meet her remaining functional goals.   Total Session Time   Timed Code Treatment Minutes 39   Total Treatment Time (sum of timed and untimed services) 39

## 2024-11-25 ENCOUNTER — ANCILLARY PROCEDURE (OUTPATIENT)
Dept: GENERAL RADIOLOGY | Facility: CLINIC | Age: 74
End: 2024-11-25
Attending: ORTHOPAEDIC SURGERY
Payer: MEDICARE

## 2024-11-25 ENCOUNTER — OFFICE VISIT (OUTPATIENT)
Dept: ORTHOPEDICS | Facility: CLINIC | Age: 74
End: 2024-11-25
Payer: MEDICARE

## 2024-11-25 VITALS
SYSTOLIC BLOOD PRESSURE: 126 MMHG | RESPIRATION RATE: 18 BRPM | HEIGHT: 63 IN | BODY MASS INDEX: 28.35 KG/M2 | HEART RATE: 69 BPM | WEIGHT: 160 LBS | DIASTOLIC BLOOD PRESSURE: 78 MMHG

## 2024-11-25 DIAGNOSIS — Z96.641 S/P TOTAL RIGHT HIP ARTHROPLASTY: ICD-10-CM

## 2024-11-25 DIAGNOSIS — Z96.641 S/P TOTAL RIGHT HIP ARTHROPLASTY: Primary | ICD-10-CM

## 2024-11-25 PROCEDURE — 99024 POSTOP FOLLOW-UP VISIT: CPT | Performed by: ORTHOPAEDIC SURGERY

## 2024-11-25 PROCEDURE — 72170 X-RAY EXAM OF PELVIS: CPT | Mod: TC | Performed by: RADIOLOGY

## 2024-11-25 RX ORDER — AMOXICILLIN 500 MG/1
2000 CAPSULE ORAL ONCE
Qty: 8 CAPSULE | Refills: 4 | Status: SHIPPED | OUTPATIENT
Start: 2024-11-25 | End: 2024-11-25

## 2024-11-25 NOTE — PROGRESS NOTES
3 month follow up right total hip arthroplasty on 8/2/24 with direct anterior approach .    She complains of weakness of the legs.  Pain is mild  To walk she is using a walker.  She is in Physical Therapy and wants to continue.    Wound is healed well.  She has mild abductor lurch.  She is doing abductor exercises appropriately with Physical Therapy.  She also has a mild flexion contracture limiting extension when walking.  Range of motion internal rotation 0 degrees, external rotation 30 degrees.  Left hip has same range of motion.      Assessment:  3 months status post right total hip arthroplasty.  Plan:  Continue abductor strengthening.  Exercises reviewed.  Use cane until there is no limp.  Take amoxacillin 4 tablets before dental appointment.   Return to clinic at 1 year from surgery ( August ) with low AP pelvis and right lateral hip x-rays.

## 2024-11-25 NOTE — PATIENT INSTRUCTIONS
Assessment:  3 months status post right total hip arthroplasty.  Plan:  Continue abductor strengthening.  Exercises reviewed.  Use cane until there is no limp.  Take amoxacillin 4 tablets before dental appointment.   Return to clinic at 1 year from surgery ( August ) with low AP pelvis and right lateral hip x-rays.

## 2024-11-25 NOTE — LETTER
11/25/2024      Glenda Bales  [address]  [facility] [address]      Dear Colleague,    Thank you for referring your patient, Glenda Bales, to the [facility]. Please see a copy of my visit note below.        3 month follow up right total hip arthroplasty on 8/2/24 with direct anterior approach .    She complains of weakness of the legs.  Pain is mild  To walk she is using a walker.  She is in Physical Therapy and wants to continue.    Wound is healed well.  She has mild abductor lurch.  She is doing abductor exercises appropriately with Physical Therapy.  She also has a mild flexion contracture limiting extension when walking.  Range of motion internal rotation 0 degrees, external rotation 30 degrees.  Left hip has same range of motion.      Assessment:  3 months status post right total hip arthroplasty.  Plan:  Continue abductor strengthening.  Exercises reviewed.  Use cane until there is no limp.  Take amoxacillin 4 tablets before dental appointment.   Return to clinic at 1 year from surgery ( August ) with low AP pelvis and right lateral hip x-rays.       Again, thank you for allowing me to participate in the care of your patient.        Sincerely,        Meek Cifuentes MD

## 2024-12-05 ENCOUNTER — THERAPY VISIT (OUTPATIENT)
Dept: PHYSICAL THERAPY | Facility: CLINIC | Age: 74
End: 2024-12-05
Payer: MEDICARE

## 2024-12-05 DIAGNOSIS — G89.29 CHRONIC RIGHT HIP PAIN: Primary | ICD-10-CM

## 2024-12-05 DIAGNOSIS — M25.551 CHRONIC RIGHT HIP PAIN: Primary | ICD-10-CM

## 2024-12-10 ENCOUNTER — OFFICE VISIT (OUTPATIENT)
Dept: FAMILY MEDICINE | Facility: CLINIC | Age: 74
End: 2024-12-10
Payer: MEDICARE

## 2024-12-10 VITALS
HEIGHT: 63 IN | HEART RATE: 66 BPM | DIASTOLIC BLOOD PRESSURE: 77 MMHG | OXYGEN SATURATION: 97 % | BODY MASS INDEX: 28.17 KG/M2 | SYSTOLIC BLOOD PRESSURE: 131 MMHG | RESPIRATION RATE: 16 BRPM | WEIGHT: 159 LBS | TEMPERATURE: 96.4 F

## 2024-12-10 DIAGNOSIS — N18.30 BENIGN HYPERTENSION WITH CKD (CHRONIC KIDNEY DISEASE) STAGE III (H): ICD-10-CM

## 2024-12-10 DIAGNOSIS — Z12.11 SCREEN FOR COLON CANCER: ICD-10-CM

## 2024-12-10 DIAGNOSIS — J06.9 UPPER RESPIRATORY TRACT INFECTION, UNSPECIFIED TYPE: ICD-10-CM

## 2024-12-10 DIAGNOSIS — I12.9 BENIGN HYPERTENSION WITH CKD (CHRONIC KIDNEY DISEASE) STAGE III (H): ICD-10-CM

## 2024-12-10 PROCEDURE — G2211 COMPLEX E/M VISIT ADD ON: HCPCS | Performed by: FAMILY MEDICINE

## 2024-12-10 PROCEDURE — 99213 OFFICE O/P EST LOW 20 MIN: CPT | Performed by: FAMILY MEDICINE

## 2024-12-10 ASSESSMENT — PAIN SCALES - GENERAL: PAINLEVEL_OUTOF10: NO PAIN (0)

## 2024-12-10 NOTE — PROGRESS NOTES
Assessment & Plan     Benign hypertension with CKD (chronic kidney disease) stage III (H)  Advised stop Toprol  Check BP at home  She Brings her BP machine today and it is accurate      Upper respiratory tract infection, unspecified type  Advised symptomatic Treatment  Try Flonase   Follow up if not better     Screen for colon cancer  Advised again  - Fecal colorectal cancer screen (FIT); Future  The longitudinal plan of care for the diagnosis(es)/condition(s) as documented were addressed during this visit. Due to the added complexity in care, I will continue to support Glenda in the subsequent management and with ongoing continuity of care.      Pt declines to see GYN   Subjective   Glenda is a 74 year old, presenting for the following health issues:  Hypertension (Patient would like to discuss home blood pressure readings. /) and Nasal Congestion (Runny nose for the past few days. No cough, no fever, no sore throat)      12/10/2024    10:32 AM   Additional Questions   Roomed by Allyson     History of Present Illness       Hypertension: She presents for follow up of hypertension.  She does check blood pressure  regularly outside of the clinic. Outpatient blood pressures have not been over 140/90. She does not follow a low salt diet.     She eats 2-3 servings of fruits and vegetables daily.She consumes 0 sweetened beverage(s) daily.She exercises with enough effort to increase her heart rate 9 or less minutes per day.  She exercises with enough effort to increase her heart rate 3 or less days per week.   She is taking medications regularly.   Pt says BP at Home can sometime be 100/60   She gfeels fine  Np dizziness or fatigue  Pt has a Mild runny nose  No fever or chills  No cough  No sob              Review of Systems  Constitutional, neuro, ENT, endocrine, pulmonary, cardiac, gastrointestinal, genitourinary, musculoskeletal, integument and psychiatric systems are negative, except as otherwise noted.      Objective   "  /77   Pulse 66   Temp (!) 96.4  F (35.8  C) (Temporal)   Resp 16   Ht 1.594 m (5' 2.76\")   Wt 72.1 kg (159 lb)   SpO2 97%   BMI 28.39 kg/m    Body mass index is 28.39 kg/m .  Physical Exam   GENERAL: alert and no distress  HENT: ear canals and TM's normal, nose and mouth without ulcers or lesions  NECK: no adenopathy, no asymmetry, masses, or scars  RESP: lungs clear to auscultation - no rales, rhonchi or wheezes  CV: regular rate and rhythm, normal S1 S2, no S3 or S4, no murmur, click or rub, no peripheral edema  MS: no gross musculoskeletal defects noted, no edema            Signed Electronically by: Marysol Cisse MD    "

## 2024-12-18 ENCOUNTER — MYC MEDICAL ADVICE (OUTPATIENT)
Dept: FAMILY MEDICINE | Facility: CLINIC | Age: 74
End: 2024-12-18
Payer: MEDICARE

## 2024-12-18 NOTE — TELEPHONE ENCOUNTER
Called patient. Patient notified of provider message as written. Patient verbalized good understanding.     Melly ZAMBRANO, RN  [facility]

## 2024-12-18 NOTE — TELEPHONE ENCOUNTER
Stef Erwin, I would continue monitoring BP while off metoprolol and furosemide. She should also be on look out for signs of fluid overload such as SOB, leg swelling.  If BP remains stable and she does not have leg swelling or SOB- then OK for 1/2/25 follow-up.     Essentially schedule same day visit for continued low BP and/or signs of fluid overload.     Hope this helps,  Aldo

## 2024-12-18 NOTE — TELEPHONE ENCOUNTER
"Routing to PCP and covering providers    RN called patient to further go over her [employer] message.    She had a visit on 12/10 due to her lower BP and even stopping her metoprolol, she is having lower BP readings    She took one about an hour ago 123/71, .    When she was 89/64 this morning, she was \"a tiny bit dizzy, but not really\"    She is wanting to stop her furosemide if appropriate. She has appt on 1/2/25    RN went over signs and sx of when to call back and when to seek emergency medical attention.     Please advise    Yaima Reyes RN        "

## 2024-12-18 NOTE — PROGRESS NOTES
Clinic Care Coordination Contact  Care Coordination Transition Communication    Clinical Data: Patient was hospitalized at [facility] from 8/28/24 to 8/30/24 with diagnosis of primary osteoarthritis of right hip.     Assessment: Patient has transitioned to TCU for short term rehabilitation:    Facility Name: [facility]   Transition Communication:  Notified facility of Primary Care- Care Coordination support via Epic fax.    Plan: Care Coordinator will await notification from facility staff informing of patient's discharge plans/needs. Care Coordinator will review chart and outreach to facility staff every 2-4 weeks and as needed.     IQRA Mcgovern   Social Work Primary Care Clinic Care Coordinator   [facility]  Covering for Chapin Glez RN CC  
md

## 2025-01-07 ENCOUNTER — OFFICE VISIT (OUTPATIENT)
Dept: FAMILY MEDICINE | Facility: CLINIC | Age: 75
End: 2025-01-07
Payer: MEDICARE

## 2025-01-07 VITALS
TEMPERATURE: 97.5 F | BODY MASS INDEX: 26.93 KG/M2 | HEART RATE: 72 BPM | HEIGHT: 63 IN | OXYGEN SATURATION: 97 % | SYSTOLIC BLOOD PRESSURE: 123 MMHG | RESPIRATION RATE: 16 BRPM | DIASTOLIC BLOOD PRESSURE: 82 MMHG | WEIGHT: 152 LBS

## 2025-01-07 DIAGNOSIS — H61.21 IMPACTED CERUMEN OF RIGHT EAR: Primary | ICD-10-CM

## 2025-01-07 DIAGNOSIS — N18.30 BENIGN HYPERTENSION WITH CKD (CHRONIC KIDNEY DISEASE) STAGE III (H): ICD-10-CM

## 2025-01-07 DIAGNOSIS — J01.90 ACUTE SINUSITIS WITH SYMPTOMS > 10 DAYS: ICD-10-CM

## 2025-01-07 DIAGNOSIS — B37.31 YEAST VAGINITIS: ICD-10-CM

## 2025-01-07 DIAGNOSIS — I12.9 BENIGN HYPERTENSION WITH CKD (CHRONIC KIDNEY DISEASE) STAGE III (H): ICD-10-CM

## 2025-01-07 PROCEDURE — 99213 OFFICE O/P EST LOW 20 MIN: CPT | Mod: 25 | Performed by: FAMILY MEDICINE

## 2025-01-07 PROCEDURE — 69210 REMOVE IMPACTED EAR WAX UNI: CPT | Mod: RT | Performed by: FAMILY MEDICINE

## 2025-01-07 RX ORDER — FLUCONAZOLE 150 MG/1
150 TABLET ORAL
Qty: 3 TABLET | Refills: 0 | Status: SHIPPED | OUTPATIENT
Start: 2025-01-07 | End: 2025-01-14

## 2025-01-07 RX ORDER — AMOXICILLIN 500 MG/1
500 CAPSULE ORAL 2 TIMES DAILY
Qty: 28 CAPSULE | Refills: 0 | Status: SHIPPED | OUTPATIENT
Start: 2025-01-07 | End: 2025-01-21

## 2025-01-07 NOTE — PATIENT INSTRUCTIONS
Hold prescriptions.  Fill if symptoms not resolving    Could use over the counter wax dissolving drops in future if needed     Follow up based on symptoms

## 2025-01-07 NOTE — PROGRESS NOTES
"      Kathrine Doshi is a 74 year old, presenting for the following health issues:  Ear Problem (Right ear feels plugged)        1/7/2025     6:56 AM   Additional Questions   Roomed by Rafaela CANNON CMA     History of Present Illness       Reason for visit:  Blood presdure    She eats 0-1 servings of fruits and vegetables daily.She consumes 0 sweetened beverage(s) daily.She exercises with enough effort to increase her heart rate 9 or less minutes per day.  She exercises with enough effort to increase her heart rate 3 or less days per week.   She is taking medications regularly.         Patient is here for her right ear feeling plugged for about a week. Is getting over a cold    Muffled  and some itching right ear    Not much pain    Crackle when swallowing    Had bad cold before alona  That is better    No fever/ chills    No cough              Objective    /82 (BP Location: Left arm, Patient Position: Chair, Cuff Size: Adult Regular)   Pulse 72   Temp 97.5  F (36.4  C) (Temporal)   Resp 16   Ht 1.594 m (5' 2.76\")   Wt 68.9 kg (152 lb)   SpO2 97%   BMI 27.13 kg/m    Body mass index is 27.13 kg/m .  Physical Exam  HENT:      Mouth/Throat:      Mouth: Mucous membranes are moist.      Pharynx: Oropharynx is clear.   Eyes:      Conjunctiva/sclera: Conjunctivae normal.   Cardiovascular:      Rate and Rhythm: Normal rate and regular rhythm.      Heart sounds: Normal heart sounds.   Pulmonary:      Effort: Pulmonary effort is normal. No respiratory distress.      Breath sounds: Normal breath sounds.      Only minimal wax left ear canal, tympanic membrane looks fine    We were not able  to see right tympanic membrane at all initially.  Large amount cerumen present.  Discussed in detail.  With consent used gentle irrigation and curette to remove very large clump of wax.  After this tympanic membrane and canal seen, looks okay.    Mild sinus discomfort.      ASSESSMENT / PLAN:  (H61.21) Impacted cerumen of " right ear  (primary encounter diagnosis)  Comment: removed here.    Plan: REMOVE IMPACTED CERUMEN        Follow up prn.  Discussed in detail.     (J01.90) Acute sinusitis with symptoms > 10 days  Comment: printed prescription for patient to have in case sinus type symptoms not resolving.   Plan: amoxicillin (AMOXIL) 500 MG capsule             (B37.31) Yeast vaginitis  Comment: if she does take antibiotics, prone to yeast so printed this also.   Plan: fluconazole (DIFLUCAN) 150 MG tablet             (I12.9,  N18.30) Benign hypertension with CKD (chronic kidney disease) stage III (H)  Comment: blood pressure fine  Plan: no change in med       I reviewed the patient's medications, allergies, medical history, family history, and social history.    Melchor Granados MD               Signed Electronically by: Melchor Granados MD

## 2025-01-28 DIAGNOSIS — E03.9 ACQUIRED HYPOTHYROIDISM: ICD-10-CM

## 2025-01-29 ENCOUNTER — THERAPY VISIT (OUTPATIENT)
Dept: PHYSICAL THERAPY | Facility: CLINIC | Age: 75
End: 2025-01-29
Payer: MEDICARE

## 2025-01-29 DIAGNOSIS — M25.551 CHRONIC RIGHT HIP PAIN: Primary | ICD-10-CM

## 2025-01-29 DIAGNOSIS — G89.29 CHRONIC RIGHT HIP PAIN: Primary | ICD-10-CM

## 2025-01-29 PROCEDURE — 97116 GAIT TRAINING THERAPY: CPT | Mod: GP | Performed by: PHYSICAL THERAPIST

## 2025-01-29 PROCEDURE — 97110 THERAPEUTIC EXERCISES: CPT | Mod: GP | Performed by: PHYSICAL THERAPIST

## 2025-01-29 RX ORDER — LEVOTHYROXINE SODIUM 112 UG/1
112 TABLET ORAL DAILY
Qty: 90 TABLET | Refills: 2 | Status: SHIPPED | OUTPATIENT
Start: 2025-01-29
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
## 2025-01-29 NOTE — TELEPHONE ENCOUNTER
Left message for patient to return call. When patient returns call please assist with scheduling a lab only appointment. Allyson Beckman MA

## 2025-02-10 ENCOUNTER — LAB (OUTPATIENT)
Dept: LAB | Facility: CLINIC | Age: 75
End: 2025-02-10
Payer: MEDICARE

## 2025-02-10 DIAGNOSIS — E11.22 TYPE 2 DIABETES MELLITUS WITH STAGE 3B CHRONIC KIDNEY DISEASE, WITHOUT LONG-TERM CURRENT USE OF INSULIN (H): Primary | ICD-10-CM

## 2025-02-10 DIAGNOSIS — N18.32 TYPE 2 DIABETES MELLITUS WITH STAGE 3B CHRONIC KIDNEY DISEASE, WITHOUT LONG-TERM CURRENT USE OF INSULIN (H): Primary | ICD-10-CM

## 2025-02-10 DIAGNOSIS — E03.9 ACQUIRED HYPOTHYROIDISM: ICD-10-CM

## 2025-02-10 LAB
EST. AVERAGE GLUCOSE BLD GHB EST-MCNC: 108 MG/DL
HBA1C MFR BLD: 5.4 % (ref 0–5.6)
TSH SERPL DL<=0.005 MIU/L-ACNC: 1.2 UIU/ML (ref 0.3–4.2)

## 2025-02-10 PROCEDURE — 83036 HEMOGLOBIN GLYCOSYLATED A1C: CPT

## 2025-02-10 PROCEDURE — 84443 ASSAY THYROID STIM HORMONE: CPT

## 2025-02-10 PROCEDURE — 36415 COLL VENOUS BLD VENIPUNCTURE: CPT

## 2025-02-19 ENCOUNTER — THERAPY VISIT (OUTPATIENT)
Dept: PHYSICAL THERAPY | Facility: CLINIC | Age: 75
End: 2025-02-19
Payer: MEDICARE

## 2025-02-19 DIAGNOSIS — M25.551 CHRONIC RIGHT HIP PAIN: Primary | ICD-10-CM

## 2025-02-19 DIAGNOSIS — G89.29 CHRONIC RIGHT HIP PAIN: Primary | ICD-10-CM

## 2025-02-19 PROCEDURE — 97110 THERAPEUTIC EXERCISES: CPT | Mod: GP | Performed by: PHYSICAL THERAPIST

## 2025-03-06 ENCOUNTER — OFFICE VISIT (OUTPATIENT)
Dept: OPHTHALMOLOGY | Facility: CLINIC | Age: 75
End: 2025-03-06
Payer: MEDICARE

## 2025-03-06 DIAGNOSIS — E11.22 TYPE 2 DIABETES MELLITUS WITH STAGE 3A CHRONIC KIDNEY DISEASE, WITHOUT LONG-TERM CURRENT USE OF INSULIN (H): Primary | ICD-10-CM

## 2025-03-06 DIAGNOSIS — H10.13 ALLERGIC CONJUNCTIVITIS OF BOTH EYES: ICD-10-CM

## 2025-03-06 DIAGNOSIS — H52.4 MYOPIA OF BOTH EYES WITH REGULAR ASTIGMATISM AND PRESBYOPIA: ICD-10-CM

## 2025-03-06 DIAGNOSIS — N18.31 TYPE 2 DIABETES MELLITUS WITH STAGE 3A CHRONIC KIDNEY DISEASE, WITHOUT LONG-TERM CURRENT USE OF INSULIN (H): Primary | ICD-10-CM

## 2025-03-06 DIAGNOSIS — Z96.1 PSEUDOPHAKIA: ICD-10-CM

## 2025-03-06 DIAGNOSIS — H52.13 MYOPIA OF BOTH EYES WITH REGULAR ASTIGMATISM AND PRESBYOPIA: ICD-10-CM

## 2025-03-06 DIAGNOSIS — H52.223 MYOPIA OF BOTH EYES WITH REGULAR ASTIGMATISM AND PRESBYOPIA: ICD-10-CM

## 2025-03-06 ASSESSMENT — CONF VISUAL FIELD
OD_SUPERIOR_TEMPORAL_RESTRICTION: 0
OD_SUPERIOR_NASAL_RESTRICTION: 0
OS_SUPERIOR_TEMPORAL_RESTRICTION: 0
OD_INFERIOR_NASAL_RESTRICTION: 0
OS_INFERIOR_TEMPORAL_RESTRICTION: 0
OS_INFERIOR_NASAL_RESTRICTION: 0
OS_SUPERIOR_NASAL_RESTRICTION: 0
OS_NORMAL: 1
OD_NORMAL: 1
OD_INFERIOR_TEMPORAL_RESTRICTION: 0

## 2025-03-06 ASSESSMENT — VISUAL ACUITY
OD_SC+: +2
OD_SC: 20/25
OS_CC: J1
OS_SC+: -2
OS_SC: 20/25
METHOD: SNELLEN - LINEAR
OD_CC: J2

## 2025-03-06 ASSESSMENT — CUP TO DISC RATIO
OD_RATIO: 0.25
OS_RATIO: 0.3

## 2025-03-06 ASSESSMENT — TONOMETRY
IOP_METHOD: APPLANATION
OD_IOP_MMHG: 11
OS_IOP_MMHG: 10

## 2025-03-06 ASSESSMENT — REFRACTION_WEARINGRX
OD_SPHERE: +2.50
SPECS_TYPE: OTC
OS_CYLINDER: SPHERE
OS_SPHERE: +2.50
OD_CYLINDER: SPHERE

## 2025-03-06 ASSESSMENT — REFRACTION_MANIFEST
OD_ADD: +2.75
OD_AXIS: 115
OD_CYLINDER: +0.25
OS_AXIS: 050
OD_SPHERE: -0.25
OS_SPHERE: PLANO
OS_CYLINDER: +0.25
OS_ADD: +2.75

## 2025-03-06 ASSESSMENT — SLIT LAMP EXAM - LIDS
COMMENTS: 1+ DERMATOCHALASIS
COMMENTS: 1+ DERMATOCHALASIS

## 2025-03-06 ASSESSMENT — EXTERNAL EXAM - RIGHT EYE: OD_EXAM: NORMAL

## 2025-03-06 ASSESSMENT — EXTERNAL EXAM - LEFT EYE: OS_EXAM: NORMAL

## 2025-03-06 NOTE — PATIENT INSTRUCTIONS
Continue Alaway twice a day in both eyes as needed for allergies    Continue over the counter readers or can fill glasses prescription given     Keep blood sugars and blood pressure under good control.     Jama Carpenter MD  [phone]    Patient Education   Diabetes weakens the blood vessels all over the body, including the eyes. Damage to the blood vessels in the eyes can cause swelling or bleeding into part of the eye (called the retina). This is called diabetic retinopathy (BRIAN-tin--pu-thee). If not treated, this disease can cause vision loss or blindness.   Symptoms may include blurred or distorted vision, but many people have no symptoms. It's important to see your eye doctor regularly to check for problems.   Early treatment and good control can help protect your vision. Here are the things you can do to help prevent vision loss:      1. Keep your blood sugar levels under tight control.      2. Bring high blood pressure under control.      3. No smoking.      4. Have yearly dilated eye exams.

## 2025-03-06 NOTE — PROGRESS NOTES
Current Eye Medications:  Alaway as needed for allergies     Subjective:  Complete exam: patient is satisfied with her distance visual acuity and near visual acuity (with her +2.50 OTC readers)    Lab Results   Component Value Date    A1C 5.4 02/10/2025    A1C 5.7 08/12/2024    A1C 5.8 05/30/2024    A1C 7.8 01/31/2024    A1C 6.9 03/14/2023    A1C 5.5 06/15/2021    A1C 5.6 01/12/2021    A1C 5.4 07/22/2020    A1C 5.2 07/08/2020    A1C 5.4 10/17/2019         Objective:  See Ophthalmology Exam.       Assessment:  Glenda Bales is a 74 year old female who presents with:   Encounter Diagnoses   Name Primary?    Type 2 diabetes mellitus with stage 3a chronic kidney disease, without long-term current use of insulin (H) Negative diabetic retinopathy     Pseudophakia - Both Eyes     Allergic conjunctivitis of both eyes     Myopia of both eyes with regular astigmatism and presbyopia        Plan:  Continue Alaway twice a day in both eyes as needed for allergies    Continue over the counter readers or can fill glasses prescription given     Keep blood sugars and blood pressure under good control.     Jama Carpenter MD  [phone]    Patient Education  Diabetes weakens the blood vessels all over the body, including the eyes. Damage to the blood vessels in the eyes can cause swelling or bleeding into part of the eye (called the retina). This is called diabetic retinopathy (BRIAN-tin--puh-thee). If not treated, this disease can cause vision loss or blindness.   Symptoms may include blurred or distorted vision, but many people have no symptoms. It's important to see your eye doctor regularly to check for problems.   Early treatment and good control can help protect your vision. Here are the things you can do to help prevent vision loss:      1. Keep your blood sugar levels under tight control.      2. Bring high blood pressure under control.      3. No smoking.      4. Have yearly dilated eye exams.

## 2025-03-06 NOTE — LETTER
3/6/2025      Glenda Bales  [address]  [facility] [address]      Dear Colleague,    Thank you for referring your patient, [facility]enda Bales[facility] Please see a copy of my visit note below.     Current Eye Medications:  Alaway as needed for allergies     Subjective:  Complete exam: patient is satisfied with her distance visual acuity and near visual acuity (with her +2.50 OTC readers)    Lab Results   Component Value Date    A1C 5.4 02/10/2025    A1C 5.7 08/12/2024    A1C 5.8 05/30/2024    A1C 7.8 01/31/2024    A1C 6.9 03/14/2023    A1C 5.5 06/15/2021    A1C 5.6 01/12/2021    A1C 5.4 07/22/2020    A1C 5.2 07/08/2020    A1C 5.4 10/17/2019         Objective:  See Ophthalmology Exam.       Assessment:  Glenda Bales is a 74 year old female who presents with:   Encounter Diagnoses   Name Primary?     Type 2 diabetes mellitus with stage 3a chronic kidney disease, without long-term current use of insulin (H) Negative diabetic retinopathy      Pseudophakia - Both Eyes      Allergic conjunctivitis of both eyes      Myopia of both eyes with regular astigmatism and presbyopia        Plan:  Continue Alaway twice a day in both eyes as needed for allergies    Continue over the counter readers or can fill glasses prescription given     Keep blood sugars and blood pressure under good control.     Jama Carpenter MD  [phone]    Patient Education  Diabetes weakens the blood vessels all over the body, including the eyes. Damage to the blood vessels in the eyes can cause swelling or bleeding into part of the eye (called the retina). This is called diabetic retinopathy (BRIAN-tin-AH-puh-thee). If not treated, this disease can cause vision loss or blindness.   Symptoms may include blurred or distorted vision, but many people have no symptoms. It's important to see your eye doctor regularly to check for problems.   Early treatment and good control can help protect your vision. Here are the  Physical Therapy    Physical Therapy Treatment    Patient Name: Gabriella Haskins  MRN: [id number]  Department: [facility]  Room: [facility]  Today's Date: 1/24/2025  Time Calculation  Start Time: 1045  Stop Time: 1130  Time Calculation (min): 45 min         Assessment/Plan   PT Assessment  End of Session Communication: Bedside nurse  End of Session Patient Position: Up in chair, Alarm on (call light and needs within reach.)     PT Plan  Treatment/Interventions: Bed mobility, Transfer training, Gait training, Stair training, Balance training, Strengthening, Therapeutic exercise, Therapeutic activity  PT Plan: Ongoing PT  PT Frequency: 90 min/5 days per week (10 days)  PT Discharge Recommendations:  (Anticipate discharge home with the need for intermittent min assist at the walker level.)  Equipment Recommended upon Discharge: Wheeled walker  PT Recommended Transfer Status: Assist x2      General Visit Information:   PT  Visit  PT Received On: 01/24/25  General  Prior to Session Communication: Bedside nurse  Patient Position Received: Up in chair, Alarm on  General Comment: Patient pleasant and agreeable to therapy.    Subjective   Precautions:  Precautions  LE Weight Bearing Status: Weight Bearing as Tolerated  Medical Precautions: Fall precautions  Post-Surgical Precautions: Left hip precautions    Objective   Pain:  Pain Assessment  Pain Assessment: 0-10  0-10 (Numeric) Pain Score: 0 - No pain  Cognition:  Cognition  Overall Cognitive Status: Within Functional Limits  Treatments:  Bed Mobility  Bed Mobility: Yes  Patient performed sit<>sup transfer on gym mat with Caro x1. Cues to maitain hip precautions.     Ambulation/Gait Training  Ambulation/Gait Training Performed: Yes  Patient ambulated ~100ft x2 trials with FWW and Caro x1 on carpet. Seated rest between trials to manage fatigue.     Transfers  Transfer: Yes  Patient performed sit<>stand with CGA/Caro x1. Cues for hand placement and body  mechanics    Stairs  Stairs: Yes  Patient negotiated 5 steps up/down x2 trials with B HR and Caro x1. Cues for hand placement and sequencing. Seated rest between trials to manage fatigue.     Education Comments  Educated patient on transfer training, gait training, bed mobility training, and stair training techniques to maximize safety and independence.         OP EDUCATION:       Encounter Problems       Encounter Problems (Active)       PT Problem       STG - Bed mobility with min/mod assist x2. (Progressing)       Start:  01/17/25    Expected End:  01/23/25            STG - Transfers with mod assist x1. (Progressing)       Start:  01/17/25    Expected End:  01/23/25            STG - Pt will amb 30 ft with wheeled walker and mod assist x1.  (Met)       Start:  01/17/25    Expected End:  01/23/25    Resolved:  01/22/25         STG - Pt will negotiate one step with rails and min/mod assist x2. (Progressing)       Start:  01/17/25    Expected End:  01/23/25            LTG - Bed mobility with min assist x1. (Progressing)       Start:  01/17/25    Expected End:  01/28/25            LTG - Transfers with min assist x1. (Progressing)       Start:  01/17/25    Expected End:  01/28/25            LTG - Pt will amb 50 ft with wheeled walker and min assist. (Progressing)       Start:  01/17/25    Expected End:  01/28/25            LTG - Pt will negotiate 5 stairs with rails and min/mod assist x1. (Progressing)       Start:  01/17/25    Expected End:  01/28/25               Pain - Adult               things you can do to help prevent vision loss:      1. Keep your blood sugar levels under tight control.      2. Bring high blood pressure under control.      3. No smoking.      4. Have yearly dilated eye exams.          Again, thank you for allowing me to participate in the care of your patient.        Sincerely,        Jama Carpenter MD    Electronically signed

## 2025-03-10 ENCOUNTER — THERAPY VISIT (OUTPATIENT)
Dept: PHYSICAL THERAPY | Facility: CLINIC | Age: 75
End: 2025-03-10
Payer: MEDICARE

## 2025-03-10 DIAGNOSIS — G89.29 CHRONIC RIGHT HIP PAIN: Primary | ICD-10-CM

## 2025-03-10 DIAGNOSIS — M25.551 CHRONIC RIGHT HIP PAIN: Primary | ICD-10-CM

## 2025-03-10 PROCEDURE — 97110 THERAPEUTIC EXERCISES: CPT | Mod: GP | Performed by: PHYSICAL THERAPIST

## 2025-03-10 NOTE — PROGRESS NOTES
DISCHARGE  Reason for Discharge: Patient has met all goals.  Patient chooses to discontinue therapy.    Equipment Issued: None    Discharge Plan: Patient to continue home program.    Referring Provider:  Meek Cifuentes       03/10/25 0500   Appointment Info   Signing clinician's name / credentials Sage Yates, PT, DPT   Total/Authorized Visits 16 (POC) (E&T)   Visits Used 12   Medical Diagnosis S/P total right hip arthroplasty   PT Tx Diagnosis Chronic right hip pain   Precautions/Limitations No hip precautions per op note   Other pertinent information Discharged   Progress Note/Certification   Start of Care Date 09/19/24   Onset of illness/injury or Date of Surgery 08/28/24  (Date of surgery)   Therapy Frequency 1 time every 2 weeks   Predicted Duration 10 weeks   Certification date from 01/11/25   Certification date to 03/22/25   Progress Note Due Date 03/22/25   Progress Note Completed Date 01/29/25   GOALS   PT Goals 2;3   PT Goal 1   Goal Identifier Ambulation   Goal Description Pt will be able to ambulate for 15 minutes in order to run errands with a minimal increase in pain 1-2/10   Rationale to maximize safety and independence with performance of ADLs and functional tasks;to maximize safety and independence within the home;to maximize safety and independence within the community   Goal Progress Mostly met. Still using a walker.   Target Date 03/22/25   Date Met 02/19/25   PT Goal 2   Goal Identifier Transfers   Goal Description Pt will be able to get in and out of her car with a minimal increase in pain 1-2/10.   Rationale to maximize safety and independence with performance of ADLs and functional tasks;to maximize safety and independence within the community;to maximize safety and independence within the home   Target Date 01/10/25   Date Met 01/29/25   PT Goal 3   Goal Identifier Stairs   Goal Description Pt will be able to ascend and descend 1 flight of stairs safely using a handrail assist.    Rationale to maximize safety and independence with performance of ADLs and functional tasks;to maximize safety and independence within the home;to maximize safety and independence within the community   Target Date 03/22/25   Date Met 03/10/25   Subjective Report   Subjective Report Has been bracing self with pillows and that has been comfortable.   Objective Measures   Objective Measures Objective Measure 1;Objective Measure 2;Objective Measure 3   Objective Measure 1   Objective Measure Stairs   Details Performs stair ascent with a reciprical gait pattern with handrail assist and cane safely and confidently. Hesitant to perform stair descent with reciprical gait pattern. Able to perform with step to gait pattern safely.   Objective Measure 2   Objective Measure Gait   Details Ambulates with and without single point cane. No loss of balance noted.   Objective Measure 3   Objective Measure Strength   Details Hip flexion - 4+/5 on right and 5/5 on left, Hip abduction - 4+/5 on right and 5/5 on left, Knee extension - 5/5 B, Knee flexion - 5/5 B   Treatment Interventions (PT)   Interventions Therapeutic Procedure/Exercise;Gait Training;Manual Therapy;Neuromuscular Re-education   Therapeutic Procedure/Exercise   Therapeutic Procedures: strength, endurance, ROM, flexibility minutes (43026) 32   Therapeutic Procedures Ther Proc 2;Ther Proc 4;Ther Proc 3;Ther Proc 5;Ther Proc 6;Ther Proc 7;Ther Proc 8;Ther Proc 9;Ther Proc 10   Ther Proc 1 Step downs 2x15 on 7 inch step   Ther Proc 2 Step ups 2x15 on 7 inch step   Ther Proc 3 Sit to stand x15 with upper extremity assist   Ther Proc 4 Marching at railing x30 B   Ther Proc 5 Side steps x8 lengths   Ther Proc 6 Standing hip abduction with extension x15 B   Ther Proc 7 LAQ x10 B with 5 second holds   Skilled Intervention Stretching and strengthening exercise education to improve function   Patient Response/Progress No increase in discomfort noted   Education   Learner/Method  Patient;No Barriers to Learning   Plan   Home program PTRx (handout)   Comments   Comments Pt has done well in physical therapy and has met all functional goals. Pt is still hesitant for stair descent using a reciprical gait pattern, however she can perform stair descent safely with a step to gait pattern. Pt states that she has no concerns for daily activities currently and would like to be discharged to [facility] at this time.   Total Session Time   Timed Code Treatment Minutes 32   Total Treatment Time (sum of timed and untimed services) 32

## 2025-04-10 ENCOUNTER — OFFICE VISIT (OUTPATIENT)
Dept: FAMILY MEDICINE | Facility: CLINIC | Age: 75
End: 2025-04-10
Payer: MEDICARE

## 2025-04-10 VITALS
BODY MASS INDEX: 27.67 KG/M2 | SYSTOLIC BLOOD PRESSURE: 128 MMHG | DIASTOLIC BLOOD PRESSURE: 77 MMHG | RESPIRATION RATE: 16 BRPM | TEMPERATURE: 96.5 F | OXYGEN SATURATION: 100 % | HEART RATE: 76 BPM | WEIGHT: 155 LBS

## 2025-04-10 DIAGNOSIS — N18.32 STAGE 3B CHRONIC KIDNEY DISEASE (H): ICD-10-CM

## 2025-04-10 DIAGNOSIS — N18.32 TYPE 2 DIABETES MELLITUS WITH STAGE 3B CHRONIC KIDNEY DISEASE, WITHOUT LONG-TERM CURRENT USE OF INSULIN (H): Primary | ICD-10-CM

## 2025-04-10 DIAGNOSIS — I12.9 BENIGN HYPERTENSION WITH CKD (CHRONIC KIDNEY DISEASE) STAGE III (H): ICD-10-CM

## 2025-04-10 DIAGNOSIS — E11.22 TYPE 2 DIABETES MELLITUS WITH STAGE 3B CHRONIC KIDNEY DISEASE, WITHOUT LONG-TERM CURRENT USE OF INSULIN (H): Primary | ICD-10-CM

## 2025-04-10 DIAGNOSIS — N18.30 BENIGN HYPERTENSION WITH CKD (CHRONIC KIDNEY DISEASE) STAGE III (H): ICD-10-CM

## 2025-04-10 DIAGNOSIS — F51.01 PRIMARY INSOMNIA: ICD-10-CM

## 2025-04-10 DIAGNOSIS — Z29.11 NEED FOR VACCINATION AGAINST RESPIRATORY SYNCYTIAL VIRUS: ICD-10-CM

## 2025-04-10 DIAGNOSIS — M25.551 HIP PAIN, RIGHT: ICD-10-CM

## 2025-04-10 PROBLEM — E66.01 MORBID OBESITY (H): Status: RESOLVED | Noted: 2023-03-28 | Resolved: 2025-04-10

## 2025-04-10 LAB
ALBUMIN SERPL BCG-MCNC: 4.2 G/DL (ref 3.5–5.2)
ANION GAP SERPL CALCULATED.3IONS-SCNC: 12 MMOL/L (ref 7–15)
BUN SERPL-MCNC: 29.2 MG/DL (ref 8–23)
CALCIUM SERPL-MCNC: 9.5 MG/DL (ref 8.8–10.4)
CHLORIDE SERPL-SCNC: 101 MMOL/L (ref 98–107)
CREAT SERPL-MCNC: 1.28 MG/DL (ref 0.51–0.95)
CREAT UR-MCNC: 19.1 MG/DL
EGFRCR SERPLBLD CKD-EPI 2021: 43 ML/MIN/1.73M2
GLUCOSE SERPL-MCNC: 97 MG/DL (ref 70–99)
HCO3 SERPL-SCNC: 26 MMOL/L (ref 22–29)
MICROALBUMIN UR-MCNC: <12 MG/L
MICROALBUMIN/CREAT UR: NORMAL MG/G{CREAT}
PHOSPHATE SERPL-MCNC: 3.8 MG/DL (ref 2.5–4.5)
POTASSIUM SERPL-SCNC: 4 MMOL/L (ref 3.4–5.3)
SODIUM SERPL-SCNC: 139 MMOL/L (ref 135–145)

## 2025-04-10 RX ORDER — TRAZODONE HYDROCHLORIDE 50 MG/1
25 TABLET ORAL AT BEDTIME
Qty: 15 TABLET | Refills: 3 | Status: SHIPPED | OUTPATIENT
Start: 2025-04-10

## 2025-04-10 ASSESSMENT — ENCOUNTER SYMPTOMS: FATIGUE: 1

## 2025-04-10 ASSESSMENT — PAIN SCALES - GENERAL: PAINLEVEL_OUTOF10: NO PAIN (0)

## 2025-04-10 NOTE — PROGRESS NOTES
"  Assessment & Plan     Type 2 diabetes mellitus with stage 3b chronic kidney disease, without long-term current use of insulin (H)  Stable   - Albumin Random Urine Quantitative with Creat Ratio; Future  - Albumin Random Urine Quantitative with Creat Ratio    Stage 3b chronic kidney disease (H)  Pending labs  See Renal after labs are back and still High     Benign hypertension with CKD (chronic kidney disease) stage III (H)  Controlled   - Renal panel (Alb, BUN, Ca, Cl, CO2, Creat, Gluc, Phos, K, Na); Future  - Renal panel (Alb, BUN, Ca, Cl, CO2, Creat, Gluc, Phos, K, Na)    Need for vaccination against respiratory syncytial virus  Advised   - RSV vaccine, bivalent, ABRYSVO, injection; Inject 0.5 mLs into the muscle once for 1 dose. Pharmacist administered    Primary insomnia  Advised melatonin  - traZODone (DESYREL) 50 MG tablet; Take 0.5 tablets (25 mg) by mouth at bedtime.   The potential side effects of this medication have been discussed with the patient.  Call if any significant problems with these are experienced.  Follow up if not better     Hip pain, right  Advised Tylenol 1000 mg q 6 Hours PRN  Try a Pillow at Night between her legs  She has a follow up appointment with her Ortho scheduled       The longitudinal plan of care for the diagnosis(es)/condition(s) as documented were addressed during this visit. Due to the added complexity in care, I will continue to support Glenda in the subsequent management and with ongoing continuity of care.    BMI  Estimated body mass index is 27.67 kg/m  as calculated from the following:    Height as of 1/7/25: 1.594 m (5' 2.76\").    Weight as of this encounter: 70.3 kg (155 lb).         Follow up 3 months     Subjective   Glenda is a 74 year old, presenting for the following health issues:  Fatigue (Muscle cramps in the morning)        4/10/2025     9:31 AM   Additional Questions   Roomed by Allyson Medina of Present Illness       Reason for visit:  Fatigue and muscle " cramps  Symptom onset:  1-2 weeks ago   She is taking medications regularly.    Pt says she feels tired as she does not sleep at Night and feels Tired  She also has to get Up to Void Urine  Also gets pain Right Hip when she lies down  Has had Hip Replacement  She is fine during day Time  and  has No pain  Lies on her back       Diabetes Follow-up    How often are you checking your blood sugar? One time daily    Have you had any blood sugars above 200?  No  Have you had any blood sugars below 70?  No  What symptoms do you notice when your blood sugar is low?  None  What concerns do you have today about your diabetes? None   Do you have any of these symptoms? (Select all that apply)  dpong well           Hyperlipidemia Follow-Up    Are you regularly taking any medication or supplement to lower your cholesterol?   No  Has Not been taking her statins as advised   Are you having muscle aches or other side effects that you think could be caused by your cholesterol lowering medication?  No    Hypertension Follow-up    Do you check your blood pressure regularly outside of the clinic? No   Are you following a low salt diet? Yes  Are your blood pressures ever more than 140 on the top number (systolic) OR more   than 90 on the bottom number (diastolic), for example 140/90? No    BP Readings from Last 2 Encounters:   04/10/25 128/77   01/07/25 123/82     Hemoglobin A1C (%)   Date Value   02/10/2025 5.4   08/12/2024 5.7 (H)   06/15/2021 5.5   01/12/2021 5.6     LDL Cholesterol Calculated (mg/dL)   Date Value   08/12/2024 202 (H)   06/29/2023 154 (H)   08/20/2020 163 (H)   07/22/2020 125 (H)         Chronic Kidney Disease Follow-up    Do you take any over the counter pain medicine?: No        Review of Systems  CONSTITUTIONAL: NEGATIVE for fever, chills, change in weight  ENT/MOUTH: NEGATIVE for ear, mouth and throat problems  RESP: NEGATIVE for significant cough or SOB  CV: NEGATIVE for chest pain, palpitations or peripheral  edema  MUSCULOSKELETAL: has above   PSYCHIATRIC: has Insomnia  Rest of the ROS is Negative except see above and Problem list [stable]        Objective    /77   Pulse 76   Temp (!) 96.5  F (35.8  C) (Temporal)   Resp 16   Wt 70.3 kg (155 lb)   SpO2 100%   BMI 27.67 kg/m    Body mass index is 27.67 kg/m .  Physical Exam   GENERAL: alert and no distress  EYES: Eyes grossly normal to inspection  NECK: no adenopathy, no asymmetry, masses, or scars  RESP: lungs clear to auscultation - no rales, rhonchi or wheezes  CV: regular rate and rhythm, normal S1 S2, no S3 or S4, no murmur, click or rub, no peripheral edema  ABDOMEN: soft, nontender, no hepatosplenomegaly, no masses and bowel sounds normal  MS: no gross musculoskeletal defects noted, no edema  PSYCH: mentation appears normal, judgement and insight intact, and appearance well groomed    Lab on 02/10/2025   Component Date Value Ref Range Status    TSH 02/10/2025 1.20  0.30 - 4.20 uIU/mL Final    Estimated Average Glucose 02/10/2025 108  <117 mg/dL Final    Hemoglobin A1C 02/10/2025 5.4  0.0 - 5.6 % Final    Normal <5.7%   Prediabetes 5.7-6.4%    Diabetes 6.5% or higher     Note: Adopted from ADA consensus guidelines.     No results found for any visits on 04/10/25.        Signed Electronically by: Marysol Cisse MD

## 2025-04-19 ENCOUNTER — MYC REFILL (OUTPATIENT)
Dept: FAMILY MEDICINE | Facility: CLINIC | Age: 75
End: 2025-04-19
Payer: MEDICARE

## 2025-04-19 DIAGNOSIS — I87.2 VENOUS STASIS DERMATITIS, UNSPECIFIED LATERALITY: ICD-10-CM

## 2025-04-21 ENCOUNTER — ANCILLARY PROCEDURE (OUTPATIENT)
Dept: GENERAL RADIOLOGY | Facility: CLINIC | Age: 75
End: 2025-04-21
Attending: ORTHOPAEDIC SURGERY
Payer: MEDICARE

## 2025-04-21 ENCOUNTER — OFFICE VISIT (OUTPATIENT)
Dept: ORTHOPEDICS | Facility: CLINIC | Age: 75
End: 2025-04-21
Payer: MEDICARE

## 2025-04-21 VITALS — HEIGHT: 63 IN | BODY MASS INDEX: 27.46 KG/M2 | RESPIRATION RATE: 20 BRPM | WEIGHT: 155 LBS

## 2025-04-21 DIAGNOSIS — Z96.641 S/P TOTAL RIGHT HIP ARTHROPLASTY: ICD-10-CM

## 2025-04-21 DIAGNOSIS — Z96.641 S/P TOTAL RIGHT HIP ARTHROPLASTY: Primary | ICD-10-CM

## 2025-04-21 PROCEDURE — 73502 X-RAY EXAM HIP UNI 2-3 VIEWS: CPT | Mod: TC | Performed by: RADIOLOGY

## 2025-04-21 PROCEDURE — 99214 OFFICE O/P EST MOD 30 MIN: CPT | Performed by: ORTHOPAEDIC SURGERY

## 2025-04-21 RX ORDER — FUROSEMIDE 20 MG/1
20 TABLET ORAL DAILY
Qty: 90 TABLET | Refills: 3 | Status: SHIPPED | OUTPATIENT
Start: 2025-04-21

## 2025-04-21 NOTE — LETTER
2025      Glenda Bales  [address]  [facility] [address]      Dear Colleague,    Thank you for referring your patient, Glenda Bales, to the [facility]. Please see a copy of my visit note below.    Glenda Bales is in for follow up of right total hip arthroplasty on 24 with direct anterior approach .    She complains of persistent numbness of the right thigh.  She also has pain in the hip and groin when she first gets up in the morning.  After she is up and about a bit, it gets better.  She sleeps on her back, and does not think she moves at night.  She walks well.  She is using a cane, but does not use it indoors.  She uses the cane mainly for balance    X-ray of the pelvis shows right total hip arthroplasty in good position.  No sign of loosening or wear..    Past Medical History:   Diagnosis Date     Cataract      Diabetes (H) 2019     Essential hypertension, benign      Nonrheumatic aortic valve insufficiency      Osteoarthritis of knee, unspecified laterality, unspecified osteoarthritis type      Thyroid disease        Past Surgical History:   Procedure Laterality Date     ARTHROPLASTY HIP ANTERIOR Right 2024    Procedure: Right total hip arthroplasty, Direct anterior approach;  Surgeon: Meek Cifuentes MD;  Location: [facility] OR     CATARACT IOL, RT/LT Right 2018    Model ZCB00 SN:[id number] +11.5 Dioptor - Jermaine Akers MD ([facility])     CATARACT IOL, RT/LT Left 2018    Model ZCB00 SN:[id number] +12.5 Dioptor - Jermaine Akers MD ([facility])     DILATION AND CURETTAGE SUCTION      AB     DILATION AND CURETTAGE SUCTION      in 40's     NO HISTORY OF SURGERY         Family History   Problem Relation Age of Onset     Diabetes Mother      Coronary Artery Disease Father          with a heart attack     Glaucoma Cousin      Colon Cancer No family hx of      Breast Cancer No family hx of      Macular Degeneration No family hx of        Social History      Socioeconomic History     Marital status: Single     Spouse name: Not on file     Number of children: 0     Years of education: Not on file     Highest education level: Not on file   Occupational History     Occupation:    Tobacco Use     Smoking status: Former     Current packs/day: 0.00     Types: Cigarettes     Quit date: 6/15/1977     Years since quittin.8     Passive exposure: Past     Smokeless tobacco: Never     Tobacco comments:     quit in    Vaping Use     Vaping status: Never Used   Substance and Sexual Activity     Alcohol use: Not Currently     Comment: 1 red wine before bef     Drug use: No     Sexual activity: Not Currently     Partners: Male     Birth control/protection: Abstinence   Other Topics Concern     Parent/sibling w/ CABG, MI or angioplasty before 65F 55M? Yes   Social History Narrative     Not on file     Social Drivers of Health     Financial Resource Strain: Low Risk  (2024)    Financial Resource Strain      Within the past 12 months, have you or your family members you live with been unable to get utilities (heat, electricity) when it was really needed?: No   Food Insecurity: Low Risk  (2024)    Food Insecurity      Within the past 12 months, did you worry that your food would run out before you got money to buy more?: No      Within the past 12 months, did the food you bought just not last and you didn t have money to get more?: No   Transportation Needs: Low Risk  (2024)    Transportation Needs      Within the past 12 months, has lack of transportation kept you from medical appointments, getting your medicines, non-medical meetings or appointments, work, or from getting things that you need?: No   Physical Activity: Insufficiently Active (2024)    Exercise Vital Sign      Days of Exercise per Week: 1 day      Minutes of Exercise per Session: 10 min   Stress: No Stress Concern Present (2024)    [place] [employer] -  Occupational Stress Questionnaire      Feeling of Stress : Only a little   Recent Concern: Stress - Stress Concern Present (8/14/2024)    [place] [employer] - Occupational Stress Questionnaire      Feeling of Stress : To some extent   Social Connections: Moderately Isolated (9/27/2024)    Social Connection and Isolation Panel [NHANES]      Frequency of Communication with Friends and Family: More than three times a week      Frequency of Social Gatherings with Friends and Family: Once a week      Attends Rastafarian Services: 1 to 4 times per year      Active Member of Clubs or Organizations: No      Attends Club or Organization Meetings: Never      Marital Status: Never    Interpersonal Safety: Low Risk  (10/3/2024)    Interpersonal Safety      Do you feel physically and emotionally safe where you currently live?: Yes      Within the past 12 months, have you been hit, slapped, kicked or otherwise physically hurt by someone?: No      Within the past 12 months, have you been humiliated or emotionally abused in other ways by your partner or ex-partner?: No   Housing Stability: Low Risk  (9/27/2024)    Housing Stability      Do you have housing? : Yes      Are you worried about losing your housing?: No       Current Outpatient Medications   Medication Sig Dispense Refill     acetaminophen (TYLENOL) 325 MG tablet Take 3 tablets (975 mg) by mouth every 8 hours as needed for other (mild pain). 100 tablet 0     alcohol swab prep pads Use to swab area of injection/milena as directed. 100 each 3     atorvastatin (LIPITOR) 10 MG tablet Take 1 tablet (10 mg) by mouth daily. 90 tablet 3     blood glucose (NO BRAND SPECIFIED) test strip Use to test blood sugar 1 times daily or as directed. 100 strip 11     famotidine (PEPCID) 40 MG tablet Take 1 tablet (40 mg) by mouth daily. 30 tablet 1     fluticasone (FLONASE) 50 MCG/ACT spray Spray 1 spray into both nostrils daily as needed        furosemide (LASIX)  "20 MG tablet Take 1 tablet (20 mg) by mouth daily. 90 tablet 3     gabapentin (NEURONTIN) 100 MG capsule Take 1 capsule (100 mg) by mouth 2 times daily 180 capsule 3     ketotifen (ZADITOR) 0.025 % ophthalmic solution Place 1 drop into both eyes 2 times daily as needed for itching       levothyroxine (SYNTHROID/LEVOTHROID) 112 MCG tablet TAKE 1 TABLET(112 MCG) BY MOUTH DAILY 90 tablet 2     losartan (COZAAR) 25 MG tablet Take 1 tablet (25 mg) by mouth daily. 90 tablet 3     omeprazole (PRILOSEC) 20 MG DR capsule Take 1 capsule (20 mg) by mouth daily 90 capsule 3     traZODone (DESYREL) 50 MG tablet Take 0.5 tablets (25 mg) by mouth at bedtime. 15 tablet 3       Allergies   Allergen Reactions     Diltiazem Hcl Swelling     Leg swelling     Norvasc [Amlodipine] Swelling     Leg swelling     Seasonal Allergies Other (See Comments)     Eye nose throat irritation       REVIEW OF SYSTEMS:  CONSTITUTIONAL:  NEGATIVE for fever, chills, change in weight  INTEGUMENTARY/SKIN:  NEGATIVE for worrisome rashes, moles or lesions  EYES:  NEGATIVE for vision changes or irritation  ENT/MOUTH:  NEGATIVE for ear, mouth and throat problems  RESP:  NEGATIVE for significant cough or SOB  BREAST:  NEGATIVE for masses, tenderness or discharge  CV:  NEGATIVE for chest pain, palpitations or peripheral edema  GI:  NEGATIVE for nausea, abdominal pain, heartburn, or change in bowel habits  :  Negative   MUSCULOSKELETAL:  See HPI above  NEURO:  NEGATIVE for weakness, dizziness or paresthesias  ENDOCRINE:  NEGATIVE for temperature intolerance, skin/hair changes  HEME/ALLERGY/IMMUNE:  NEGATIVE for bleeding problems  PSYCHIATRIC:  NEGATIVE for changes in mood or affect    Exam:    Vitals: Resp 20   Ht 1.594 m (5' 2.75\")   Wt 70.3 kg (155 lb)   BMI 27.68 kg/m    BMI= Body mass index is 27.68 kg/m .   Hip Range of motion:  Left external rotation 45 degrees, internal rotation 0 degrees.  No pain.  Right external rotation 30 degrees, internal " rotation 10 degrees.  No pain.  Flexion full.  Extension mildly limited bilaterally .  Sensation, motor, and circulation intact.  Well healed scar.  No erythema or increased warmth.  Ambulation: no abductor lurch.  She takes short steps with each leg.  She walks the same with or without her cane.    Assessment:  right total hip arthroplasty doing well.  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  Pain early in the morning may be due to stiffness from not moving at all during the night.  I do not think there is much we can do about this.    Plan:  Activity as tolerated.  Continue amoxacillin 4 tablets  for dental work.  I recommend this forever due to diabetes mellitus .  Return to clinic 4 years with xray - low AP pelvis with lateral  right hip.    Again, thank you for allowing me to participate in the care of your patient.        Sincerely,        Meek Cifuentes MD    Electronically signed

## 2025-04-21 NOTE — PROGRESS NOTES
Glenda Bales is in for follow up of right total hip arthroplasty on 24 with direct anterior approach .    She complains of persistent numbness of the right thigh.  She also has pain in the hip and groin when she first gets up in the morning.  After she is up and about a bit, it gets better.  She sleeps on her back, and does not think she moves at night.  She walks well.  She is using a cane, but does not use it indoors.  She uses the cane mainly for balance    X-ray of the pelvis shows right total hip arthroplasty in good position.  No sign of loosening or wear..    Past Medical History:   Diagnosis Date    Cataract     Diabetes (H) 2019    Essential hypertension, benign     Nonrheumatic aortic valve insufficiency     Osteoarthritis of knee, unspecified laterality, unspecified osteoarthritis type     Thyroid disease        Past Surgical History:   Procedure Laterality Date    ARTHROPLASTY HIP ANTERIOR Right 2024    Procedure: Right total hip arthroplasty, Direct anterior approach;  Surgeon: Meek Cifuentes MD;  Location: [facility]    CATARACT IOL, RT/LT Right 2018    Model ZCB00 SN:[id number] +11.5 Dioptor - Jermaine Akers MD ([facility])    CATARACT IOL, RT/LT Left 2018    Model ZCB00 SN:[id number] +12.5 Dioptor - Jermaine Akers MD ([facility])    DILATION AND CURETTAGE SUCTION      AB    DILATION AND CURETTAGE SUCTION      in 40's    NO HISTORY OF SURGERY         Family History   Problem Relation Age of Onset    Diabetes Mother     Coronary Artery Disease Father          with a heart attack    Glaucoma Cousin     Colon Cancer No family hx of     Breast Cancer No family hx of     Macular Degeneration No family hx of        Social History     Socioeconomic History    Marital status: Single     Spouse name: Not on file    Number of children: 0    Years of education: Not on file    Highest education level: Not on file   Occupational History    Occupation:    Tobacco Use    Smoking  status: Former     Current packs/day: 0.00     Types: Cigarettes     Quit date: 6/15/1977     Years since quittin.8     Passive exposure: Past    Smokeless tobacco: Never    Tobacco comments:     quit in    Vaping Use    Vaping status: Never Used   Substance and Sexual Activity    Alcohol use: Not Currently     Comment: 1 red wine before bef    Drug use: No    Sexual activity: Not Currently     Partners: Male     Birth control/protection: Abstinence   Other Topics Concern    Parent/sibling w/ CABG, MI or angioplasty before 65F 55M? Yes   Social History Narrative    Not on file     Social Drivers of Health     Financial Resource Strain: Low Risk  (2024)    Financial Resource Strain     Within the past 12 months, have you or your family members you live with been unable to get utilities (heat, electricity) when it was really needed?: No   Food Insecurity: Low Risk  (2024)    Food Insecurity     Within the past 12 months, did you worry that your food would run out before you got money to buy more?: No     Within the past 12 months, did the food you bought just not last and you didn t have money to get more?: No   Transportation Needs: Low Risk  (2024)    Transportation Needs     Within the past 12 months, has lack of transportation kept you from medical appointments, getting your medicines, non-medical meetings or appointments, work, or from getting things that you need?: No   Physical Activity: Insufficiently Active (2024)    Exercise Vital Sign     Days of Exercise per Week: 1 day     Minutes of Exercise per Session: 10 min   Stress: No Stress Concern Present (2024)    [place] [employer] - Occupational Stress Questionnaire     Feeling of Stress : Only a little   Recent Concern: Stress - Stress Concern Present (2024)    [place] [employer] - Occupational Stress Questionnaire     Feeling of Stress : To some extent   Social Connections:  Moderately Isolated (9/27/2024)    Social Connection and Isolation Panel [NHANES]     Frequency of Communication with Friends and Family: More than three times a week     Frequency of Social Gatherings with Friends and Family: Once a week     Attends Spiritism Services: 1 to 4 times per year     Active Member of Clubs or Organizations: No     Attends Club or Organization Meetings: Never     Marital Status: Never    Interpersonal Safety: Low Risk  (10/3/2024)    Interpersonal Safety     Do you feel physically and emotionally safe where you currently live?: Yes     Within the past 12 months, have you been hit, slapped, kicked or otherwise physically hurt by someone?: No     Within the past 12 months, have you been humiliated or emotionally abused in other ways by your partner or ex-partner?: No   Housing Stability: Low Risk  (9/27/2024)    Housing Stability     Do you have housing? : Yes     Are you worried about losing your housing?: No       Current Outpatient Medications   Medication Sig Dispense Refill    acetaminophen (TYLENOL) 325 MG tablet Take 3 tablets (975 mg) by mouth every 8 hours as needed for other (mild pain). 100 tablet 0    alcohol swab prep pads Use to swab area of injection/milena as directed. 100 each 3    atorvastatin (LIPITOR) 10 MG tablet Take 1 tablet (10 mg) by mouth daily. 90 tablet 3    blood glucose (NO BRAND SPECIFIED) test strip Use to test blood sugar 1 times daily or as directed. 100 strip 11    famotidine (PEPCID) 40 MG tablet Take 1 tablet (40 mg) by mouth daily. 30 tablet 1    fluticasone (FLONASE) 50 MCG/ACT spray Spray 1 spray into both nostrils daily as needed       furosemide (LASIX) 20 MG tablet Take 1 tablet (20 mg) by mouth daily. 90 tablet 3    gabapentin (NEURONTIN) 100 MG capsule Take 1 capsule (100 mg) by mouth 2 times daily 180 capsule 3    ketotifen (ZADITOR) 0.025 % ophthalmic solution Place 1 drop into both eyes 2 times daily as needed for itching       "levothyroxine (SYNTHROID/LEVOTHROID) 112 MCG tablet TAKE 1 TABLET(112 MCG) BY MOUTH DAILY 90 tablet 2    losartan (COZAAR) 25 MG tablet Take 1 tablet (25 mg) by mouth daily. 90 tablet 3    omeprazole (PRILOSEC) 20 MG DR capsule Take 1 capsule (20 mg) by mouth daily 90 capsule 3    traZODone (DESYREL) 50 MG tablet Take 0.5 tablets (25 mg) by mouth at bedtime. 15 tablet 3       Allergies   Allergen Reactions    Diltiazem Hcl Swelling     Leg swelling    Norvasc [Amlodipine] Swelling     Leg swelling    Seasonal Allergies Other (See Comments)     Eye nose throat irritation       REVIEW OF SYSTEMS:  CONSTITUTIONAL:  NEGATIVE for fever, chills, change in weight  INTEGUMENTARY/SKIN:  NEGATIVE for worrisome rashes, moles or lesions  EYES:  NEGATIVE for vision changes or irritation  ENT/MOUTH:  NEGATIVE for ear, mouth and throat problems  RESP:  NEGATIVE for significant cough or SOB  BREAST:  NEGATIVE for masses, tenderness or discharge  CV:  NEGATIVE for chest pain, palpitations or peripheral edema  GI:  NEGATIVE for nausea, abdominal pain, heartburn, or change in bowel habits  :  Negative   MUSCULOSKELETAL:  See HPI above  NEURO:  NEGATIVE for weakness, dizziness or paresthesias  ENDOCRINE:  NEGATIVE for temperature intolerance, skin/hair changes  HEME/ALLERGY/IMMUNE:  NEGATIVE for bleeding problems  PSYCHIATRIC:  NEGATIVE for changes in mood or affect    Exam:    Vitals: Resp 20   Ht 1.594 m (5' 2.75\")   Wt 70.3 kg (155 lb)   BMI 27.68 kg/m    BMI= Body mass index is 27.68 kg/m .   Hip Range of motion:  Left external rotation 45 degrees, internal rotation 0 degrees.  No pain.  Right external rotation 30 degrees, internal rotation 10 degrees.  No pain.  Flexion full.  Extension mildly limited bilaterally .  Sensation, motor, and circulation intact.  Well healed scar.  No erythema or increased warmth.  Ambulation: no abductor lurch.  She takes short steps with each leg.  She walks the same with or without her " cane.    Assessment:  right total hip arthroplasty doing well.  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  Pain early in the morning may be due to stiffness from not moving at all during the night.  I do not think there is much we can do about this.    Plan:  Activity as tolerated.  Continue amoxacillin 4 tablets  for dental work.  I recommend this forever due to diabetes mellitus .  Return to clinic 4 years with xray - low AP pelvis with lateral  right hip.

## 2025-05-07 PROBLEM — G89.29 CHRONIC RIGHT HIP PAIN: Status: RESOLVED | Noted: 2024-09-19 | Resolved: 2025-05-07

## 2025-05-07 PROBLEM — M25.551 CHRONIC RIGHT HIP PAIN: Status: RESOLVED | Noted: 2024-09-19 | Resolved: 2025-05-07

## 2025-05-08 DIAGNOSIS — I87.2 VENOUS STASIS DERMATITIS, UNSPECIFIED LATERALITY: ICD-10-CM

## 2025-05-08 RX ORDER — FUROSEMIDE 20 MG/1
20 TABLET ORAL DAILY
Qty: 90 TABLET | Refills: 3 | OUTPATIENT
Start: 2025-05-08

## 2025-07-01 ENCOUNTER — MYC REFILL (OUTPATIENT)
Dept: FAMILY MEDICINE | Facility: CLINIC | Age: 75
End: 2025-07-01
Payer: MEDICARE

## 2025-07-01 DIAGNOSIS — G60.9 IDIOPATHIC PERIPHERAL NEUROPATHY: ICD-10-CM

## 2025-07-01 DIAGNOSIS — K21.9 GASTROESOPHAGEAL REFLUX DISEASE, UNSPECIFIED WHETHER ESOPHAGITIS PRESENT: ICD-10-CM

## 2025-07-02 RX ORDER — GABAPENTIN 100 MG/1
100 CAPSULE ORAL 2 TIMES DAILY
Qty: 180 CAPSULE | Refills: 3 | Status: SHIPPED | OUTPATIENT
Start: 2025-07-02

## 2025-07-02 RX ORDER — OMEPRAZOLE 20 MG/1
20 CAPSULE, DELAYED RELEASE ORAL DAILY
Qty: 90 CAPSULE | Refills: 0 | Status: SHIPPED | OUTPATIENT
Start: 2025-07-02

## 2025-07-31 ENCOUNTER — OFFICE VISIT (OUTPATIENT)
Dept: FAMILY MEDICINE | Facility: CLINIC | Age: 75
End: 2025-07-31
Payer: MEDICARE

## 2025-07-31 VITALS
RESPIRATION RATE: 16 BRPM | BODY MASS INDEX: 26.58 KG/M2 | HEART RATE: 70 BPM | WEIGHT: 150 LBS | SYSTOLIC BLOOD PRESSURE: 129 MMHG | OXYGEN SATURATION: 99 % | HEIGHT: 63 IN | DIASTOLIC BLOOD PRESSURE: 72 MMHG | TEMPERATURE: 97 F

## 2025-07-31 DIAGNOSIS — E78.5 HYPERLIPIDEMIA LDL GOAL <70: ICD-10-CM

## 2025-07-31 DIAGNOSIS — I12.9 BENIGN HYPERTENSION WITH CKD (CHRONIC KIDNEY DISEASE) STAGE III (H): ICD-10-CM

## 2025-07-31 DIAGNOSIS — N18.30 BENIGN HYPERTENSION WITH CKD (CHRONIC KIDNEY DISEASE) STAGE III (H): ICD-10-CM

## 2025-07-31 DIAGNOSIS — R14.3 EXCESSIVE GAS: Primary | ICD-10-CM

## 2025-07-31 ASSESSMENT — PAIN SCALES - GENERAL: PAINLEVEL_OUTOF10: NO PAIN (0)

## 2025-07-31 ASSESSMENT — ENCOUNTER SYMPTOMS: FLATUS: 1

## 2025-07-31 NOTE — PROGRESS NOTES
Assessment & Plan   (R14.3) Excessive gas  (primary encounter diagnosis)  Comment: discussed watch diet   Plan: increase activity  Discussed Foods to avoid  Follow up if not better     (E78.5) Hyperlipidemia LDL goal <70  Comment: advised take statins as recommended to decrease ascvd risk as discussed   Plan: follow up Diabetes and Other labs check 1 month     (I12.9,  N18.30) Benign hypertension with CKD (chronic kidney disease) stage III (H)  Comment: stop Lasix   Plan: follow up 1 month      There are no diagnoses linked to this encounter.    Follow-up    Follow-up Visit   Expected date:  Aug 31, 2025 (Approximate)      Follow Up Appointment Details:     Follow-up with whom?: Me    Follow-Up for what?: Chronic Disease f/u    Chronic Disease f/u: Diabetes    How?: In Person             The longitudinal plan of care for the diagnosis(es)/condition(s) as documented were addressed during this visit. Due to the added complexity in care, I will continue to support Glenda in the subsequent management and with ongoing continuity of care.  Sooner if worse or any new symptoms  Subjective   Glenda is a 74 year old, presenting for the following health issues:  Gas and Hypertension (Blood pressure has been running low. )        7/31/2025     8:45 AM   Additional Questions   Roomed by Allysonmatt Dillon    History of Present Illness       Reason for visit:  Gas  Symptom onset:  1-2 weeks ago  Symptom progression:  Staying the same  Had these symptoms before:  No   She is taking medications regularly.        Has regular Bowel movements  Pt says she is not active much  The patient denies abdominal or flank pain, anorexia, nausea or vomiting, dysphagia, change in bowel habits or black or bloody stools.  No red flags  She also says her Blood Pressure tends to be Lower   No chest pain  No sob  Feels Tired at Times            Review of Systems  CONSTITUTIONAL:NEGATIVE  for fever  and chills  ENT/MOUTH: NEGATIVE for ear, mouth and  "throat problems  RESP: NEGATIVE for significant cough or SOB  CV: NEGATIVE for chest pain, palpitations or peripheral edema  GI: NEGATIVE for nausea, abdominal pain, heartburn, or change in bowel habits  MUSCULOSKELETAL: walks slowly with a cane  PSYCHIATRIC: NEGATIVE for changes in mood or affect  Rest of the ROS is Negative except see above and Problem list [stable]        Objective    /72   Pulse 70   Temp 97  F (36.1  C) (Temporal)   Resp 16   Ht 1.594 m (5' 2.76\")   Wt 68 kg (150 lb)   SpO2 99%   BMI 26.78 kg/m    Body mass index is 26.78 kg/m .  Physical Exam   GENERAL: alert and no distress  NECK: no adenopathy, no asymmetry, masses, or scars  RESP: lungs clear to auscultation - no rales, rhonchi or wheezes  CV: regular rate and rhythm, normal S1 S2, no S3 or S4, no murmur, click or rub, no peripheral edema  ABDOMEN: soft, nontender, no hepatosplenomegaly, no masses and bowel sounds normal  MS: walks slowly with a walker     Pending         Signed Electronically by: Marysol Cisse MD    "

## 2025-08-04 ENCOUNTER — PATIENT OUTREACH (OUTPATIENT)
Dept: CARE COORDINATION | Facility: CLINIC | Age: 75
End: 2025-08-04

## 2025-08-04 PROBLEM — K21.9 GASTROESOPHAGEAL REFLUX DISEASE, UNSPECIFIED WHETHER ESOPHAGITIS PRESENT: Status: RESOLVED | Noted: 2023-08-16 | Resolved: 2025-08-04

## 2025-08-04 PROBLEM — M16.11 PRIMARY OSTEOARTHRITIS OF RIGHT HIP: Status: RESOLVED | Noted: 2024-06-03 | Resolved: 2025-08-04

## 2025-08-04 PROBLEM — Z96.649 S/P TOTAL HIP ARTHROPLASTY: Status: RESOLVED | Noted: 2024-08-28 | Resolved: 2025-08-04

## 2025-08-04 PROBLEM — R74.8 ABNORMAL LEVELS OF OTHER SERUM ENZYMES: Status: RESOLVED | Noted: 2024-02-29 | Resolved: 2025-08-04

## 2025-08-04 PROBLEM — M16.12 PRIMARY OSTEOARTHRITIS OF LEFT HIP: Status: RESOLVED | Noted: 2021-01-12 | Resolved: 2025-08-04

## 2025-08-25 ENCOUNTER — OFFICE VISIT (OUTPATIENT)
Dept: FAMILY MEDICINE | Facility: CLINIC | Age: 75
End: 2025-08-25
Payer: MEDICARE

## 2025-08-25 VITALS
OXYGEN SATURATION: 99 % | SYSTOLIC BLOOD PRESSURE: 138 MMHG | RESPIRATION RATE: 14 BRPM | WEIGHT: 153.8 LBS | DIASTOLIC BLOOD PRESSURE: 82 MMHG | HEIGHT: 63 IN | BODY MASS INDEX: 27.25 KG/M2 | HEART RATE: 60 BPM | TEMPERATURE: 97.5 F

## 2025-08-25 DIAGNOSIS — E11.22 TYPE 2 DIABETES MELLITUS WITH STAGE 3B CHRONIC KIDNEY DISEASE, WITHOUT LONG-TERM CURRENT USE OF INSULIN (H): ICD-10-CM

## 2025-08-25 DIAGNOSIS — E78.5 HYPERLIPIDEMIA LDL GOAL <70: ICD-10-CM

## 2025-08-25 DIAGNOSIS — I12.9 BENIGN HYPERTENSION WITH CKD (CHRONIC KIDNEY DISEASE) STAGE III (H): ICD-10-CM

## 2025-08-25 DIAGNOSIS — N18.32 STAGE 3B CHRONIC KIDNEY DISEASE (H): ICD-10-CM

## 2025-08-25 DIAGNOSIS — N18.32 TYPE 2 DIABETES MELLITUS WITH STAGE 3B CHRONIC KIDNEY DISEASE, WITHOUT LONG-TERM CURRENT USE OF INSULIN (H): ICD-10-CM

## 2025-08-25 DIAGNOSIS — N18.30 BENIGN HYPERTENSION WITH CKD (CHRONIC KIDNEY DISEASE) STAGE III (H): ICD-10-CM

## 2025-08-25 DIAGNOSIS — F10.91 ALCOHOL USE DISORDER IN REMISSION: ICD-10-CM

## 2025-08-25 LAB
EST. AVERAGE GLUCOSE BLD GHB EST-MCNC: 108 MG/DL
HBA1C MFR BLD: 5.4 % (ref 0–5.6)
HGB BLD-MCNC: 13.8 G/DL (ref 11.7–15.7)
MCV RBC AUTO: 87.6 FL (ref 78–100)

## 2025-08-25 PROCEDURE — 99214 OFFICE O/P EST MOD 30 MIN: CPT | Performed by: FAMILY MEDICINE

## 2025-08-25 PROCEDURE — 36415 COLL VENOUS BLD VENIPUNCTURE: CPT | Performed by: FAMILY MEDICINE

## 2025-08-25 PROCEDURE — 3044F HG A1C LEVEL LT 7.0%: CPT | Performed by: FAMILY MEDICINE

## 2025-08-25 PROCEDURE — 83036 HEMOGLOBIN GLYCOSYLATED A1C: CPT | Performed by: FAMILY MEDICINE

## 2025-08-25 PROCEDURE — 85018 HEMOGLOBIN: CPT | Performed by: FAMILY MEDICINE

## 2025-08-25 PROCEDURE — G2211 COMPLEX E/M VISIT ADD ON: HCPCS | Performed by: FAMILY MEDICINE

## 2025-08-25 ASSESSMENT — PAIN SCALES - GENERAL: PAINLEVEL_OUTOF10: NO PAIN (0)

## 2025-09-03 ENCOUNTER — PATIENT OUTREACH (OUTPATIENT)
Dept: CARE COORDINATION | Facility: CLINIC | Age: 75
End: 2025-09-03
Payer: MEDICARE

## (undated) DEVICE — HOOD FLYTE 0408-800-000

## (undated) DEVICE — GLOVE BIOGEL PI INDICATOR 8.0 LF 41680

## (undated) DEVICE — DRAPE STERI U 1015

## (undated) DEVICE — DRILL BIT BIOM QUICK CONNECTING RING LOCK 3.2X30MM 31-323230

## (undated) DEVICE — SU ETHIBOND 2 V-37 4X30" MX69G

## (undated) DEVICE — KIT PATIENT CARE HANA TABLE PROFX SUPINE 6855

## (undated) DEVICE — BLADE SAW SAGITTAL STRK 25X90X1.19MM HD SYS 6 6125-119-090

## (undated) DEVICE — DRAPE POUCH IRR 1016

## (undated) DEVICE — GLOVE BIOGEL PI SZ 7.5 40875

## (undated) DEVICE — BLADE KNIFE SURG 10 371110

## (undated) DEVICE — GLOVE BIOGEL INDICATOR 7.5 LF 41675

## (undated) DEVICE — PREP CHLORAPREP 26ML TINTED ORANGE  260815

## (undated) DEVICE — SUCTION IRR SYSTEM W/O TIP INTERPULSE HANDPIECE 0210-100-000

## (undated) DEVICE — ESU PENCIL SMOKE EVAC W/ROCKER SWITCH 0703-047-000

## (undated) DEVICE — ESU BIPOLAR SEALER AQUAMANTYS 6MM 23-112-1

## (undated) DEVICE — DRAPE U SPLIT 74X120" 29440

## (undated) DEVICE — PACK TOTAL JOINT STD LATEX

## (undated) DEVICE — BONE CLEANING TIP INTERPULSE  0210-010-000

## (undated) DEVICE — SU VICRYL 2-0 CT-2 27" UND J269H

## (undated) DEVICE — SUTURE VICRYL+ 2 27IN CP UNDYED VCP195H

## (undated) DEVICE — DRSG AQUACEL AG HYDROFIBER  3.5X10" 422605

## (undated) DEVICE — SOL NACL 0.9% IRRIG 3000ML BAG 07972-08

## (undated) DEVICE — DRAPE POUCH INSTRUMENT 1018

## (undated) DEVICE — SU MONOCRYL 3-0 PS-2 18" UND Y497G

## (undated) DEVICE — DRAPE IOBAN INCISE 36X23" 6651EZ

## (undated) DEVICE — SU VICRYL 0 CT-1 36" J946H

## (undated) DEVICE — SU DERMABOND ADVANCED .7ML DNX12

## (undated) DEVICE — SOL WATER IRRIG 1000ML BOTTLE 07139-09

## (undated) DEVICE — DRAPE C-ARM PACK 07PK803

## (undated) RX ORDER — PROPOFOL 10 MG/ML
INJECTION, EMULSION INTRAVENOUS
Status: DISPENSED
Start: 2024-08-28

## (undated) RX ORDER — EPINEPHRINE 1 MG/ML
INJECTION, SOLUTION INTRAMUSCULAR; SUBCUTANEOUS
Status: DISPENSED
Start: 2024-08-28

## (undated) RX ORDER — HYDROMORPHONE HYDROCHLORIDE 1 MG/ML
INJECTION, SOLUTION INTRAMUSCULAR; INTRAVENOUS; SUBCUTANEOUS
Status: DISPENSED
Start: 2024-08-28

## (undated) RX ORDER — FENTANYL CITRATE 50 UG/ML
INJECTION, SOLUTION INTRAMUSCULAR; INTRAVENOUS
Status: DISPENSED
Start: 2024-08-28

## (undated) RX ORDER — LIDOCAINE HYDROCHLORIDE 10 MG/ML
INJECTION, SOLUTION EPIDURAL; INFILTRATION; INTRACAUDAL; PERINEURAL
Status: DISPENSED
Start: 2024-08-28

## (undated) RX ORDER — DIMENHYDRINATE 50 MG/ML
INJECTION, SOLUTION INTRAMUSCULAR; INTRAVENOUS
Status: DISPENSED
Start: 2024-08-28

## (undated) RX ORDER — VANCOMYCIN HYDROCHLORIDE 1 G/20ML
INJECTION, POWDER, LYOPHILIZED, FOR SOLUTION INTRAVENOUS
Status: DISPENSED
Start: 2024-08-28

## (undated) RX ORDER — GLYCOPYRROLATE 0.2 MG/ML
INJECTION, SOLUTION INTRAMUSCULAR; INTRAVENOUS
Status: DISPENSED
Start: 2024-08-28

## (undated) RX ORDER — ONDANSETRON 2 MG/ML
INJECTION INTRAMUSCULAR; INTRAVENOUS
Status: DISPENSED
Start: 2024-08-28